# Patient Record
Sex: MALE | Race: WHITE | NOT HISPANIC OR LATINO | URBAN - METROPOLITAN AREA
[De-identification: names, ages, dates, MRNs, and addresses within clinical notes are randomized per-mention and may not be internally consistent; named-entity substitution may affect disease eponyms.]

---

## 2022-12-12 ENCOUNTER — INPATIENT (INPATIENT)
Facility: HOSPITAL | Age: 77
LOS: 2 days | Discharge: HOME | DRG: 682 | End: 2022-12-15
Attending: GENERAL PRACTICE | Admitting: FAMILY MEDICINE
Payer: MEDICARE

## 2022-12-12 VITALS
RESPIRATION RATE: 24 BRPM | TEMPERATURE: 96 F | WEIGHT: 210.1 LBS | OXYGEN SATURATION: 94 % | HEIGHT: 70 IN | HEART RATE: 111 BPM | SYSTOLIC BLOOD PRESSURE: 111 MMHG | DIASTOLIC BLOOD PRESSURE: 77 MMHG

## 2022-12-12 DIAGNOSIS — J18.9 PNEUMONIA, UNSPECIFIED ORGANISM: ICD-10-CM

## 2022-12-12 DIAGNOSIS — N17.9 ACUTE KIDNEY FAILURE, UNSPECIFIED: ICD-10-CM

## 2022-12-12 DIAGNOSIS — Z29.9 ENCOUNTER FOR PROPHYLACTIC MEASURES, UNSPECIFIED: ICD-10-CM

## 2022-12-12 DIAGNOSIS — E78.5 HYPERLIPIDEMIA, UNSPECIFIED: ICD-10-CM

## 2022-12-12 DIAGNOSIS — I10 ESSENTIAL (PRIMARY) HYPERTENSION: ICD-10-CM

## 2022-12-12 DIAGNOSIS — Z87.2 PERSONAL HISTORY OF DISEASES OF THE SKIN AND SUBCUTANEOUS TISSUE: ICD-10-CM

## 2022-12-12 DIAGNOSIS — R74.01 ELEVATION OF LEVELS OF LIVER TRANSAMINASE LEVELS: ICD-10-CM

## 2022-12-12 LAB
ALBUMIN SERPL ELPH-MCNC: 2.7 G/DL — LOW (ref 3.3–5)
ALP SERPL-CCNC: 301 U/L — HIGH (ref 40–120)
ALT FLD-CCNC: 83 U/L — HIGH (ref 12–78)
ANION GAP SERPL CALC-SCNC: 14 MMOL/L — SIGNIFICANT CHANGE UP (ref 5–17)
AST SERPL-CCNC: 74 U/L — HIGH (ref 15–37)
BASOPHILS # BLD AUTO: 0.04 K/UL — SIGNIFICANT CHANGE UP (ref 0–0.2)
BASOPHILS NFR BLD AUTO: 0.2 % — SIGNIFICANT CHANGE UP (ref 0–2)
BILIRUB SERPL-MCNC: 1.1 MG/DL — SIGNIFICANT CHANGE UP (ref 0.2–1.2)
BUN SERPL-MCNC: 37 MG/DL — HIGH (ref 7–23)
CALCIUM SERPL-MCNC: 8.7 MG/DL — SIGNIFICANT CHANGE UP (ref 8.5–10.1)
CHLORIDE SERPL-SCNC: 96 MMOL/L — SIGNIFICANT CHANGE UP (ref 96–108)
CO2 SERPL-SCNC: 25 MMOL/L — SIGNIFICANT CHANGE UP (ref 22–31)
CREAT SERPL-MCNC: 1.8 MG/DL — HIGH (ref 0.5–1.3)
EGFR: 39 ML/MIN/1.73M2 — LOW
EOSINOPHIL # BLD AUTO: 0.05 K/UL — SIGNIFICANT CHANGE UP (ref 0–0.5)
EOSINOPHIL NFR BLD AUTO: 0.2 % — SIGNIFICANT CHANGE UP (ref 0–6)
GLUCOSE SERPL-MCNC: 95 MG/DL — SIGNIFICANT CHANGE UP (ref 70–99)
HCT VFR BLD CALC: 43.5 % — SIGNIFICANT CHANGE UP (ref 39–50)
HGB BLD-MCNC: 14.5 G/DL — SIGNIFICANT CHANGE UP (ref 13–17)
IMM GRANULOCYTES NFR BLD AUTO: 1.2 % — HIGH (ref 0–0.9)
LACTATE SERPL-SCNC: 2.2 MMOL/L — HIGH (ref 0.7–2)
LYMPHOCYTES # BLD AUTO: 0.56 K/UL — LOW (ref 1–3.3)
LYMPHOCYTES # BLD AUTO: 2.7 % — LOW (ref 13–44)
MAGNESIUM SERPL-MCNC: 2.1 MG/DL — SIGNIFICANT CHANGE UP (ref 1.6–2.6)
MCHC RBC-ENTMCNC: 26.5 PG — LOW (ref 27–34)
MCHC RBC-ENTMCNC: 33.3 GM/DL — SIGNIFICANT CHANGE UP (ref 32–36)
MCV RBC AUTO: 79.4 FL — LOW (ref 80–100)
MONOCYTES # BLD AUTO: 0.7 K/UL — SIGNIFICANT CHANGE UP (ref 0–0.9)
MONOCYTES NFR BLD AUTO: 3.3 % — SIGNIFICANT CHANGE UP (ref 2–14)
NEUTROPHILS # BLD AUTO: 19.42 K/UL — HIGH (ref 1.8–7.4)
NEUTROPHILS NFR BLD AUTO: 92.4 % — HIGH (ref 43–77)
NRBC # BLD: 0 /100 WBCS — SIGNIFICANT CHANGE UP (ref 0–0)
NT-PROBNP SERPL-SCNC: 3091 PG/ML — HIGH (ref 0–450)
PLATELET # BLD AUTO: 186 K/UL — SIGNIFICANT CHANGE UP (ref 150–400)
POTASSIUM SERPL-MCNC: 3.5 MMOL/L — SIGNIFICANT CHANGE UP (ref 3.5–5.3)
POTASSIUM SERPL-SCNC: 3.5 MMOL/L — SIGNIFICANT CHANGE UP (ref 3.5–5.3)
PROT SERPL-MCNC: 6.3 G/DL — SIGNIFICANT CHANGE UP (ref 6–8.3)
RAPID RVP RESULT: SIGNIFICANT CHANGE UP
RBC # BLD: 5.48 M/UL — SIGNIFICANT CHANGE UP (ref 4.2–5.8)
RBC # FLD: 15 % — HIGH (ref 10.3–14.5)
SARS-COV-2 RNA SPEC QL NAA+PROBE: SIGNIFICANT CHANGE UP
SODIUM SERPL-SCNC: 135 MMOL/L — SIGNIFICANT CHANGE UP (ref 135–145)
WBC # BLD: 21.02 K/UL — HIGH (ref 3.8–10.5)
WBC # FLD AUTO: 21.02 K/UL — HIGH (ref 3.8–10.5)

## 2022-12-12 PROCEDURE — 99285 EMERGENCY DEPT VISIT HI MDM: CPT | Mod: FS,CS

## 2022-12-12 PROCEDURE — 93010 ELECTROCARDIOGRAM REPORT: CPT

## 2022-12-12 PROCEDURE — 99223 1ST HOSP IP/OBS HIGH 75: CPT | Mod: GC

## 2022-12-12 PROCEDURE — 71046 X-RAY EXAM CHEST 2 VIEWS: CPT | Mod: 26

## 2022-12-12 RX ORDER — BISOPROLOL FUMARATE 10 MG/1
1 TABLET, FILM COATED ORAL
Qty: 0 | Refills: 0 | DISCHARGE

## 2022-12-12 RX ORDER — AMLODIPINE BESYLATE 2.5 MG/1
10 TABLET ORAL DAILY
Refills: 0 | Status: DISCONTINUED | OUTPATIENT
Start: 2022-12-13 | End: 2022-12-15

## 2022-12-12 RX ORDER — SIMVASTATIN 20 MG/1
1 TABLET, FILM COATED ORAL
Qty: 0 | Refills: 0 | DISCHARGE

## 2022-12-12 RX ORDER — CEFTRIAXONE 500 MG/1
1000 INJECTION, POWDER, FOR SOLUTION INTRAMUSCULAR; INTRAVENOUS EVERY 24 HOURS
Refills: 0 | Status: DISCONTINUED | OUTPATIENT
Start: 2022-12-13 | End: 2022-12-15

## 2022-12-12 RX ORDER — CEFTRIAXONE 500 MG/1
1000 INJECTION, POWDER, FOR SOLUTION INTRAMUSCULAR; INTRAVENOUS ONCE
Refills: 0 | Status: COMPLETED | OUTPATIENT
Start: 2022-12-12 | End: 2022-12-12

## 2022-12-12 RX ORDER — METOPROLOL TARTRATE 50 MG
200 TABLET ORAL DAILY
Refills: 0 | Status: DISCONTINUED | OUTPATIENT
Start: 2022-12-13 | End: 2022-12-15

## 2022-12-12 RX ORDER — AZITHROMYCIN 500 MG/1
500 TABLET, FILM COATED ORAL EVERY 24 HOURS
Refills: 0 | Status: DISCONTINUED | OUTPATIENT
Start: 2022-12-13 | End: 2022-12-14

## 2022-12-12 RX ORDER — SODIUM CHLORIDE 9 MG/ML
2000 INJECTION INTRAMUSCULAR; INTRAVENOUS; SUBCUTANEOUS ONCE
Refills: 0 | Status: COMPLETED | OUTPATIENT
Start: 2022-12-12 | End: 2022-12-12

## 2022-12-12 RX ORDER — SIMVASTATIN 20 MG/1
10 TABLET, FILM COATED ORAL AT BEDTIME
Refills: 0 | Status: DISCONTINUED | OUTPATIENT
Start: 2022-12-12 | End: 2022-12-13

## 2022-12-12 RX ORDER — SODIUM CHLORIDE 9 MG/ML
1000 INJECTION INTRAMUSCULAR; INTRAVENOUS; SUBCUTANEOUS
Refills: 0 | Status: DISCONTINUED | OUTPATIENT
Start: 2022-12-12 | End: 2022-12-13

## 2022-12-12 RX ORDER — CHLORTHALIDONE 50 MG
1 TABLET ORAL
Qty: 0 | Refills: 0 | DISCHARGE

## 2022-12-12 RX ORDER — HEPARIN SODIUM 5000 [USP'U]/ML
5000 INJECTION INTRAVENOUS; SUBCUTANEOUS EVERY 8 HOURS
Refills: 0 | Status: DISCONTINUED | OUTPATIENT
Start: 2022-12-12 | End: 2022-12-14

## 2022-12-12 RX ORDER — AZITHROMYCIN 500 MG/1
500 TABLET, FILM COATED ORAL ONCE
Refills: 0 | Status: COMPLETED | OUTPATIENT
Start: 2022-12-12 | End: 2022-12-12

## 2022-12-12 RX ORDER — AMLODIPINE BESYLATE 2.5 MG/1
1 TABLET ORAL
Qty: 0 | Refills: 0 | DISCHARGE

## 2022-12-12 RX ADMIN — HEPARIN SODIUM 5000 UNIT(S): 5000 INJECTION INTRAVENOUS; SUBCUTANEOUS at 22:34

## 2022-12-12 RX ADMIN — SODIUM CHLORIDE 1000 MILLILITER(S): 9 INJECTION INTRAMUSCULAR; INTRAVENOUS; SUBCUTANEOUS at 20:52

## 2022-12-12 RX ADMIN — AZITHROMYCIN 255 MILLIGRAM(S): 500 TABLET, FILM COATED ORAL at 22:21

## 2022-12-12 RX ADMIN — SIMVASTATIN 10 MILLIGRAM(S): 20 TABLET, FILM COATED ORAL at 22:34

## 2022-12-12 RX ADMIN — CEFTRIAXONE 100 MILLIGRAM(S): 500 INJECTION, POWDER, FOR SOLUTION INTRAMUSCULAR; INTRAVENOUS at 20:52

## 2022-12-12 NOTE — H&P ADULT - NSHPSOCIALHISTORY_GEN_ALL_CORE
Tobacco: former smoker, quit 7 years ago  EtOH: denies  Recreational drug use: denies  Lives with: wife  Ambulates: independent  ADLs: indpendent

## 2022-12-12 NOTE — H&P ADULT - PROBLEM SELECTOR PLAN 1
Patient with 1 month of productive cough, malaise, progressive NICHOLSON  -CXR showing patchy infiltrates bilat, f/u official report  -Patient meeting sepsis criteria on admission with leukocytosis, tachycardia, source likely PNA  -Admit to telemetry  -F/u CT chest  -S/p 2L NS bolus in ED, start maintenance IVF  -Lactate elevated on admission, f/u repeat s/p IVF  -S/p Azithro and Rocephin in ED, continue  -F/u blood cultures, legionella, strep pneumo  -Trend temp and leukocytosis curve, patient on prednisone for psoriasis so leukocytosis likely falsely elevated however baseline unknown  -Tylenol PRN   -ID Dr. Bryant consulted Patient with 1 month of productive cough, malaise, progressive NICHOLSON  -CXR showing patchy infiltrates bilat, f/u official report  -Patient meeting sepsis criteria on admission with leukocytosis, tachycardia, source likely PNA  -Admit to telemetry  -BNP elevated however likely in setting of LIVIER, do not suspect pulm congestion however f/u CT chest  -S/p 2L NS bolus in ED, start maintenance IVF  -Lactate elevated on admission, f/u repeat s/p IVF  -S/p Azithro and Rocephin in ED, continue  -F/u blood cultures, legionella, strep pneumo  -Trend temp and leukocytosis curve, patient on prednisone for psoriasis so leukocytosis likely falsely elevated however baseline unknown  -Tylenol PRN   -ID Dr. Bryant consulted Patient with 1 month of productive cough, malaise, progressive NICHOLSON  -CXR showing patchy infiltrates bilat, f/u official report  -Patient meeting sepsis criteria on admission with leukocytosis, tachycardia, source likely PNA  -Admit to telemetry  -Wells Score 1.5, low risk for PE, suspect tachy in setting of sepsis however if remains tachycardic or develops other signs of PE obtain D-dimer  -BNP elevated however likely in setting of LIVIER, do not suspect pulm congestion, however f/u CT chest  -S/p 2L NS bolus in ED, start maintenance IVF  -Lactate elevated on admission, f/u repeat s/p IVF  -S/p Azithro and Rocephin in ED, continue  -F/u blood cultures, legionella, strep pneumo  -Trend temp and leukocytosis curve, patient on prednisone for psoriasis so leukocytosis likely falsely elevated however baseline unknown  -ID Dr. Bryant consulted  -Pulm Dr. Linton consulted

## 2022-12-12 NOTE — H&P ADULT - PROBLEM SELECTOR PLAN 3
Alk phos 301, AST 74, ALT 83 on admission, unknown baseline  -F/u RUQ US  -Trend LFTs Alk phos 301, AST 74, ALT 83 on admission, unknown baseline  -Trend LFTs, if remains elevated consider RUQ US  -Avoid further hepatotoxic meds

## 2022-12-12 NOTE — ED ADULT NURSE NOTE - ED STAT RN HANDOFF DETAILS
Received the patient in the Er at the time of change of shift. Patient is alert and oriented. Monitoring continuing. pending for bed availability.

## 2022-12-12 NOTE — H&P ADULT - PROBLEM SELECTOR PLAN 4
Continue home amlodipine 10mg qd, therapeutic conversion bisoprolol to metoprolol succ 200mg qd  -Hold chlorthalidone in setting of LIVIER, restart when able  -Monitor routine hemodynamics

## 2022-12-12 NOTE — ED PROVIDER NOTE - CROS ED ROS STATEMENT
Sly Dorado the care coordinator with [facility] called to inform Dr Neetu Brar that the pt has admitted for NSTEMI. He is in room 7410.  If any questions give Sly Dorado a call [phone]
all other ROS negative except as per HPI

## 2022-12-12 NOTE — ED PROVIDER NOTE - CARE PLAN
1 Principal Discharge DX:	Bilateral pneumonia  Secondary Diagnosis:	Dehydration  Secondary Diagnosis:	Shortness of breath

## 2022-12-12 NOTE — H&P ADULT - PROBLEM SELECTOR PLAN 2
BUN 37, Cr 1.80, GFR 39 on admission, baseline renal status unknown   -Likely prerenal in setting of acute infection, poor PO intake  -S/p 2L NS bolus in ED, continue maintenance IVF  -Hold home chlorthalidone and further nephrotoxic meds  -If renal indices do not improve, consider renal consult

## 2022-12-12 NOTE — ED PROVIDER NOTE - WR ORDER DATE AND TIME 1
Detail Level: Simple Additional Notes: Patient consent was obtained to proceed with the visit and recommended plan of care after discussion of all risks and benefits, including the risks of COVID-19 exposure. 12-Dec-2022 17:06

## 2022-12-12 NOTE — ED PROVIDER NOTE - CROS ED GI ALL NEG
negative...
No respiratory distress. No stridor, Lungs sounds clear with good aeration bilaterally. No wheezing

## 2022-12-12 NOTE — ED ADULT NURSE NOTE - OBJECTIVE STATEMENT
Pt a/ox4 reports SOB/NICHOLSON, diminished lung sounds, non productive cough, no other signs of acute distress noted.

## 2022-12-12 NOTE — H&P ADULT - NSHPPHYSICALEXAM_GEN_ALL_CORE
T(C): 35.7 (12-12-22 @ 16:06), Max: 35.7 (12-12-22 @ 16:06)  HR: 111 (12-12-22 @ 16:06) (111 - 111)  BP: 111/77 (12-12-22 @ 16:06) (111/77 - 111/77)  RR: 24 (12-12-22 @ 16:06) (24 - 24)  SpO2: 94% (12-12-22 @ 16:06) (94% - 94%)  Wt(kg): --    Physical Exam:   GENERAL: well-groomed, well-developed, NAD  HEENT: head NC/AT; EOM intact, PERRLA, conjunctiva & sclera clear; hearing grossly intact, dry mucous membranes  NECK: supple, no JVD  RESPIRATORY: decreased  breath sounds bilat, no wheezing rhonchi or rales  CARDIOVASCULAR: S1&S2, RRR, no murmurs  ABDOMEN: soft, non-tender, non-distended, + Bowel sounds x4 quadrants, no guarding, rebound or rigidity  MUSCULOSKELETAL:  no clubbing, cyanosis or edema of all 4 extremities  VASCULAR: Dorsalis pedis pulses 2+ bilaterally,   SKIN: warm and dry, color normal  NEUROLOGIC: answering questions appropriately, moving all extremities spontaneously  Psych: Normal mood and affect, normal behavior

## 2022-12-12 NOTE — H&P ADULT - ASSESSMENT
75 yo male PMH HTN, HLD, psoriasis on daily prednisone presents to ED with 4 weeks of productive cough, malaise, poor PO intake, admitted for PNA, LIVIER, and transaminitis.

## 2022-12-12 NOTE — ED PROVIDER NOTE - EMPLOYMENT
pleural effusion. Acute nondisplaced left 6th and 7th rib fractures. Abdomen and pelvis CT: Bilateral perinephric fat stranding, which may be age   related. Nonetheless, correlate with any clinical evidence of urinary tract   infection. Otherwise no acute abnormality detected within the abdomen and   pelvis. 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 No acute intracranial abnormality. No acute intracranial hemorrhage. Assessment/Plan:    Active Hospital Problems    Diagnosis Date Noted    Cardiac arrest [facility]) [I46.9] 06/06/2020       Cardiac arrest:  -Cause uncertain--appears most likely to be infectious. Troponin and procalcitonin on arrival at [facility] ED were within normal limits. WBC was elevated at 14 in potassium elevated at 5.9.  -Blood cultures obtained at outside hospital.  Follow-up results  -COVID rule out test pending  -Hypothermia protocol initiated. Rewarming to start this evening. Given myoclonic jerking patient has poor neurologic status prognosis     Chronic pleural catheter:  -Uncertain the reason it was placed. Pt does appear to have CHF or perhaps some type of malignancy--CT chest shows concern for potential mesothilioma   -prominent lymph node on CT. In conversation of Dr. Rambo Mcdaniels with family, this was never biopsied/worked up  - may be source of infection    Potential Pneumonia -- HAP  -empiric vanc + Merrem    Left pleural effusion  - Suction.    - need monitor for infection of pleur-x      Shock with lactic acidosis:  - s/p Levo and bicard drips     CELESTINE:  - likely due to hypoperfusion  -cont to trend       DVT Prophylaxis: Outpt Eliquis  Diet: Diet NPO Effective Now  Code Status: Limited    PT/OT Eval Status: n/a    Dispo - ICU, poor neurologic prognosis    Brandon Burr MD Retired

## 2022-12-12 NOTE — ED PROVIDER NOTE - OBJECTIVE STATEMENT
77 yo M PMHx HTN presents to ED c/o productive cough, congestion, malaise x about 3 weeks. Pt reports decreased appetite. States he has been feeling short of breath over the last few days, particularly with coughing bouts. Pt sent in by his PCP for evaluation. Pt denies chest pain, abd pain, back pain, fever/chills, leg pain/swelling, known sick contacts, recent travel.

## 2022-12-12 NOTE — H&P ADULT - NSHPREVIEWOFSYSTEMS_GEN_ALL_CORE
CONSTITUTIONAL: +malaise, +chills, no fevers  HEENT:  No headache, no visual changes, no sore throat  RESPIRATORY: +productive cough, +NICHOLSON, denies wheezing  CARDIOVASCULAR: No chest pain or palpitations  GASTROINTESTINAL: No abdominal pain, no nausea, vomiting, or hematemesis; No diarrhea or constipation. No melena or hematochezia.  GENITOURINARY: No dysuria, frequency or hematuria  NEUROLOGICAL: No focal weakness or dizziness   MSK: no myalgias  SKIN: No itching, burning, rashes, or lesions

## 2022-12-12 NOTE — H&P ADULT - ATTENDING COMMENTS
75 yo male PMH HTN, HLD, psoriasis on daily prednisone presents to ED with 4 weeks of productive cough, malaise, poor PO intake, admitted for b/l cap PNA, LIVIER, and transaminitis. Plan: cont iv antibx, apprec pulm recs, monitor clinical course. f/u ct chest, cont prednisone, trend renal and liver functions

## 2022-12-12 NOTE — H&P ADULT - HISTORY OF PRESENT ILLNESS
75 yo male PMH HTN, HLD, psoriasis on daily prednisone presents to ED with 4 weeks of productive cough, malaise, poor PO intake. Patient states after [place]'s day he developed a productive cough with mucous. States this has remained persistent, however past few days he has had worsening NICHOLSON and SOB. Also c/o chills. Denies fevers, sore throat, HA, CP, palpitations. Patient has tried Mucinex with some improvement, however states his symptoms always return. Patient states he has not been eating or drinking at baseline this past week.     In ED:  Vitals: T 96.3, , /77, RR 24, SpO2 94%  Labs significant for: WBC 21.02, neutrophil 19.42, BUN 37, Cr 1.80, GFR 39, alk phos 301, AST 74, ALT 83, lactate 2.2, BNP 3091  CXR: patchy bilat on wet read  EKG: NSR rate 100, possible LAE, nonspecific ST wave abnormality  Given: IV Azithro x1, IV Rocephin x1, 2L NS bolus x1 75 yo male PMH HTN, HLD, psoriasis on daily prednisone presents to ED with 4 weeks of productive cough, malaise, poor PO intake. Patient states after [place]'s day he developed a productive cough with mucous. States this has remained persistent, however past few days he has had worsening NICHOLSON and SOB. Also c/o chills. Denies fevers, sore throat, HA, CP, palpitations. Patient has tried Mucinex with some improvement, however states his symptoms always return. Patient states he has not been eating or drinking at baseline this past week. Patient denies any coughing after eating or hx of dysphagia.     In ED:  Vitals: T 96.3, , /77, RR 24, SpO2 94%  Labs significant for: WBC 21.02, neutrophil 19.42, BUN 37, Cr 1.80, GFR 39, alk phos 301, AST 74, ALT 83, lactate 2.2, BNP 3091  CXR: patchy bilat on wet read  EKG: NSR rate 100, possible LAE, nonspecific ST wave abnormality  Given: IV Azithro x1, IV Rocephin x1, 2L NS bolus x1

## 2022-12-12 NOTE — ED PROVIDER NOTE - CLINICAL SUMMARY MEDICAL DECISION MAKING FREE TEXT BOX
Patient is a 76-year-old male with a history of high blood pressure is been short of breath for 3 days, suffering cold and flu symptoms for the past 3 weeks not eating or drinking well.  His primary care physician is Dr. Prasad Shirley who sent him into the hospital today for evaluation for dehydration and possible pneumonia.  Patient is 77 birthday is in 1 week, and he is eager to go home.    On evaluation is a well-developed well-nourished male no apparent distress.  HEENT reveals no G/F/R.  Mucous membranes are dry.  Lung sounds are coarse bilaterally at the bases, heart exam is normal.  Abdominal exam is soft nontender no guarding no rebound.  No CVA tenderness.  Musculoskeletal exam is normal.  Skin exam is normal.  Neurologic exam is normal patient is awake and alert    Patient with shortness of breath and a cough with dehydration, rule out pneumonia, rule out sepsis chest x-ray reveals bilateral pneumonia will provide antibiotic therapy White count is 21,000 will provide IV fluid resuscitation, but lactate is elevated will after the IV fluids we will repeat the lactate, admit to the hospital for bilateral pneumonia. ro covid ro viral

## 2022-12-12 NOTE — ED PROVIDER NOTE - NS ED ATTENDING STATEMENT MOD
This was a shared visit with the KALYAN. I reviewed and verified the documentation and independently performed the documented:

## 2022-12-13 LAB
ALBUMIN SERPL ELPH-MCNC: 2.2 G/DL — LOW (ref 3.3–5)
ALP SERPL-CCNC: 232 U/L — HIGH (ref 40–120)
ALT FLD-CCNC: 62 U/L — SIGNIFICANT CHANGE UP (ref 12–78)
ANION GAP SERPL CALC-SCNC: 12 MMOL/L — SIGNIFICANT CHANGE UP (ref 5–17)
AST SERPL-CCNC: 64 U/L — HIGH (ref 15–37)
BASOPHILS # BLD AUTO: 0.04 K/UL — SIGNIFICANT CHANGE UP (ref 0–0.2)
BASOPHILS NFR BLD AUTO: 0.2 % — SIGNIFICANT CHANGE UP (ref 0–2)
BILIRUB SERPL-MCNC: 0.7 MG/DL — SIGNIFICANT CHANGE UP (ref 0.2–1.2)
BUN SERPL-MCNC: 36 MG/DL — HIGH (ref 7–23)
CALCIUM SERPL-MCNC: 7.8 MG/DL — LOW (ref 8.5–10.1)
CHLORIDE SERPL-SCNC: 100 MMOL/L — SIGNIFICANT CHANGE UP (ref 96–108)
CO2 SERPL-SCNC: 24 MMOL/L — SIGNIFICANT CHANGE UP (ref 22–31)
CREAT SERPL-MCNC: 1.5 MG/DL — HIGH (ref 0.5–1.3)
EGFR: 48 ML/MIN/1.73M2 — LOW
EOSINOPHIL # BLD AUTO: 0.13 K/UL — SIGNIFICANT CHANGE UP (ref 0–0.5)
EOSINOPHIL NFR BLD AUTO: 0.8 % — SIGNIFICANT CHANGE UP (ref 0–6)
GLUCOSE SERPL-MCNC: 127 MG/DL — HIGH (ref 70–99)
HCT VFR BLD CALC: 39.9 % — SIGNIFICANT CHANGE UP (ref 39–50)
HCV AB S/CO SERPL IA: 0.08 S/CO — SIGNIFICANT CHANGE UP (ref 0–0.99)
HCV AB SERPL-IMP: SIGNIFICANT CHANGE UP
HGB BLD-MCNC: 12.8 G/DL — LOW (ref 13–17)
IMM GRANULOCYTES NFR BLD AUTO: 0.6 % — SIGNIFICANT CHANGE UP (ref 0–0.9)
LEGIONELLA AG UR QL: NEGATIVE — SIGNIFICANT CHANGE UP
LYMPHOCYTES # BLD AUTO: 0.94 K/UL — LOW (ref 1–3.3)
LYMPHOCYTES # BLD AUTO: 5.5 % — LOW (ref 13–44)
MAGNESIUM SERPL-MCNC: 2.1 MG/DL — SIGNIFICANT CHANGE UP (ref 1.6–2.6)
MCHC RBC-ENTMCNC: 26.5 PG — LOW (ref 27–34)
MCHC RBC-ENTMCNC: 32.1 GM/DL — SIGNIFICANT CHANGE UP (ref 32–36)
MCV RBC AUTO: 82.6 FL — SIGNIFICANT CHANGE UP (ref 80–100)
MONOCYTES # BLD AUTO: 0.64 K/UL — SIGNIFICANT CHANGE UP (ref 0–0.9)
MONOCYTES NFR BLD AUTO: 3.8 % — SIGNIFICANT CHANGE UP (ref 2–14)
NEUTROPHILS # BLD AUTO: 15.19 K/UL — HIGH (ref 1.8–7.4)
NEUTROPHILS NFR BLD AUTO: 89.1 % — HIGH (ref 43–77)
NRBC # BLD: 0 /100 WBCS — SIGNIFICANT CHANGE UP (ref 0–0)
PHOSPHATE SERPL-MCNC: 3.3 MG/DL — SIGNIFICANT CHANGE UP (ref 2.5–4.5)
PLATELET # BLD AUTO: 141 K/UL — LOW (ref 150–400)
POTASSIUM SERPL-MCNC: 3.4 MMOL/L — LOW (ref 3.5–5.3)
POTASSIUM SERPL-SCNC: 3.4 MMOL/L — LOW (ref 3.5–5.3)
PROCALCITONIN SERPL-MCNC: 1.02 NG/ML — HIGH
PROT SERPL-MCNC: 5.3 G/DL — LOW (ref 6–8.3)
RBC # BLD: 4.83 M/UL — SIGNIFICANT CHANGE UP (ref 4.2–5.8)
RBC # FLD: 14.9 % — HIGH (ref 10.3–14.5)
SODIUM SERPL-SCNC: 136 MMOL/L — SIGNIFICANT CHANGE UP (ref 135–145)
WBC # BLD: 17.04 K/UL — HIGH (ref 3.8–10.5)
WBC # FLD AUTO: 17.04 K/UL — HIGH (ref 3.8–10.5)

## 2022-12-13 PROCEDURE — 99233 SBSQ HOSP IP/OBS HIGH 50: CPT

## 2022-12-13 PROCEDURE — 99222 1ST HOSP IP/OBS MODERATE 55: CPT

## 2022-12-13 PROCEDURE — 71250 CT THORAX DX C-: CPT | Mod: 26

## 2022-12-13 PROCEDURE — 93306 TTE W/DOPPLER COMPLETE: CPT | Mod: 26

## 2022-12-13 RX ORDER — POTASSIUM CHLORIDE 20 MEQ
40 PACKET (EA) ORAL ONCE
Refills: 0 | Status: COMPLETED | OUTPATIENT
Start: 2022-12-13 | End: 2022-12-13

## 2022-12-13 RX ORDER — ALBUTEROL 90 UG/1
2 AEROSOL, METERED ORAL EVERY 6 HOURS
Refills: 0 | Status: DISCONTINUED | OUTPATIENT
Start: 2022-12-13 | End: 2022-12-15

## 2022-12-13 RX ORDER — FAMOTIDINE 10 MG/ML
20 INJECTION INTRAVENOUS DAILY
Refills: 0 | Status: DISCONTINUED | OUTPATIENT
Start: 2022-12-13 | End: 2022-12-15

## 2022-12-13 RX ORDER — INFLUENZA VIRUS VACCINE 15; 15; 15; 15 UG/.5ML; UG/.5ML; UG/.5ML; UG/.5ML
0.7 SUSPENSION INTRAMUSCULAR ONCE
Refills: 0 | Status: DISCONTINUED | OUTPATIENT
Start: 2022-12-13 | End: 2022-12-15

## 2022-12-13 RX ADMIN — Medication 10 MILLIGRAM(S): at 07:08

## 2022-12-13 RX ADMIN — AZITHROMYCIN 255 MILLIGRAM(S): 500 TABLET, FILM COATED ORAL at 07:08

## 2022-12-13 RX ADMIN — CEFTRIAXONE 100 MILLIGRAM(S): 500 INJECTION, POWDER, FOR SOLUTION INTRAMUSCULAR; INTRAVENOUS at 21:34

## 2022-12-13 RX ADMIN — AMLODIPINE BESYLATE 10 MILLIGRAM(S): 2.5 TABLET ORAL at 07:08

## 2022-12-13 RX ADMIN — Medication 200 MILLIGRAM(S): at 07:08

## 2022-12-13 RX ADMIN — HEPARIN SODIUM 5000 UNIT(S): 5000 INJECTION INTRAVENOUS; SUBCUTANEOUS at 15:12

## 2022-12-13 RX ADMIN — HEPARIN SODIUM 5000 UNIT(S): 5000 INJECTION INTRAVENOUS; SUBCUTANEOUS at 07:08

## 2022-12-13 RX ADMIN — HEPARIN SODIUM 5000 UNIT(S): 5000 INJECTION INTRAVENOUS; SUBCUTANEOUS at 21:34

## 2022-12-13 RX ADMIN — Medication 40 MILLIEQUIVALENT(S): at 10:00

## 2022-12-13 RX ADMIN — SODIUM CHLORIDE 70 MILLILITER(S): 9 INJECTION INTRAMUSCULAR; INTRAVENOUS; SUBCUTANEOUS at 07:39

## 2022-12-13 NOTE — CONSULT NOTE ADULT - TIME BILLING
Thank you for your consult.   Please call us with any concerns or questions.   We will continue to follow.     Arron Yip MD   Division of Infectious Diseases  [facility]   Cell [phone] between 8am and 6pm   After 6pm and weekends please call ID service at [phone].

## 2022-12-13 NOTE — CONSULT NOTE ADULT - ASSESSMENT
77 yo male PMH HTN, HLD, psoriasis on daily prednisone presents to ED with 4 weeks of productive cough, malaise, poor PO intake. Patient states after 's day he developed a productive cough with mucous. 77 yo male PMH HTN, HLD, psoriasis on daily prednisone presents to ED with 4 weeks of productive cough, malaise, poor PO intake. Patient states after 's day he developed a productive cough with mucous.    pt reports congestion and cough  rvp neg  ct chest pending, cxr with poss PNA     eval for HF  eval for PNA    emp ABX in progress  biomarkers  pt is on chr steroids for Psoriasis -     consider Cardio eval - TTE - and eval for HF - proBNP noted - concern for vol overload and HF    cough rx regimen prn  acap prn  monitor VS  WBC elev - may be due to steroids - plus an Infectious component  tolerating room air  cvs rx regimen and BP control - hx of HTN and HLD  pt is an ex smoker - PFT as outpatient, Proventil PRN for sob and or wheeze - empirically

## 2022-12-13 NOTE — PATIENT PROFILE ADULT - FUNCTIONAL ASSESSMENT - BASIC MOBILITY 6.
3-calculated by average/Not able to assess (calculate score using [facility] averaging method)

## 2022-12-13 NOTE — CONSULT NOTE ADULT - SUBJECTIVE AND OBJECTIVE BOX
Date/Time Patient Seen:  		  Referring MD:   Data Reviewed	       Patient is a 76y old  Male who presents with a chief complaint of sepsis 2/2 PNA (12 Dec 2022 21:03)      Subjective/HPI   Reason for Admission: sepsis 2/2 PNA  History of Present Illness:   75 yo male PMH HTN, HLD, psoriasis on daily prednisone presents to ED with 4 weeks of productive cough, malaise, poor PO intake. Patient states after 's day he developed a productive cough with mucous. States this has remained persistent, however past few days he has had worsening NICHOLSON and SOB. Also c/o chills. Denies fevers, sore throat, HA, CP, palpitations. Patient has tried Mucinex with some improvement, however states his symptoms always return. Patient states he has not been eating or drinking at baseline this past week. Patient denies any coughing after eating or hx of dysphagia.   PAST MEDICAL & SURGICAL HISTORY:  Hypertension    Psoriasis    HLD (hyperlipidemia)    No significant past surgical history          Medication list         MEDICATIONS  (STANDING):  amLODIPine   Tablet 10 milliGRAM(s) Oral daily  azithromycin  IVPB 500 milliGRAM(s) IV Intermittent every 24 hours  cefTRIAXone   IVPB 1000 milliGRAM(s) IV Intermittent every 24 hours  heparin   Injectable 5000 Unit(s) SubCutaneous every 8 hours  metoprolol succinate  milliGRAM(s) Oral daily  predniSONE   Tablet 10 milliGRAM(s) Oral daily  simvastatin 10 milliGRAM(s) Oral at bedtime  sodium chloride 0.9%. 1000 milliLiter(s) (70 mL/Hr) IV Continuous <Continuous>    MEDICATIONS  (PRN):         Vitals log        ICU Vital Signs Last 24 Hrs  T(C): 35.7 (12 Dec 2022 16:06), Max: 35.7 (12 Dec 2022 16:06)  T(F): 96.3 (12 Dec 2022 16:06), Max: 96.3 (12 Dec 2022 16:06)  HR: 111 (12 Dec 2022 16:06) (111 - 111)  BP: 111/77 (12 Dec 2022 16:06) (111/77 - 111/77)  BP(mean): --  ABP: --  ABP(mean): --  RR: 24 (12 Dec 2022 16:06) (24 - 24)  SpO2: 94% (12 Dec 2022 16:06) (94% - 94%)    O2 Parameters below as of 12 Dec 2022 16:06  Patient On (Oxygen Delivery Method): room air                 Input and Output:  I&O's Detail      Lab Data                        14.5   21.02 )-----------( 186      ( 12 Dec 2022 18:50 )             43.5     12-12    135  |  96  |  37<H>  ----------------------------<  95  3.5   |  25  |  1.80<H>    Ca    8.7      12 Dec 2022 18:50  Mg     2.1     12-12    TPro  6.3  /  Alb  2.7<L>  /  TBili  1.1  /  DBili  x   /  AST  74<H>  /  ALT  83<H>  /  AlkPhos  301<H>  12-12      PAST SURGICAL HISTORY:  No significant past surgical history.     FAMILY HISTORY:  No pertinent family history in first degree relatives. No pertinent family history of: coronary disease and diabetes.     Social History:  · Substance use	No  · Social History (marital status, living situation, occupation, and sexual history)	Tobacco: former smoker, quit 7 years ago  EtOH: denies  Recreational drug use: denies  Lives with: wife  Ambulates: independent  ADLs: indpendent     Tobacco Screening:  · Core Measure Site	Yes  · Has the patient used tobacco in the past 30 days?	No    Risk Assessment:    Present on Admission:  Deep Venous Thrombosis	no  Pulmonary Embolus	no     HIV Screening:  · In accordance with [place] State law, we offer every patient who comes to our ED an HIV test. Would you like to be tested today?	Opt out        Review of Systems	  cough  sputum production      Objective     Physical Examination        Pertinent Lab findings & Imaging      Pop:  NO   Adequate UO     I&O's Detail           Discussed with:     Cultures:	        Radiology                             Date/Time Patient Seen:  		  Referring MD:   Data Reviewed	       Patient is a 76y old  Male who presents with a chief complaint of sepsis 2/2 PNA (12 Dec 2022 21:03)      Subjective/HPI  in bed  on room air  vs noted  labs reviewed  ex smoker  alert  verbal  oriented    h and p reviewed  er provider note reviewed     Reason for Admission: sepsis 2/2 PNA  History of Present Illness:   75 yo male PMH HTN, HLD, psoriasis on daily prednisone presents to ED with 4 weeks of productive cough, malaise, poor PO intake. Patient states after 's day he developed a productive cough with mucous. States this has remained persistent, however past few days he has had worsening NICHOLSON and SOB. Also c/o chills. Denies fevers, sore throat, HA, CP, palpitations. Patient has tried Mucinex with some improvement, however states his symptoms always return. Patient states he has not been eating or drinking at baseline this past week. Patient denies any coughing after eating or hx of dysphagia.   PAST MEDICAL & SURGICAL HISTORY:  Hypertension    Psoriasis    HLD (hyperlipidemia)    No significant past surgical history          Medication list         MEDICATIONS  (STANDING):  amLODIPine   Tablet 10 milliGRAM(s) Oral daily  azithromycin  IVPB 500 milliGRAM(s) IV Intermittent every 24 hours  cefTRIAXone   IVPB 1000 milliGRAM(s) IV Intermittent every 24 hours  heparin   Injectable 5000 Unit(s) SubCutaneous every 8 hours  metoprolol succinate  milliGRAM(s) Oral daily  predniSONE   Tablet 10 milliGRAM(s) Oral daily  simvastatin 10 milliGRAM(s) Oral at bedtime  sodium chloride 0.9%. 1000 milliLiter(s) (70 mL/Hr) IV Continuous <Continuous>    MEDICATIONS  (PRN):         Vitals log        ICU Vital Signs Last 24 Hrs  T(C): 35.7 (12 Dec 2022 16:06), Max: 35.7 (12 Dec 2022 16:06)  T(F): 96.3 (12 Dec 2022 16:06), Max: 96.3 (12 Dec 2022 16:06)  HR: 111 (12 Dec 2022 16:06) (111 - 111)  BP: 111/77 (12 Dec 2022 16:06) (111/77 - 111/77)  BP(mean): --  ABP: --  ABP(mean): --  RR: 24 (12 Dec 2022 16:06) (24 - 24)  SpO2: 94% (12 Dec 2022 16:06) (94% - 94%)    O2 Parameters below as of 12 Dec 2022 16:06  Patient On (Oxygen Delivery Method): room air                 Input and Output:  I&O's Detail      Lab Data                        14.5   21.02 )-----------( 186      ( 12 Dec 2022 18:50 )             43.5     12-12    135  |  96  |  37<H>  ----------------------------<  95  3.5   |  25  |  1.80<H>    Ca    8.7      12 Dec 2022 18:50  Mg     2.1     12-12    TPro  6.3  /  Alb  2.7<L>  /  TBili  1.1  /  DBili  x   /  AST  74<H>  /  ALT  83<H>  /  AlkPhos  301<H>  12-12      PAST SURGICAL HISTORY:  No significant past surgical history.     FAMILY HISTORY:  No pertinent family history in first degree relatives. No pertinent family history of: coronary disease and diabetes.     Social History:  · Substance use	No  · Social History (marital status, living situation, occupation, and sexual history)	Tobacco: former smoker, quit 7 years ago  EtOH: denies  Recreational drug use: denies  Lives with: wife  Ambulates: independent  ADLs: indpendent     Tobacco Screening:  · Core Measure Site	Yes  · Has the patient used tobacco in the past 30 days?	No    Risk Assessment:    Present on Admission:  Deep Venous Thrombosis	no  Pulmonary Embolus	no     HIV Screening:  · In accordance with [place] State law, we offer every patient who comes to our ED an HIV test. Would you like to be tested today?	Opt out        Review of Systems	  cough  sputum production      Objective     Physical Examination    heart s1s2  lung dc BS  head nc  verbal  cn grossly int  moves all extr      Pertinent Lab findings & Imaging      Pop:  NO   Adequate UO     I&O's Detail           Discussed with:     Cultures:	        Radiology    cxr with pna

## 2022-12-13 NOTE — PROGRESS NOTE ADULT - SUBJECTIVE AND OBJECTIVE BOX
Pneumonia due to infectious organism    HPI:  77 yo male PMH HTN, HLD, psoriasis on daily prednisone presents to ED with 4 weeks of productive cough, malaise, poor PO intake. Patient states after [place]'s day he developed a productive cough with mucous. States this has remained persistent, however past few days he has had worsening NICHOLSON and SOB. Also c/o chills. Denies fevers, sore throat, HA, CP, palpitations. Patient has tried Mucinex with some improvement, however states his symptoms always return. Patient states he has not been eating or drinking at baseline this past week. Patient denies any coughing after eating or hx of dysphagia. (12 Dec 2022 21:03)    Interval History:  Patient was seen and examined at bedside around 12:15 pm.  Complaining of productive cough intermittently.   Denies chest pain, palpitations, shortness of breath at rest, headache, dizziness, visual symptoms, nausea, vomiting or abdominal pain.  Denies lower extremity edema, orthopnea or paroxysmal nocturnal dyspnea.     ROS:  As per interval history otherwise unremarkable.    PHYSICAL EXAM:  Vital Signs   T(C): 36.4 (13 Dec 2022 10:27), Max: 36.4 (13 Dec 2022 06:00)  T(F): 97.5 (13 Dec 2022 10:27), Max: 97.6 (13 Dec 2022 06:00)  HR: 80 (13 Dec 2022 10:27) (74 - 111)  BP: 137/80 (13 Dec 2022 10:27) (110/60 - 137/80)  RR: 18 (13 Dec 2022 10:27) (18 - 24)  SpO2: 96% (13 Dec 2022 10:27) (94% - 96%)  Parameters below as of 13 Dec 2022 10:27  Patient On (Oxygen Delivery Method): room air  General: Elderly male lying in bed comfortably. No acute distress  HEENT: EOMI. Clear conjunctivae. Moist mucus membrane  Neck: Supple.   Chest: Good air entry. Fine scattered rhnochi. No wheezing or rales. No chest wall tenderness.  Heart: Normal S1 & S2. RRR.   Abdomen: Non distended. Soft. Non-tender. + BS  Ext: No pedal edema. No calf tenderness   Neuro: AAO x 3. No focal deficit. No speech disorder  Skin: Warm and Dry  Psychiatry: Normal mood and affect    LABS:                        12.8   17.04 )-----------( 141      ( 13 Dec 2022 07:12 )             39.9     12-13    136  |  100  |  36<H>  ----------------------------<  127<H>  3.4<L>   |  24  |  1.50<H>    Ca    7.8<L>      13 Dec 2022 07:12  Phos  3.3     12-13  Mg     2.1     12-13    TPro  5.3<L>  /  Alb  2.2<L>  /  TBili  0.7  /  DBili  0.3  /  AST  64<H>  /  ALT  62  /  AlkPhos  232<H>  12-13    RADIOLOGY & ADDITIONAL STUDIES:  Reviewed     MEDICATIONS  (STANDING):  amLODIPine   Tablet 10 milliGRAM(s) Oral daily  azithromycin  IVPB 500 milliGRAM(s) IV Intermittent every 24 hours  cefTRIAXone   IVPB 1000 milliGRAM(s) IV Intermittent every 24 hours  heparin   Injectable 5000 Unit(s) SubCutaneous every 8 hours  influenza  Vaccine (HIGH DOSE) 0.7 milliLiter(s) IntraMuscular once  metoprolol succinate  milliGRAM(s) Oral daily  predniSONE   Tablet 10 milliGRAM(s) Oral daily  simvastatin 10 milliGRAM(s) Oral at bedtime  sodium chloride 0.9%. 1000 milliLiter(s) (70 mL/Hr) IV Continuous <Continuous>    MEDICATIONS  (PRN):  albuterol    90 MICROgram(s) HFA Inhaler 2 Puff(s) Inhalation every 6 hours PRN Shortness of Breath and/or Wheezing  guaiFENesin Oral Liquid (Sugar-Free) 200 milliGRAM(s) Oral every 6 hours PRN Cough

## 2022-12-13 NOTE — CONSULT NOTE ADULT - SUBJECTIVE AND OBJECTIVE BOX
[facility]  INFECTIOUS DISEASES   [address] [place] [address]  Tel: [phone]     Fax: [phone]  ======================================================  MD Phi Campbell Kaushal, MD Cho, Michelle, MD   ======================================================    Assessment/Recommendations:       76-year-old  male with past medical history of essential hypertension dyslipidemia psoriasis on steroids for few months presented with nonspecific symptoms off weakness, postnasal drip, cough and malaise with dyspnea on exertion being admitted for     patient seen and examined   blood culture: December 12: 2 sets: Pending  Ordered Streptococcus and Legionella urinary antigen  Ordered procalcitonin   RVP PCR negative on admission  CT chest:Small bilateral pleural effusions. Multifocal and bilateral patchy ground-glass and consolidative opacities involving all lung lobes. The exact etiology is unclear and diagnostic considerations include infectious/inflammatory process. It is difficult to exclude metastasis given the hepatic findings. Esophagus contains fluid and debris throughout its course. Many hepatic lesions; the exact etiology is unclear, but metastatic disease is the leading diagnostic consideration. Upper abdominal lymphadenopathy not well evaluated. Currently patient is afebrile, saturating more than 95% and in no respiratory distress on room air, hemodynamically stable with improving WBC count   Okay to continue with  1 g IV Rocephin and 500 mg IV azithromycin for now but if cultures remain negative with minimally elevated procalcitonin based on clinical presentation and CT chest, low threshold to limit the course of antibiotic to 5 days through enteral route   daily CBC with differential, renal function with electrolytes while inpatient and on antibiotics   Further work-up of hepatic lesions as per primary team    Based on patient's history of being born and brought up in [place] with limited travel history, at low risk for acquiring community-acquired fungal pneumonia      Records, reports from primary team, nursing, consultant reviewed  Plan explained to patient   Case discussed with Primary team   _________________________________________________-  Patient is a 76y old  Male who presents with a chief complaint of sepsis 2/2 PNA (13 Dec 2022 05:32)    HPI:  77 yo male PMH HTN, HLD, psoriasis on daily prednisone presents to ED with 4 weeks of productive cough, malaise, poor PO intake. Patient states after [place]'s day he developed a productive cough with mucous. States this has remained persistent, however past few days he has had worsening NICHOLSON and SOB. Also c/o chills. Denies fevers, sore throat, HA, CP, palpitations. Patient has tried Mucinex with some improvement, however states his symptoms always return. Patient states he has not been eating or drinking at baseline this past week. Patient denies any coughing after eating or hx of dysphagia.     In ED:  Vitals: T 96.3, , /77, RR 24, SpO2 94%  Labs significant for: WBC 21.02, neutrophil 19.42, BUN 37, Cr 1.80, GFR 39, alk phos 301, AST 74, ALT 83, lactate 2.2, BNP 3091  CXR: patchy bilat on wet read  EKG: NSR rate 100, possible LAE, nonspecific ST wave abnormality  Given: IV Azithro x1, IV Rocephin x1, 2L NS bolus x1 (12 Dec 2022 21:03)     infectious disease called for further evaluation of  pneumonia and management of antibiotics.  Patient's history is vague with his timeline and symptoms based on chart review and personal questionnaire.  During my interview he mentioned that he has had generalized weakness over last couple of weeks after having a head cold with postnasal drip with dyspnea on exertion and difficulty breathing.  Denied any fever chills, did mention nausea and episode of vomiting associated with postnasal drip and trying to get phlegm out from the back of the throat.      PAST MEDICAL & SURGICAL HISTORY:  Hypertension  Psoriasis  HLD (hyperlipidemia)  No significant past surgical history    Social History:  Tobacco: former smoker, quit 7 years ago  EtOH: denies  Recreational drug use: denies  Lives with: wife  Ambulates: independent  ADLs: indpendent (12 Dec 2022 21:03)      FAMILY HISTORY:  No pertinent family history in first degree relatives    Recent Ill Contacts:	None  Recent Travel History:	 None  Recent Animal/Insect Exposure/Tick Bites: None      REVIEW OF SYSTEMS  11 systems reviewed, no other symptoms except as above    Allergies  No Known Allergies      Medication   azithromycin  IVPB 500 milliGRAM(s) IV Intermittent every 24 hours  cefTRIAXone   IVPB 1000 milliGRAM(s) IV Intermittent every 24 hours  albuterol    90 MICROgram(s) HFA Inhaler 2 Puff(s) Inhalation every 6 hours PRN  amLODIPine   Tablet 10 milliGRAM(s) Oral daily  guaiFENesin Oral Liquid (Sugar-Free) 200 milliGRAM(s) Oral every 6 hours PRN  heparin   Injectable 5000 Unit(s) SubCutaneous every 8 hours  influenza  Vaccine (HIGH DOSE) 0.7 milliLiter(s) IntraMuscular once  metoprolol succinate  milliGRAM(s) Oral daily  predniSONE   Tablet 10 milliGRAM(s) Oral daily  simvastatin 10 milliGRAM(s) Oral at bedtime  sodium chloride 0.9%. 1000 milliLiter(s) IV Continuous <Continuous>    ____________________________________________  Physical Examination:    Vital Signs Last 24 Hrs  T(C): 36.4 (13 Dec 2022 10:27), Max: 36.4 (13 Dec 2022 06:00)  T(F): 97.5 (13 Dec 2022 10:27), Max: 97.6 (13 Dec 2022 06:00)  HR: 80 (13 Dec 2022 10:27) (74 - 111)  BP: 137/80 (13 Dec 2022 10:27) (110/60 - 137/80)  RR: 18 (13 Dec 2022 10:27) (18 - 24)  SpO2: 96% (13 Dec 2022 10:27) (94% - 96%)  O2 Parameters below as of 13 Dec 2022 10:27  Patient On (Oxygen Delivery Method): room air    General: No acute distress while lying in bed    Neuro: AAO*4, No obvious acute motor deficit  HEENT: Pupils equal, reactive to light, Oral mucosa moist, Posterior pharynx inflamed without any purulence, left anterior nare with minimal inflammation  PULM: Clear to auscultation bilaterally, no significant adventitious breath sounds   CVS: Regular rhythm and controlled rate, 1/6 systolic murmur  ABD: Soft, nondistended, nontender, normoactive bowel sounds, no CVA tenderness  EXT: No b/l LE edema, nontender with pedal pulse palpable   SKIN: Warm and well perfused, no acute rashes   NO obvious palpable lymphadenopathy     _______________________________________________________    Lab Results:                        12.8   17.04 )-----------( 141      ( 13 Dec 2022 07:12 )             39.9     12-13    136  |  100  |  36<H>  ----------------------------<  127<H>  3.4<L>   |  24  |  1.50<H>    Ca    7.8<L>      13 Dec 2022 07:12  Phos  3.3     12-13  Mg     2.1     12-13    TPro  5.3<L>  /  Alb  2.2<L>  /  TBili  0.7  /  DBili  0.3  /  AST  64<H>  /  ALT  62  /  AlkPhos  232<H>  12-13    LIVER FUNCTIONS - ( 13 Dec 2022 07:12 )  Alb: 2.2 g/dL / Pro: 5.3 g/dL / ALK PHOS: 232 U/L / ALT: 62 U/L / AST: 64 U/L / GGT: x                 MICROBIOLOGY/PATHOLOGY/ RADIOLOGY: Reviewed

## 2022-12-13 NOTE — PATIENT PROFILE ADULT - FALL HARM RISK - CONCLUSION
Date of scheduled appointment: 08/23/17


Type: Cancel (Patient called to cancel appointment today due to being called 

into work.)
Fall with Harm Risk

## 2022-12-13 NOTE — PROGRESS NOTE ADULT - ASSESSMENT
77 yo male PMH HTN, HLD, psoriasis on daily prednisone presents to ED with 4 weeks of productive cough, malaise, poor PO intake, admitted for PNA, LIVIER, and transaminitis.     1) Multifocal Pneumonia  Patient with 1 month of productive cough, malaise, progressive NICHOLSON  -Unlikely Sepsis   -BNP elevated however no clinical signs of volume overload   -s/p IVF  -Lactate elevated on admission, likely due to liver disease  -S/p Azithro and Rocephin in ED, continue  -F/u blood cultures, legionella, strep pneumo  -Trend temp and leukocytosis curve, patient on prednisone for psoriasis so leukocytosis likely falsely elevated however baseline unknown  -ID Dr. Heller consulted, recs appreciated   -Pulm Dr. Linton consulted, recs appreciated     2) B/L Pleural Effusions (small)  - No sings of respiratory distress  - ECHO    3) R/O Metastatic Disease   - Will order CT Abdomen / Pelvis with IV Contrast once renal function improves otherwise will order MRI   - Will consider GI / Onc Consults after imaging     4) Transaminitis   - CT vs MRI as above   - Avoid Hepatotoxic medications  - Monitor liver function     5) LIVIER (acute kidney injury) on CKD   BUN 37, Cr 1.80, GFR 39 on admission, baseline renal status unknown but as per patient he was following nephrologist as an outpatient   -Likely prerenal in setting of acute infection, poor PO intake  -s/p IVF  -Hold home chlorthalidone and further nephrotoxic meds  -If renal indices do not improve, consider renal consult.    6) Benign essential HTN  -Continue home amlodipine 10mg qd, therapeutic conversion bisoprolol to metoprolol succ 200mg qd  -Hold chlorthalidone in setting of LIVIER, restart when able  -Monitor routine hemodynamics.    7) HLD (hyperlipidemia).   -Hold Simvastatin, resume once liver function improves     8) History of psoriasis.   Follows with rheum Dr. Bradford  -Continue prednisone 10mg qd.    9) Hypokalemia  -Repleted     DVT Prophylaxis -- Heparin SQ. Check coags.     Dispo: Likely home once stable.    CHEST PAIN

## 2022-12-14 LAB
ALBUMIN SERPL ELPH-MCNC: 2.4 G/DL — LOW (ref 3.3–5)
ALP SERPL-CCNC: 271 U/L — HIGH (ref 40–120)
ALT FLD-CCNC: 67 U/L — SIGNIFICANT CHANGE UP (ref 12–78)
ANION GAP SERPL CALC-SCNC: 7 MMOL/L — SIGNIFICANT CHANGE UP (ref 5–17)
AST SERPL-CCNC: 68 U/L — HIGH (ref 15–37)
BASOPHILS # BLD AUTO: 0.02 K/UL — SIGNIFICANT CHANGE UP (ref 0–0.2)
BASOPHILS NFR BLD AUTO: 0.1 % — SIGNIFICANT CHANGE UP (ref 0–2)
BILIRUB SERPL-MCNC: 0.7 MG/DL — SIGNIFICANT CHANGE UP (ref 0.2–1.2)
BUN SERPL-MCNC: 34 MG/DL — HIGH (ref 7–23)
CALCIUM SERPL-MCNC: 8.7 MG/DL — SIGNIFICANT CHANGE UP (ref 8.5–10.1)
CHLORIDE SERPL-SCNC: 101 MMOL/L — SIGNIFICANT CHANGE UP (ref 96–108)
CO2 SERPL-SCNC: 30 MMOL/L — SIGNIFICANT CHANGE UP (ref 22–31)
CREAT SERPL-MCNC: 1.4 MG/DL — HIGH (ref 0.5–1.3)
EGFR: 52 ML/MIN/1.73M2 — LOW
EOSINOPHIL # BLD AUTO: 0.26 K/UL — SIGNIFICANT CHANGE UP (ref 0–0.5)
EOSINOPHIL NFR BLD AUTO: 1.5 % — SIGNIFICANT CHANGE UP (ref 0–6)
GLUCOSE SERPL-MCNC: 79 MG/DL — SIGNIFICANT CHANGE UP (ref 70–99)
HCT VFR BLD CALC: 39.6 % — SIGNIFICANT CHANGE UP (ref 39–50)
HGB BLD-MCNC: 12.7 G/DL — LOW (ref 13–17)
IMM GRANULOCYTES NFR BLD AUTO: 1.1 % — HIGH (ref 0–0.9)
INR BLD: 1.45 RATIO — HIGH (ref 0.88–1.16)
LACTATE SERPL-SCNC: 1.6 MMOL/L — SIGNIFICANT CHANGE UP (ref 0.7–2)
LYMPHOCYTES # BLD AUTO: 0.75 K/UL — LOW (ref 1–3.3)
LYMPHOCYTES # BLD AUTO: 4.5 % — LOW (ref 13–44)
MAGNESIUM SERPL-MCNC: 2.3 MG/DL — SIGNIFICANT CHANGE UP (ref 1.6–2.6)
MCHC RBC-ENTMCNC: 26.3 PG — LOW (ref 27–34)
MCHC RBC-ENTMCNC: 32.1 GM/DL — SIGNIFICANT CHANGE UP (ref 32–36)
MCV RBC AUTO: 82.2 FL — SIGNIFICANT CHANGE UP (ref 80–100)
MONOCYTES # BLD AUTO: 0.64 K/UL — SIGNIFICANT CHANGE UP (ref 0–0.9)
MONOCYTES NFR BLD AUTO: 3.8 % — SIGNIFICANT CHANGE UP (ref 2–14)
NEUTROPHILS # BLD AUTO: 14.99 K/UL — HIGH (ref 1.8–7.4)
NEUTROPHILS NFR BLD AUTO: 89 % — HIGH (ref 43–77)
NRBC # BLD: 0 /100 WBCS — SIGNIFICANT CHANGE UP (ref 0–0)
PLATELET # BLD AUTO: 168 K/UL — SIGNIFICANT CHANGE UP (ref 150–400)
POTASSIUM SERPL-MCNC: 4.3 MMOL/L — SIGNIFICANT CHANGE UP (ref 3.5–5.3)
POTASSIUM SERPL-SCNC: 4.3 MMOL/L — SIGNIFICANT CHANGE UP (ref 3.5–5.3)
PROT SERPL-MCNC: 5.6 G/DL — LOW (ref 6–8.3)
PROTHROM AB SERPL-ACNC: 17 SEC — HIGH (ref 10.5–13.4)
RBC # BLD: 4.82 M/UL — SIGNIFICANT CHANGE UP (ref 4.2–5.8)
RBC # FLD: 15.2 % — HIGH (ref 10.3–14.5)
S PNEUM AG UR QL: NEGATIVE — SIGNIFICANT CHANGE UP
SODIUM SERPL-SCNC: 138 MMOL/L — SIGNIFICANT CHANGE UP (ref 135–145)
WBC # BLD: 16.85 K/UL — HIGH (ref 3.8–10.5)
WBC # FLD AUTO: 16.85 K/UL — HIGH (ref 3.8–10.5)

## 2022-12-14 PROCEDURE — 99233 SBSQ HOSP IP/OBS HIGH 50: CPT

## 2022-12-14 PROCEDURE — 74183 MRI ABD W/O CNTR FLWD CNTR: CPT | Mod: 26

## 2022-12-14 RX ORDER — ONDANSETRON 8 MG/1
4 TABLET, FILM COATED ORAL EVERY 8 HOURS
Refills: 0 | Status: DISCONTINUED | OUTPATIENT
Start: 2022-12-14 | End: 2022-12-15

## 2022-12-14 RX ADMIN — Medication 10 MILLIGRAM(S): at 05:03

## 2022-12-14 RX ADMIN — HEPARIN SODIUM 5000 UNIT(S): 5000 INJECTION INTRAVENOUS; SUBCUTANEOUS at 05:04

## 2022-12-14 RX ADMIN — FAMOTIDINE 20 MILLIGRAM(S): 10 INJECTION INTRAVENOUS at 13:24

## 2022-12-14 RX ADMIN — AZITHROMYCIN 255 MILLIGRAM(S): 500 TABLET, FILM COATED ORAL at 06:33

## 2022-12-14 RX ADMIN — CEFTRIAXONE 100 MILLIGRAM(S): 500 INJECTION, POWDER, FOR SOLUTION INTRAMUSCULAR; INTRAVENOUS at 21:38

## 2022-12-14 RX ADMIN — HEPARIN SODIUM 5000 UNIT(S): 5000 INJECTION INTRAVENOUS; SUBCUTANEOUS at 13:25

## 2022-12-14 RX ADMIN — AMLODIPINE BESYLATE 10 MILLIGRAM(S): 2.5 TABLET ORAL at 05:03

## 2022-12-14 RX ADMIN — Medication 200 MILLIGRAM(S): at 05:04

## 2022-12-14 NOTE — PROGRESS NOTE ADULT - SUBJECTIVE AND OBJECTIVE BOX
[facility]  INFECTIOUS DISEASES   [address] [place] [address]  Tel: [phone]     Fax: [phone]  ======================================================  MD Phi Campbell Kaushal, MD Cho, Michelle, MD   ======================================================    Assessment/Recommendations:     76-year-old  male with past medical history of essential hypertension dyslipidemia psoriasis on steroids for few months presented with nonspecific symptoms off weakness, postnasal drip, cough and malaise with dyspnea on exertion being admitted for Pneumonia, acute kidney injury, transaminitis with hepatic lesions and fluid and debris's in esophagus with questionable motility problem    Negative to date (follow until completion)  Urinary Legionella antigen: Negative  Urinary Streptococcus antigen: Negative  Procalcitonin:1.02  RVP negative on admission  Afebrile, hemodynamically stable, saturating more than 95% on room air without respiratory distress with improving WBC count  Monitor WBC and temperature curve  Continue with IV Rocephin 1 g daily  Discontinuing IV azithromycin  Daily CBC with differential, renal function with electrolytes while inpatient and on antibiotics  Short course of antibiotic: 5 days in total  Work-up for hepatic lesion, transaminitis as well as esophagus containing fluid and debris's as per primary team  F/u pulmonary lesions as er pulmonary team    Plan explained to patient   D/w Primary team     ________________________________    Last 24 hours:   Patient denied any new complaints  Afebrile  Still has some postnasal drip and subsequent phlegm at the back of the throat that he is having difficulty getting out  Denied any nausea vomiting diarrhea dysuria frequency hematuria new rash    Further ROS:  All systems reviewed. No other complaints besides mentioned above     ______________________________  Allergies  No Known Allergies    Medications:   cefTRIAXone   IVPB 1000 milliGRAM(s) IV Intermittent every 24 hours  influenza  Vaccine (HIGH DOSE) 0.7 milliLiter(s) IntraMuscular once  albuterol    90 MICROgram(s) HFA Inhaler 2 Puff(s) Inhalation every 6 hours PRN  amLODIPine   Tablet 10 milliGRAM(s) Oral daily  famotidine    Tablet 20 milliGRAM(s) Oral daily  guaiFENesin Oral Liquid (Sugar-Free) 200 milliGRAM(s) Oral every 6 hours PRN  heparin   Injectable 5000 Unit(s) SubCutaneous every 8 hours  metoprolol succinate  milliGRAM(s) Oral daily  predniSONE   Tablet 10 milliGRAM(s) Oral daily      _________________    PHYSICAL EXAM:    Objective:  Vital Signs Last 24 Hrs  T(C): 36.4 (14 Dec 2022 05:24), Max: 36.4 (13 Dec 2022 10:27)  T(F): 97.6 (14 Dec 2022 05:24), Max: 97.6 (14 Dec 2022 05:24)  HR: 70 (14 Dec 2022 05:24) (64 - 80)  BP: 117/63 (14 Dec 2022 05:24) (113/64 - 137/80)  RR: 18 (14 Dec 2022 05:24) (18 - 18)  SpO2: 92% (14 Dec 2022 05:24) (91% - 96%)  Parameters below as of 14 Dec 2022 05:24  Patient On (Oxygen Delivery Method): room air    General: No acute distress while lying in bed    Neuro: AAO*4, No obvious acute motor deficit  HEENT: Pupils equal, reactive to light, Oral mucosa moist, Posterior pharynx inflamed without any purulence, left anterior nare with minimal inflammation  PULM: Clear to auscultation bilaterally, no significant adventitious breath sounds   CVS: Regular rhythm and controlled rate, 1/6 systolic murmur  ABD: Soft, nondistended, nontender, normoactive bowel sounds, no CVA tenderness  EXT: No b/l LE edema, nontender with pedal pulse palpable   SKIN: Warm and well perfused, no acute rashes   NO obvious palpable lymphadenopathy   ______________  LABS, MICROBIOLOGY, RADIOLOGY & ADDITIONAL STUDIES: Reviewed

## 2022-12-14 NOTE — CONSULT NOTE ADULT - ASSESSMENT
Abnormal imaging    CT noted, d/w pt  LFTs noted, trend  No known prior hx of liver disease  Follow MR abdomen w/wo IV contrast  D/w pt and family at bedside  Will follow    I reviewed the overnight course of events on the unit, re-confirming the patient history. I discussed the care with the patient and their family  Differential diagnosis and plan of care discussed with patient after the evaluation  35 minutes spent on total encounter of which more than fifty percent of the encounter was spent counseling and/or coordinating care by the attending physician.  Advanced care planning was discussed with patient and family.  Advanced care planning forms were reviewed and discussed.  Risks, benefits and alternatives of gastroenterologic procedures were discussed in detail and all questions were answered.

## 2022-12-14 NOTE — CONSULT NOTE ADULT - SUBJECTIVE AND OBJECTIVE BOX
[facility]  Wesley Varner PA-C  [address] [place] [id number]  [phone]      Chief Complaint:  Patient is a 76y old  Male who presents with a chief complaint of sepsis 2/2 PNA (14 Dec 2022 09:59)      HPI:  77 yo male PMH HTN, HLD, psoriasis on daily prednisone presents to ED with 4 weeks of productive cough, malaise, poor PO intake. Patient states after 's day he developed a productive cough with mucous. States this has remained persistent, however past few days he has had worsening NICHOLSON and SOB. Also c/o chills. Denies fevers, sore throat, HA, CP, palpitations. Patient has tried Mucinex with some improvement, however states his symptoms always return. Patient states he has not been eating or drinking at baseline this past week. Patient denies any coughing after eating or hx of dysphagia.     INTERVAL HPI:  Pt s/e with wife at bedside  Reports history as above  Discussed CT Chest incidental finding of hepatic lesions with pt  Pt denies prior history of liver disease, denies hx of EtOH dependence.   Reports no family history of liver disease or cancer  Otherwise no further GI complaints    Allergies:  No Known Allergies      Medications:  albuterol    90 MICROgram(s) HFA Inhaler 2 Puff(s) Inhalation every 6 hours PRN  amLODIPine   Tablet 10 milliGRAM(s) Oral daily  cefTRIAXone   IVPB 1000 milliGRAM(s) IV Intermittent every 24 hours  famotidine    Tablet 20 milliGRAM(s) Oral daily  guaiFENesin Oral Liquid (Sugar-Free) 200 milliGRAM(s) Oral every 6 hours PRN  heparin   Injectable 5000 Unit(s) SubCutaneous every 8 hours  influenza  Vaccine (HIGH DOSE) 0.7 milliLiter(s) IntraMuscular once  metoprolol succinate  milliGRAM(s) Oral daily  ondansetron Injectable 4 milliGRAM(s) IV Push every 8 hours PRN  predniSONE   Tablet 10 milliGRAM(s) Oral daily      PMHX/PSHX:  Hypertension    Psoriasis    HLD (hyperlipidemia)    No significant past surgical history        Family history:  No pertinent family history in first degree relatives      ROS:   General:  No wt loss, fevers, chills, night sweats, fatigue,   Eyes:  Good vision, no reported pain  ENT:  No sore throat, pain, runny nose, dysphagia  CV:  No pain, palpitations, hypo/hypertension  Resp:  +cough. No dyspnea, tachypnea, wheezing  GI:  No pain, No nausea, No vomiting, No diarrhea, No constipation, No weight loss, No fever, No pruritis, No rectal bleeding, No tarry stools, No dysphagia,  :  No pain, bleeding, incontinence, nocturia  Muscle:  No pain, weakness  Neuro:  No weakness, tingling, memory problems  Psych:  No fatigue, insomnia, mood problems, depression  Endocrine:  No polyuria, polydipsia, cold/heat intolerance  Heme:  No petechiae, ecchymosis, easy bruisability  Skin:  No rash, tattoos, scars, edema      PHYSICAL EXAM:   Vital Signs:  Vital Signs Last 24 Hrs  T(C): 37.2 (14 Dec 2022 12:04), Max: 37.2 (14 Dec 2022 12:04)  T(F): 98.9 (14 Dec 2022 12:04), Max: 98.9 (14 Dec 2022 12:04)  HR: 65 (14 Dec 2022 12:04) (64 - 70)  BP: 107/68 (14 Dec 2022 12:04) (107/68 - 117/63)  BP(mean): --  RR: 18 (14 Dec 2022 12:04) (18 - 18)  SpO2: 91% (14 Dec 2022 12:04) (91% - 92%)    Parameters below as of 14 Dec 2022 12:04  Patient On (Oxygen Delivery Method): room air      Daily     Daily Weight in k.3 (14 Dec 2022 05:24)    GENERAL:  Appears stated age  ABDOMEN:  Soft, non-tender, non-distended  NEURO:  Alert    LABS:                        12.7   16.85 )-----------( 168      ( 14 Dec 2022 08:20 )             39.6     12    138  |  101  |  34<H>  ----------------------------<  79  4.3   |  30  |  1.40<H>    Ca    8.7      14 Dec 2022 08:20  Phos  3.3     12-13  Mg     2.3     12-14    TPro  5.6<L>  /  Alb  2.4<L>  /  TBili  0.7  /  DBili  x   /  AST  68<H>  /  ALT  67  /  AlkPhos  271<H>      LIVER FUNCTIONS - ( 14 Dec 2022 08:20 )  Alb: 2.4 g/dL / Pro: 5.6 g/dL / ALK PHOS: 271 U/L / ALT: 67 U/L / AST: 68 U/L / GGT: x           PT/INR - ( 14 Dec 2022 08:20 )   PT: 17.0 sec;   INR: 1.45 ratio      Imaging:  < from: CT Chest No Cont (22 @ 07:59) >    ACC: [id number] EXAM:  CT CHEST                          PROCEDURE DATE:  2022          INTERPRETATION:  HISTORY: Admitting Dxs: J18.9 PNEUMONIA, UNSPECIFIED   ORGANISM    EXAMINATION: CT CHEST was performed without IV contrast.    COMPARISON: None available.    FINDINGS:    AIRWAYS, LUNGS, PLEURA: Clear central airways. Small bilateral pleural   effusions (left greater than right). Interlobular septal thickening.   Multifocal and bilateral patchy ground-glass and consolidative opacities   involving all lung lobes.    MEDIASTINUM: Heart size normal. No pericardial effusion. Coronary   atherosclerosis. Thoracic aorta normal caliber. Esophagus is distended   with fluid and debris. Multiple subcentimeter mediastinal nodes in short   axis.    IMAGED ABDOMEN: Many lesions throughout the liver; reference right   hepatic lobe 5.2 x 4.9 cm mass (image 115, series 2). Upper abdominal   lymph nodes appear enlarged. Trace perihepatic ascites.    SOFT TISSUES: Unremarkable.    BONES: Unremarkable.      IMPRESSION:.    Small bilateral pleural effusions.    Multifocal and bilateral patchy ground-glass and consolidative opacities   involving all lung lobes.  The exact etiology is unclear and diagnostic   considerations include infectious/inflammatory process. It is difficult   to exclude metastasis given the hepatic findings.    Esophagus contains fluid and debris throughout its course.    Many hepatic lesions; the exact etiology is unclear, but metastatic   disease is the leading diagnostic consideration. Upper abdominal   lymphadenopathy not well evaluated.    Recommend dedicated CT or MR abdomen and pelvis to further assess.    --- End of Report ---     KAYLENE LINN MD; Attending Radiologist  This document has been electronically signed. Dec 13 2022 10:29AM    < end of copied text >

## 2022-12-14 NOTE — PROGRESS NOTE ADULT - SUBJECTIVE AND OBJECTIVE BOX
PULMONARY CONSULT NOTE      ALIA JOVEL  MRN-[id number]    Patient is a 76y old  Male who presents with a chief complaint of sepsis 2/2 PNA (13 Dec 2022 14:24)      HISTORY OF PRESENT ILLNESS:    MEDICATIONS  (STANDING):  amLODIPine   Tablet 10 milliGRAM(s) Oral daily  cefTRIAXone   IVPB 1000 milliGRAM(s) IV Intermittent every 24 hours  famotidine    Tablet 20 milliGRAM(s) Oral daily  heparin   Injectable 5000 Unit(s) SubCutaneous every 8 hours  influenza  Vaccine (HIGH DOSE) 0.7 milliLiter(s) IntraMuscular once  metoprolol succinate  milliGRAM(s) Oral daily  predniSONE   Tablet 10 milliGRAM(s) Oral daily      MEDICATIONS  (PRN):  albuterol    90 MICROgram(s) HFA Inhaler 2 Puff(s) Inhalation every 6 hours PRN Shortness of Breath and/or Wheezing  guaiFENesin Oral Liquid (Sugar-Free) 200 milliGRAM(s) Oral every 6 hours PRN Cough      Allergies    No Known Allergies    Intolerances        PAST MEDICAL & SURGICAL HISTORY:  Hypertension      Psoriasis      HLD (hyperlipidemia)      No significant past surgical history          FAMILY HISTORY:  No pertinent family history in first degree relatives        SOCIAL HISTORY  Smoking History:     REVIEW OF SYSTEMS:    REVIEW OF SYSTEMS      General:	    Skin/Breast:  	  Ophthalmologic:  	  ENMT:	    Respiratory and Thorax:  	  Cardiovascular:	    Gastrointestinal:	    Genitourinary:	    Musculoskeletal:	    Neurological:	    Psychiatric:	    Hematology/Lymphatics:	    Endocrine:	    Allergic/Immunologic:	    Vital Signs Last 24 Hrs  T(C): 36.4 (14 Dec 2022 05:24), Max: 36.4 (13 Dec 2022 10:27)  T(F): 97.6 (14 Dec 2022 05:24), Max: 97.6 (14 Dec 2022 05:24)  HR: 70 (14 Dec 2022 05:24) (64 - 80)  BP: 117/63 (14 Dec 2022 05:24) (113/64 - 137/80)  BP(mean): --  RR: 18 (14 Dec 2022 05:24) (18 - 18)  SpO2: 92% (14 Dec 2022 05:24) (91% - 96%)    Parameters below as of 14 Dec 2022 05:24  Patient On (Oxygen Delivery Method): room air        PHYSICAL EXAMINATION:  PHYSICAL EXAM:      Constitutional:    Eyes:    ENMT:    Neck:    Breasts:    Back:    Respiratory:    Cardiovascular:    Gastrointestinal:    Genitourinary:    Rectal:    Extremities:    Vascular:    Neurological:    Skin:    Lymph Nodes:    Musculoskeletal:    Psychiatric:    heart s1s2  lung dec BS        LABS:                        12.7   16.85 )-----------( 168      ( 14 Dec 2022 08:20 )             39.6     12-13    136  |  100  |  36<H>  ----------------------------<  127<H>  3.4<L>   |  24  |  1.50<H>    Ca    7.8<L>      13 Dec 2022 07:12  Phos  3.3     12-13  Mg     2.1     12-13    TPro  5.3<L>  /  Alb  2.2<L>  /  TBili  0.7  /  DBili  0.3  /  AST  64<H>  /  ALT  62  /  AlkPhos  232<H>  12-13    PT/INR - ( 14 Dec 2022 08:20 )   PT: 17.0 sec;   INR: 1.45 ratio                     Serum Pro-Brain Natriuretic Peptide: 3091 pg/mL (12-12-22 @ 18:50)      Procalcitonin, Serum: 1.02 ng/mL (12-13-22 @ 07:12)      MICROBIOLOGY:    RADIOLOGY & ADDITIONAL STUDIES:

## 2022-12-14 NOTE — PROGRESS NOTE ADULT - SUBJECTIVE AND OBJECTIVE BOX
Patient is a 76y old  Male who presents with a chief complaint of sepsis 2/2 PNA (14 Dec 2022 13:47)    INTERVAL HPI/OVERNIGHT EVENTS: Patient was seen and examined. No acute events occurred overnight. Reports productive cough  on RA    I&O's Summary      LABS:                        12.7   16.85 )-----------( 168      ( 14 Dec 2022 08:20 )             39.6     12-14    138  |  101  |  34<H>  ----------------------------<  79  4.3   |  30  |  1.40<H>    Ca    8.7      14 Dec 2022 08:20  Phos  3.3     12-13  Mg     2.3     12-14    TPro  5.6<L>  /  Alb  2.4<L>  /  TBili  0.7  /  DBili  x   /  AST  68<H>  /  ALT  67  /  AlkPhos  271<H>  12-14    PT/INR - ( 14 Dec 2022 08:20 )   PT: 17.0 sec;   INR: 1.45 ratio             CAPILLARY BLOOD GLUCOSE      MEDICATIONS  (STANDING):  amLODIPine   Tablet 10 milliGRAM(s) Oral daily  cefTRIAXone   IVPB 1000 milliGRAM(s) IV Intermittent every 24 hours  famotidine    Tablet 20 milliGRAM(s) Oral daily  heparin   Injectable 5000 Unit(s) SubCutaneous every 8 hours  influenza  Vaccine (HIGH DOSE) 0.7 milliLiter(s) IntraMuscular once  metoprolol succinate  milliGRAM(s) Oral daily  predniSONE   Tablet 10 milliGRAM(s) Oral daily    MEDICATIONS  (PRN):  albuterol    90 MICROgram(s) HFA Inhaler 2 Puff(s) Inhalation every 6 hours PRN Shortness of Breath and/or Wheezing  guaiFENesin Oral Liquid (Sugar-Free) 200 milliGRAM(s) Oral every 6 hours PRN Cough  ondansetron Injectable 4 milliGRAM(s) IV Push every 8 hours PRN Nausea and/or Vomiting      REVIEW OF SYSTEMS:  CONSTITUTIONAL: No fever or chills  HEENT:  No headache, no sore throat  RESPIRATORY: No cough, wheezing, or shortness of breath  CARDIOVASCULAR: No chest pain, palpitations  GASTROINTESTINAL: No abdominal pain, nausea, vomiting, or diarrhea  GENITOURINARY: No dysuria, frequency, or hematuria  NEUROLOGICAL: no focal weakness or dizziness  MUSCULOSKELETAL: no myalgias  PSYCH: no recent changes in mood    RADIOLOGY & ADDITIONAL TESTS:    Imaging Personally Reviewed:  [x] YES  [ ] NO    Consultant(s) Notes Reviewed:  [x] YES  [ ] NO    PHYSICAL EXAM:  T(C): 37.2 (12-14-22 @ 12:04), Max: 37.2 (12-14-22 @ 12:04)  HR: 65 (12-14-22 @ 12:04) (64 - 70)  BP: 107/68 (12-14-22 @ 12:04) (107/68 - 117/63)  RR: 18 (12-14-22 @ 12:04) (18 - 18)  SpO2: 91% (12-14-22 @ 12:04) (91% - 92%)    GENERAL: NAD, well-developed, well-groomed  HEENT:  anicteric, moist mucous membranes  CHEST/LUNG:  CTA b/l, no rales, wheezes, or rhonchi  HEART:  RRR, S1, S2  ABDOMEN:  BS+, soft, nontender, nondistended  EXTREMITIES: no edema, cyanosis, or calf tenderness  NERVOUS SYSTEM: answers questions and follows commands appropriately  PSYCH: normal affect    Care Discussed with Consultants/Other Providers [x] YES  [ ] NO

## 2022-12-14 NOTE — PROGRESS NOTE ADULT - ASSESSMENT
PNA    - seen by ID. continue rocephin 1 gm daily.     - azithromycin discontinued. urine legionella was negative.     - Procal 1      hepatic lesion    - MRI is concerning for mets. there is a possible mass in the esophagus per discussion with radiology. Spoke with GI. will consider IR liver biopsy if patient is agreeable. Left message with IR to discuss.     - NPO after midnight     - heparin sq discontinued     psoriasis     - cont prednisone 10mg daily     HTN    - cont home meds. bisoprolol changed to toprol. Cont norvasc chlorthalidone held     DVT proph: SCDs

## 2022-12-14 NOTE — PROGRESS NOTE ADULT - TIME BILLING
Please call us with any concerns or questions.   We will continue to follow.     Arron Yip MD   Division of Infectious Diseases  [facility]   Cell [phone] between 8am and 6pm   After 6pm and weekends please call ID service at [phone].

## 2022-12-14 NOTE — PROGRESS NOTE ADULT - ASSESSMENT
75 yo male PMH HTN, HLD, psoriasis on daily prednisone presents to ED with 4 weeks of productive cough, malaise, poor PO intake. Patient states after 's day he developed a productive cough with mucous.    pt reports congestion and cough  rvp neg  ct chest pending, cxr with poss PNA     eval for HF  eval for PNA    emp ABX in progress  biomarkers  pt is on chr steroids for Psoriasis -   ID eval noted  on RA    cardio eval -     cough rx regimen prn  acap prn  monitor VS  WBC elev - may be due to steroids - plus an Infectious component  tolerating room air  cvs rx regimen and BP control - hx of HTN and HLD  pt is an ex smoker - PFT as outpatient, Proventil PRN for sob and or wheeze - empirically

## 2022-12-15 VITALS
RESPIRATION RATE: 18 BRPM | TEMPERATURE: 97 F | OXYGEN SATURATION: 92 % | SYSTOLIC BLOOD PRESSURE: 107 MMHG | DIASTOLIC BLOOD PRESSURE: 67 MMHG | HEART RATE: 71 BPM

## 2022-12-15 DIAGNOSIS — C16.0 MALIGNANT NEOPLASM OF CARDIA: ICD-10-CM

## 2022-12-15 DIAGNOSIS — C78.7 SECONDARY MALIGNANT NEOPLASM OF LIVER AND INTRAHEPATIC BILE DUCT: ICD-10-CM

## 2022-12-15 PROBLEM — E78.5 HYPERLIPIDEMIA, UNSPECIFIED: Chronic | Status: ACTIVE | Noted: 2022-12-12

## 2022-12-15 LAB
ALBUMIN SERPL ELPH-MCNC: 2.5 G/DL — LOW (ref 3.3–5)
ALP SERPL-CCNC: 257 U/L — HIGH (ref 40–120)
ALT FLD-CCNC: 63 U/L — SIGNIFICANT CHANGE UP (ref 12–78)
ANION GAP SERPL CALC-SCNC: 11 MMOL/L — SIGNIFICANT CHANGE UP (ref 5–17)
AST SERPL-CCNC: 59 U/L — HIGH (ref 15–37)
BASOPHILS # BLD AUTO: 0.05 K/UL — SIGNIFICANT CHANGE UP (ref 0–0.2)
BASOPHILS NFR BLD AUTO: 0.2 % — SIGNIFICANT CHANGE UP (ref 0–2)
BILIRUB SERPL-MCNC: 0.7 MG/DL — SIGNIFICANT CHANGE UP (ref 0.2–1.2)
BUN SERPL-MCNC: 42 MG/DL — HIGH (ref 7–23)
CALCIUM SERPL-MCNC: 8.8 MG/DL — SIGNIFICANT CHANGE UP (ref 8.5–10.1)
CHLORIDE SERPL-SCNC: 100 MMOL/L — SIGNIFICANT CHANGE UP (ref 96–108)
CO2 SERPL-SCNC: 29 MMOL/L — SIGNIFICANT CHANGE UP (ref 22–31)
CREAT SERPL-MCNC: 1.4 MG/DL — HIGH (ref 0.5–1.3)
EGFR: 52 ML/MIN/1.73M2 — LOW
EOSINOPHIL # BLD AUTO: 0.26 K/UL — SIGNIFICANT CHANGE UP (ref 0–0.5)
EOSINOPHIL NFR BLD AUTO: 1.2 % — SIGNIFICANT CHANGE UP (ref 0–6)
GLUCOSE SERPL-MCNC: 59 MG/DL — LOW (ref 70–99)
HCT VFR BLD CALC: 42.9 % — SIGNIFICANT CHANGE UP (ref 39–50)
HGB BLD-MCNC: 13.7 G/DL — SIGNIFICANT CHANGE UP (ref 13–17)
IMM GRANULOCYTES NFR BLD AUTO: 1.2 % — HIGH (ref 0–0.9)
INR BLD: 1.35 RATIO — HIGH (ref 0.88–1.16)
LYMPHOCYTES # BLD AUTO: 1.14 K/UL — SIGNIFICANT CHANGE UP (ref 1–3.3)
LYMPHOCYTES # BLD AUTO: 5.4 % — LOW (ref 13–44)
MCHC RBC-ENTMCNC: 26.2 PG — LOW (ref 27–34)
MCHC RBC-ENTMCNC: 31.9 GM/DL — LOW (ref 32–36)
MCV RBC AUTO: 82 FL — SIGNIFICANT CHANGE UP (ref 80–100)
MONOCYTES # BLD AUTO: 0.88 K/UL — SIGNIFICANT CHANGE UP (ref 0–0.9)
MONOCYTES NFR BLD AUTO: 4.2 % — SIGNIFICANT CHANGE UP (ref 2–14)
NEUTROPHILS # BLD AUTO: 18.44 K/UL — HIGH (ref 1.8–7.4)
NEUTROPHILS NFR BLD AUTO: 87.8 % — HIGH (ref 43–77)
NRBC # BLD: 0 /100 WBCS — SIGNIFICANT CHANGE UP (ref 0–0)
PLATELET # BLD AUTO: 201 K/UL — SIGNIFICANT CHANGE UP (ref 150–400)
POTASSIUM SERPL-MCNC: 3.8 MMOL/L — SIGNIFICANT CHANGE UP (ref 3.5–5.3)
POTASSIUM SERPL-SCNC: 3.8 MMOL/L — SIGNIFICANT CHANGE UP (ref 3.5–5.3)
PROT SERPL-MCNC: 5.8 G/DL — LOW (ref 6–8.3)
PROTHROM AB SERPL-ACNC: 15.8 SEC — HIGH (ref 10.5–13.4)
RBC # BLD: 5.23 M/UL — SIGNIFICANT CHANGE UP (ref 4.2–5.8)
RBC # FLD: 15.8 % — HIGH (ref 10.3–14.5)
SODIUM SERPL-SCNC: 140 MMOL/L — SIGNIFICANT CHANGE UP (ref 135–145)
WBC # BLD: 21.03 K/UL — HIGH (ref 3.8–10.5)
WBC # FLD AUTO: 21.03 K/UL — HIGH (ref 3.8–10.5)

## 2022-12-15 PROCEDURE — 71250 CT THORAX DX C-: CPT

## 2022-12-15 PROCEDURE — 87040 BLOOD CULTURE FOR BACTERIA: CPT

## 2022-12-15 PROCEDURE — 83880 ASSAY OF NATRIURETIC PEPTIDE: CPT

## 2022-12-15 PROCEDURE — 74183 MRI ABD W/O CNTR FLWD CNTR: CPT

## 2022-12-15 PROCEDURE — 83036 HEMOGLOBIN GLYCOSYLATED A1C: CPT

## 2022-12-15 PROCEDURE — 82248 BILIRUBIN DIRECT: CPT

## 2022-12-15 PROCEDURE — 99285 EMERGENCY DEPT VISIT HI MDM: CPT

## 2022-12-15 PROCEDURE — 0225U NFCT DS DNA&RNA 21 SARSCOV2: CPT

## 2022-12-15 PROCEDURE — 99233 SBSQ HOSP IP/OBS HIGH 50: CPT

## 2022-12-15 PROCEDURE — 84100 ASSAY OF PHOSPHORUS: CPT

## 2022-12-15 PROCEDURE — 83735 ASSAY OF MAGNESIUM: CPT

## 2022-12-15 PROCEDURE — 87449 NOS EACH ORGANISM AG IA: CPT

## 2022-12-15 PROCEDURE — 36415 COLL VENOUS BLD VENIPUNCTURE: CPT

## 2022-12-15 PROCEDURE — 93307 TTE W/O DOPPLER COMPLETE: CPT

## 2022-12-15 PROCEDURE — 86803 HEPATITIS C AB TEST: CPT

## 2022-12-15 PROCEDURE — 93005 ELECTROCARDIOGRAM TRACING: CPT

## 2022-12-15 PROCEDURE — 85025 COMPLETE CBC W/AUTO DIFF WBC: CPT

## 2022-12-15 PROCEDURE — 80053 COMPREHEN METABOLIC PANEL: CPT

## 2022-12-15 PROCEDURE — A9579: CPT

## 2022-12-15 PROCEDURE — 71046 X-RAY EXAM CHEST 2 VIEWS: CPT

## 2022-12-15 PROCEDURE — 83605 ASSAY OF LACTIC ACID: CPT

## 2022-12-15 PROCEDURE — 85610 PROTHROMBIN TIME: CPT

## 2022-12-15 PROCEDURE — 84145 PROCALCITONIN (PCT): CPT

## 2022-12-15 PROCEDURE — 87899 AGENT NOS ASSAY W/OPTIC: CPT

## 2022-12-15 PROCEDURE — 93306 TTE W/DOPPLER COMPLETE: CPT

## 2022-12-15 RX ORDER — FAMOTIDINE 10 MG/ML
1 INJECTION INTRAVENOUS
Qty: 30 | Refills: 0
Start: 2022-12-15

## 2022-12-15 RX ADMIN — Medication 200 MILLIGRAM(S): at 05:25

## 2022-12-15 RX ADMIN — Medication 10 MILLIGRAM(S): at 05:26

## 2022-12-15 RX ADMIN — FAMOTIDINE 20 MILLIGRAM(S): 10 INJECTION INTRAVENOUS at 12:33

## 2022-12-15 NOTE — PROGRESS NOTE ADULT - TIME BILLING
Please call us with any concerns or questions.   We will continue to follow.     Arron Yip MD   Division of Infectious Diseases  [facility]   Cell [phone] between 8am and 6pm   After 6pm and weekends please call ID service at [phone]. Plan d/w patient and primary team   We will sign off for now.   Please call us with any concerns or questions.     Arron Yip MD   Division of Infectious Diseases  [facility]   Cell [phone] between 8am and 6pm   After 6pm and weekends please call ID service at [phone].

## 2022-12-15 NOTE — DISCHARGE NOTE PROVIDER - NSDCCPCAREPLAN_GEN_ALL_CORE_FT
PRINCIPAL DISCHARGE DIAGNOSIS  Diagnosis: Bilateral pneumonia  Assessment and Plan of Treatment: You were treated with IV antibiotics which was changed to vantin 200mg twice a day for two more days starting 12/16      SECONDARY DISCHARGE DIAGNOSES  Diagnosis: Dehydration  Assessment and Plan of Treatment: You were given IV fluids during admission. Your renal functions improved.    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   You were seen by gastroenterology and hematology. You were recommended a liver biopsy with interventional radiology.   You are scheduled for a biopsy on 12/22 at 9 am. please be at [facility].   You will need an updated covid test on Mon. Please obtain it at any [facility] facility/urgent care.

## 2022-12-15 NOTE — PROGRESS NOTE ADULT - SUBJECTIVE AND OBJECTIVE BOX
[facility]  Wesley Varner PA-C  [address] [place], [place] [id number] [phone]      INTERVAL HPI/OVERNIGHT EVENTS:  Pt s/e  Reports no abdominal pain  States recent poor PO intake  Otherwise no new GI events  MR noted    MEDICATIONS  (STANDING):  amLODIPine   Tablet 10 milliGRAM(s) Oral daily  cefTRIAXone   IVPB 1000 milliGRAM(s) IV Intermittent every 24 hours  famotidine    Tablet 20 milliGRAM(s) Oral daily  influenza  Vaccine (HIGH DOSE) 0.7 milliLiter(s) IntraMuscular once  metoprolol succinate  milliGRAM(s) Oral daily  predniSONE   Tablet 10 milliGRAM(s) Oral daily    MEDICATIONS  (PRN):  albuterol    90 MICROgram(s) HFA Inhaler 2 Puff(s) Inhalation every 6 hours PRN Shortness of Breath and/or Wheezing  guaiFENesin Oral Liquid (Sugar-Free) 200 milliGRAM(s) Oral every 6 hours PRN Cough  ondansetron Injectable 4 milliGRAM(s) IV Push every 8 hours PRN Nausea and/or Vomiting      Allergies    No Known Allergies      PHYSICAL EXAM:   Vital Signs:  Vital Signs Last 24 Hrs  T(C): 36.3 (15 Dec 2022 04:58), Max: 37.2 (14 Dec 2022 12:04)  T(F): 97.3 (15 Dec 2022 04:58), Max: 98.9 (14 Dec 2022 12:04)  HR: 71 (15 Dec 2022 04:58) (65 - 71)  BP: 107/67 (15 Dec 2022 04:58) (106/63 - 130/88)  BP(mean): --  RR: 18 (15 Dec 2022 04:58) (18 - 18)  SpO2: 92% (15 Dec 2022 04:58) (91% - 93%)    Parameters below as of 15 Dec 2022 04:58  Patient On (Oxygen Delivery Method): room air    GENERAL:  Appears stated age  ABDOMEN:  Soft, non-tender, non-distended  NEURO:  Alert      LABS:                        13.7   21.03 )-----------( 201      ( 15 Dec 2022 05:57 )             42.9     12-15    140  |  100  |  42<H>  ----------------------------<  59<L>  3.8   |  29  |  1.40<H>    Ca    8.8      15 Dec 2022 05:57  Mg     2.3     12-14    TPro  5.8<L>  /  Alb  2.5<L>  /  TBili  0.7  /  DBili  x   /  AST  59<H>  /  ALT  63  /  AlkPhos  257<H>  12-15    PT/INR - ( 14 Dec 2022 08:20 )   PT: 17.0 sec;   INR: 1.45 ratio      IMAGING:  < from: MR Abdomen w/wo IV Cont (12.14.22 @ 16:20) >    ACC: [id number] EXAM:  MR ABDOMEN WAW IC                          PROCEDURE DATE:  12/14/2022          INTERPRETATION:  CLINICAL INFORMATION: Hepatic lesions on chest CT    COMPARISON: Chest CT 12/13/2022    CONTRAST/COMPLICATIONS:  IV Contrast: Gadavist  9.5 cc administered   .5 cc discarded  Oral Contrast: NONE  Complications: None reported at time of study completion    PROCEDURE:  MRI of the abdomen was performed.  MRCP was performed.    FINDINGS:    Motion limited study.    LOWER CHEST: Trace to small pleural effusions. Lung parenchyma is   suboptimally characterized on MRI. Abnormal lung parenchymal signal   better characterized on recent chest CT.    LIVER: Bilobar targetoid hepatic lesions/masses concerning for metastatic   disease, for example measuring 3 cm of the left hepatic lobe on image 20   series 4. Many of the lesions contain central necrosis and diffusion   restriction. Superimposed infection cannot be excluded.  BILE DUCTS: No biliary distention  GALLBLADDER: Unremarkable  SPLEEN: Spleen size within normal limits  PANCREAS: Limited evaluation due to motion. No main ductal dilatation.   Parenchymal volume loss at the head neck junction.  ADRENALS: Unremarkable  KIDNEYS/URETERS: No hydronephrosis. Renal cysts, simple and   proteinaceous/hemorrhagic. Additional numerous renal T2 hyperintense and   hypointense lesions too small to characterize.    VISUALIZED PORTIONS:  BOWEL: Distal esophagus is distended with abnormal diffusion restriction   at the GE junction, best seen on images 46-49 series 9. Remainder of the   stomach is nondistended. No small bowel distention.  PERITONEUM: Trace ascites  VESSELS: No abdominal aortic aneurysm. Atheromatous plaque, image 59   series 1301.  RETROPERITONEUM/LYMPH NODES: Enlarged portacaval lymph node measuring 3.1   x 2.1 cm, image 22 series 4. Additional prominent upper abdominal nodes.   Few subcentimeter retroperitoneal nodes.  ABDOMINAL WALL: Unremarkable  BONES: Suboptimally evaluated and abdominal protocol MRI. 1.6cm lower   thoracic vertebral body T1 and T2 hyperintense fat containing lesion,   image 5 series 700, without diffusion restriction or enhancment, favored   to be benign. Suggest bone scan evaluation to exclude osseous metastatic   disease.    IMPRESSION:    Bilobar targetoid hepatic lesions/masses concerning for metastatic   disease. Many of the lesions demonstrate central necrosis. Superimposed   infection cannot be excluded.    Distal esophagus is distended with abnormal diffusion restriction and   enhancement at the GE junction, best seen on images 46-49 series 9.   Gastroesophageal primary neoplasm can be considered. Recommend aspiration   precautions.    Above findings discussed with REHAN Varner from GI service on 12/14/2022 at   4:20PM and Dr. Gracia from medicine on 12/14/2022 at 5PM.    Trace to small pleural effusions. Lung parenchyma is suboptimally   characterized on MRI. Abnormal lung parenchymal signal better   characterized on recent chest CT.    Enlarged portacaval node may be metastatic. Additional small volume upper   abdominal nodes. Attention on follow-up is recommended. Recommend   dedicated CT abdomen pelvis as the pelvis is not fully imaged on this   study.    --- End of Report ---      TIMOTHY SKINNER M.D., ATTENDING RADIOLOGIST  This document has been electronically signed. Dec 14 2022  5:07PM    < end of copied text >    I spent 120 minutes face to face with the patient. Time was spent in discussion with patient. Discussed imaging results, lab results, . Visit start time: 00:00. Visit end time: 00:00.   [facility]  Wesley Varner PA-C  [address] [place], [place] [id number] [phone]      INTERVAL HPI/OVERNIGHT EVENTS:  Pt s/e  Reports no abdominal pain  States recent poor PO intake  Otherwise no new GI events  MR noted    MEDICATIONS  (STANDING):  amLODIPine   Tablet 10 milliGRAM(s) Oral daily  cefTRIAXone   IVPB 1000 milliGRAM(s) IV Intermittent every 24 hours  famotidine    Tablet 20 milliGRAM(s) Oral daily  influenza  Vaccine (HIGH DOSE) 0.7 milliLiter(s) IntraMuscular once  metoprolol succinate  milliGRAM(s) Oral daily  predniSONE   Tablet 10 milliGRAM(s) Oral daily    MEDICATIONS  (PRN):  albuterol    90 MICROgram(s) HFA Inhaler 2 Puff(s) Inhalation every 6 hours PRN Shortness of Breath and/or Wheezing  guaiFENesin Oral Liquid (Sugar-Free) 200 milliGRAM(s) Oral every 6 hours PRN Cough  ondansetron Injectable 4 milliGRAM(s) IV Push every 8 hours PRN Nausea and/or Vomiting      Allergies    No Known Allergies      PHYSICAL EXAM:   Vital Signs:  Vital Signs Last 24 Hrs  T(C): 36.3 (15 Dec 2022 04:58), Max: 37.2 (14 Dec 2022 12:04)  T(F): 97.3 (15 Dec 2022 04:58), Max: 98.9 (14 Dec 2022 12:04)  HR: 71 (15 Dec 2022 04:58) (65 - 71)  BP: 107/67 (15 Dec 2022 04:58) (106/63 - 130/88)  BP(mean): --  RR: 18 (15 Dec 2022 04:58) (18 - 18)  SpO2: 92% (15 Dec 2022 04:58) (91% - 93%)    Parameters below as of 15 Dec 2022 04:58  Patient On (Oxygen Delivery Method): room air    GENERAL:  Appears stated age  ABDOMEN:  Soft, non-tender, non-distended  NEURO:  Alert      LABS:                        13.7   21.03 )-----------( 201      ( 15 Dec 2022 05:57 )             42.9     12-15    140  |  100  |  42<H>  ----------------------------<  59<L>  3.8   |  29  |  1.40<H>    Ca    8.8      15 Dec 2022 05:57  Mg     2.3     12-14    TPro  5.8<L>  /  Alb  2.5<L>  /  TBili  0.7  /  DBili  x   /  AST  59<H>  /  ALT  63  /  AlkPhos  257<H>  12-15    PT/INR - ( 14 Dec 2022 08:20 )   PT: 17.0 sec;   INR: 1.45 ratio      IMAGING:  < from: MR Abdomen w/wo IV Cont (12.14.22 @ 16:20) >    ACC: [id number] EXAM:  MR ABDOMEN WAW IC                          PROCEDURE DATE:  12/14/2022          INTERPRETATION:  CLINICAL INFORMATION: Hepatic lesions on chest CT    COMPARISON: Chest CT 12/13/2022    CONTRAST/COMPLICATIONS:  IV Contrast: Gadavist  9.5 cc administered   .5 cc discarded  Oral Contrast: NONE  Complications: None reported at time of study completion    PROCEDURE:  MRI of the abdomen was performed.  MRCP was performed.    FINDINGS:    Motion limited study.    LOWER CHEST: Trace to small pleural effusions. Lung parenchyma is   suboptimally characterized on MRI. Abnormal lung parenchymal signal   better characterized on recent chest CT.    LIVER: Bilobar targetoid hepatic lesions/masses concerning for metastatic   disease, for example measuring 3 cm of the left hepatic lobe on image 20   series 4. Many of the lesions contain central necrosis and diffusion   restriction. Superimposed infection cannot be excluded.  BILE DUCTS: No biliary distention  GALLBLADDER: Unremarkable  SPLEEN: Spleen size within normal limits  PANCREAS: Limited evaluation due to motion. No main ductal dilatation.   Parenchymal volume loss at the head neck junction.  ADRENALS: Unremarkable  KIDNEYS/URETERS: No hydronephrosis. Renal cysts, simple and   proteinaceous/hemorrhagic. Additional numerous renal T2 hyperintense and   hypointense lesions too small to characterize.    VISUALIZED PORTIONS:  BOWEL: Distal esophagus is distended with abnormal diffusion restriction   at the GE junction, best seen on images 46-49 series 9. Remainder of the   stomach is nondistended. No small bowel distention.  PERITONEUM: Trace ascites  VESSELS: No abdominal aortic aneurysm. Atheromatous plaque, image 59   series 1301.  RETROPERITONEUM/LYMPH NODES: Enlarged portacaval lymph node measuring 3.1   x 2.1 cm, image 22 series 4. Additional prominent upper abdominal nodes.   Few subcentimeter retroperitoneal nodes.  ABDOMINAL WALL: Unremarkable  BONES: Suboptimally evaluated and abdominal protocol MRI. 1.6cm lower   thoracic vertebral body T1 and T2 hyperintense fat containing lesion,   image 5 series 700, without diffusion restriction or enhancment, favored   to be benign. Suggest bone scan evaluation to exclude osseous metastatic   disease.    IMPRESSION:    Bilobar targetoid hepatic lesions/masses concerning for metastatic   disease. Many of the lesions demonstrate central necrosis. Superimposed   infection cannot be excluded.    Distal esophagus is distended with abnormal diffusion restriction and   enhancement at the GE junction, best seen on images 46-49 series 9.   Gastroesophageal primary neoplasm can be considered. Recommend aspiration   precautions.    Above findings discussed with REHAN Varner from GI service on 12/14/2022 at   4:20PM and Dr. Gracia from medicine on 12/14/2022 at 5PM.    Trace to small pleural effusions. Lung parenchyma is suboptimally   characterized on MRI. Abnormal lung parenchymal signal better   characterized on recent chest CT.    Enlarged portacaval node may be metastatic. Additional small volume upper   abdominal nodes. Attention on follow-up is recommended. Recommend   dedicated CT abdomen pelvis as the pelvis is not fully imaged on this   study.    --- End of Report ---      TIMOTHY SKINNER M.D., ATTENDING RADIOLOGIST  This document has been electronically signed. Dec 14 2022  5:07PM    < end of copied text >    I spent 120 minutes face to face with the patient. Time was spent in discussion with patient. Discussed imaging results, lab results, need for liver biopsy, discussed IR scheduling and risks/benefits/alternatives to eventual upper endoscopy. Pt states he prefers not to stay admitted til next week to await IR bx, he wants to follow up outpatient for both bx and egd. Answered all patient questions. Visit start time: 8:00. Visit end time: 10:00.

## 2022-12-15 NOTE — DISCHARGE NOTE PROVIDER - PROVIDER TOKENS
PROVIDER:[TOKEN:[[id number]],FOLLOWUP:[2 weeks]],PROVIDER:[TOKEN:[[id number]],FOLLOWUP:[2 weeks]]

## 2022-12-15 NOTE — CONSULT NOTE ADULT - SUBJECTIVE AND OBJECTIVE BOX
Patient is a 76y old  Male who presents with a chief complaint of sepsis 2/2 PNA (15 Dec 2022 14:17)      HPI:  75 yo male PMH HTN, HLD, psoriasis on daily prednisone presents to ED with 4 weeks of productive cough, malaise, poor PO intake. Patient states after Newcastle's day he developed a productive cough with mucous. States this has remained persistent, however past few days he has had worsening NICHOLSON and SOB. Also c/o chills. Denies fevers, sore throat, HA, CP, palpitations. Patient has tried Mucinex with some improvement, however states his symptoms always return. Patient states he has not been eating or drinking at baseline this past week. Patient denies any coughing after eating or hx of dysphagia.     In ED:  Vitals: T 96.3, , /77, RR 24, SpO2 94%  Labs significant for: WBC 21.02, neutrophil 19.42, BUN 37, Cr 1.80, GFR 39, alk phos 301, AST 74, ALT 83, lactate 2.2, BNP 3091  CXR: patchy bilat on wet read  EKG: NSR rate 100, possible LAE, nonspecific ST wave abnormality  Given: IV Azithro x1, IV Rocephin x1, 2L NS bolus x1 (12 Dec 2022 21:03)       ROS:  Negative except for:    PAST MEDICAL & SURGICAL HISTORY:  Hypertension      Psoriasis      HLD (hyperlipidemia)      No significant past surgical history          SOCIAL HISTORY:    FAMILY HISTORY:  No pertinent family history in first degree relatives        MEDICATIONS  (STANDING):  amLODIPine   Tablet 10 milliGRAM(s) Oral daily  cefTRIAXone   IVPB 1000 milliGRAM(s) IV Intermittent every 24 hours  famotidine    Tablet 20 milliGRAM(s) Oral daily  influenza  Vaccine (HIGH DOSE) 0.7 milliLiter(s) IntraMuscular once  metoprolol succinate  milliGRAM(s) Oral daily  predniSONE   Tablet 10 milliGRAM(s) Oral daily    MEDICATIONS  (PRN):  albuterol    90 MICROgram(s) HFA Inhaler 2 Puff(s) Inhalation every 6 hours PRN Shortness of Breath and/or Wheezing  guaiFENesin Oral Liquid (Sugar-Free) 200 milliGRAM(s) Oral every 6 hours PRN Cough  ondansetron Injectable 4 milliGRAM(s) IV Push every 8 hours PRN Nausea and/or Vomiting      Allergies    No Known Allergies    Intolerances        Vital Signs Last 24 Hrs  T(C): 36.3 (15 Dec 2022 04:58), Max: 36.8 (14 Dec 2022 20:04)  T(F): 97.3 (15 Dec 2022 04:58), Max: 98.2 (14 Dec 2022 20:04)  HR: 71 (15 Dec 2022 04:58) (65 - 71)  BP: 107/67 (15 Dec 2022 04:58) (106/63 - 130/88)  BP(mean): --  RR: 18 (15 Dec 2022 04:58) (18 - 18)  SpO2: 92% (15 Dec 2022 04:58) (91% - 93%)    Parameters below as of 15 Dec 2022 04:58  Patient On (Oxygen Delivery Method): room air        PHYSICAL EXAM  General: adult in NAD  HEENT: clear oropharynx, anicteric sclera, pink conjunctivae  Neck: supple  CV: normal S1S2 with no murmur rubs or gallops  Lungs: clear to auscultation, no wheezes, no rhales  Abdomen: soft non-tender non-distended, no hepato/splenomegaly  Ext: no clubbing cyanosis or edema  Skin: no rashes and no petichiae  Neuro: alert and oriented X3 no focal deficits      LABS:    CBC Full  -  ( 15 Dec 2022 05:57 )  WBC Count : 21.03 K/uL  RBC Count : 5.23 M/uL  Hemoglobin : 13.7 g/dL  Hematocrit : 42.9 %  Platelet Count - Automated : 201 K/uL  Mean Cell Volume : 82.0 fl  Mean Cell Hemoglobin : 26.2 pg  Mean Cell Hemoglobin Concentration : 31.9 gm/dL  Auto Neutrophil # : 18.44 K/uL  Auto Lymphocyte # : 1.14 K/uL  Auto Monocyte # : 0.88 K/uL  Auto Eosinophil # : 0.26 K/uL  Auto Basophil # : 0.05 K/uL  Auto Neutrophil % : 87.8 %  Auto Lymphocyte % : 5.4 %  Auto Monocyte % : 4.2 %  Auto Eosinophil % : 1.2 %  Auto Basophil % : 0.2 %    12-15    140  |  100  |  42<H>  ----------------------------<  59<L>  3.8   |  29  |  1.40<H>    Ca    8.8      15 Dec 2022 05:57  Mg     2.3     12-14    TPro  5.8<L>  /  Alb  2.5<L>  /  TBili  0.7  /  DBili  x   /  AST  59<H>  /  ALT  63  /  AlkPhos  257<H>  12-15    PT/INR - ( 15 Dec 2022 12:50 )   PT: 15.8 sec;   INR: 1.35 ratio                   BLOOD SMEAR INTERPRETATION:    RADIOLOGY & ADDITIONAL STUDIES:    < from: CT Chest No Cont (12.13.22 @ 07:59) >  ACC: [id number] EXAM:  CT CHEST                          PROCEDURE DATE:  12/13/2022          INTERPRETATION:  HISTORY: Admitting Dxs: J18.9 PNEUMONIA, UNSPECIFIED   ORGANISM    EXAMINATION: CT CHEST was performed without IV contrast.    COMPARISON: None available.    FINDINGS:    AIRWAYS, LUNGS, PLEURA: Clear central airways. Small bilateral pleural   effusions (left greater than right). Interlobular septal thickening.   Multifocal and bilateral patchy ground-glass and consolidative opacities   involving all lung lobes.    MEDIASTINUM: Heart size normal. No pericardial effusion. Coronary   atherosclerosis. Thoracic aorta normal caliber. Esophagus is distended   with fluid and debris. Multiple subcentimeter mediastinal nodes in short   axis.    IMAGED ABDOMEN: Many lesions throughout the liver; reference right   hepatic lobe 5.2 x 4.9 cm mass (image 115, series 2). Upper abdominal   lymph nodes appear enlarged. Trace perihepatic ascites.    SOFT TISSUES: Unremarkable.    BONES: Unremarkable.      IMPRESSION:.    Small bilateral pleural effusions.    Multifocal and bilateral patchy ground-glass and consolidative opacities   involving all lung lobes.  The exact etiology is unclear and diagnostic   considerations include infectious/inflammatory process. It is difficult   to exclude metastasis given the hepatic findings.    Esophagus contains fluid and debris throughout its course.    Many hepatic lesions; the exact etiology is unclear, but metastatic   disease is the leading diagnostic consideration. Upper abdominal   lymphadenopathy not well evaluated.    Recommend dedicated CT or MR abdomen and pelvis to further assess.    --- End of Report ---             KAYLENE LINN MD; Attending Radiologist  This document has been electronically signed. Dec 13 2022 10:29AM    < end of copied text >  < from: MR Abdomen w/wo IV Cont (12.14.22 @ 16:20) >  ACC: [id number] EXAM:  MR ABDOMEN WAW IC                          PROCEDURE DATE:  12/14/2022          INTERPRETATION:  CLINICAL INFORMATION: Hepatic lesions on chest CT    COMPARISON: Chest CT 12/13/2022    CONTRAST/COMPLICATIONS:  IV Contrast: Gadavist  9.5 cc administered   .5 cc discarded  Oral Contrast: NONE  Complications: None reported at time of study completion    PROCEDURE:  MRI of the abdomen was performed.  MRCP was performed.    FINDINGS:    Motion limited study.    LOWER CHEST: Trace to small pleural effusions. Lung parenchyma is   suboptimally characterized on MRI. Abnormal lung parenchymal signal   better characterized on recent chest CT.    LIVER: Bilobar targetoid hepatic lesions/masses concerning for metastatic   disease, for example measuring 3 cm of the left hepatic lobe on image 20   series 4. Many of the lesions contain central necrosis and diffusion   restriction. Superimposed infection cannot be excluded.  BILE DUCTS: No biliary distention  GALLBLADDER: Unremarkable  SPLEEN: Spleen size within normal limits  PANCREAS: Limited evaluation due to motion. No main ductal dilatation.   Parenchymal volume loss at the head neck junction.  ADRENALS: Unremarkable  KIDNEYS/URETERS: No hydronephrosis. Renal cysts, simple and   proteinaceous/hemorrhagic. Additional numerous renal T2 hyperintense and   hypointense lesions too small to characterize.    VISUALIZED PORTIONS:  BOWEL: Distal esophagus is distended with abnormal diffusion restriction   at the GE junction, best seen on images 46-49 series 9. Remainder of the   stomach is nondistended. No small bowel distention.  PERITONEUM: Trace ascites  VESSELS: No abdominal aortic aneurysm. Atheromatous plaque, image 59   series 1301.  RETROPERITONEUM/LYMPH NODES: Enlarged portacaval lymph node measuring 3.1   x 2.1 cm, image 22 series 4. Additional prominent upper abdominal nodes.   Few subcentimeter retroperitoneal nodes.  ABDOMINAL WALL: Unremarkable  BONES: Suboptimally evaluated and abdominal protocol MRI. 1.6cm lower   thoracic vertebral body T1 and T2 hyperintense fat containing lesion,   image 5 series 700, without diffusion restriction or enhancment, favored   to be benign. Suggest bone scan evaluation to exclude osseous metastatic   disease.    IMPRESSION:    Bilobar targetoid hepatic lesions/masses concerning for metastatic   disease. Many of the lesions demonstrate central necrosis. Superimposed   infection cannot be excluded.    Distal esophagus is distended with abnormal diffusion restriction and   enhancement at the GE junction, best seen on images 46-49 series 9.   Gastroesophageal primary neoplasm can be considered. Recommend aspiration   precautions.    Above findings discussed with REHAN Varner from GI service on 12/14/2022 at   4:20PM and Dr. Gracia from medicine on 12/14/2022 at 5PM.    Trace to small pleural effusions. Lung parenchyma is suboptimally   characterized on MRI. Abnormal lung parenchymal signal better   characterized on recent chest CT.    Enlarged portacaval node may be metastatic. Additional small volume upper   abdominal nodes. Attention on follow-up is recommended. Recommend   dedicated CT abdomen pelvis as the pelvis is not fully imaged on this   study.    --- End of Report ---            TIMOTHY SKINNER M.D., ATTENDING RADIOLOGIST  This document has been electronically signed. Dec 14 2022  5:07PM    < end of copied text >

## 2022-12-15 NOTE — PROGRESS NOTE ADULT - SUBJECTIVE AND OBJECTIVE BOX
[facility]  INFECTIOUS DISEASES   [address] [place] [address]  Tel: [phone]     Fax: [phone]  ======================================================  MD Phi Campbell Kaushal, MD Cho, Michelle, MD   ======================================================    Assessment/Recommendations:     76-year-old  male with past medical history of essential hypertension dyslipidemia psoriasis on steroids for few months presented with nonspecific symptoms off weakness, postnasal drip, cough and malaise with dyspnea on exertion being admitted for Pneumonia, acute kidney injury, transaminitis with hepatic lesions and fluid and debris's in esophagus with questionable motility problem    Negative to date (follow until completion)  Urinary Legionella antigen: Negative  Urinary Streptococcus antigen: Negative  Procalcitonin:1.02  RVP negative on admission  Afebrile, hemodynamically stable, saturating more than 95% on room air without respiratory distress with improving WBC count  Monitor WBC and temperature curve  Continue with IV Rocephin 1 g daily while inpatient and can finish 5 days Abx course with PO Augmentin 500-125 mg PO TID or Ventin 200 mg Po BID if discharged prior   Discontinuing IV azithromycin  Daily CBC with differential, renal function with electrolytes while inpatient and on antibiotics  Short course of antibiotic: 5 days in total  Work-up for hepatic lesion, transaminitis as well as esophagus containing fluid and debris's as per primary team; MRI noted   F/u pulmonary lesions as er pulmonary team  Leucocytosis reactive, clinically stable from ID perspective; f/u outpatient     Plan explained to patient   D/w Primary team     ________________________________    Last 24 hours:   Patient denied any new complaints  Afebrile  postnasal drip +  Denied any nausea vomiting diarrhea dysuria frequency hematuria new rash    Further ROS:  All systems reviewed. No other complaints besides mentioned above     ______________________________  Allergies  No Known Allergies    MEDICATIONS  (STANDING):  amLODIPine   Tablet 10 milliGRAM(s) Oral daily  cefTRIAXone   IVPB 1000 milliGRAM(s) IV Intermittent every 24 hours  famotidine    Tablet 20 milliGRAM(s) Oral daily  influenza  Vaccine (HIGH DOSE) 0.7 milliLiter(s) IntraMuscular once  metoprolol succinate  milliGRAM(s) Oral daily  predniSONE   Tablet 10 milliGRAM(s) Oral daily    MEDICATIONS  (PRN):  albuterol    90 MICROgram(s) HFA Inhaler 2 Puff(s) Inhalation every 6 hours PRN Shortness of Breath and/or Wheezing  guaiFENesin Oral Liquid (Sugar-Free) 200 milliGRAM(s) Oral every 6 hours PRN Cough  ondansetron Injectable 4 milliGRAM(s) IV Push every 8 hours PRN Nausea and/or Vomiting    _________________    PHYSICAL EXAM:    Vital Signs Last 24 Hrs  T(C): 36.3 (15 Dec 2022 04:58), Max: 37.2 (14 Dec 2022 12:04)  T(F): 97.3 (15 Dec 2022 04:58), Max: 98.9 (14 Dec 2022 12:04)  HR: 71 (15 Dec 2022 04:58) (65 - 71)  BP: 107/67 (15 Dec 2022 04:58) (106/63 - 130/88)  RR: 18 (15 Dec 2022 04:58) (18 - 18)  SpO2: 92% (15 Dec 2022 04:58) (91% - 93%)  O2 Parameters below as of 15 Dec 2022 04:58  Patient On (Oxygen Delivery Method): room air      General: No acute distress while lying in bed    Neuro: AAO*4, No obvious acute motor deficit  HEENT: Pupils equal, reactive to light, Oral mucosa moist, Posterior pharynx inflamed without any purulence, left anterior nare with minimal inflammation  PULM: Clear to auscultation bilaterally, no significant adventitious breath sounds   CVS: Regular rhythm and controlled rate, 1/6 systolic murmur  ABD: Soft, nondistended, nontender, normoactive bowel sounds, no CVA tenderness  EXT: No b/l LE edema, nontender with pedal pulse palpable   SKIN: Warm and well perfused, no acute rashes   NO obvious palpable lymphadenopathy   ______________  LABS, MICROBIOLOGY, RADIOLOGY & ADDITIONAL STUDIES: Reviewed

## 2022-12-15 NOTE — PROGRESS NOTE ADULT - PROVIDER SPECIALTY LIST ADULT
Gastroenterology
Pulmonology
Hospitalist
Infectious Disease
Infectious Disease

## 2022-12-15 NOTE — PROGRESS NOTE ADULT - ASSESSMENT
Abnormal imaging  GE junction lesion  Liver mets    MR noted, d/w pt  D/w IR, liver bx would likely be next week  ?GE junction lesion  Will need eventual egd after liver bx; can be done as outpatient    I reviewed the overnight course of events on the unit, re-confirming the patient history. I discussed the care with the patient and their family  Differential diagnosis and plan of care discussed with patient after the evaluation  35 minutes spent on total encounter of which more than fifty percent of the encounter was spent counseling and/or coordinating care by the attending physician.  Advanced care planning was discussed with patient and family.  Advanced care planning forms were reviewed and discussed.  Risks, benefits and alternatives of gastroenterologic procedures were discussed in detail and all questions were answered.     Abnormal imaging  GE junction lesion  Liver mets    MR noted, d/w pt  D/w IR, as inpatient, liver bx would likely be next week  D/w pt who states he would prefer outpatient bx  ?GE junction lesion  Will need eventual egd after liver bx  D/w pt and wife at bedside  Card given for follow up  D/w IR to arrange for outpatient bx    I reviewed the overnight course of events on the unit, re-confirming the patient history. I discussed the care with the patient and their family  Differential diagnosis and plan of care discussed with patient after the evaluation  35 minutes spent on total encounter of which more than fifty percent of the encounter was spent counseling and/or coordinating care by the attending physician.  Advanced care planning was discussed with patient and family.  Advanced care planning forms were reviewed and discussed.  Risks, benefits and alternatives of gastroenterologic procedures were discussed in detail and all questions were answered.

## 2022-12-15 NOTE — CONSULT NOTE ADULT - ASSESSMENT
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  follow CBC  2.  continue treatment of pneumonia  3.  patient has been seen in GI evaluation and has had scans reviewed by IR.  to have outpatient liver biopsy done as well as EGD  4.  to be further oncologically evaluated as an outpatient as pneumonia resolves and biopsies performed for additional staging with PET/CT and treatment recommendations  discussed with Dr. Gracia, patient and wife

## 2022-12-15 NOTE — DISCHARGE NOTE PROVIDER - NSDCFUADDAPPT_GEN_ALL_CORE_FT
You have a scheduled appointment with interventional radiologist on 12/22 at 9 am  You have a scheduled appointment with interventional radiologist on 12/22 at 9 am   please follow up with a new COVID test on Monday 12/19 at [facility]

## 2022-12-15 NOTE — PROGRESS NOTE ADULT - REASON FOR ADMISSION
sepsis 2/2 PNA

## 2022-12-15 NOTE — DISCHARGE NOTE PROVIDER - NSDCMRMEDTOKEN_GEN_ALL_CORE_FT
amLODIPine 10 mg oral tablet: 1 tab(s) orally once a day  bisoprolol 10 mg oral tablet: 1 tab(s) orally once a day  cefpodoxime 200 mg oral tablet: 1 tab(s) orally 2 times a day   chlorthalidone 25 mg oral tablet: 1 tab(s) orally once a day  famotidine 20 mg oral tablet: 1 tab(s) orally once a day  predniSONE 10 mg oral tablet: 1 tab(s) orally once a day  simvastatin 10 mg oral tablet: 1 tab(s) orally once a day (at bedtime)

## 2022-12-15 NOTE — DISCHARGE NOTE NURSING/CASE MANAGEMENT/SOCIAL WORK - NSDCFUADDAPPT_GEN_ALL_CORE_FT
You have a scheduled appointment with interventional radiologist on 12/22 at 9 am   please follow up with a new COVID test on Monday 12/19 at [facility]

## 2022-12-15 NOTE — DISCHARGE NOTE PROVIDER - HOSPITAL COURSE
. .77 y/o M PMH HTN, HLD, psoriasis on prednisone 10mg daily who was brought in for evaluation of productive cough, poor appetite and worsening dyspnea. Patient was started on IV rocephin/azithromycin for pneumonia. On initial workup, patient was found to have possible liver mets. He was seen by GI and underwent a MR abdomen showing possible esophageal mass with liver mets. He was recommended IR biopsy and will be schedule for a biopsy on 12/22 at 9 am. He was instructed on need for repeat COVID on Monday. Patient was seen at bedside on 12/15. He remains stable on RA. He will be discharged on two more days of vantin 200mg BID. He had elevated wbc which was reactive.    He will need an eventual EGD once his infection clears and after liver biopsy.    Consults:  Pulm; Dr Linton  ID: Dr [email or web address]  Heme: Dr Zarco   GI: Dr Phelps

## 2022-12-15 NOTE — DISCHARGE NOTE NURSING/CASE MANAGEMENT/SOCIAL WORK - PATIENT PORTAL LINK FT
You can access the FollowMyHealth Patient Portal offered by [facility] by registering at the following website: [email or web address]. By joining [facility]’s FollowMyHealth portal, you will also be able to view your health information using other applications (apps) compatible with our system.

## 2022-12-15 NOTE — DISCHARGE NOTE PROVIDER - ATTENDING DISCHARGE PHYSICAL EXAMINATION:
GENERAL: NAD, well-developed, well-groomed  HEENT:  anicteric, moist mucous membranes  CHEST/LUNG:  CTA b/l, no rales, wheezes, or rhonchi  HEART:  RRR, S1, S2  ABDOMEN:  BS+, soft, nontender, nondistended  EXTREMITIES: no edema, cyanosis, or calf tenderness  NERVOUS SYSTEM: answers questions and follows commands appropriately  PSYCH: normal affect

## 2022-12-15 NOTE — PROGRESS NOTE ADULT - SUBJECTIVE AND OBJECTIVE BOX
Patient is a 76y old  Male who presents with a chief complaint of sepsis 2/2 PNA (14 Dec 2022 17:48)    INTERVAL HPI/OVERNIGHT EVENTS: Patient was seen and examined. Denies any abdominal pain. still with some productive cough. afebrile. NPO for possible liver biopsy     I&O's Summary      LABS:                        13.7   21.03 )-----------( 201      ( 15 Dec 2022 05:57 )             42.9     12-15    140  |  100  |  42<H>  ----------------------------<  59<L>  3.8   |  29  |  1.40<H>    Ca    8.8      15 Dec 2022 05:57  Mg     2.3     12-14    TPro  5.8<L>  /  Alb  2.5<L>  /  TBili  0.7  /  DBili  x   /  AST  59<H>  /  ALT  63  /  AlkPhos  257<H>  12-15    PT/INR - ( 14 Dec 2022 08:20 )   PT: 17.0 sec;   INR: 1.45 ratio       CAPILLARY BLOOD GLUCOSE      MEDICATIONS  (STANDING):  amLODIPine   Tablet 10 milliGRAM(s) Oral daily  cefTRIAXone   IVPB 1000 milliGRAM(s) IV Intermittent every 24 hours  famotidine    Tablet 20 milliGRAM(s) Oral daily  influenza  Vaccine (HIGH DOSE) 0.7 milliLiter(s) IntraMuscular once  metoprolol succinate  milliGRAM(s) Oral daily  predniSONE   Tablet 10 milliGRAM(s) Oral daily    MEDICATIONS  (PRN):  albuterol    90 MICROgram(s) HFA Inhaler 2 Puff(s) Inhalation every 6 hours PRN Shortness of Breath and/or Wheezing  guaiFENesin Oral Liquid (Sugar-Free) 200 milliGRAM(s) Oral every 6 hours PRN Cough  ondansetron Injectable 4 milliGRAM(s) IV Push every 8 hours PRN Nausea and/or Vomiting      REVIEW OF SYSTEMS:  CONSTITUTIONAL: No fever or chills  HEENT:  No headache, no sore throat  RESPIRATORY: No cough, wheezing, or shortness of breath  CARDIOVASCULAR: No chest pain, palpitations  GASTROINTESTINAL: No abdominal pain, nausea, vomiting, or diarrhea  GENITOURINARY: No dysuria, frequency, or hematuria  NEUROLOGICAL: no focal weakness or dizziness  MUSCULOSKELETAL: no myalgias  PSYCH: no recent changes in mood    RADIOLOGY & ADDITIONAL TESTS:    Imaging Personally Reviewed:  [x] YES  [ ] NO    Consultant(s) Notes Reviewed:  [x] YES  [ ] NO    PHYSICAL EXAM:  T(C): 36.3 (12-15-22 @ 04:58), Max: 37.2 (12-14-22 @ 12:04)  HR: 71 (12-15-22 @ 04:58) (65 - 71)  BP: 107/67 (12-15-22 @ 04:58) (106/63 - 130/88)  RR: 18 (12-15-22 @ 04:58) (18 - 18)  SpO2: 92% (12-15-22 @ 04:58) (91% - 93%)    GENERAL: NAD, well-developed, well-groomed  HEENT:  anicteric, moist mucous membranes  CHEST/LUNG:  CTA b/l, no rales, wheezes, or rhonchi  HEART:  RRR, S1, S2  ABDOMEN:  BS+, soft, nontender, nondistended  EXTREMITIES: no edema, cyanosis, or calf tenderness  NERVOUS SYSTEM: answers questions and follows commands appropriately  PSYCH: normal affect    Care Discussed with Consultants/Other Providers [x] YES  [ ] NO

## 2022-12-15 NOTE — DISCHARGE NOTE PROVIDER - CARE PROVIDER_API CALL
Jimmy Zarco (MD)  Hematology; Internal Medicine; Medical Oncology  [address] [facility], [address]  [place] [address]  Phone: [phone]  Fax: [phone]  Follow Up Time: 2 weeks    Alvino Phelps (DO)  Gastroenterology; Internal Medicine  [address] [place] [address]  Phone: [phone]  Fax: [phone]  Follow Up Time: 2 weeks

## 2022-12-15 NOTE — PROGRESS NOTE ADULT - ASSESSMENT
PNA    - seen by ID. continue rocephin 1 gm daily.     - azithromycin discontinued. urine legionella was negative.     - Procal 1      hepatic lesion    - MRI is concerning for mets. there is a possible mass in the esophagus per discussion with radiology.     - discussed with patient at bedside. agreeable for liver biopsy    - NPO for now for possible IR today.     - heparin sq discontinued     psoriasis     - cont prednisone 10mg daily     HTN    - cont home meds. bisoprolol changed to toprol. Cont norvasc chlorthalidone held     DVT proph: SCDs  patient likely may need eventual EGD. possible outpatient.    PNA    - seen by ID. continue rocephin 1 gm daily. plan for 5 days.    - azithromycin discontinued. urine legionella was negative.     - Procal 1      hepatic lesion    - MRI is concerning for mets. there is a possible mass in the esophagus per discussion with radiology.     - discussed with patient at bedside. agreeable for liver biopsy    - NPO for now for possible IR today.     - heparin sq discontinued     psoriasis     - cont prednisone 10mg daily     HTN    - cont home meds. bisoprolol changed to toprol. Cont norvasc chlorthalidone held     DVT proph: SCDs  patient likely may need eventual EGD. possible outpatient.    PNA    - seen by ID. continue rocephin 1 gm daily. plan for 5 days.    - azithromycin discontinued. urine legionella was negative.     - Procal 1    - leukocytosis likely reactive       hepatic lesion    - MRI is concerning for mets. there is a possible mass in the esophagus per discussion with radiology.     - discussed with patient at bedside. agreeable for liver biopsy    - NPO for now for possible IR today.     - heparin sq discontinued     psoriasis     - cont prednisone 10mg daily     HTN    - cont home meds. bisoprolol changed to toprol. Cont norvasc chlorthalidone held     DVT proph: SCDs  patient likely may need eventual EGD. possible outpatient.

## 2022-12-16 RX ORDER — CEFPODOXIME PROXETIL 100 MG
1 TABLET ORAL
Qty: 4 | Refills: 0
Start: 2022-12-16

## 2022-12-18 LAB
CULTURE RESULTS: SIGNIFICANT CHANGE UP
CULTURE RESULTS: SIGNIFICANT CHANGE UP
SPECIMEN SOURCE: SIGNIFICANT CHANGE UP
SPECIMEN SOURCE: SIGNIFICANT CHANGE UP

## 2022-12-22 ENCOUNTER — OUTPATIENT (OUTPATIENT)
Dept: OUTPATIENT SERVICES | Facility: HOSPITAL | Age: 77
LOS: 1 days | End: 2022-12-22
Payer: MEDICARE

## 2022-12-22 DIAGNOSIS — J18.9 PNEUMONIA, UNSPECIFIED ORGANISM: ICD-10-CM

## 2022-12-22 PROBLEM — I10 ESSENTIAL (PRIMARY) HYPERTENSION: Chronic | Status: ACTIVE | Noted: 2022-12-12

## 2022-12-22 PROBLEM — L40.9 PSORIASIS, UNSPECIFIED: Chronic | Status: ACTIVE | Noted: 2022-12-12

## 2022-12-22 LAB
HCT VFR BLD CALC: 52.1 % — HIGH (ref 39–50)
HGB BLD-MCNC: 17.1 G/DL — HIGH (ref 13–17)
MCHC RBC-ENTMCNC: 26.8 PG — LOW (ref 27–34)
MCHC RBC-ENTMCNC: 32.8 GM/DL — SIGNIFICANT CHANGE UP (ref 32–36)
MCV RBC AUTO: 81.8 FL — SIGNIFICANT CHANGE UP (ref 80–100)
NRBC # BLD: 0 /100 WBCS — SIGNIFICANT CHANGE UP (ref 0–0)
PLATELET # BLD AUTO: 190 K/UL — SIGNIFICANT CHANGE UP (ref 150–400)
RBC # BLD: 6.37 M/UL — HIGH (ref 4.2–5.8)
RBC # FLD: 18.2 % — HIGH (ref 10.3–14.5)
SARS-COV-2 RNA SPEC QL NAA+PROBE: SIGNIFICANT CHANGE UP
WBC # BLD: 26.27 K/UL — HIGH (ref 3.8–10.5)
WBC # FLD AUTO: 26.27 K/UL — HIGH (ref 3.8–10.5)

## 2022-12-22 PROCEDURE — 88313 SPECIAL STAINS GROUP 2: CPT | Mod: 26

## 2022-12-22 PROCEDURE — 76942 ECHO GUIDE FOR BIOPSY: CPT | Mod: 26

## 2022-12-22 PROCEDURE — 88312 SPECIAL STAINS GROUP 1: CPT | Mod: 26

## 2022-12-22 PROCEDURE — 47000 NEEDLE BIOPSY OF LIVER PERQ: CPT

## 2022-12-22 PROCEDURE — 88333 PATH CONSLTJ SURG CYTO XM 1: CPT | Mod: 26

## 2022-12-22 PROCEDURE — 88307 TISSUE EXAM BY PATHOLOGIST: CPT | Mod: 26

## 2022-12-22 PROCEDURE — 88360 TUMOR IMMUNOHISTOCHEM/MANUAL: CPT | Mod: 26

## 2022-12-28 PROCEDURE — 88313 SPECIAL STAINS GROUP 2: CPT

## 2022-12-28 PROCEDURE — 88312 SPECIAL STAINS GROUP 1: CPT

## 2022-12-28 PROCEDURE — 88307 TISSUE EXAM BY PATHOLOGIST: CPT

## 2022-12-28 PROCEDURE — 88360 TUMOR IMMUNOHISTOCHEM/MANUAL: CPT

## 2022-12-28 PROCEDURE — 87635 SARS-COV-2 COVID-19 AMP PRB: CPT

## 2022-12-28 PROCEDURE — 85027 COMPLETE CBC AUTOMATED: CPT

## 2022-12-28 PROCEDURE — 36415 COLL VENOUS BLD VENIPUNCTURE: CPT

## 2022-12-28 PROCEDURE — 76942 ECHO GUIDE FOR BIOPSY: CPT

## 2022-12-28 PROCEDURE — 88333 PATH CONSLTJ SURG CYTO XM 1: CPT

## 2022-12-28 PROCEDURE — 47000 NEEDLE BIOPSY OF LIVER PERQ: CPT

## 2022-12-31 ENCOUNTER — INPATIENT (INPATIENT)
Facility: HOSPITAL | Age: 77
LOS: 5 days | DRG: 871 | End: 2023-01-06
Attending: EMERGENCY MEDICINE | Admitting: INTERNAL MEDICINE
Payer: MEDICARE

## 2022-12-31 VITALS — HEIGHT: 70 IN | WEIGHT: 220.02 LBS

## 2022-12-31 DIAGNOSIS — R06.02 SHORTNESS OF BREATH: ICD-10-CM

## 2022-12-31 LAB
ALBUMIN SERPL ELPH-MCNC: 2.6 G/DL — LOW (ref 3.3–5)
ALP SERPL-CCNC: 1636 U/L — HIGH (ref 40–120)
ALT FLD-CCNC: 442 U/L — HIGH (ref 10–45)
ANION GAP SERPL CALC-SCNC: 23 MMOL/L — HIGH (ref 5–17)
APPEARANCE UR: CLEAR — SIGNIFICANT CHANGE UP
APTT BLD: 26.9 SEC — LOW (ref 27.5–35.5)
APTT BLD: >200 SEC — CRITICAL HIGH (ref 27.5–35.5)
AST SERPL-CCNC: 685 U/L — HIGH (ref 10–40)
BACTERIA # UR AUTO: NEGATIVE — SIGNIFICANT CHANGE UP
BASE EXCESS BLDV CALC-SCNC: -2.2 MMOL/L — LOW (ref -2–3)
BASE EXCESS BLDV CALC-SCNC: -3.7 MMOL/L — LOW (ref -2–3)
BASE EXCESS BLDV CALC-SCNC: -5.8 MMOL/L — LOW (ref -2–3)
BASOPHILS # BLD AUTO: 0 K/UL — SIGNIFICANT CHANGE UP (ref 0–0.2)
BASOPHILS NFR BLD AUTO: 0 % — SIGNIFICANT CHANGE UP (ref 0–2)
BILIRUB SERPL-MCNC: 3.6 MG/DL — HIGH (ref 0.2–1.2)
BILIRUB UR-MCNC: NEGATIVE — SIGNIFICANT CHANGE UP
BUN SERPL-MCNC: 119 MG/DL — HIGH (ref 7–23)
CA-I SERPL-SCNC: 0.99 MMOL/L — LOW (ref 1.15–1.33)
CA-I SERPL-SCNC: 1.04 MMOL/L — LOW (ref 1.15–1.33)
CALCIUM SERPL-MCNC: 8.4 MG/DL — SIGNIFICANT CHANGE UP (ref 8.4–10.5)
CHLORIDE BLDV-SCNC: 93 MMOL/L — LOW (ref 96–108)
CHLORIDE BLDV-SCNC: 95 MMOL/L — LOW (ref 96–108)
CHLORIDE SERPL-SCNC: 93 MMOL/L — LOW (ref 96–108)
CK MB BLD-MCNC: 8.1 % — HIGH (ref 0–3.5)
CK MB CFR SERPL CALC: 9.6 NG/ML — HIGH (ref 0–6.7)
CK SERPL-CCNC: 118 U/L — SIGNIFICANT CHANGE UP (ref 30–200)
CO2 BLDV-SCNC: 22 MMOL/L — SIGNIFICANT CHANGE UP (ref 22–26)
CO2 BLDV-SCNC: 23 MMOL/L — SIGNIFICANT CHANGE UP (ref 22–26)
CO2 BLDV-SCNC: 24 MMOL/L — SIGNIFICANT CHANGE UP (ref 22–26)
CO2 SERPL-SCNC: 17 MMOL/L — LOW (ref 22–31)
COLOR SPEC: YELLOW — SIGNIFICANT CHANGE UP
CREAT SERPL-MCNC: 3.79 MG/DL — HIGH (ref 0.5–1.3)
DIFF PNL FLD: ABNORMAL
DIFF PNL FLD: NEGATIVE — SIGNIFICANT CHANGE UP
EGFR: 16 ML/MIN/1.73M2 — LOW
EOSINOPHIL # BLD AUTO: 0.67 K/UL — HIGH (ref 0–0.5)
EOSINOPHIL NFR BLD AUTO: 2.6 % — SIGNIFICANT CHANGE UP (ref 0–6)
EPI CELLS # UR: 1 /HPF — SIGNIFICANT CHANGE UP
GAS PNL BLDV: 128 MMOL/L — LOW (ref 136–145)
GAS PNL BLDV: 129 MMOL/L — LOW (ref 136–145)
GAS PNL BLDV: SIGNIFICANT CHANGE UP
GLUCOSE BLDV-MCNC: 104 MG/DL — HIGH (ref 70–99)
GLUCOSE BLDV-MCNC: 112 MG/DL — HIGH (ref 70–99)
GLUCOSE BLDV-MCNC: 125 MG/DL — HIGH (ref 70–99)
GLUCOSE SERPL-MCNC: 111 MG/DL — HIGH (ref 70–99)
GLUCOSE UR QL: NEGATIVE — SIGNIFICANT CHANGE UP
HCO3 BLDV-SCNC: 21 MMOL/L — LOW (ref 22–29)
HCO3 BLDV-SCNC: 22 MMOL/L — SIGNIFICANT CHANGE UP (ref 22–29)
HCO3 BLDV-SCNC: 23 MMOL/L — SIGNIFICANT CHANGE UP (ref 22–29)
HCT VFR BLD CALC: 45.8 % — SIGNIFICANT CHANGE UP (ref 39–50)
HCT VFR BLD CALC: 49.2 % — SIGNIFICANT CHANGE UP (ref 39–50)
HCT VFR BLDA CALC: 39 % — SIGNIFICANT CHANGE UP (ref 39–51)
HCT VFR BLDA CALC: 42 % — SIGNIFICANT CHANGE UP (ref 39–51)
HCT VFR BLDA CALC: 50 % — SIGNIFICANT CHANGE UP (ref 39–51)
HGB BLD CALC-MCNC: 13 G/DL — SIGNIFICANT CHANGE UP (ref 12.6–17.4)
HGB BLD CALC-MCNC: 14 G/DL — SIGNIFICANT CHANGE UP (ref 12.6–17.4)
HGB BLD CALC-MCNC: 16.7 G/DL — SIGNIFICANT CHANGE UP (ref 12.6–17.4)
HGB BLD-MCNC: 14.9 G/DL — SIGNIFICANT CHANGE UP (ref 13–17)
HGB BLD-MCNC: 16 G/DL — SIGNIFICANT CHANGE UP (ref 13–17)
HYALINE CASTS # UR AUTO: 5 /LPF — HIGH (ref 0–2)
HYALINE CASTS # UR AUTO: 6 /LPF — SIGNIFICANT CHANGE UP (ref 0–7)
INR BLD: 1.43 RATIO — HIGH (ref 0.88–1.16)
KETONES UR-MCNC: NEGATIVE — SIGNIFICANT CHANGE UP
LACTATE BLDV-MCNC: 3.8 MMOL/L — HIGH (ref 0.5–2)
LACTATE BLDV-MCNC: 4.7 MMOL/L — CRITICAL HIGH (ref 0.5–2)
LACTATE BLDV-MCNC: 6 MMOL/L — CRITICAL HIGH (ref 0.5–2)
LEGIONELLA AG UR QL: NEGATIVE — SIGNIFICANT CHANGE UP
LEUKOCYTE ESTERASE UR-ACNC: NEGATIVE — SIGNIFICANT CHANGE UP
LYMPHOCYTES # BLD AUTO: 0.44 K/UL — LOW (ref 1–3.3)
LYMPHOCYTES # BLD AUTO: 1.7 % — LOW (ref 13–44)
MAGNESIUM SERPL-MCNC: 2.9 MG/DL — HIGH (ref 1.6–2.6)
MANUAL SMEAR VERIFICATION: SIGNIFICANT CHANGE UP
MCHC RBC-ENTMCNC: 26.8 PG — LOW (ref 27–34)
MCHC RBC-ENTMCNC: 27 PG — SIGNIFICANT CHANGE UP (ref 27–34)
MCHC RBC-ENTMCNC: 32.5 GM/DL — SIGNIFICANT CHANGE UP (ref 32–36)
MCV RBC AUTO: 82.2 FL — SIGNIFICANT CHANGE UP (ref 80–100)
MCV RBC AUTO: 83 FL — SIGNIFICANT CHANGE UP (ref 80–100)
MONOCYTES # BLD AUTO: 0.44 K/UL — SIGNIFICANT CHANGE UP (ref 0–0.9)
MONOCYTES NFR BLD AUTO: 1.7 % — LOW (ref 2–14)
NEUTROPHILS # BLD AUTO: 24.13 K/UL — HIGH (ref 1.8–7.4)
NEUTROPHILS NFR BLD AUTO: 92.3 % — HIGH (ref 43–77)
NEUTS BAND # BLD: 1.7 % — SIGNIFICANT CHANGE UP (ref 0–8)
NITRITE UR-MCNC: NEGATIVE — SIGNIFICANT CHANGE UP
NRBC # BLD: 0 /100 WBCS — SIGNIFICANT CHANGE UP (ref 0–0)
NT-PROBNP SERPL-SCNC: HIGH PG/ML (ref 0–300)
PCO2 BLDV: 41 MMHG — LOW (ref 42–55)
PCO2 BLDV: 42 MMHG — SIGNIFICANT CHANGE UP (ref 42–55)
PCO2 BLDV: 43 MMHG — SIGNIFICANT CHANGE UP (ref 42–55)
PH BLDV: 7.29 — LOW (ref 7.32–7.43)
PH BLDV: 7.33 — SIGNIFICANT CHANGE UP (ref 7.32–7.43)
PH BLDV: 7.36 — SIGNIFICANT CHANGE UP (ref 7.32–7.43)
PH UR: 5.5 — SIGNIFICANT CHANGE UP (ref 5–8)
PLAT MORPH BLD: NORMAL — SIGNIFICANT CHANGE UP
PLATELET # BLD AUTO: 146 K/UL — LOW (ref 150–400)
PLATELET # BLD AUTO: 86 K/UL — LOW (ref 150–400)
PO2 BLDV: 24 MMHG — LOW (ref 25–45)
PO2 BLDV: 29 MMHG — SIGNIFICANT CHANGE UP (ref 25–45)
PO2 BLDV: 31 MMHG — SIGNIFICANT CHANGE UP (ref 25–45)
POTASSIUM BLDV-SCNC: 4.5 MMOL/L — SIGNIFICANT CHANGE UP (ref 3.5–5.1)
POTASSIUM BLDV-SCNC: 4.6 MMOL/L — SIGNIFICANT CHANGE UP (ref 3.5–5.1)
POTASSIUM BLDV-SCNC: 4.7 MMOL/L — SIGNIFICANT CHANGE UP (ref 3.5–5.1)
POTASSIUM SERPL-MCNC: 4.8 MMOL/L — SIGNIFICANT CHANGE UP (ref 3.5–5.3)
POTASSIUM SERPL-SCNC: 4.8 MMOL/L — SIGNIFICANT CHANGE UP (ref 3.5–5.3)
PROT SERPL-MCNC: 5.5 G/DL — LOW (ref 6–8.3)
PROT UR-MCNC: ABNORMAL
PROTHROM AB SERPL-ACNC: 16.7 SEC — HIGH (ref 10.5–13.4)
RAPID RVP RESULT: SIGNIFICANT CHANGE UP
RBC # BLD: 5.57 M/UL — SIGNIFICANT CHANGE UP (ref 4.2–5.8)
RBC # BLD: 5.93 M/UL — HIGH (ref 4.2–5.8)
RBC # FLD: 19.5 % — HIGH (ref 10.3–14.5)
RBC # FLD: 19.7 % — HIGH (ref 10.3–14.5)
RBC BLD AUTO: SIGNIFICANT CHANGE UP
RBC CASTS # UR COMP ASSIST: 4 /HPF — SIGNIFICANT CHANGE UP (ref 0–4)
RBC CASTS # UR COMP ASSIST: 6 /HPF — HIGH (ref 0–4)
SAO2 % BLDV: 32.1 % — LOW (ref 67–88)
SAO2 % BLDV: 33.8 % — LOW (ref 67–88)
SAO2 % BLDV: 35.1 % — LOW (ref 67–88)
SARS-COV-2 RNA SPEC QL NAA+PROBE: SIGNIFICANT CHANGE UP
SODIUM SERPL-SCNC: 133 MMOL/L — LOW (ref 135–145)
SP GR SPEC: 1.02 — SIGNIFICANT CHANGE UP (ref 1.01–1.02)
SP GR SPEC: 1.04 — HIGH (ref 1.01–1.02)
TROPONIN T, HIGH SENSITIVITY RESULT: 139 NG/L — HIGH (ref 0–51)
TROPONIN T, HIGH SENSITIVITY RESULT: 147 NG/L — HIGH (ref 0–51)
TROPONIN T, HIGH SENSITIVITY RESULT: 165 NG/L — HIGH (ref 0–51)
UROBILINOGEN FLD QL: NEGATIVE — SIGNIFICANT CHANGE UP
WBC # BLD: 18.27 K/UL — HIGH (ref 3.8–10.5)
WBC # BLD: 25.67 K/UL — HIGH (ref 3.8–10.5)
WBC # FLD AUTO: 18.27 K/UL — HIGH (ref 3.8–10.5)
WBC # FLD AUTO: 25.67 K/UL — HIGH (ref 3.8–10.5)
WBC UR QL: 2 /HPF — SIGNIFICANT CHANGE UP (ref 0–5)
WBC UR QL: 6 /HPF — HIGH (ref 0–5)

## 2022-12-31 PROCEDURE — 71275 CT ANGIOGRAPHY CHEST: CPT | Mod: 26,MB

## 2022-12-31 PROCEDURE — 99222 1ST HOSP IP/OBS MODERATE 55: CPT | Mod: GC

## 2022-12-31 PROCEDURE — 93010 ELECTROCARDIOGRAM REPORT: CPT

## 2022-12-31 PROCEDURE — 93308 TTE F-UP OR LMTD: CPT | Mod: 26

## 2022-12-31 PROCEDURE — 99223 1ST HOSP IP/OBS HIGH 75: CPT

## 2022-12-31 PROCEDURE — 99291 CRITICAL CARE FIRST HOUR: CPT

## 2022-12-31 PROCEDURE — 71045 X-RAY EXAM CHEST 1 VIEW: CPT | Mod: 26

## 2022-12-31 PROCEDURE — 70450 CT HEAD/BRAIN W/O DYE: CPT | Mod: 26,MA

## 2022-12-31 RX ORDER — FAMOTIDINE 10 MG/ML
20 INJECTION INTRAVENOUS DAILY
Refills: 0 | Status: DISCONTINUED | OUTPATIENT
Start: 2022-12-31 | End: 2022-12-31

## 2022-12-31 RX ORDER — ACETAMINOPHEN 500 MG
650 TABLET ORAL EVERY 6 HOURS
Refills: 0 | Status: DISCONTINUED | OUTPATIENT
Start: 2022-12-31 | End: 2023-01-05

## 2022-12-31 RX ORDER — SODIUM CHLORIDE 9 MG/ML
1000 INJECTION INTRAMUSCULAR; INTRAVENOUS; SUBCUTANEOUS
Refills: 0 | Status: DISCONTINUED | OUTPATIENT
Start: 2022-12-31 | End: 2022-12-31

## 2022-12-31 RX ORDER — INFLUENZA VIRUS VACCINE 15; 15; 15; 15 UG/.5ML; UG/.5ML; UG/.5ML; UG/.5ML
0.7 SUSPENSION INTRAMUSCULAR ONCE
Refills: 0 | Status: DISCONTINUED | OUTPATIENT
Start: 2022-12-31 | End: 2023-01-06

## 2022-12-31 RX ORDER — SIMVASTATIN 20 MG/1
10 TABLET, FILM COATED ORAL AT BEDTIME
Refills: 0 | Status: DISCONTINUED | OUTPATIENT
Start: 2022-12-31 | End: 2023-01-05

## 2022-12-31 RX ORDER — PIPERACILLIN AND TAZOBACTAM 4; .5 G/20ML; G/20ML
3.38 INJECTION, POWDER, LYOPHILIZED, FOR SOLUTION INTRAVENOUS ONCE
Refills: 0 | Status: COMPLETED | OUTPATIENT
Start: 2022-12-31 | End: 2022-12-31

## 2022-12-31 RX ORDER — HYDROCORTISONE 20 MG
100 TABLET ORAL ONCE
Refills: 0 | Status: COMPLETED | OUTPATIENT
Start: 2022-12-31 | End: 2022-12-31

## 2022-12-31 RX ORDER — SODIUM BICARBONATE 1 MEQ/ML
0.04 SYRINGE (ML) INTRAVENOUS
Qty: 75 | Refills: 0 | Status: DISCONTINUED | OUTPATIENT
Start: 2022-12-31 | End: 2023-01-05

## 2022-12-31 RX ORDER — ONDANSETRON 8 MG/1
4 TABLET, FILM COATED ORAL
Refills: 0 | Status: DISCONTINUED | OUTPATIENT
Start: 2022-12-31 | End: 2023-01-05

## 2022-12-31 RX ORDER — HEPARIN SODIUM 5000 [USP'U]/ML
INJECTION INTRAVENOUS; SUBCUTANEOUS
Qty: 25000 | Refills: 0 | Status: DISCONTINUED | OUTPATIENT
Start: 2022-12-31 | End: 2023-01-05

## 2022-12-31 RX ORDER — VANCOMYCIN HCL 1 G
1000 VIAL (EA) INTRAVENOUS ONCE
Refills: 0 | Status: COMPLETED | OUTPATIENT
Start: 2022-12-31 | End: 2022-12-31

## 2022-12-31 RX ORDER — HEPARIN SODIUM 5000 [USP'U]/ML
7000 INJECTION INTRAVENOUS; SUBCUTANEOUS EVERY 6 HOURS
Refills: 0 | Status: DISCONTINUED | OUTPATIENT
Start: 2022-12-31 | End: 2023-01-05

## 2022-12-31 RX ORDER — PIPERACILLIN AND TAZOBACTAM 4; .5 G/20ML; G/20ML
3.38 INJECTION, POWDER, LYOPHILIZED, FOR SOLUTION INTRAVENOUS EVERY 12 HOURS
Refills: 0 | Status: DISCONTINUED | OUTPATIENT
Start: 2023-01-01 | End: 2023-01-06

## 2022-12-31 RX ORDER — SODIUM CHLORIDE 9 MG/ML
500 INJECTION, SOLUTION INTRAVENOUS ONCE
Refills: 0 | Status: COMPLETED | OUTPATIENT
Start: 2022-12-31 | End: 2022-12-31

## 2022-12-31 RX ORDER — HEPARIN SODIUM 5000 [USP'U]/ML
3500 INJECTION INTRAVENOUS; SUBCUTANEOUS EVERY 6 HOURS
Refills: 0 | Status: DISCONTINUED | OUTPATIENT
Start: 2022-12-31 | End: 2023-01-05

## 2022-12-31 RX ORDER — HEPARIN SODIUM 5000 [USP'U]/ML
7000 INJECTION INTRAVENOUS; SUBCUTANEOUS ONCE
Refills: 0 | Status: COMPLETED | OUTPATIENT
Start: 2022-12-31 | End: 2022-12-31

## 2022-12-31 RX ORDER — PIPERACILLIN AND TAZOBACTAM 4; .5 G/20ML; G/20ML
3.38 INJECTION, POWDER, LYOPHILIZED, FOR SOLUTION INTRAVENOUS ONCE
Refills: 0 | Status: COMPLETED | OUTPATIENT
Start: 2023-01-01 | End: 2023-01-01

## 2022-12-31 RX ORDER — SUCRALFATE 1 G
1 TABLET ORAL
Refills: 0 | Status: DISCONTINUED | OUTPATIENT
Start: 2022-12-31 | End: 2023-01-05

## 2022-12-31 RX ORDER — PANTOPRAZOLE SODIUM 20 MG/1
40 TABLET, DELAYED RELEASE ORAL
Refills: 0 | Status: DISCONTINUED | OUTPATIENT
Start: 2022-12-31 | End: 2023-01-03

## 2022-12-31 RX ORDER — AZITHROMYCIN 500 MG/1
500 TABLET, FILM COATED ORAL ONCE
Refills: 0 | Status: COMPLETED | OUTPATIENT
Start: 2022-12-31 | End: 2022-12-31

## 2022-12-31 RX ADMIN — Medication 100 MILLIGRAM(S): at 10:08

## 2022-12-31 RX ADMIN — SODIUM CHLORIDE 500 MILLILITER(S): 9 INJECTION, SOLUTION INTRAVENOUS at 12:19

## 2022-12-31 RX ADMIN — HEPARIN SODIUM 1600 UNIT(S)/HR: 5000 INJECTION INTRAVENOUS; SUBCUTANEOUS at 14:13

## 2022-12-31 RX ADMIN — Medication 250 MILLIGRAM(S): at 11:13

## 2022-12-31 RX ADMIN — HEPARIN SODIUM 1300 UNIT(S)/HR: 5000 INJECTION INTRAVENOUS; SUBCUTANEOUS at 22:55

## 2022-12-31 RX ADMIN — PIPERACILLIN AND TAZOBACTAM 200 GRAM(S): 4; .5 INJECTION, POWDER, LYOPHILIZED, FOR SOLUTION INTRAVENOUS at 10:08

## 2022-12-31 RX ADMIN — AZITHROMYCIN 255 MILLIGRAM(S): 500 TABLET, FILM COATED ORAL at 12:20

## 2022-12-31 RX ADMIN — SODIUM CHLORIDE 500 MILLILITER(S): 9 INJECTION, SOLUTION INTRAVENOUS at 10:08

## 2022-12-31 RX ADMIN — SIMVASTATIN 10 MILLIGRAM(S): 20 TABLET, FILM COATED ORAL at 22:41

## 2022-12-31 RX ADMIN — Medication 50 MEQ/KG/HR: at 19:00

## 2022-12-31 RX ADMIN — HEPARIN SODIUM 1600 UNIT(S)/HR: 5000 INJECTION INTRAVENOUS; SUBCUTANEOUS at 20:00

## 2022-12-31 RX ADMIN — HEPARIN SODIUM 0 UNIT(S)/HR: 5000 INJECTION INTRAVENOUS; SUBCUTANEOUS at 20:55

## 2022-12-31 RX ADMIN — HEPARIN SODIUM 7000 UNIT(S): 5000 INJECTION INTRAVENOUS; SUBCUTANEOUS at 14:12

## 2022-12-31 RX ADMIN — PIPERACILLIN AND TAZOBACTAM 200 GRAM(S): 4; .5 INJECTION, POWDER, LYOPHILIZED, FOR SOLUTION INTRAVENOUS at 20:01

## 2022-12-31 NOTE — ED PROVIDER NOTE - ATTENDING CONTRIBUTION TO CARE
Attending note (Kwasi): 75y/o M with recently diagnosed esophageal/liver masses presenting with SOB since this morning; found to be hypotensive; recent admission for pneumonia; concerning for sepsis, possible PE, possible cardiogenic shock.  Obtaining screening labs: cbc (to evaluate for leukocytosis or anemia), CMP (to evaluate for electrolyte abnormalities or renal/liver dysfunction), troponin/proBNP, send blood cultures, start empiric broad spectrum iv antibiotics, and give iv fluids based on POCUS assessment. Please see above progress notes above for updates to medical decision making and the patient's clinical course. Attending note (Kwasi): 73y/o M with recently diagnosed esophageal/liver masses presenting with SOB since this morning; found to be hypotensive; recent admission for pneumonia; concerning for sepsis, possible PE, possible cardiogenic shock.  Obtaining screening labs: cbc (to evaluate for leukocytosis or anemia), CMP (to evaluate for electrolyte abnormalities or renal/liver dysfunction), troponin/proBNP, send blood cultures, start empiric broad spectrum iv antibiotics, and give iv fluids based on POCUS assessment. Please see above progress notes above for updates to medical decision making and the patient's clinical course.

## 2022-12-31 NOTE — CONSULT NOTE ADULT - ASSESSMENT
77M with PMH HTN, HLD, and psoriasis (on prednisone) with recent admission at OSH for PNA with newly diagnosed liver mets s/p biopsy 12/22 i/s/o esophageal mass presenting with SOB, found to have LIVIER and RLL segmental PE and multiple subsegmental PE's without RHS and b/l consolidations/GGO opacities on CT chest c/f infection. MICU consulted for hypotension    #hypotension  Likely multifactorial 2/2 sepsis vs hypovolemia 2/2 vomiting/diarrhea/decreased PO intake. Initially with soft BP's s/p 1L IVF in ED, now mapping in 70s-80s  - TTE wnl, EF 70%, nl LV systolic function, no e/o RHS   - small compressible IVC on POCUS, can give additional IVF if needed  - c/w treatment for sepsis w/ broad spectrum abx     - Currently maintaining MAPs in 70s-80s, protecting airway and saturating in mid 90s on RA. LIVIER without urgent HD needs at this time. No ICU needs/not a MICU candidate at this time

## 2022-12-31 NOTE — H&P ADULT - HISTORY OF PRESENT ILLNESS
8 y/o M PMH HTN, HLD, psoriasis on prednisone 10mg daily presenting with SOB. Recent admission for pneumonia and dced two weeks ago also found to possible esophageal cancer (mass) with liver mets s/p IR biopsy 12/22. Today patient woke up with shortness of breath at rest, worse with exertion with no associated LOC, chest pain, palpitations, diaphoresis, focal weakness, numbness/tingling.  Reports intermittent nonbloody vomiting and diarrhea with no fever/chills, cough, rashes, dysuria, or hematuria. Reports decreased PO intake     Received 1L IVF, azithro, zosyn, vanc, and solumedrol 100mg IVP x1 in ED.

## 2022-12-31 NOTE — ED ADULT NURSE NOTE - NS ED NURSE LEVEL OF CONSCIOUSNESS AFFECT
History:    Patient age <15 or >35: No  Toxoplasmosis: No  Cytomegalovirus: No  Hepatitis B  Immunization: Yes    History of any Infertility: No  History of an abnormal pap smear: Yes at 20 years old, had a colp for low grade dysplasia, normal since  Any personal History of Post Partum Depression: No  History of previous STD's:  Denies all sexually transmitted disease  History of Blood Transfusion: No  Agree to be tested for AIDS/HIV: Yes  Previous History of Group B Streptococcus: No  Previous History of Group B Strep Infected Infant: No  Problems with Anesthesia: No  Any History of Hemorrhage during a Previous Pregnancy: No  Family or Medical History of Twins or Multiple Birth: No  Desires testing for Cystic Fibrosis: No  Reviewed information about genetic testing options (Rule out Down Syndrome, T18/T13):No  Made aware to discuss testing with Primary physician at first appointment: Yes  Travel outside of [place] within last 4 months: Yes in [place], not a Zika concern  Where:[place]  Dates: July/August  You, your partner, or you and your partner: self    Psychosocial Assessment:    Concerns with Housing, Clothing, Food: No  Concerns with Finances: No  Concerns with Transportation: No  Biological Father Involved: Yes  History of Physical/Sexual/Emotional Abuse: No    Visit Summary:    Pt here today for OB hx, Ob bag given, labs ordered, accepts HIV test, has been offered dating/viability ultrasound at first visit with MD, and accepts . Mauri Mena  is the father and he will be involved. Please see prenatal checklist for areas of education covered today. Questions answered. Pt denies any additional concerns or questions at this time.                  
Calm

## 2022-12-31 NOTE — PATIENT PROFILE ADULT - OVER THE PAST TWO WEEKS HAVE YOU FELT DOWN, DEPRESSED OR HOPELESS?
56 year old female with PMH of stage 3 breast cancer on aromasin, RA, Psoriasis on Otezla, brain aneurysm, s/p clip 1988, right jugular DVT, Catheter related MSSA bacteremia 2015,  multiple admissions for ETOH abuse, COPD on oxygen presents here with dyspnea, ETOH withdrawal.     1.  Chronic respiratory failure with hypoxia.  -secondary to pulm disease, COPD/ emphysema   -cxr w/ clear lungs   -cv stable. no decomp chf on exam; no evidence of acs   -management per med/ pulm  -iv steroids   -recent echo with grossly normal LV function     2. Sinus tachycardia -improved   - secondary to withdrawal, dehydration, anxiety, copd exacerbation  -no evidence of acs , asymptomatic  -recent echo with grossly normal lv function     3. med f/u, CIWA  protocol   pt. continues to drink despite multiple admissions  aware of risks of continued drinking     dvt ppx no

## 2022-12-31 NOTE — H&P ADULT - ASSESSMENT
77 m with    Pulmonary embolus  - AC with Heparin  - Pulmonary evaluation  - Vascular cardiology evaluation    Congestive Heart Failure  - telemetry  - cardiac enzymes  - echo  - cardiology evaluation     HTN  - hold BP meds  - hold diuretic    Psoriasis  - stress dose steroid    Liver lesions  - s/p Bx at [facility]  - will try to obtain result      LIVIER  - follow lytes, Cr  - gentle IVF  - Nephrology evaluation Dr. Khan     Esophageal mass  - Gastroenterology evaluation Dr. Sterling    Sepsis/ Pneumonia  - antibiotic  - ID evaluation Dr Luo    DVT prophylaxis  - AC    MICU evaluation noted     Further action as per clinical course     Moshe Tamayo MD phone [phone]  77 m with    Pulmonary embolus  - AC with Heparin  - Pulmonary evaluation  - Vascular cardiology evaluation    Congestive Heart Failure  - telemetry  - cardiac enzymes  - echo  - cardiology evaluation     HTN  - hold BP meds  - hold diuretic    Psoriasis  - stress dose steroid    Liver lesions  - s/p Bx at [facility]  - will try to obtain result      LIVIER  - follow lytes, Cr  - gentle IVF  - Nephrology evaluation Dr. Khan     Esophageal mass  - Gastroenterology evaluation Dr. Sterling    Sepsis/ Pneumonia  - antibiotic  - ID evaluation Dr Luo    DVT prophylaxis  - AC    MICU evaluation noted     Further action as per clinical course     Moshe Tamayo MD phone [phone]  77 m with    Pulmonary embolus  - AC with Heparin  - Pulmonary evaluation  - Vascular cardiology evaluation    Congestive Heart Failure  - telemetry  - cardiac enzymes  - echo  - cardiology evaluation     HTN  - hold BP meds  - hold diuretic    Psoriasis  - stress dose steroid    Liver lesions  - s/p Bx at [facility]  - will try to obtain result      LIVIER  - follow lytes, Cr  - gentle IVF  - Nephrology evaluation Dr. Khan     Esophageal mass  - Gastroenterology evaluation Dr. Sterling    Sepsis/ Pneumonia  - antibiotic  - ID evaluation Dr Luo    DVT prophylaxis  - AC    MICU evaluation noted     Further action as per clinical course     Moshe Tamayo MD phone [phone]

## 2022-12-31 NOTE — ED ADULT TRIAGE NOTE - CHIEF COMPLAINT QUOTE
decreased appetite CA esophagus mets LIVER NO tx at this time SOB on exertion  Lethargic Aroused Unable to obtain vitals

## 2022-12-31 NOTE — ED CLERICAL - BED REQUESTED
Patient states that she \"thought\" she put the printed script in a safe/obvious place.  But now as she needs it to refill, she can not find the script.  She reports that she has looked everywhere in her house and her purse/car but can not find the script.  Asking to have script escribed to [facility] in [place] on her profile.  She will continue to look for the script and if she finds it, she will bring it to her appointment on 11/12.   31-Dec-2022 15:05

## 2022-12-31 NOTE — ED PROVIDER NOTE - OBJECTIVE STATEMENT
78 y/o M PMH HTN, HLD, psoriasis on prednisone 10mg daily presenting with SOB. Recent admission for pneumonia and dced two weeks ago also found to possible esophageal cancer (mass) with liver mets s/p IR biopsy 12/22. Today patient woke up with shortness of breath at rest, worse with exertion with no associated LOC, chest pain, palpitations, diaphoresis, focal weakness, numbness/tingling.  Reports intermittent nonbloody vomiting and diarrhea with no fever/chills, cough, rashes, dysuria, or hematuria.  Does report decreased appetite as well as decreased fluid intake. 76 y/o M PMH HTN, HLD, psoriasis on prednisone 10mg daily presenting with SOB. Recent admission for pneumonia and dced two weeks ago also found to possible esophageal cancer (mass) with liver mets s/p IR biopsy 12/22. Today patient woke up with shortness of breath at rest, worse with exertion with no associated LOC, chest pain, palpitations, diaphoresis, focal weakness, numbness/tingling.  Reports intermittent nonbloody vomiting and diarrhea with no fever/chills, cough, rashes, dysuria, or hematuria.  Does report decreased appetite as well as decreased fluid intake.

## 2022-12-31 NOTE — ED ADULT NURSE NOTE - OBJECTIVE STATEMENT
78 y/o M not ambulatory AAO4 presents to the ED c/o SOB x2 days. Pt was recently inpatient at [facility] from 12-12-22--12-15-22 being treated for pneumonia. Pt has become increasingly weak and decrease PO intake since discharge. Pt has fallen multiple times at home because of weak knee joints. denies hitting his head ever, or LOC. Pt has PMH of HTN and psoriatic arthritis. Pt denies blood thinners. Pt is also newly diagnosed with metastatic liver CA but has not had any treatment thus far. Pt denies CP, HA, vision changes, fevers chills, abdominal pain. Upon assessment, abdomen soft and nontender, +strong peripheral pulses, moving all extremities without difficulty. Pt on room air sat 97% O2. On CM NSR. EKG done. All safety measures are implied side rails up and stretcher in lowest position. Straight catheterization with two RNs present, 300 cc of dark yellow colored urin. Pt bottom was red but not pressure injury's noted. 78 y/o M not ambulatory AAO4 presents to the ED c/o SOB x2 days. Pt was recently inpatient at [facility] from 12-12-22--12-15-22 being treated for pneumonia. Pt has become increasingly weak and decrease PO intake since discharge. Pt has fallen multiple times at home because of weak knee joints. denies hitting his head ever, or LOC. Pt has PMH of HTN and psoriatic arthritis. Pt denies blood thinners. Pt is also newly diagnosed with metastatic liver CA but has not had any treatment thus far. Pt denies CP, HA, vision changes, fevers chills, abdominal pain. Upon assessment, abdomen soft and nontender, +strong peripheral pulses, moving all extremities without difficulty. Pt on room air sat 97% O2. On CM NSR. EKG done. All safety measures are implied side rails up and stretcher in lowest position. Straight catheterization with two RNs present, 300 cc of dark yellow colored urin. Pt bottom was red but not pressure injury's noted. 78 y/o M not ambulatory AAO4 presents to the ED c/o SOB x2 days. Pt was recently inpatient at [facility] from 12-12-22--12-15-22 being treated for pneumonia. Pt has become increasingly weak and decrease PO intake since discharge. Pt has fallen multiple times at home because of weak knee joints. denies hitting his head ever, or LOC. Pt has PMH of HTN and psoriatic arthritis. Pt denies blood thinners. Pt is also newly diagnosed with metastatic liver CA but has not had any treatment thus far. Pt denies CP, HA, vision changes, fevers chills, abdominal pain. Upon assessment, abdomen soft and nontender, +strong peripheral pulses, moving all extremities without difficulty. Pt on room air sat 97% O2. On CM NSR. EKG done. All safety measures are implied side rails up and stretcher in lowest position. Straight catheterization with two RNs present, 300 cc of dark yellow colored urin. Pt bottom was red but not pressure injury's noted.

## 2022-12-31 NOTE — ED PROVIDER NOTE - CLINICAL SUMMARY MEDICAL DECISION MAKING FREE TEXT BOX
76 y/o M PMH HTN, HLD, psoriasis on prednisone 10mg daily presenting with SOB x 1 day; recent admission for pneumonia and dced two weeks ago also found to possible esophageal cancer (mass) with liver mets s/p IR biopsy 12/22, mild increased WOB with RR in 20s minimal accessory muscle use with no hypoxia, afebrile, hypotensive,  POCUS showing B lines throughout b/l fields and enlarged RV with areas of decreased contractility. Exam/hx c/f but not limited to multifocal pneumonia vs. CHF vs. ACS (nonischemic EKG) vs. PE; will treat empirically with broad spectrum abx, 500 cc fluid bolus with stress dose steroids given on chronic steroids, Trop/pro-BNP, basic labs, and will continue to reassess BP/fluid status

## 2022-12-31 NOTE — CONSULT NOTE ADULT - ASSESSMENT
a/p esophageal cancer with mets liver  n/v/d    plan  in an dout  ppi once a day  zofran and reglan  check stool studies   daily cbc   transfuse prn   ivf  may need  upper gastrointestinal endoscopy to be done  check iron studies   ppi once a day  check stool occult blood  further recommendations pending above    Advanced care planning was discussed with patient and family.  Advanced care planning forms were reviewed and discussed.  Risks, benefits and alternatives of gastroenterologic procedures were discussed in detail and all questions were answered.    30 minutes spent.

## 2022-12-31 NOTE — H&P ADULT - NSHPPHYSICALEXAM_GEN_ALL_CORE
PHYSICAL EXAMINATION:  Vital Signs Last 24 Hrs  T(C): 36.2 (31 Dec 2022 15:51), Max: 36.2 (31 Dec 2022 15:51)  T(F): 97.2 (31 Dec 2022 15:51), Max: 97.2 (31 Dec 2022 15:51)  HR: 64 (31 Dec 2022 15:37) (64 - 87)  BP: 116/63 (31 Dec 2022 15:37) (82/62 - 116/63)  BP(mean): 79 (31 Dec 2022 15:37) (70 - 79)  RR: 23 (31 Dec 2022 15:37) (17 - 23)  SpO2: 97% (31 Dec 2022 15:37) (96% - 100%)    Parameters below as of 31 Dec 2022 15:37  Patient On (Oxygen Delivery Method): room air      CAPILLARY BLOOD GLUCOSE          GENERAL: sick  HEAD:  atraumatic, normocephalic  EYES: sclera anicteric  ENMT: mucous membranes moist  NECK: supple, No JVD  CHEST/LUNG: clear to auscultation bilaterally; no rales, rhonchi, or wheezing b/l  HEART: normal S1, S2  ABDOMEN: BS+, soft, ND, NT   EXTREMITIES: 1-2+ edema b/l LEs  NEURO: awake, alert, interactive; moves all extremities  SKIN: no rashes or lesions

## 2022-12-31 NOTE — H&P ADULT - NSHPLABSRESULTS_GEN_ALL_CORE
16.0   25.67 )-----------( 146      ( 31 Dec 2022 10:00 )             49.2           133<L>  |  93<L>  |  119<H>  ----------------------------<  111<H>  4.8   |  17<L>  |  3.79<H>    Ca    8.4      31 Dec 2022 10:00  Mg     2.9         TPro  5.5<L>  /  Alb  2.6<L>  /  TBili  3.6<H>  /  DBili  x   /  AST  685<H>  /  ALT  442<H>  /  AlkPhos  1636<H>                Urinalysis Basic - ( 31 Dec 2022 09:59 )    Color: Yellow / Appearance: Clear / S.022 / pH: x  Gluc: x / Ketone: Negative  / Bili: Negative / Urobili: Negative   Blood: x / Protein: Trace / Nitrite: Negative   Leuk Esterase: Negative / RBC: 4 /hpf / WBC 2 /HPF   Sq Epi: x / Non Sq Epi: 1 /hpf / Bacteria: Negative        PT/INR - ( 31 Dec 2022 10:00 )   PT: 16.7 sec;   INR: 1.43 ratio         PTT - ( 31 Dec 2022 10:00 )  PTT:26.9 sec    < from: CT Head No Cont (22 @ 12:27) >    IMPRESSION:    No CT evidence of acute intracranial hemorrhage or mass.    If clinical suspicion is high for intracranial metastasis, an MRI of the   brain with and without IV contrast is recommended for further evaluation.    < end of copied text >    < from: CT Angio Chest PE Protocol w/ IV Cont (22 @ 10:33) >    IMPRESSION:.    Pulmonary embolism within the right interlobar pulmonary artery and   within multiple proximal segmental branches of the right lower lobe.    Small bilateral pleural effusions and interlobular septalthickening may   be on the basis of pulmonary edema.    Ill-defined consolidative and groundglass opacities involving all lung   lobes may be secondary to infection.    Few additional discrete subcentimeter nodular opacities; indeterminate   and metastatic disease cannot be excluded. Recommend CT chest follow-up   in 3 months to assess stability.    AP window and left hilar lymphadenopathy; indeterminate and metastatic   disease is a diagnostic consideration.    Hepatic metastatic disease, periportal lymphadenopathy, and ascites.    EKG SR

## 2022-12-31 NOTE — ED PROVIDER NOTE - PROGRESS NOTE DETAILS
Rogelio, PGY-3  Radiology called. CTA shows right sided PE (interlobar and subsegmental), trop 165 and pro-BNP 23,000 c/f right heart strain. Pending weight and CT head (r/o mets) to start heparin bolus/gtt. CCU/PERT team called. Patient currently hemodynamically stable Attending note (Kwasi): CT with PE; POCUS with dilated RV ?RV strain (limited apical views); agree with above plan napoleon given concern for RV strain but after 500cc iv fluid bolus and stress steroids (given as patient on prednisone chronically for psoariasis) now maintaining BP with MAP 70s; CCU/cardiology consulted; continue to monitor while awaiting consultant recommendations. Labs reviewed, concerning for liver failure, acute renal failure, and leukocytosis; possible severe sepsis vs congestion due to RV strain vs malignancy related; will continue very careful hydration (high suspicion for dehydration given poor po intake 2/2 esophageal mass and distributive shock from infection) while close monitoring and awaiting CCU/PERT input. Rogelio, PGY-3  MICU report not an ICU candidate. CCU reports not a thrombectomy or CCU candidate. Patient hemodynamically stable with MAPs in high 70s. Admitted to Dr. Tamayo

## 2022-12-31 NOTE — CONSULT NOTE ADULT - SUBJECTIVE AND OBJECTIVE BOX
CHIEF COMPLAINT:    HPI: 76 y/o M PMH HTN, HLD, psoriasis on prednisone 10mg daily presenting with SOB. Recent admission for pneumonia and dced two weeks ago also found to possible esophageal cancer (mass) with liver mets s/p IR biopsy . Today patient woke up with shortness of breath at rest, worse with exertion with no associated LOC, chest pain, palpitations, diaphoresis, focal weakness, numbness/tingling.  Reports intermittent nonbloody vomiting and diarrhea with no fever/chills, cough, rashes, dysuria, or hematuria. Reports decreased PO intake     Received 1L IVF, azithro, zosyn, vanc, and solumedrol 100mg IVP x1 in ED.   MICU consulted for hypotension/soft BP's.       PAST MEDICAL & SURGICAL HISTORY:  HTN  HLD  psoriasis  Metastatic liver lesions s/p IR biopsy   Esophageal mass     FAMILY HISTORY: non-contributory     SOCIAL HISTORY: 40 year smoker, occasionally beers    Allergies: No Known Allergies/intolerances      HOME MEDICATIONS: Reviewed     REVIEW OF SYSTEMS:  CONSTITUTIONAL: No fevers or chills  EYES/ENT: No visual changes;  No vertigo or throat pain   NECK: No pain or stiffness  RESPIRATORY: No cough, wheezing, hemoptysis; No shortness of breath  CARDIOVASCULAR: No chest pain or palpitations  GASTROINTESTINAL: No abdominal or epigastric pain. No hematemesis; No melena or hematochezia.  GENITOURINARY: No dysuria, frequency or hematuria  NEUROLOGICAL: No syncope or dizziness  SKIN: No itching, rashes      OBJECTIVE:  ICU Vital Signs Last 24 Hrs  T(C): 36.1 (31 Dec 2022 09:50), Max: 36.1 (31 Dec 2022 09:50)  T(F): 97 (31 Dec 2022 09:50), Max: 97 (31 Dec 2022 09:50)  HR: 70 (31 Dec 2022 12:53) (70 - 87)  BP: 96/68 (31 Dec 2022 12:53) (82/62 - 102/61)  BP(mean): 79 (31 Dec 2022 12:53) (70 - 79)  ABP: --  ABP(mean): --  RR: 19 (31 Dec 2022 12:53) (17 - 20)  SpO2: 96% (31 Dec 2022 12:53) (96% - 100%)    O2 Parameters below as of 31 Dec 2022 12:53  Patient On (Oxygen Delivery Method): room air      CAPILLARY BLOOD GLUCOSE      PHYSICAL EXAM:  General: NAD, non-toxic appearing   HEENT: EOMi, no scleral icterus  CV: RRR, normal S1 and S2  Lungs: normal respiratory effort, scattered crackles b/l   Abd: soft, nontender, nondistended  Ext: no edema, warm, well perfused   Pysch: AAOx3, appropriate affect   Neuro: grossly non-focal, moving all extremities spontaneously   Skin: no rashes or lesions       HOSPITAL MEDICATIONS:  MEDICATIONS  (STANDING):  heparin   Injectable 7000 Unit(s) IV Push once  heparin  Infusion.  Unit(s)/Hr (16 mL/Hr) IV Continuous <Continuous>    MEDICATIONS  (PRN):  heparin   Injectable 7000 Unit(s) IV Push every 6 hours PRN For aPTT less than 40  heparin   Injectable 3500 Unit(s) IV Push every 6 hours PRN For aPTT between 40 - 57      LABS:                        16.0   25.67 )-----------( 146      ( 31 Dec 2022 10:00 )             49.2         133<L>  |  93<L>  |  119<H>  ----------------------------<  111<H>  4.8   |  17<L>  |  3.79<H>    Ca    8.4      31 Dec 2022 10:00  Mg     2.9         TPro  5.5<L>  /  Alb  2.6<L>  /  TBili  3.6<H>  /  DBili  x   /  AST  685<H>  /  ALT  442<H>  /  AlkPhos  1636<H>      PT/INR - ( 31 Dec 2022 10:00 )   PT: 16.7 sec;   INR: 1.43 ratio         PTT - ( 31 Dec 2022 10:00 )  PTT:26.9 sec  Urinalysis Basic - ( 31 Dec 2022 09:59 )    Color: Yellow / Appearance: Clear / S.022 / pH: x  Gluc: x / Ketone: Negative  / Bili: Negative / Urobili: Negative   Blood: x / Protein: Trace / Nitrite: Negative   Leuk Esterase: Negative / RBC: 4 /hpf / WBC 2 /HPF   Sq Epi: x / Non Sq Epi: 1 /hpf / Bacteria: Negative        Venous Blood Gas:   @ 10:42  7.33/42/29/22/35.1  VBG Lactate: 4.7  Venous Blood Gas:   @ 09:25  7.29/43/31/21/33.8  VBG Lactate: 6.0      MICROBIOLOGY:     RADIOLOGY: Reviewed  CHIEF COMPLAINT:    HPI: 78 y/o M PMH HTN, HLD, psoriasis on prednisone 10mg daily presenting with SOB. Recent admission for pneumonia and dced two weeks ago also found to possible esophageal cancer (mass) with liver mets s/p IR biopsy . Today patient woke up with shortness of breath at rest, worse with exertion with no associated LOC, chest pain, palpitations, diaphoresis, focal weakness, numbness/tingling.  Reports intermittent nonbloody vomiting and diarrhea with no fever/chills, cough, rashes, dysuria, or hematuria. Reports decreased PO intake     Received 1L IVF, azithro, zosyn, vanc, and solumedrol 100mg IVP x1 in ED.   MICU consulted for hypotension/soft BP's.       PAST MEDICAL & SURGICAL HISTORY:  HTN  HLD  psoriasis  Metastatic liver lesions s/p IR biopsy   Esophageal mass     FAMILY HISTORY: non-contributory     SOCIAL HISTORY: 40 year smoker, occasionally beers    Allergies: No Known Allergies/intolerances      HOME MEDICATIONS: Reviewed     REVIEW OF SYSTEMS:  CONSTITUTIONAL: No fevers or chills  EYES/ENT: No visual changes;  No vertigo or throat pain   NECK: No pain or stiffness  RESPIRATORY: No cough, wheezing, hemoptysis; No shortness of breath  CARDIOVASCULAR: No chest pain or palpitations  GASTROINTESTINAL: No abdominal or epigastric pain. No hematemesis; No melena or hematochezia.  GENITOURINARY: No dysuria, frequency or hematuria  NEUROLOGICAL: No syncope or dizziness  SKIN: No itching, rashes      OBJECTIVE:  ICU Vital Signs Last 24 Hrs  T(C): 36.1 (31 Dec 2022 09:50), Max: 36.1 (31 Dec 2022 09:50)  T(F): 97 (31 Dec 2022 09:50), Max: 97 (31 Dec 2022 09:50)  HR: 70 (31 Dec 2022 12:53) (70 - 87)  BP: 96/68 (31 Dec 2022 12:53) (82/62 - 102/61)  BP(mean): 79 (31 Dec 2022 12:53) (70 - 79)  ABP: --  ABP(mean): --  RR: 19 (31 Dec 2022 12:53) (17 - 20)  SpO2: 96% (31 Dec 2022 12:53) (96% - 100%)    O2 Parameters below as of 31 Dec 2022 12:53  Patient On (Oxygen Delivery Method): room air      CAPILLARY BLOOD GLUCOSE      PHYSICAL EXAM:  General: NAD, non-toxic appearing   HEENT: EOMi, no scleral icterus  CV: RRR, normal S1 and S2  Lungs: normal respiratory effort, scattered crackles b/l   Abd: soft, nontender, nondistended  Ext: no edema, warm, well perfused   Pysch: AAOx3, appropriate affect   Neuro: grossly non-focal, moving all extremities spontaneously   Skin: no rashes or lesions       HOSPITAL MEDICATIONS:  MEDICATIONS  (STANDING):  heparin   Injectable 7000 Unit(s) IV Push once  heparin  Infusion.  Unit(s)/Hr (16 mL/Hr) IV Continuous <Continuous>    MEDICATIONS  (PRN):  heparin   Injectable 7000 Unit(s) IV Push every 6 hours PRN For aPTT less than 40  heparin   Injectable 3500 Unit(s) IV Push every 6 hours PRN For aPTT between 40 - 57      LABS:                        16.0   25.67 )-----------( 146      ( 31 Dec 2022 10:00 )             49.2         133<L>  |  93<L>  |  119<H>  ----------------------------<  111<H>  4.8   |  17<L>  |  3.79<H>    Ca    8.4      31 Dec 2022 10:00  Mg     2.9         TPro  5.5<L>  /  Alb  2.6<L>  /  TBili  3.6<H>  /  DBili  x   /  AST  685<H>  /  ALT  442<H>  /  AlkPhos  1636<H>      PT/INR - ( 31 Dec 2022 10:00 )   PT: 16.7 sec;   INR: 1.43 ratio         PTT - ( 31 Dec 2022 10:00 )  PTT:26.9 sec  Urinalysis Basic - ( 31 Dec 2022 09:59 )    Color: Yellow / Appearance: Clear / S.022 / pH: x  Gluc: x / Ketone: Negative  / Bili: Negative / Urobili: Negative   Blood: x / Protein: Trace / Nitrite: Negative   Leuk Esterase: Negative / RBC: 4 /hpf / WBC 2 /HPF   Sq Epi: x / Non Sq Epi: 1 /hpf / Bacteria: Negative        Venous Blood Gas:   @ 10:42  7.33/42/29/22/35.1  VBG Lactate: 4.7  Venous Blood Gas:   @ 09:25  7.29/43/31/21/33.8  VBG Lactate: 6.0      MICROBIOLOGY:     RADIOLOGY: Reviewed

## 2022-12-31 NOTE — CONSULT NOTE ADULT - SUBJECTIVE AND OBJECTIVE BOX
Chief Complaint:  Patient is a 77y old  Male who presents with a chief complaint of n/v/d  8 y/o M PMH HTN, HLD, psoriasis on prednisone 10mg daily presenting with SOB. Recent admission for pneumonia and dced two weeks ago also found to possible esophageal cancer (mass) with liver mets s/p IR biopsy . Today patient woke up with shortness of breath at rest, worse with exertion with no associated LOC, chest pain, palpitations, diaphoresis, focal weakness, numbness/tingling.  Reports intermittent nonbloody vomiting and diarrhea with no fever/chills, cough, rashes, dysuria, or hematuria. Reports decreased PO intake     Received 1L IVF, azithro, zosyn, vanc, and solumedrol 100mg IVP x1 in ED.        Review of Systems:  Review of Systems: REVIEW OF SYSTEMS:    CONSTITUTIONAL:  +weakness,no fevers + chills  EYES/ENT: No visual changes;  No vertigo or throat pain   NECK: No pain or stiffness  RESPIRATORY: No cough, wheezing, hemoptysis; No shortness of breath  CARDIOVASCULAR: No chest pain or palpitations  GASTROINTESTINAL: No abdominal or epigastric pain. No nausea, vomiting, or hematemesis; No diarrhea or constipation. No melena or hematochezia.  GENITOURINARY: No dysuria, frequency or hematuria  NEUROLOGICAL: No numbness or weakness  SKIN: No itching, burning, rashes, or lesions   All other review of systems is negative unless indicated above.      Allergies:  No Known Allergies      Medications:  acetaminophen     Tablet .. 650 milliGRAM(s) Oral every 6 hours PRN  heparin   Injectable 7000 Unit(s) IV Push every 6 hours PRN  heparin   Injectable 3500 Unit(s) IV Push every 6 hours PRN  heparin  Infusion.  Unit(s)/Hr IV Continuous <Continuous>  influenza  Vaccine (HIGH DOSE) 0.7 milliLiter(s) IntraMuscular once  pantoprazole    Tablet 40 milliGRAM(s) Oral before breakfast  predniSONE   Tablet 10 milliGRAM(s) Oral daily  simvastatin 10 milliGRAM(s) Oral at bedtime  sodium bicarbonate  Infusion 0.043 mEq/kG/Hr IV Continuous <Continuous>      PMHX/PSHX:  Psoriasis    Esophageal mass    Benign essential HTN        Family history:    no uc no cd    Social History:   no etoh no cigs lives at home    ROS:     General:  No wt loss, fevers, chills, night sweats, fatigue,   Eyes:  Good vision, no reported pain  ENT:  No sore throat, pain, runny nose, dysphagia  CV:  No pain, palpitations, hypo/hypertension  Resp:  No dyspnea, cough, tachypnea, wheezing  GI:  No pain, No nausea, No vomiting, No diarrhea, No constipation, No weight loss, No fever, No pruritis, No rectal bleeding, No tarry stools, No dysphagia,  :  No pain, bleeding, incontinence, nocturia  Muscle:  No pain, weakness  Neuro:  No weakness, tingling, memory problems  Psych:  No fatigue, insomnia, mood problems, depression  Endocrine:  No polyuria, polydipsia, cold/heat intolerance  Heme:  No petechiae, ecchymosis, easy bruisability  Skin:  No rash, tattoos, scars, edema      PHYSICAL EXAM:   Vital Signs:  Vital Signs Last 24 Hrs  T(C): 35.6 (31 Dec 2022 17:47), Max: 36.2 (31 Dec 2022 15:51)  T(F): 96 (31 Dec 2022 17:47), Max: 97.2 (31 Dec 2022 15:51)  HR: 72 (31 Dec 2022 17:47) (64 - 87)  BP: 101/69 (31 Dec 2022 17:47) (82/62 - 116/63)  BP(mean): 80 (31 Dec 2022 17:47) (70 - 80)  RR: 22 (31 Dec 2022 17:47) (17 - 23)  SpO2: 94% (31 Dec 2022 17:47) (94% - 100%)    Parameters below as of 31 Dec 2022 17:47  Patient On (Oxygen Delivery Method): room air      Daily Height in cm: 177.8 (31 Dec 2022 09:14)    Daily     GENERAL:  Appears stated age, well-groomed, well-nourished, no distress  HEENT:  NC/AT,  conjunctivae clear and pink, no thyromegaly, nodules, adenopathy, no JVD, sclera -anicteric  CHEST:  Full & symmetric excursion, no increased effort, breath sounds clear  HEART:  Regular rhythm, S1, S2, no murmur/rub/S3/S4, no abdominal bruit, no edema  ABDOMEN:  Soft, non-tender, non-distended, normoactive bowel sounds,  no masses ,no hepato-splenomegaly, no signs of chronic liver disease  EXTEREMITIES:  no cyanosis,clubbing or edema  SKIN:  No rash/erythema/ecchymoses/petechiae/wounds/abscess/warm/dry  NEURO:  Alert, oriented, no asterixis, no tremor, no encephalopathy    LABS:                        16.0   25.67 )-----------( 146      ( 31 Dec 2022 10:00 )             49.2         133<L>  |  93<L>  |  119<H>  ----------------------------<  111<H>  4.8   |  17<L>  |  3.79<H>    Ca    8.4      31 Dec 2022 10:00  Mg     2.9         TPro  5.5<L>  /  Alb  2.6<L>  /  TBili  3.6<H>  /  DBili  x   /  AST  685<H>  /  ALT  442<H>  /  AlkPhos  1636<H>      LIVER FUNCTIONS - ( 31 Dec 2022 10:00 )  Alb: 2.6 g/dL / Pro: 5.5 g/dL / ALK PHOS: 1636 U/L / ALT: 442 U/L / AST: 685 U/L / GGT: x           PT/INR - ( 31 Dec 2022 10:00 )   PT: 16.7 sec;   INR: 1.43 ratio         PTT - ( 31 Dec 2022 10:00 )  PTT:26.9 sec  Urinalysis Basic - ( 31 Dec 2022 09:59 )    Color: Yellow / Appearance: Clear / S.022 / pH: x  Gluc: x / Ketone: Negative  / Bili: Negative / Urobili: Negative   Blood: x / Protein: Trace / Nitrite: Negative   Leuk Esterase: Negative / RBC: 4 /hpf / WBC 2 /HPF   Sq Epi: x / Non Sq Epi: 1 /hpf / Bacteria: Negative          Imaging:             Chief Complaint:  Patient is a 77y old  Male who presents with a chief complaint of n/v/d  6 y/o M PMH HTN, HLD, psoriasis on prednisone 10mg daily presenting with SOB. Recent admission for pneumonia and dced two weeks ago also found to possible esophageal cancer (mass) with liver mets s/p IR biopsy . Today patient woke up with shortness of breath at rest, worse with exertion with no associated LOC, chest pain, palpitations, diaphoresis, focal weakness, numbness/tingling.  Reports intermittent nonbloody vomiting and diarrhea with no fever/chills, cough, rashes, dysuria, or hematuria. Reports decreased PO intake     Received 1L IVF, azithro, zosyn, vanc, and solumedrol 100mg IVP x1 in ED.        Review of Systems:  Review of Systems: REVIEW OF SYSTEMS:    CONSTITUTIONAL:  +weakness,no fevers + chills  EYES/ENT: No visual changes;  No vertigo or throat pain   NECK: No pain or stiffness  RESPIRATORY: No cough, wheezing, hemoptysis; No shortness of breath  CARDIOVASCULAR: No chest pain or palpitations  GASTROINTESTINAL: No abdominal or epigastric pain. No nausea, vomiting, or hematemesis; No diarrhea or constipation. No melena or hematochezia.  GENITOURINARY: No dysuria, frequency or hematuria  NEUROLOGICAL: No numbness or weakness  SKIN: No itching, burning, rashes, or lesions   All other review of systems is negative unless indicated above.      Allergies:  No Known Allergies      Medications:  acetaminophen     Tablet .. 650 milliGRAM(s) Oral every 6 hours PRN  heparin   Injectable 7000 Unit(s) IV Push every 6 hours PRN  heparin   Injectable 3500 Unit(s) IV Push every 6 hours PRN  heparin  Infusion.  Unit(s)/Hr IV Continuous <Continuous>  influenza  Vaccine (HIGH DOSE) 0.7 milliLiter(s) IntraMuscular once  pantoprazole    Tablet 40 milliGRAM(s) Oral before breakfast  predniSONE   Tablet 10 milliGRAM(s) Oral daily  simvastatin 10 milliGRAM(s) Oral at bedtime  sodium bicarbonate  Infusion 0.043 mEq/kG/Hr IV Continuous <Continuous>      PMHX/PSHX:  Psoriasis    Esophageal mass    Benign essential HTN        Family history:    no uc no cd    Social History:   no etoh no cigs lives at home    ROS:     General:  No wt loss, fevers, chills, night sweats, fatigue,   Eyes:  Good vision, no reported pain  ENT:  No sore throat, pain, runny nose, dysphagia  CV:  No pain, palpitations, hypo/hypertension  Resp:  No dyspnea, cough, tachypnea, wheezing  GI:  No pain, No nausea, No vomiting, No diarrhea, No constipation, No weight loss, No fever, No pruritis, No rectal bleeding, No tarry stools, No dysphagia,  :  No pain, bleeding, incontinence, nocturia  Muscle:  No pain, weakness  Neuro:  No weakness, tingling, memory problems  Psych:  No fatigue, insomnia, mood problems, depression  Endocrine:  No polyuria, polydipsia, cold/heat intolerance  Heme:  No petechiae, ecchymosis, easy bruisability  Skin:  No rash, tattoos, scars, edema      PHYSICAL EXAM:   Vital Signs:  Vital Signs Last 24 Hrs  T(C): 35.6 (31 Dec 2022 17:47), Max: 36.2 (31 Dec 2022 15:51)  T(F): 96 (31 Dec 2022 17:47), Max: 97.2 (31 Dec 2022 15:51)  HR: 72 (31 Dec 2022 17:47) (64 - 87)  BP: 101/69 (31 Dec 2022 17:47) (82/62 - 116/63)  BP(mean): 80 (31 Dec 2022 17:47) (70 - 80)  RR: 22 (31 Dec 2022 17:47) (17 - 23)  SpO2: 94% (31 Dec 2022 17:47) (94% - 100%)    Parameters below as of 31 Dec 2022 17:47  Patient On (Oxygen Delivery Method): room air      Daily Height in cm: 177.8 (31 Dec 2022 09:14)    Daily     GENERAL:  Appears stated age, well-groomed, well-nourished, no distress  HEENT:  NC/AT,  conjunctivae clear and pink, no thyromegaly, nodules, adenopathy, no JVD, sclera -anicteric  CHEST:  Full & symmetric excursion, no increased effort, breath sounds clear  HEART:  Regular rhythm, S1, S2, no murmur/rub/S3/S4, no abdominal bruit, no edema  ABDOMEN:  Soft, non-tender, non-distended, normoactive bowel sounds,  no masses ,no hepato-splenomegaly, no signs of chronic liver disease  EXTEREMITIES:  no cyanosis,clubbing or edema  SKIN:  No rash/erythema/ecchymoses/petechiae/wounds/abscess/warm/dry  NEURO:  Alert, oriented, no asterixis, no tremor, no encephalopathy    LABS:                        16.0   25.67 )-----------( 146      ( 31 Dec 2022 10:00 )             49.2         133<L>  |  93<L>  |  119<H>  ----------------------------<  111<H>  4.8   |  17<L>  |  3.79<H>    Ca    8.4      31 Dec 2022 10:00  Mg     2.9         TPro  5.5<L>  /  Alb  2.6<L>  /  TBili  3.6<H>  /  DBili  x   /  AST  685<H>  /  ALT  442<H>  /  AlkPhos  1636<H>      LIVER FUNCTIONS - ( 31 Dec 2022 10:00 )  Alb: 2.6 g/dL / Pro: 5.5 g/dL / ALK PHOS: 1636 U/L / ALT: 442 U/L / AST: 685 U/L / GGT: x           PT/INR - ( 31 Dec 2022 10:00 )   PT: 16.7 sec;   INR: 1.43 ratio         PTT - ( 31 Dec 2022 10:00 )  PTT:26.9 sec  Urinalysis Basic - ( 31 Dec 2022 09:59 )    Color: Yellow / Appearance: Clear / S.022 / pH: x  Gluc: x / Ketone: Negative  / Bili: Negative / Urobili: Negative   Blood: x / Protein: Trace / Nitrite: Negative   Leuk Esterase: Negative / RBC: 4 /hpf / WBC 2 /HPF   Sq Epi: x / Non Sq Epi: 1 /hpf / Bacteria: Negative          Imaging:

## 2022-12-31 NOTE — ED PROVIDER NOTE - CARE PLAN
Principal Discharge DX:	SOB (shortness of breath)   1 Principal Discharge DX:	SOB (shortness of breath)  Secondary Diagnosis:	Pulmonary embolism  Secondary Diagnosis:	Pneumonia

## 2022-12-31 NOTE — PATIENT PROFILE ADULT - FALL HARM RISK - HARM RISK INTERVENTIONS
Assistance with ambulation/Assistance OOB with selected safe patient handling equipment/Communicate Risk of Fall with Harm to all staff/Reinforce activity limits and safety measures with patient and family/Tailored Fall Risk Interventions/Visual Cue: Yellow wristband and red socks/Bed in lowest position, wheels locked, appropriate side rails in place/Call bell, personal items and telephone in reach/Instruct patient to call for assistance before getting out of bed or chair/Non-slip footwear when patient is out of bed/[place] to call system/Physically safe environment - no spills, clutter or unnecessary equipment/Purposeful Proactive Rounding/Room/bathroom lighting operational, light cord in reach Assistance with ambulation/Assistance OOB with selected safe patient handling equipment/Communicate Risk of Fall with Harm to all staff/Reinforce activity limits and safety measures with patient and family/Tailored Fall Risk Interventions/Visual Cue: Yellow wristband and red socks/Bed in lowest position, wheels locked, appropriate side rails in place/Call bell, personal items and telephone in reach/Instruct patient to call for assistance before getting out of bed or chair/Non-slip footwear when patient is out of bed/[place] to call system/Physically safe environment - no spills, clutter or unnecessary equipment/Purposeful Proactive Rounding/Room/bathroom lighting operational, light cord in reach Assistance with ambulation/Assistance OOB with selected safe patient handling equipment/Communicate Risk of Fall with Harm to all staff/Reinforce activity limits and safety measures with patient and family/Tailored Fall Risk Interventions/Visual Cue: Yellow wristband and red socks/Bed in lowest position, wheels locked, appropriate side rails in place/Call bell, personal items and telephone in reach/Instruct patient to call for assistance before getting out of bed or chair/Non-slip footwear when patient is out of bed/[place] to call system/Physically safe environment - no spills, clutter or unnecessary equipment/Purposeful Proactive Rounding/Room/bathroom lighting operational, light cord in reach

## 2022-12-31 NOTE — CONSULT NOTE ADULT - ATTENDING COMMENTS
78 y/o M w/psoriasis (on prednisone) and esophageal mass w/likely liver mets presenting with dyspnea found to have RLL segmental PE and multiple subsegmental PE's without heart strain, LIVIER likely ATN, and b/l consolidations/GGO opacities on CT chest c/f infection. Patient initially hypotensive upon arrival, however now normotensive without evidence of distress on exam.    - Therapeutic AC  - Broad spectrum abx  - Trend Cr, avoid nephrotoxins  - GOC discussions  - No current ICU need, please recall if condition worsens 76 y/o M w/psoriasis (on prednisone) and esophageal mass w/likely liver mets presenting with dyspnea found to have RLL segmental PE and multiple subsegmental PE's without heart strain, LIVIER likely ATN, and b/l consolidations/GGO opacities on CT chest c/f infection. Patient initially hypotensive upon arrival, however now normotensive without evidence of distress on exam.    - Therapeutic AC  - Broad spectrum abx  - Trend Cr, avoid nephrotoxins  - GOC discussions  - No current ICU need, please recall if condition worsens

## 2022-12-31 NOTE — ED PROVIDER NOTE - CHILD ABUSE FACILITY
[facility], formerly [facility] [place]

## 2022-12-31 NOTE — CONSULT NOTE ADULT - SUBJECTIVE AND OBJECTIVE BOX
Patient seen and evaluated at bedside    Chief Complaint: SOB    HPI:  78 yo M with hx of HTN, HLD, psoriasis on prednisone presented with SOB and found to have RLL segmental PEs.  Patient was recently admitted 2 weeks ago for PNA and found to have esophageal mass with liver masses s/p IR biopsy with concern for hepatic malignancy.  Today, he woke up with SOB at rest and worse with exertion, denies LOC, CP, palpitations or weakness.     PMHx:       PSHx:       Allergies:  No Known Allergies      Home Meds:    Current Medications:       FAMILY HISTORY:      Social History:  Smoking History:  Alcohol Use:  Drug Use:    REVIEW OF SYSTEMS:  Constitutional:     [x ] negative [ ] fevers [ ] chills [ ] weight loss [ ] weight gain  HEENT:                  [x ] negative [ ] dry eyes [ ] eye irritation [ ] postnasal drip [ ] nasal congestion  CV:                         [ x] negative  [ ] chest pain [ ] orthopnea [ ] palpitations [ ] murmur  Resp:                     [x ] negative [ ] cough [ ] shortness of breath [ ] dyspnea [ ] wheezing [ ] sputum [ ]hemoptysis  GI:                          [ x] negative [ ] nausea [ ] vomiting [ ] diarrhea [ ] constipation [ ] abd pain [ ] dysphagia   :                        [ x] negative [ ] dysuria [ ] nocturia [ ] hematuria [ ] increased urinary frequency  Musculoskeletal: [x ] negative [ ] back pain [ ] myalgias [ ] arthralgias [ ] fracture  Skin:                       [ x] negative [ ] rash [ ] itch  Neurological:        [ x] negative [ ] headache [ ] dizziness [ ] syncope [ ] weakness [ ] numbness  Psychiatric:           [ x] negative [ ] anxiety [ ] depression  Endocrine:            [ x] negative [ ] diabetes [ ] thyroid problem  Heme/Lymph:      [ x] negative [ ] anemia [ ] bleeding problem  Allergic/Immune: [ x] negative [ ] itchy eyes [ ] nasal discharge [ ] hives [ ] angioedema    [ x] All other systems negative  [ ] Unable to assess ROS due to      Physical Exam:  T(F): 97 (12-31), Max: 97 (12-31)  HR: 87 (12-31) (86 - 87)  BP: 102/61 (12-31) (82/62 - 102/61)  RR: 17 (12-31)  SpO2: 97% (12-31)  GENERAL: No acute distress, well-developed  HEAD:  Atraumatic, Normocephalic  ENT: EOMI, PERRLA, conjunctiva and sclera clear, Neck supple, No JVD, moist mucosa  CHEST/LUNG: Clear to auscultation bilaterally; No wheeze, equal breath sounds bilaterally   BACK: No spinal tenderness  HEART: Regular rate and rhythm; No murmurs, rubs, or gallops  ABDOMEN: Soft, Nontender, Nondistended; Bowel sounds present  EXTREMITIES:  No clubbing, cyanosis, 1-2+ BLE edema  PSYCH: Nl behavior, nl affect  NEUROLOGY: AAOx3, non-focal, cranial nerves intact  SKIN: Normal color, No rashes or lesions  LINES:    Cardiovascular Diagnostic Testing:    ECG: Personally reviewed:      Imaging:    CXR: Personally reviewed    Labs: Personally reviewed                        16.0   25.67 )-----------( 146      ( 31 Dec 2022 10:00 )             49.2     12-31    133<L>  |  93<L>  |  119<H>  ----------------------------<  111<H>  4.8   |  17<L>  |  3.79<H>    Ca    8.4      31 Dec 2022 10:00  Mg     2.9     12-31    TPro  5.5<L>  /  Alb  2.6<L>  /  TBili  3.6<H>  /  DBili  x   /  AST  685<H>  /  ALT  442<H>  /  AlkPhos  1636<H>  12-31    PT/INR - ( 31 Dec 2022 10:00 )   PT: 16.7 sec;   INR: 1.43 ratio         PTT - ( 31 Dec 2022 10:00 )  PTT:26.9 sec  Serum Pro-Brain Natriuretic Peptide: [id number] pg/mL (12-31 @ 10:00)         Patient seen and evaluated at bedside    Chief Complaint: SOB    HPI:  76 yo M with hx of HTN, HLD, psoriasis on prednisone presented with SOB and found to have RLL segmental PEs.  Patient was recently admitted 2 weeks ago for PNA and found to have esophageal mass with liver masses s/p IR biopsy with concern for hepatic malignancy.  Today, he woke up with SOB at rest and worse with exertion, denies LOC, CP, palpitations or weakness.     PMHx:       PSHx:       Allergies:  No Known Allergies      Home Meds:    Current Medications:       FAMILY HISTORY:      Social History:  Smoking History:  Alcohol Use:  Drug Use:    REVIEW OF SYSTEMS:  Constitutional:     [x ] negative [ ] fevers [ ] chills [ ] weight loss [ ] weight gain  HEENT:                  [x ] negative [ ] dry eyes [ ] eye irritation [ ] postnasal drip [ ] nasal congestion  CV:                         [ x] negative  [ ] chest pain [ ] orthopnea [ ] palpitations [ ] murmur  Resp:                     [x ] negative [ ] cough [ ] shortness of breath [ ] dyspnea [ ] wheezing [ ] sputum [ ]hemoptysis  GI:                          [ x] negative [ ] nausea [ ] vomiting [ ] diarrhea [ ] constipation [ ] abd pain [ ] dysphagia   :                        [ x] negative [ ] dysuria [ ] nocturia [ ] hematuria [ ] increased urinary frequency  Musculoskeletal: [x ] negative [ ] back pain [ ] myalgias [ ] arthralgias [ ] fracture  Skin:                       [ x] negative [ ] rash [ ] itch  Neurological:        [ x] negative [ ] headache [ ] dizziness [ ] syncope [ ] weakness [ ] numbness  Psychiatric:           [ x] negative [ ] anxiety [ ] depression  Endocrine:            [ x] negative [ ] diabetes [ ] thyroid problem  Heme/Lymph:      [ x] negative [ ] anemia [ ] bleeding problem  Allergic/Immune: [ x] negative [ ] itchy eyes [ ] nasal discharge [ ] hives [ ] angioedema    [ x] All other systems negative  [ ] Unable to assess ROS due to      Physical Exam:  T(F): 97 (12-31), Max: 97 (12-31)  HR: 87 (12-31) (86 - 87)  BP: 102/61 (12-31) (82/62 - 102/61)  RR: 17 (12-31)  SpO2: 97% (12-31)  GENERAL: No acute distress, well-developed  HEAD:  Atraumatic, Normocephalic  ENT: EOMI, PERRLA, conjunctiva and sclera clear, Neck supple, No JVD, moist mucosa  CHEST/LUNG: Clear to auscultation bilaterally; No wheeze, equal breath sounds bilaterally   BACK: No spinal tenderness  HEART: Regular rate and rhythm; No murmurs, rubs, or gallops  ABDOMEN: Soft, Nontender, Nondistended; Bowel sounds present  EXTREMITIES:  No clubbing, cyanosis, 1-2+ BLE edema  PSYCH: Nl behavior, nl affect  NEUROLOGY: AAOx3, non-focal, cranial nerves intact  SKIN: Normal color, No rashes or lesions  LINES:    Cardiovascular Diagnostic Testing:    ECG: Personally reviewed:      Imaging:    CXR: Personally reviewed    Labs: Personally reviewed                        16.0   25.67 )-----------( 146      ( 31 Dec 2022 10:00 )             49.2     12-31    133<L>  |  93<L>  |  119<H>  ----------------------------<  111<H>  4.8   |  17<L>  |  3.79<H>    Ca    8.4      31 Dec 2022 10:00  Mg     2.9     12-31    TPro  5.5<L>  /  Alb  2.6<L>  /  TBili  3.6<H>  /  DBili  x   /  AST  685<H>  /  ALT  442<H>  /  AlkPhos  1636<H>  12-31    PT/INR - ( 31 Dec 2022 10:00 )   PT: 16.7 sec;   INR: 1.43 ratio         PTT - ( 31 Dec 2022 10:00 )  PTT:26.9 sec  Serum Pro-Brain Natriuretic Peptide: [id number] pg/mL (12-31 @ 10:00)         Patient seen and evaluated at bedside    Chief Complaint: SOB    HPI:  76 yo M with hx of HTN, HLD, psoriasis on prednisone presented with SOB and found to have RLL segmental PEs.  Patient was recently admitted 2 weeks ago for PNA and found to have esophageal mass with liver masses s/p IR biopsy with concern for hepatic malignancy.  Today, he woke up with SOB at rest and worse with exertion, denies LOC, CP, palpitations or weakness.     PMHx:       PSHx:       Allergies:  No Known Allergies      Home Meds:    Current Medications:       FAMILY HISTORY:      Social History:  Smoking History:  Alcohol Use:  Drug Use:    REVIEW OF SYSTEMS:  Constitutional:     [x ] negative [ ] fevers [ ] chills [ ] weight loss [ ] weight gain  HEENT:                  [x ] negative [ ] dry eyes [ ] eye irritation [ ] postnasal drip [ ] nasal congestion  CV:                         [ x] negative  [ ] chest pain [ ] orthopnea [ ] palpitations [ ] murmur  Resp:                     [x ] negative [ ] cough [ ] shortness of breath [ ] dyspnea [ ] wheezing [ ] sputum [ ]hemoptysis  GI:                          [ x] negative [ ] nausea [ ] vomiting [ ] diarrhea [ ] constipation [ ] abd pain [ ] dysphagia   :                        [ x] negative [ ] dysuria [ ] nocturia [ ] hematuria [ ] increased urinary frequency  Musculoskeletal: [x ] negative [ ] back pain [ ] myalgias [ ] arthralgias [ ] fracture  Skin:                       [ x] negative [ ] rash [ ] itch  Neurological:        [ x] negative [ ] headache [ ] dizziness [ ] syncope [ ] weakness [ ] numbness  Psychiatric:           [ x] negative [ ] anxiety [ ] depression  Endocrine:            [ x] negative [ ] diabetes [ ] thyroid problem  Heme/Lymph:      [ x] negative [ ] anemia [ ] bleeding problem  Allergic/Immune: [ x] negative [ ] itchy eyes [ ] nasal discharge [ ] hives [ ] angioedema    [ x] All other systems negative  [ ] Unable to assess ROS due to      Physical Exam:  T(F): 97 (12-31), Max: 97 (12-31)  HR: 87 (12-31) (86 - 87)  BP: 102/61 (12-31) (82/62 - 102/61)  RR: 17 (12-31)  SpO2: 97% (12-31)  GENERAL: No acute distress, well-developed  HEAD:  Atraumatic, Normocephalic  ENT: EOMI, PERRLA, conjunctiva and sclera clear, Neck supple, No JVD, moist mucosa  CHEST/LUNG: Clear to auscultation bilaterally; No wheeze, equal breath sounds bilaterally   BACK: No spinal tenderness  HEART: Regular rate and rhythm; No murmurs, rubs, or gallops  ABDOMEN: Soft, Nontender, Nondistended; Bowel sounds present  EXTREMITIES:  No clubbing, cyanosis, 1-2+ BLE edema  PSYCH: Nl behavior, nl affect  NEUROLOGY: AAOx3, non-focal, cranial nerves intact  SKIN: Normal color, No rashes or lesions  LINES:    Cardiovascular Diagnostic Testing:    ECG: Personally reviewed:      Imaging:    CXR: Personally reviewed    Labs: Personally reviewed                        16.0   25.67 )-----------( 146      ( 31 Dec 2022 10:00 )             49.2     12-31    133<L>  |  93<L>  |  119<H>  ----------------------------<  111<H>  4.8   |  17<L>  |  3.79<H>    Ca    8.4      31 Dec 2022 10:00  Mg     2.9     12-31    TPro  5.5<L>  /  Alb  2.6<L>  /  TBili  3.6<H>  /  DBili  x   /  AST  685<H>  /  ALT  442<H>  /  AlkPhos  1636<H>  12-31    PT/INR - ( 31 Dec 2022 10:00 )   PT: 16.7 sec;   INR: 1.43 ratio         PTT - ( 31 Dec 2022 10:00 )  PTT:26.9 sec  Serum Pro-Brain Natriuretic Peptide: [id number] pg/mL (12-31 @ 10:00)

## 2022-12-31 NOTE — H&P ADULT - NSHPREVIEWOFSYSTEMS_GEN_ALL_CORE
REVIEW OF SYSTEMS:    CONSTITUTIONAL:  +weakness,no fevers + chills  EYES/ENT: No visual changes;  No vertigo or throat pain   NECK: No pain or stiffness  RESPIRATORY: No cough, wheezing, hemoptysis; No shortness of breath  CARDIOVASCULAR: No chest pain or palpitations  GASTROINTESTINAL: No abdominal or epigastric pain. No nausea, vomiting, or hematemesis; No diarrhea or constipation. No melena or hematochezia.  GENITOURINARY: No dysuria, frequency or hematuria  NEUROLOGICAL: No numbness or weakness  SKIN: No itching, burning, rashes, or lesions   All other review of systems is negative unless indicated above.

## 2022-12-31 NOTE — H&P ADULT - NSHPSOCIALHISTORY_GEN_ALL_CORE
Social History:    Marital Status:  (  x )    (   ) Single    (   )    (  )   Occupation:   Lives with: (  ) alone  (  ) children   (x  ) spouse   (  ) parents  (  ) other    Substance Use (street drugs): ( x ) never used  (  ) other:  Tobacco Usage:  (   ) never smoked   (x  ) former smoker   (   ) current smoker  (     ) pack years  (        ) last cigarette date  Alcohol Usage: denies    (     ) Advanced Directives: (     ) None    (      ) DNR    (     ) DNI    (     ) Health Care Proxy:

## 2022-12-31 NOTE — CONSULT NOTE ADULT - SUBJECTIVE AND OBJECTIVE BOX
Patient is a 77y old  Male who presents with a chief complaint of     HPI:  8 y/o M PMH HTN, HLD, psoriasis on prednisone 10mg daily presenting with SOB. Recent admission for pneumonia and dced two weeks ago also found to possible esophageal cancer (mass) with liver mets s/p IR biopsy . Today patient woke up with shortness of breath at rest. Reports intermittent nonbloody vomiting and diarrhea with no fever/chills, cough, rashes, dysuria, or hematuria. CT chest notable for pulmonary emboli.  Nephrology consulted for elevated creatinine. Patient reports no known renal issues in the past.  No h/o kidney stones. States he has not been able to urinate since this am. NO pedal edema    Received 1L IVF, azithro, zosyn, vanc, and solumedrol 100mg IVP x1 in ED.    (31 Dec 2022 16:20)      PAST MEDICAL & SURGICAL HISTORY:  Psoriasis      Esophageal mass      Benign essential HTN          MEDICATIONS  (STANDING):  heparin  Infusion.  Unit(s)/Hr (16 mL/Hr) IV Continuous <Continuous>  pantoprazole    Tablet 40 milliGRAM(s) Oral before breakfast  piperacillin/tazobactam IVPB. 3.375 Gram(s) IV Intermittent once  predniSONE   Tablet 10 milliGRAM(s) Oral daily  simvastatin 10 milliGRAM(s) Oral at bedtime      Allergies    No Known Allergies    Intolerances        SOCIAL HISTORY:  Denies ETOh,Smoking,     FAMILY HISTORY:      REVIEW OF SYSTEMS:  CONSTITUTIONAL: No weakness, fevers or chills  EYES/ENT: No visual changes;  No vertigo or throat pain   NECK: No pain or stiffness  RESPIRATORY: No cough, wheezing, hemoptysis; No shortness of breath  CARDIOVASCULAR: No chest pain or palpitations  GASTROINTESTINAL: No abdominal or epigastric pain. No nausea, vomiting, or hematemesis; No diarrhea or constipation. No melena or hematochezia.  GENITOURINARY: No dysuria, frequency or hematuria  NEUROLOGICAL: No numbness or weakness  SKIN: No itching, burning, rashes, or lesions   All other review of systems is negative unless indicated above.    VITAL:  T(C): , Max: 36.2 (22 @ 15:51)  T(F): , Max: 97.2 (22 @ 15:51)  HR: 72 (22 @ 17:47)  BP: 101/69 (22 @ 17:47)  BP(mean): 80 (22 @ 17:47)  RR: 22 (22 @ 17:47)  SpO2: 94% (22 @ 17:47)  Wt(kg): --    PHYSICAL EXAM:  Constitutional: NAD, Alert  HEENT: NCAT, MMM  Neck: Supple, No JVD  Respiratory: CTA-b/l  Cardiovascular: RRR s1s2, no m/r/g  Gastrointestinal: BS+, soft, NT/ND  Extremities: No peripheral edema b/l  Neurological: no focal deficits; strength grossly intact  Back: no CVAT b/l  Skin: No rashes, no nevi    LABS:                        16.0   25.67 )-----------( 146      ( 31 Dec 2022 10:00 )             49.2     Na(133)/K(4.8)/Cl(93)/HCO3(17)/BUN(119)/Cr(3.79)Glu(111)/Ca(8.4)/Mg(2.9)/PO4(--)     @ 10:00    Urinalysis Basic - ( 31 Dec 2022 09:59 )    Color: Yellow / Appearance: Clear / S.022 / pH: x  Gluc: x / Ketone: Negative  / Bili: Negative / Urobili: Negative   Blood: x / Protein: Trace / Nitrite: Negative   Leuk Esterase: Negative / RBC: 4 /hpf / WBC 2 /HPF   Sq Epi: x / Non Sq Epi: 1 /hpf / Bacteria: Negative            IMAGING:    ASSESSMENT:  8 y/o M PMH HTN, HLD, psoriasis on prednisone, . Recent admission for pneumonia and dced two weeks ago also found to possible esophageal cancer (mass) with liver mets s/p IR biopsy  presenting with SOB and now  with LIVIER and pulmonary emboli     Oliguric LIVIER likely pre renal vs obstructive uropathy   Gap acidosis due to LIVIER   Volume status- not overtly volume up     RECOMMEND  Switch fluids to .45 NS with 75 meq of sodium biacrbonate at 50 cc/hour   Urine lytes, urine protein:  Bladder scan  Dose meds for  Cr CL 15-20 ml/min     D/w Dr. Tamayo     Thank you for involving [facility] in this patient's care.    With warm regards,    Marlen Santoyo MD   [facility]  Office: [phone]           Patient is a 77y old  Male who presents with a chief complaint of     HPI:  8 y/o M PMH HTN, HLD, psoriasis on prednisone 10mg daily presenting with SOB. Recent admission for pneumonia and dced two weeks ago also found to possible esophageal cancer (mass) with liver mets s/p IR biopsy . Today patient woke up with shortness of breath at rest. Reports intermittent nonbloody vomiting and diarrhea with no fever/chills, cough, rashes, dysuria, or hematuria. CT chest notable for pulmonary emboli.  Nephrology consulted for elevated creatinine. Patient reports no known renal issues in the past.  No h/o kidney stones. States he has not been able to urinate since this am. NO pedal edema    Received 1L IVF, azithro, zosyn, vanc, and solumedrol 100mg IVP x1 in ED.    (31 Dec 2022 16:20)      PAST MEDICAL & SURGICAL HISTORY:  Psoriasis      Esophageal mass      Benign essential HTN          MEDICATIONS  (STANDING):  heparin  Infusion.  Unit(s)/Hr (16 mL/Hr) IV Continuous <Continuous>  pantoprazole    Tablet 40 milliGRAM(s) Oral before breakfast  piperacillin/tazobactam IVPB. 3.375 Gram(s) IV Intermittent once  predniSONE   Tablet 10 milliGRAM(s) Oral daily  simvastatin 10 milliGRAM(s) Oral at bedtime      Allergies    No Known Allergies    Intolerances        SOCIAL HISTORY:  Denies ETOh,Smoking,     FAMILY HISTORY:      REVIEW OF SYSTEMS:  CONSTITUTIONAL: No weakness, fevers or chills  EYES/ENT: No visual changes;  No vertigo or throat pain   NECK: No pain or stiffness  RESPIRATORY: No cough, wheezing, hemoptysis; No shortness of breath  CARDIOVASCULAR: No chest pain or palpitations  GASTROINTESTINAL: No abdominal or epigastric pain. No nausea, vomiting, or hematemesis; No diarrhea or constipation. No melena or hematochezia.  GENITOURINARY: No dysuria, frequency or hematuria  NEUROLOGICAL: No numbness or weakness  SKIN: No itching, burning, rashes, or lesions   All other review of systems is negative unless indicated above.    VITAL:  T(C): , Max: 36.2 (22 @ 15:51)  T(F): , Max: 97.2 (22 @ 15:51)  HR: 72 (22 @ 17:47)  BP: 101/69 (22 @ 17:47)  BP(mean): 80 (22 @ 17:47)  RR: 22 (22 @ 17:47)  SpO2: 94% (22 @ 17:47)  Wt(kg): --    PHYSICAL EXAM:  Constitutional: NAD, Alert  HEENT: NCAT, MMM  Neck: Supple, No JVD  Respiratory: CTA-b/l  Cardiovascular: RRR s1s2, no m/r/g  Gastrointestinal: BS+, soft, NT/ND  Extremities: No peripheral edema b/l  Neurological: no focal deficits; strength grossly intact  Back: no CVAT b/l  Skin: No rashes, no nevi    LABS:                        16.0   25.67 )-----------( 146      ( 31 Dec 2022 10:00 )             49.2     Na(133)/K(4.8)/Cl(93)/HCO3(17)/BUN(119)/Cr(3.79)Glu(111)/Ca(8.4)/Mg(2.9)/PO4(--)     @ 10:00    Urinalysis Basic - ( 31 Dec 2022 09:59 )    Color: Yellow / Appearance: Clear / S.022 / pH: x  Gluc: x / Ketone: Negative  / Bili: Negative / Urobili: Negative   Blood: x / Protein: Trace / Nitrite: Negative   Leuk Esterase: Negative / RBC: 4 /hpf / WBC 2 /HPF   Sq Epi: x / Non Sq Epi: 1 /hpf / Bacteria: Negative            IMAGING:    ASSESSMENT:  8 y/o M PMH HTN, HLD, psoriasis on prednisone, . Recent admission for pneumonia and dced two weeks ago also found to possible esophageal cancer (mass) with liver mets s/p IR biopsy  presenting with SOB and now  with LIVIER and pulmonary emboli     Oliguric LIVIER likely pre renal vs obstructive uropathy   Gap acidosis due to LIVIER   Volume status- not overtly volume up     RECOMMEND  Switch fluids to .45 NS with 75 meq of sodium biacrbonate at 50 cc/hour   Urine lytes, urine protein:  Bladder scan  Dose meds for  Cr CL 15-20 ml/min     D/w Dr. Tamayo     Thank you for involving [facility] in this patient's care.    With warm regards,    Marlen Santoyo MD   [facility]  Office: [phone]           Patient is a 77y old  Male who presents with a chief complaint of     HPI:  8 y/o M PMH HTN, HLD, psoriasis on prednisone 10mg daily presenting with SOB. Recent admission for pneumonia and dced two weeks ago also found to possible esophageal cancer (mass) with liver mets s/p IR biopsy . Today patient woke up with shortness of breath at rest. Reports intermittent nonbloody vomiting and diarrhea with no fever/chills, cough, rashes, dysuria, or hematuria. CT chest notable for pulmonary emboli.  Nephrology consulted for elevated creatinine. Patient reports no known renal issues in the past.  No h/o kidney stones. States he has not been able to urinate since this am. NO pedal edema    Received 1L IVF, azithro, zosyn, vanc, and solumedrol 100mg IVP x1 in ED.    (31 Dec 2022 16:20)      PAST MEDICAL & SURGICAL HISTORY:  Psoriasis      Esophageal mass      Benign essential HTN          MEDICATIONS  (STANDING):  heparin  Infusion.  Unit(s)/Hr (16 mL/Hr) IV Continuous <Continuous>  pantoprazole    Tablet 40 milliGRAM(s) Oral before breakfast  piperacillin/tazobactam IVPB. 3.375 Gram(s) IV Intermittent once  predniSONE   Tablet 10 milliGRAM(s) Oral daily  simvastatin 10 milliGRAM(s) Oral at bedtime      Allergies    No Known Allergies    Intolerances        SOCIAL HISTORY:  Denies ETOh,Smoking,     FAMILY HISTORY:      REVIEW OF SYSTEMS:  CONSTITUTIONAL: No weakness, fevers or chills  EYES/ENT: No visual changes;  No vertigo or throat pain   NECK: No pain or stiffness  RESPIRATORY: No cough, wheezing, hemoptysis; No shortness of breath  CARDIOVASCULAR: No chest pain or palpitations  GASTROINTESTINAL: No abdominal or epigastric pain. No nausea, vomiting, or hematemesis; No diarrhea or constipation. No melena or hematochezia.  GENITOURINARY: No dysuria, frequency or hematuria  NEUROLOGICAL: No numbness or weakness  SKIN: No itching, burning, rashes, or lesions   All other review of systems is negative unless indicated above.    VITAL:  T(C): , Max: 36.2 (22 @ 15:51)  T(F): , Max: 97.2 (22 @ 15:51)  HR: 72 (22 @ 17:47)  BP: 101/69 (22 @ 17:47)  BP(mean): 80 (22 @ 17:47)  RR: 22 (22 @ 17:47)  SpO2: 94% (22 @ 17:47)  Wt(kg): --    PHYSICAL EXAM:  Constitutional: NAD, Alert  HEENT: NCAT, MMM  Neck: Supple, No JVD  Respiratory: CTA-b/l  Cardiovascular: RRR s1s2, no m/r/g  Gastrointestinal: BS+, soft, NT/ND  Extremities: No peripheral edema b/l  Neurological: no focal deficits; strength grossly intact  Back: no CVAT b/l  Skin: No rashes, no nevi    LABS:                        16.0   25.67 )-----------( 146      ( 31 Dec 2022 10:00 )             49.2     Na(133)/K(4.8)/Cl(93)/HCO3(17)/BUN(119)/Cr(3.79)Glu(111)/Ca(8.4)/Mg(2.9)/PO4(--)     @ 10:00    Urinalysis Basic - ( 31 Dec 2022 09:59 )    Color: Yellow / Appearance: Clear / S.022 / pH: x  Gluc: x / Ketone: Negative  / Bili: Negative / Urobili: Negative   Blood: x / Protein: Trace / Nitrite: Negative   Leuk Esterase: Negative / RBC: 4 /hpf / WBC 2 /HPF   Sq Epi: x / Non Sq Epi: 1 /hpf / Bacteria: Negative            IMAGING:    ASSESSMENT:  8 y/o M PMH HTN, HLD, psoriasis on prednisone, . Recent admission for pneumonia and dced two weeks ago also found to possible esophageal cancer (mass) with liver mets s/p IR biopsy  presenting with SOB and now  with LIVIER and pulmonary emboli     Oliguric LIVIER likely pre renal vs obstructive uropathy   Gap acidosis due to LIVIER   Volume status- not overtly volume up     RECOMMEND  Switch fluids to .45 NS with 75 meq of sodium biacrbonate at 50 cc/hour   Urine lytes, urine protein:  Bladder scan  Dose meds for  Cr CL 15-20 ml/min     D/w Dr. Tamayo     Thank you for involving [facility] in this patient's care.    With warm regards,    Marlen Santoyo MD   [facility]  Office: [phone]           Patient is a 77y old  Male who presents with a chief complaint of     HPI:  8 y/o M PMH HTN, HLD, psoriasis on prednisone 10mg daily presenting with SOB. Recent admission for pneumonia and dced two weeks ago also found to possible esophageal cancer (mass) with liver mets s/p IR biopsy . Today patient woke up with shortness of breath at rest. Reports intermittent nonbloody vomiting and diarrhea with no fever/chills, cough, rashes, dysuria, or hematuria. CT chest notable for pulmonary emboli.  Nephrology consulted for elevated creatinine. Patient reports no known renal issues in the past.  No h/o kidney stones. States he has not been able to urinate since this am. NO pedal edema    Received 1L IVF, azithro, zosyn, vanc, and solumedrol 100mg IVP x1 in ED.    (31 Dec 2022 16:20)      PAST MEDICAL & SURGICAL HISTORY:  Psoriasis      Esophageal mass      Benign essential HTN          MEDICATIONS  (STANDING):  heparin  Infusion.  Unit(s)/Hr (16 mL/Hr) IV Continuous <Continuous>  pantoprazole    Tablet 40 milliGRAM(s) Oral before breakfast  piperacillin/tazobactam IVPB. 3.375 Gram(s) IV Intermittent once  predniSONE   Tablet 10 milliGRAM(s) Oral daily  simvastatin 10 milliGRAM(s) Oral at bedtime      Allergies    No Known Allergies    Intolerances        SOCIAL HISTORY:  Denies ETOh,Smoking,     FAMILY HISTORY:      REVIEW OF SYSTEMS:  CONSTITUTIONAL: No weakness, fevers or chills  EYES/ENT: No visual changes;  No vertigo or throat pain   NECK: No pain or stiffness  RESPIRATORY: No cough, wheezing, hemoptysis; No shortness of breath  CARDIOVASCULAR: No chest pain or palpitations  GASTROINTESTINAL: No abdominal or epigastric pain. No nausea, vomiting, or hematemesis; No diarrhea or constipation. No melena or hematochezia.  GENITOURINARY: No dysuria, frequency or hematuria  NEUROLOGICAL: No numbness or weakness  SKIN: No itching, burning, rashes, or lesions   All other review of systems is negative unless indicated above.    VITAL:  T(C): , Max: 36.2 (22 @ 15:51)  T(F): , Max: 97.2 (22 @ 15:51)  HR: 72 (22 @ 17:47)  BP: 101/69 (22 @ 17:47)  BP(mean): 80 (22 @ 17:47)  RR: 22 (22 @ 17:47)  SpO2: 94% (22 @ 17:47)  Wt(kg): --    PHYSICAL EXAM:  Constitutional: NAD, Alert  HEENT: NCAT, MMM  Neck: Supple, No JVD  Respiratory: CTA-b/l  Cardiovascular: RRR s1s2, no m/r/g  Gastrointestinal: BS+, soft, NT/ND  Extremities: No peripheral edema b/l  Neurological: no focal deficits; strength grossly intact  Back: no CVAT b/l  Skin: No rashes, no nevi    LABS:                        16.0   25.67 )-----------( 146      ( 31 Dec 2022 10:00 )             49.2     Na(133)/K(4.8)/Cl(93)/HCO3(17)/BUN(119)/Cr(3.79)Glu(111)/Ca(8.4)/Mg(2.9)/PO4(--)     @ 10:00    Urinalysis Basic - ( 31 Dec 2022 09:59 )    Color: Yellow / Appearance: Clear / S.022 / pH: x  Gluc: x / Ketone: Negative  / Bili: Negative / Urobili: Negative   Blood: x / Protein: Trace / Nitrite: Negative   Leuk Esterase: Negative / RBC: 4 /hpf / WBC 2 /HPF   Sq Epi: x / Non Sq Epi: 1 /hpf / Bacteria: Negative            IMAGING:    ASSESSMENT:  8 y/o M PMH HTN, HLD, psoriasis on prednisone, . Recent admission for pneumonia and dced two weeks ago also found to possible esophageal cancer (mass) with liver mets s/p IR biopsy  presenting with SOB and now  with LIVIER and pulmonary emboli     Oliguric LIVIER likely pre renal vs obstructive uropathy   Gap acidosis due to LIVIER   Volume status- not overtly volume up    esophageal cancer (mass) with liver mets s/p IR biopsy       RECOMMEND  Switch fluids to .45 NS with 75 meq of sodium biacrbonate at 50 cc/hour   Urine lytes, urine protein:  Phos with am labs   Bladder scan  Dose meds for  Cr CL 15-20 ml/min     D/w Dr. Tamayo     Thank you for involving [facility] in this patient's care.    With warm regards,    Marlen Santoyo MD   [facility]  Office: [phone]           Patient is a 77y old  Male who presents with a chief complaint of     HPI:  8 y/o M PMH HTN, HLD, psoriasis on prednisone 10mg daily presenting with SOB. Recent admission for pneumonia and dced two weeks ago also found to possible esophageal cancer (mass) with liver mets s/p IR biopsy . Today patient woke up with shortness of breath at rest. Reports intermittent nonbloody vomiting and diarrhea with no fever/chills, cough, rashes, dysuria, or hematuria. CT chest notable for pulmonary emboli.  Nephrology consulted for elevated creatinine. Patient reports no known renal issues in the past.  No h/o kidney stones. States he has not been able to urinate since this am. NO pedal edema    Received 1L IVF, azithro, zosyn, vanc, and solumedrol 100mg IVP x1 in ED.    (31 Dec 2022 16:20)      PAST MEDICAL & SURGICAL HISTORY:  Psoriasis      Esophageal mass      Benign essential HTN          MEDICATIONS  (STANDING):  heparin  Infusion.  Unit(s)/Hr (16 mL/Hr) IV Continuous <Continuous>  pantoprazole    Tablet 40 milliGRAM(s) Oral before breakfast  piperacillin/tazobactam IVPB. 3.375 Gram(s) IV Intermittent once  predniSONE   Tablet 10 milliGRAM(s) Oral daily  simvastatin 10 milliGRAM(s) Oral at bedtime      Allergies    No Known Allergies    Intolerances        SOCIAL HISTORY:  Denies ETOh,Smoking,     FAMILY HISTORY:      REVIEW OF SYSTEMS:  CONSTITUTIONAL: No weakness, fevers or chills  EYES/ENT: No visual changes;  No vertigo or throat pain   NECK: No pain or stiffness  RESPIRATORY: No cough, wheezing, hemoptysis; No shortness of breath  CARDIOVASCULAR: No chest pain or palpitations  GASTROINTESTINAL: No abdominal or epigastric pain. No nausea, vomiting, or hematemesis; No diarrhea or constipation. No melena or hematochezia.  GENITOURINARY: No dysuria, frequency or hematuria  NEUROLOGICAL: No numbness or weakness  SKIN: No itching, burning, rashes, or lesions   All other review of systems is negative unless indicated above.    VITAL:  T(C): , Max: 36.2 (22 @ 15:51)  T(F): , Max: 97.2 (22 @ 15:51)  HR: 72 (22 @ 17:47)  BP: 101/69 (22 @ 17:47)  BP(mean): 80 (22 @ 17:47)  RR: 22 (22 @ 17:47)  SpO2: 94% (22 @ 17:47)  Wt(kg): --    PHYSICAL EXAM:  Constitutional: NAD, Alert  HEENT: NCAT, MMM  Neck: Supple, No JVD  Respiratory: CTA-b/l  Cardiovascular: RRR s1s2, no m/r/g  Gastrointestinal: BS+, soft, NT/ND  Extremities: No peripheral edema b/l  Neurological: no focal deficits; strength grossly intact  Back: no CVAT b/l  Skin: No rashes, no nevi    LABS:                        16.0   25.67 )-----------( 146      ( 31 Dec 2022 10:00 )             49.2     Na(133)/K(4.8)/Cl(93)/HCO3(17)/BUN(119)/Cr(3.79)Glu(111)/Ca(8.4)/Mg(2.9)/PO4(--)     @ 10:00    Urinalysis Basic - ( 31 Dec 2022 09:59 )    Color: Yellow / Appearance: Clear / S.022 / pH: x  Gluc: x / Ketone: Negative  / Bili: Negative / Urobili: Negative   Blood: x / Protein: Trace / Nitrite: Negative   Leuk Esterase: Negative / RBC: 4 /hpf / WBC 2 /HPF   Sq Epi: x / Non Sq Epi: 1 /hpf / Bacteria: Negative            IMAGING:    ASSESSMENT:  8 y/o M PMH HTN, HLD, psoriasis on prednisone, . Recent admission for pneumonia and dced two weeks ago also found to possible esophageal cancer (mass) with liver mets s/p IR biopsy  presenting with SOB and now  with LIVIER and pulmonary emboli     Oliguric LIVIER likely pre renal vs obstructive uropathy   Gap acidosis due to LIVIER   Volume status- not overtly volume up    esophageal cancer (mass) with liver mets s/p IR biopsy       RECOMMEND  Switch fluids to .45 NS with 75 meq of sodium biacrbonate at 50 cc/hour   Urine lytes, urine protein:  Phos with am labs   Bladder scan  Dose meds for  Cr CL 15-20 ml/min     D/w Dr. Tamayo     Thank you for involving [facility] in this patient's care.    With warm regards,    Marlen Santoyo MD   [facility]  Office: [phone]           Patient is a 77y old  Male who presents with a chief complaint of     HPI:  8 y/o M PMH HTN, HLD, psoriasis on prednisone 10mg daily presenting with SOB. Recent admission for pneumonia and dced two weeks ago also found to possible esophageal cancer (mass) with liver mets s/p IR biopsy . Today patient woke up with shortness of breath at rest. Reports intermittent nonbloody vomiting and diarrhea with no fever/chills, cough, rashes, dysuria, or hematuria. CT chest notable for pulmonary emboli.  Nephrology consulted for elevated creatinine. Patient reports no known renal issues in the past.  No h/o kidney stones. States he has not been able to urinate since this am. NO pedal edema    Received 1L IVF, azithro, zosyn, vanc, and solumedrol 100mg IVP x1 in ED.    (31 Dec 2022 16:20)      PAST MEDICAL & SURGICAL HISTORY:  Psoriasis      Esophageal mass      Benign essential HTN          MEDICATIONS  (STANDING):  heparin  Infusion.  Unit(s)/Hr (16 mL/Hr) IV Continuous <Continuous>  pantoprazole    Tablet 40 milliGRAM(s) Oral before breakfast  piperacillin/tazobactam IVPB. 3.375 Gram(s) IV Intermittent once  predniSONE   Tablet 10 milliGRAM(s) Oral daily  simvastatin 10 milliGRAM(s) Oral at bedtime      Allergies    No Known Allergies    Intolerances        SOCIAL HISTORY:  Denies ETOh,Smoking,     FAMILY HISTORY:      REVIEW OF SYSTEMS:  CONSTITUTIONAL: No weakness, fevers or chills  EYES/ENT: No visual changes;  No vertigo or throat pain   NECK: No pain or stiffness  RESPIRATORY: No cough, wheezing, hemoptysis; No shortness of breath  CARDIOVASCULAR: No chest pain or palpitations  GASTROINTESTINAL: No abdominal or epigastric pain. No nausea, vomiting, or hematemesis; No diarrhea or constipation. No melena or hematochezia.  GENITOURINARY: No dysuria, frequency or hematuria  NEUROLOGICAL: No numbness or weakness  SKIN: No itching, burning, rashes, or lesions   All other review of systems is negative unless indicated above.    VITAL:  T(C): , Max: 36.2 (22 @ 15:51)  T(F): , Max: 97.2 (22 @ 15:51)  HR: 72 (22 @ 17:47)  BP: 101/69 (22 @ 17:47)  BP(mean): 80 (22 @ 17:47)  RR: 22 (22 @ 17:47)  SpO2: 94% (22 @ 17:47)  Wt(kg): --    PHYSICAL EXAM:  Constitutional: NAD, Alert  HEENT: NCAT, MMM  Neck: Supple, No JVD  Respiratory: CTA-b/l  Cardiovascular: RRR s1s2, no m/r/g  Gastrointestinal: BS+, soft, NT/ND  Extremities: No peripheral edema b/l  Neurological: no focal deficits; strength grossly intact  Back: no CVAT b/l  Skin: No rashes, no nevi    LABS:                        16.0   25.67 )-----------( 146      ( 31 Dec 2022 10:00 )             49.2     Na(133)/K(4.8)/Cl(93)/HCO3(17)/BUN(119)/Cr(3.79)Glu(111)/Ca(8.4)/Mg(2.9)/PO4(--)     @ 10:00    Urinalysis Basic - ( 31 Dec 2022 09:59 )    Color: Yellow / Appearance: Clear / S.022 / pH: x  Gluc: x / Ketone: Negative  / Bili: Negative / Urobili: Negative   Blood: x / Protein: Trace / Nitrite: Negative   Leuk Esterase: Negative / RBC: 4 /hpf / WBC 2 /HPF   Sq Epi: x / Non Sq Epi: 1 /hpf / Bacteria: Negative            IMAGING:    ASSESSMENT:  8 y/o M PMH HTN, HLD, psoriasis on prednisone, . Recent admission for pneumonia and dced two weeks ago also found to possible esophageal cancer (mass) with liver mets s/p IR biopsy  presenting with SOB and now  with LIVIER and pulmonary emboli     Oliguric LIVIER likely pre renal vs obstructive uropathy   Gap acidosis due to LIVIER   Volume status- not overtly volume up    esophageal cancer (mass) with liver mets s/p IR biopsy       RECOMMEND  Switch fluids to .45 NS with 75 meq of sodium biacrbonate at 50 cc/hour   Urine lytes, urine protein:  Phos with am labs   Bladder scan  Dose meds for  Cr CL 15-20 ml/min     D/w Dr. Tamayo     Thank you for involving [facility] in this patient's care.    With warm regards,    Marlen Santoyo MD   [facility]  Office: [phone]

## 2022-12-31 NOTE — ED PROVIDER NOTE - PHYSICAL EXAMINATION
Gen: WDWN, mild increased WOB with RR in 20s with minimal accessory muscle use, BP MAPs low 70s/high 60s, afebrile   HEENT: No nasal discharge, mucous membranes very dry, no oropharyngeal edema/erythema/exudates   CV: RRR, +S1/S2, no M/R/G, equal b/l radial pulses 2+, POCUS showing B lines throughout b/l fields and enlarged RV with areas of decreased contractility   Resp: Decreased BS though b/l lung fields with no localization, no W/R/R, SPO2 >95% on RA, mild increased WOB with RR in 20s minimal accessory muscle use  GI: Abdomen soft non-distended, NTTP, no masses/organomegaly   MSK/Skin: B/l LE symmetric pitting edema with no calf tenderness, no CVA tenderness, no open wounds, no bruising  Neuro: A&Ox4, moving all 4 extremities spontaneously, gross sensation intact in UE and LE BL  Psych: appropriate mood

## 2022-12-31 NOTE — ED CLERICAL - NS ED CLERK NOTE PRE-ARRIVAL INFORMATION; ADDITIONAL PRE-ARRIVAL INFORMATION
This patient is enrolled in the COPD or PNA STARS readmission program and has active care navigation.  Please call the contact number [phone] to speak with the Care navigation team to obtain additional clinical information regarding this patient and/or to arrange close clinical follow up within 24 hours. Between the hours of 8A and 5P, Monday through Friday, a Hospital Medicine attending will meet with the ED care team to provide relevant clinical information, and expedite appropriate follow-up if there is an opportunity for discharge from the ED. Off hours, you may contact the Hospitalist-In-Charge at pager [phone] for assistance. For expedited pulmonary follow-up appointments, you may email [email or web address]. Please include in this email: patient name, date of birth, MRN and contact information. This patient is enrolled in the COPD or PNA STARS readmission program and has active care navigation.  Please call the contact number [phone] to speak with the Care navigation team to obtain additional clinical information regarding this patient and/or to arrange close clinical follow up within 24 hours. Between the hours of 8A and 5P, Monday through Friday, a Hospital Medicine attending will meet with the ED care team to provide relevant clinical information, and expedite appropriate follow-up if there is an opportunity for discharge from the ED. Off hours, you may contact the Hospitalist-In-Charge at pager [phone] for assistance. For expedited pulmonary follow-up appointments, you may email [email or web address]. Please include in this email: patient name, date of birth, MRN and contact information. This patient is enrolled in the COPD or PNA STARS readmission program and has active care navigation.  Please call the contact number [phone] to speak with the Care navigation team to obtain additional clinical information regarding this patient and/or to arrange close clinical follow up within 24 hours. Between the hours of 8A and 5P, Monday through Friday, a Hospital Medicine attending will meet with the ED care team to provide relevant clinical information, and expedite appropriate follow-up if there is an opportunity for discharge from the ED. Off hours, you may contact the Hospitalist-In-Charge at pager [phone] for assistance. For expedited pulmonary follow-up appointments, you may email [email or web address]. Please include in this email: patient name, date of birth, MRN and contact information.

## 2022-12-31 NOTE — CONSULT NOTE ADULT - ASSESSMENT
78 yo M with hx of HTN, HLD, psoriasis on prednisone, and recent possible esophageal/hepatic malignancy/mass presented with acute onset SOB and found to have RLL segmental PE. Trop at 165 and proBNP at 8[address], TTE showed no sign of RV strain, no clot in transit, and LVEF of 70%. Patient initially had SBP in the 80s without LOC or dizziness, however, may have other possible etiologies for hypotension including sepsis and poor PO intake rather than 2/2 PE. He is currently HDS after 500cc IVF in the ED and on RA.    - would initiate heparin gtt  - TTE result as above  - get BLE dopplers  - c/w telemetry  - no plan for procedural interventions 78 yo M with hx of HTN, HLD, psoriasis on prednisone, and recent possible esophageal/hepatic malignancy/mass presented with acute onset SOB and found to have RLL segmental PE. Trop at 165 and [place]BNP at [address], TTE showed no sign of RV strain, no clot in transit, and LVEF of 70%. Patient initially had SBP in the 80s without LOC or dizziness, however, may have other possible etiologies for hypotension including sepsis and poor PO intake rather than 2/2 PE. He is currently HDS after 500cc IVF in the ED and on RA.    - would initiate heparin gtt  - TTE result as above  - get BLE dopplers  - c/w telemetry  - no plan for procedural interventions 76 yo M with hx of HTN, HLD, psoriasis on prednisone, and recent possible esophageal/hepatic malignancy/mass presented with acute onset SOB and found to have RLL segmental PE. Trop at 165 and proBNP at 90[address], TTE showed no sign of RV strain, no clot in transit, and LVEF of 70%. Patient initially had SBP in the 80s without LOC or dizziness, however, may have other possible etiologies for hypotension including sepsis and poor PO intake rather than 2/2 PE. He is currently HDS after 500cc IVF in the ED and on RA.    - would initiate heparin gtt  - TTE result as above  - get BLE dopplers  - c/w telemetry  - no plan for procedural interventions

## 2022-12-31 NOTE — ED ADULT NURSE REASSESSMENT NOTE - NS ED NURSE REASSESS COMMENT FT1
MD Mercado with Pt and family members at bedside explaining plan of care for pt. Pt remains at bedside CM, pending CT of the head.

## 2022-12-31 NOTE — CONSULT NOTE ADULT - ATTENDING COMMENTS
Agree with plan as outlined above.  No hemodynamic effect from PE   Continue hep gtt                                                                                            Eighty minutes spent in direct patient care and communication

## 2023-01-01 LAB
AMMONIA BLD-MCNC: 31 UMOL/L — SIGNIFICANT CHANGE UP (ref 11–55)
ANION GAP SERPL CALC-SCNC: 22 MMOL/L — HIGH (ref 5–17)
APTT BLD: 60.8 SEC — HIGH (ref 27.5–35.5)
APTT BLD: >200 SEC — CRITICAL HIGH (ref 27.5–35.5)
BUN SERPL-MCNC: 124 MG/DL — HIGH (ref 7–23)
CALCIUM SERPL-MCNC: 7.5 MG/DL — LOW (ref 8.4–10.5)
CHLORIDE SERPL-SCNC: 91 MMOL/L — LOW (ref 96–108)
CO2 SERPL-SCNC: 20 MMOL/L — LOW (ref 22–31)
CREAT ?TM UR-MCNC: 113 MG/DL — SIGNIFICANT CHANGE UP
CREAT SERPL-MCNC: 4.17 MG/DL — HIGH (ref 0.5–1.3)
CULTURE RESULTS: NO GROWTH — SIGNIFICANT CHANGE UP
EGFR: 14 ML/MIN/1.73M2 — LOW
GLUCOSE SERPL-MCNC: 108 MG/DL — HIGH (ref 70–99)
HCT VFR BLD CALC: 48.9 % — SIGNIFICANT CHANGE UP (ref 39–50)
HCV AB S/CO SERPL IA: 0.1 S/CO — SIGNIFICANT CHANGE UP (ref 0–0.99)
HCV AB SERPL-IMP: SIGNIFICANT CHANGE UP
HEPARIN-PF4 AB RESULT: 0.9 U/ML — SIGNIFICANT CHANGE UP (ref 0–0.9)
HGB BLD-MCNC: 15.8 G/DL — SIGNIFICANT CHANGE UP (ref 13–17)
MCHC RBC-ENTMCNC: 26.9 PG — LOW (ref 27–34)
MCHC RBC-ENTMCNC: 32.3 GM/DL — SIGNIFICANT CHANGE UP (ref 32–36)
MCV RBC AUTO: 83.2 FL — SIGNIFICANT CHANGE UP (ref 80–100)
MRSA PCR RESULT.: SIGNIFICANT CHANGE UP
NRBC # BLD: 0 /100 WBCS — SIGNIFICANT CHANGE UP (ref 0–0)
PF4 HEPARIN CMPLX AB SER-ACNC: NEGATIVE — SIGNIFICANT CHANGE UP
PLATELET # BLD AUTO: 60 K/UL — LOW (ref 150–400)
POTASSIUM SERPL-MCNC: 5 MMOL/L — SIGNIFICANT CHANGE UP (ref 3.5–5.3)
POTASSIUM SERPL-SCNC: 5 MMOL/L — SIGNIFICANT CHANGE UP (ref 3.5–5.3)
PROT ?TM UR-MCNC: 22 MG/DL — HIGH (ref 0–12)
PROT/CREAT UR-RTO: 0.2 RATIO — SIGNIFICANT CHANGE UP (ref 0–0.2)
RBC # BLD: 5.88 M/UL — HIGH (ref 4.2–5.8)
RBC # FLD: 20.2 % — HIGH (ref 10.3–14.5)
S AUREUS DNA NOSE QL NAA+PROBE: SIGNIFICANT CHANGE UP
SODIUM SERPL-SCNC: 133 MMOL/L — LOW (ref 135–145)
SPECIMEN SOURCE: SIGNIFICANT CHANGE UP
TROPONIN T, HIGH SENSITIVITY RESULT: 144 NG/L — HIGH (ref 0–51)
WBC # BLD: 15.76 K/UL — HIGH (ref 3.8–10.5)
WBC # FLD AUTO: 15.76 K/UL — HIGH (ref 3.8–10.5)

## 2023-01-01 PROCEDURE — 99223 1ST HOSP IP/OBS HIGH 75: CPT

## 2023-01-01 PROCEDURE — 93970 EXTREMITY STUDY: CPT | Mod: 26

## 2023-01-01 PROCEDURE — 99233 SBSQ HOSP IP/OBS HIGH 50: CPT

## 2023-01-01 RX ORDER — SODIUM BICARBONATE 1 MEQ/ML
0.04 SYRINGE (ML) INTRAVENOUS
Qty: 75 | Refills: 0 | Status: DISCONTINUED | OUTPATIENT
Start: 2023-01-01 | End: 2023-01-03

## 2023-01-01 RX ORDER — CHLORHEXIDINE GLUCONATE 213 G/1000ML
1 SOLUTION TOPICAL DAILY
Refills: 0 | Status: DISCONTINUED | OUTPATIENT
Start: 2023-01-01 | End: 2023-01-06

## 2023-01-01 RX ADMIN — HEPARIN SODIUM 1300 UNIT(S)/HR: 5000 INJECTION INTRAVENOUS; SUBCUTANEOUS at 08:52

## 2023-01-01 RX ADMIN — PIPERACILLIN AND TAZOBACTAM 25 GRAM(S): 4; .5 INJECTION, POWDER, LYOPHILIZED, FOR SOLUTION INTRAVENOUS at 00:58

## 2023-01-01 RX ADMIN — HEPARIN SODIUM 0 UNIT(S)/HR: 5000 INJECTION INTRAVENOUS; SUBCUTANEOUS at 17:59

## 2023-01-01 RX ADMIN — Medication 1 GRAM(S): at 06:57

## 2023-01-01 RX ADMIN — Medication 10 MILLIGRAM(S): at 06:56

## 2023-01-01 RX ADMIN — PIPERACILLIN AND TAZOBACTAM 25 GRAM(S): 4; .5 INJECTION, POWDER, LYOPHILIZED, FOR SOLUTION INTRAVENOUS at 17:58

## 2023-01-01 RX ADMIN — Medication 1 GRAM(S): at 17:59

## 2023-01-01 RX ADMIN — HEPARIN SODIUM 1000 UNIT(S)/HR: 5000 INJECTION INTRAVENOUS; SUBCUTANEOUS at 19:00

## 2023-01-01 RX ADMIN — CHLORHEXIDINE GLUCONATE 1 APPLICATION(S): 213 SOLUTION TOPICAL at 18:06

## 2023-01-01 RX ADMIN — PANTOPRAZOLE SODIUM 40 MILLIGRAM(S): 20 TABLET, DELAYED RELEASE ORAL at 07:00

## 2023-01-01 NOTE — CHART NOTE - NSCHARTNOTEFT_GEN_A_CORE
Pt has prior admission under [facility]    CT chest has similar patchy opacities 3 weeks ago that make it unlikely to be a new infectious process.   Consider pulm edema or early metastasis.   Would have rads compare

## 2023-01-01 NOTE — CONSULT NOTE ADULT - SUBJECTIVE AND OBJECTIVE BOX
Oncology Consult Note    HPI as per admitting team:   6 y/o M PMH HTN, HLD, psoriasis on prednisone 10mg daily presenting with SOB. Recent admission for pneumonia and dced two weeks ago also found to possible esophageal cancer (mass) with liver mets s/p IR biopsy 12/22. Today patient woke up with shortness of breath at rest, worse with exertion with no associated LOC, chest pain, palpitations, diaphoresis, focal weakness, numbness/tingling.  Reports intermittent nonbloody vomiting and diarrhea with no fever/chills, cough, rashes, dysuria, or hematuria. Reports decreased PO intake     Received 1L IVF, azithro, zosyn, vanc, and solumedrol 100mg IVP x1 in ED.    (31 Dec 2022 16:20)        REVIEW OF SYSTEMS:    CONSTITUTIONAL: No weakness, fevers or chills  EYES/ENT: No visual changes;  No vertigo or throat pain   NECK: No pain or stiffness  RESPIRATORY: No cough, wheezing, hemoptysis; No shortness of breath  CARDIOVASCULAR: No chest pain or palpitations  GASTROINTESTINAL: No abdominal or epigastric pain. No nausea, vomiting, or hematemesis; No diarrhea or constipation. No melena or hematochezia.  GENITOURINARY: No dysuria, frequency or hematuria  NEUROLOGICAL: No numbness or weakness  SKIN: No itching, burning, rashes, or lesions   All other review of systems is negative unless indicated above.    PAST MEDICAL & SURGICAL HISTORY:  Psoriasis      Esophageal mass      Benign essential HTN          FAMILY HISTORY:      SOCIAL HISTORY:     Allergies    No Known Allergies    Intolerances        MEDICATIONS  (STANDING):  chlorhexidine 2% Cloths 1 Application(s) Topical daily  heparin  Infusion.  Unit(s)/Hr (16 mL/Hr) IV Continuous <Continuous>  influenza  Vaccine (HIGH DOSE) 0.7 milliLiter(s) IntraMuscular once  ondansetron Injectable 4 milliGRAM(s) IV Push four times a day  pantoprazole    Tablet 40 milliGRAM(s) Oral before breakfast  piperacillin/tazobactam IVPB.. 3.375 Gram(s) IV Intermittent every 12 hours  predniSONE   Tablet 10 milliGRAM(s) Oral daily  simvastatin 10 milliGRAM(s) Oral at bedtime  sodium bicarbonate  Infusion 0.043 mEq/kG/Hr (50 mL/Hr) IV Continuous <Continuous>  sucralfate suspension 1 Gram(s) Oral two times a day    MEDICATIONS  (PRN):  acetaminophen     Tablet .. 650 milliGRAM(s) Oral every 6 hours PRN Temp greater or equal to 38.5C (101.3F), Mild Pain (1 - 3)  heparin   Injectable 7000 Unit(s) IV Push every 6 hours PRN For aPTT less than 40  heparin   Injectable 3500 Unit(s) IV Push every 6 hours PRN For aPTT between 40 - 57      OBJECTIVE       T(F): 97.9 (01-01-23 @ 11:04), Max: 98.4 (01-01-23 @ 05:20)  HR: 87 (01-01-23 @ 11:04)  BP: 104/72 (01-01-23 @ 11:04)  RR: 19 (01-01-23 @ 11:04)  SpO2: 96% (01-01-23 @ 11:04)  Wt(kg): --    PHYSICAL EXAM   GENERAL: NAD, well-developed  HEAD:  Atraumatic, Normocephalic  EYES: EOMI, PERRLA, conjunctiva and sclera clear  NECK: Supple, No JVD  CHEST/LUNG: Clear to auscultation bilaterally; No wheeze  HEART: Regular rate and rhythm; No murmurs, rubs, or gallops  ABDOMEN: Soft, Nontender, Nondistended; Bowel sounds present  EXTREMITIES:  2+ Peripheral Pulses, No clubbing, cyanosis, or edema  NEUROLOGY: non-focal  SKIN: No rashes or lesions                          15.8   15.76 )-----------( 60       ( 01 Jan 2023 06:16 )             48.9       12-31    133<L>  |  93<L>  |  119<H>  ----------------------------<  111<H>  4.8   |  17<L>  |  3.79<H>    Ca    8.4      31 Dec 2022 10:00  Mg     2.9     12-31    TPro  5.5<L>  /  Alb  2.6<L>  /  TBili  3.6<H>  /  DBili  x   /  AST  685<H>  /  ALT  442<H>  /  AlkPhos  1636<H>  12-31           Oncology Consult Note    HPI as per admitting team:   8 y/o M PMH HTN, HLD, psoriasis on prednisone 10mg daily presenting with SOB. Recent admission for pneumonia and dced two weeks ago also found to possible esophageal cancer (mass) with liver mets s/p IR biopsy 12/22. Today patient woke up with shortness of breath at rest, worse with exertion with no associated LOC, chest pain, palpitations, diaphoresis, focal weakness, numbness/tingling.  Reports intermittent nonbloody vomiting and diarrhea with no fever/chills, cough, rashes, dysuria, or hematuria. Reports decreased PO intake     Received 1L IVF, azithro, zosyn, vanc, and solumedrol 100mg IVP x1 in ED.    (31 Dec 2022 16:20)        REVIEW OF SYSTEMS:    CONSTITUTIONAL: No weakness, fevers or chills  EYES/ENT: No visual changes;  No vertigo or throat pain   NECK: No pain or stiffness  RESPIRATORY: No cough, wheezing, hemoptysis; No shortness of breath  CARDIOVASCULAR: No chest pain or palpitations  GASTROINTESTINAL: No abdominal or epigastric pain. No nausea, vomiting, or hematemesis; No diarrhea or constipation. No melena or hematochezia.  GENITOURINARY: No dysuria, frequency or hematuria  NEUROLOGICAL: No numbness or weakness  SKIN: No itching, burning, rashes, or lesions   All other review of systems is negative unless indicated above.    PAST MEDICAL & SURGICAL HISTORY:  Psoriasis      Esophageal mass      Benign essential HTN          FAMILY HISTORY:      SOCIAL HISTORY:     Allergies    No Known Allergies    Intolerances        MEDICATIONS  (STANDING):  chlorhexidine 2% Cloths 1 Application(s) Topical daily  heparin  Infusion.  Unit(s)/Hr (16 mL/Hr) IV Continuous <Continuous>  influenza  Vaccine (HIGH DOSE) 0.7 milliLiter(s) IntraMuscular once  ondansetron Injectable 4 milliGRAM(s) IV Push four times a day  pantoprazole    Tablet 40 milliGRAM(s) Oral before breakfast  piperacillin/tazobactam IVPB.. 3.375 Gram(s) IV Intermittent every 12 hours  predniSONE   Tablet 10 milliGRAM(s) Oral daily  simvastatin 10 milliGRAM(s) Oral at bedtime  sodium bicarbonate  Infusion 0.043 mEq/kG/Hr (50 mL/Hr) IV Continuous <Continuous>  sucralfate suspension 1 Gram(s) Oral two times a day    MEDICATIONS  (PRN):  acetaminophen     Tablet .. 650 milliGRAM(s) Oral every 6 hours PRN Temp greater or equal to 38.5C (101.3F), Mild Pain (1 - 3)  heparin   Injectable 7000 Unit(s) IV Push every 6 hours PRN For aPTT less than 40  heparin   Injectable 3500 Unit(s) IV Push every 6 hours PRN For aPTT between 40 - 57      OBJECTIVE       T(F): 97.9 (01-01-23 @ 11:04), Max: 98.4 (01-01-23 @ 05:20)  HR: 87 (01-01-23 @ 11:04)  BP: 104/72 (01-01-23 @ 11:04)  RR: 19 (01-01-23 @ 11:04)  SpO2: 96% (01-01-23 @ 11:04)  Wt(kg): --    PHYSICAL EXAM   GENERAL: NAD, well-developed  HEAD:  Atraumatic, Normocephalic  EYES: EOMI, PERRLA, conjunctiva and sclera clear  NECK: Supple, No JVD  CHEST/LUNG: Clear to auscultation bilaterally; No wheeze  HEART: Regular rate and rhythm; No murmurs, rubs, or gallops  ABDOMEN: Soft, Nontender, Nondistended; Bowel sounds present  EXTREMITIES:  2+ Peripheral Pulses, No clubbing, cyanosis, or edema  NEUROLOGY: non-focal  SKIN: No rashes or lesions                          15.8   15.76 )-----------( 60       ( 01 Jan 2023 06:16 )             48.9       12-31    133<L>  |  93<L>  |  119<H>  ----------------------------<  111<H>  4.8   |  17<L>  |  3.79<H>    Ca    8.4      31 Dec 2022 10:00  Mg     2.9     12-31    TPro  5.5<L>  /  Alb  2.6<L>  /  TBili  3.6<H>  /  DBili  x   /  AST  685<H>  /  ALT  442<H>  /  AlkPhos  1636<H>  12-31           Oncology Consult Note    HPI as per admitting team:   76 y/o M PMH HTN, HLD, psoriasis on prednisone 10mg daily presenting with SOB. Recent admission for pneumonia and dced two weeks ago also found to possible esophageal cancer (mass) with liver mets s/p IR biopsy 12/22. Today patient woke up with shortness of breath at rest, worse with exertion with no associated LOC, chest pain, palpitations, diaphoresis, focal weakness, numbness/tingling.  Reports intermittent nonbloody vomiting and diarrhea with no fever/chills, cough, rashes, dysuria, or hematuria. Reports decreased PO intake     Received 1L IVF, azithro, zosyn, vanc, and solumedrol 100mg IVP x1 in ED.    (31 Dec 2022 16:20)        REVIEW OF SYSTEMS:  CONSTITUTIONAL: No weakness, fevers or chills  EYES/ENT: No visual changes;  No vertigo or throat pain   NECK: No pain or stiffness  RESPIRATORY: No cough, wheezing, hemoptysis; No shortness of breath  CARDIOVASCULAR: No chest pain or palpitations  GASTROINTESTINAL: No abdominal or epigastric pain. No nausea, vomiting, or hematemesis; No diarrhea or constipation. No melena or hematochezia.  GENITOURINARY: No dysuria, frequency or hematuria  NEUROLOGICAL: No numbness or weakness  SKIN: No itching, burning, rashes, or lesions   All other review of systems is negative unless indicated above.    PAST MEDICAL & SURGICAL HISTORY:  Psoriasis      Esophageal mass      Benign essential HTN          FAMILY HISTORY:      SOCIAL HISTORY:     Allergies    No Known Allergies    Intolerances        MEDICATIONS  (STANDING):  chlorhexidine 2% Cloths 1 Application(s) Topical daily  heparin  Infusion.  Unit(s)/Hr (16 mL/Hr) IV Continuous <Continuous>  influenza  Vaccine (HIGH DOSE) 0.7 milliLiter(s) IntraMuscular once  ondansetron Injectable 4 milliGRAM(s) IV Push four times a day  pantoprazole    Tablet 40 milliGRAM(s) Oral before breakfast  piperacillin/tazobactam IVPB.. 3.375 Gram(s) IV Intermittent every 12 hours  predniSONE   Tablet 10 milliGRAM(s) Oral daily  simvastatin 10 milliGRAM(s) Oral at bedtime  sodium bicarbonate  Infusion 0.043 mEq/kG/Hr (50 mL/Hr) IV Continuous <Continuous>  sucralfate suspension 1 Gram(s) Oral two times a day    MEDICATIONS  (PRN):  acetaminophen     Tablet .. 650 milliGRAM(s) Oral every 6 hours PRN Temp greater or equal to 38.5C (101.3F), Mild Pain (1 - 3)  heparin   Injectable 7000 Unit(s) IV Push every 6 hours PRN For aPTT less than 40  heparin   Injectable 3500 Unit(s) IV Push every 6 hours PRN For aPTT between 40 - 57      OBJECTIVE       T(F): 97.9 (01-01-23 @ 11:04), Max: 98.4 (01-01-23 @ 05:20)  HR: 87 (01-01-23 @ 11:04)  BP: 104/72 (01-01-23 @ 11:04)  RR: 19 (01-01-23 @ 11:04)  SpO2: 96% (01-01-23 @ 11:04)  Wt(kg): --    PHYSICAL EXAM   GENERAL: NAD, well-developed  HEAD:  Atraumatic, Normocephalic  EYES: EOMI, PERRLA, conjunctiva and sclera clear  NECK: Supple, No JVD  CHEST/LUNG: Clear to auscultation bilaterally; No wheeze  HEART: Regular rate and rhythm; No murmurs, rubs, or gallops  ABDOMEN: Soft, Nontender, Nondistended; Bowel sounds present  EXTREMITIES:  2+ Peripheral Pulses, No clubbing, cyanosis, or edema  NEUROLOGY: non-focal  SKIN: No rashes or lesions                          15.8   15.76 )-----------( 60       ( 01 Jan 2023 06:16 )             48.9       12-31    133<L>  |  93<L>  |  119<H>  ----------------------------<  111<H>  4.8   |  17<L>  |  3.79<H>    Ca    8.4      31 Dec 2022 10:00  Mg     2.9     12-31    TPro  5.5<L>  /  Alb  2.6<L>  /  TBili  3.6<H>  /  DBili  x   /  AST  685<H>  /  ALT  442<H>  /  AlkPhos  1636<H>  12-31             Oncology Consult Note    HPI as per admitting team:   78 y/o M PMH HTN, HLD, psoriasis on prednisone 10mg daily presenting with SOB. Recent admission for pneumonia and dced two weeks ago also found to possible esophageal cancer (mass) with liver mets s/p IR biopsy 12/22. Today patient woke up with shortness of breath at rest, worse with exertion with no associated LOC, chest pain, palpitations, diaphoresis, focal weakness, numbness/tingling.  Reports intermittent nonbloody vomiting and diarrhea with no fever/chills, cough, rashes, dysuria, or hematuria. Reports decreased PO intake     Received 1L IVF, azithro, zosyn, vanc, and solumedrol 100mg IVP x1 in ED.    (31 Dec 2022 16:20)        REVIEW OF SYSTEMS:  CONSTITUTIONAL: No weakness, fevers or chills  EYES/ENT: No visual changes;  No vertigo or throat pain   NECK: No pain or stiffness  RESPIRATORY: No cough, wheezing, hemoptysis; No shortness of breath  CARDIOVASCULAR: No chest pain or palpitations  GASTROINTESTINAL: No abdominal or epigastric pain. No nausea, vomiting, or hematemesis; No diarrhea or constipation. No melena or hematochezia.  GENITOURINARY: No dysuria, frequency or hematuria  NEUROLOGICAL: No numbness or weakness  SKIN: No itching, burning, rashes, or lesions   All other review of systems is negative unless indicated above.    PAST MEDICAL & SURGICAL HISTORY:  Psoriasis      Esophageal mass      Benign essential HTN          FAMILY HISTORY:      SOCIAL HISTORY:     Allergies    No Known Allergies    Intolerances        MEDICATIONS  (STANDING):  chlorhexidine 2% Cloths 1 Application(s) Topical daily  heparin  Infusion.  Unit(s)/Hr (16 mL/Hr) IV Continuous <Continuous>  influenza  Vaccine (HIGH DOSE) 0.7 milliLiter(s) IntraMuscular once  ondansetron Injectable 4 milliGRAM(s) IV Push four times a day  pantoprazole    Tablet 40 milliGRAM(s) Oral before breakfast  piperacillin/tazobactam IVPB.. 3.375 Gram(s) IV Intermittent every 12 hours  predniSONE   Tablet 10 milliGRAM(s) Oral daily  simvastatin 10 milliGRAM(s) Oral at bedtime  sodium bicarbonate  Infusion 0.043 mEq/kG/Hr (50 mL/Hr) IV Continuous <Continuous>  sucralfate suspension 1 Gram(s) Oral two times a day    MEDICATIONS  (PRN):  acetaminophen     Tablet .. 650 milliGRAM(s) Oral every 6 hours PRN Temp greater or equal to 38.5C (101.3F), Mild Pain (1 - 3)  heparin   Injectable 7000 Unit(s) IV Push every 6 hours PRN For aPTT less than 40  heparin   Injectable 3500 Unit(s) IV Push every 6 hours PRN For aPTT between 40 - 57      OBJECTIVE       T(F): 97.9 (01-01-23 @ 11:04), Max: 98.4 (01-01-23 @ 05:20)  HR: 87 (01-01-23 @ 11:04)  BP: 104/72 (01-01-23 @ 11:04)  RR: 19 (01-01-23 @ 11:04)  SpO2: 96% (01-01-23 @ 11:04)  Wt(kg): --    PHYSICAL EXAM   GENERAL: NAD, well-developed  HEAD:  Atraumatic, Normocephalic  EYES: EOMI, PERRLA, conjunctiva and sclera clear  NECK: Supple, No JVD  CHEST/LUNG: Clear to auscultation bilaterally; No wheeze  HEART: Regular rate and rhythm; No murmurs, rubs, or gallops  ABDOMEN: Soft, Nontender, Nondistended; Bowel sounds present  EXTREMITIES:  2+ Peripheral Pulses, No clubbing, cyanosis, or edema  NEUROLOGY: non-focal  SKIN: No rashes or lesions                          15.8   15.76 )-----------( 60       ( 01 Jan 2023 06:16 )             48.9       12-31    133<L>  |  93<L>  |  119<H>  ----------------------------<  111<H>  4.8   |  17<L>  |  3.79<H>    Ca    8.4      31 Dec 2022 10:00  Mg     2.9     12-31    TPro  5.5<L>  /  Alb  2.6<L>  /  TBili  3.6<H>  /  DBili  x   /  AST  685<H>  /  ALT  442<H>  /  AlkPhos  1636<H>  12-31             Oncology Consult Note    HPI as per admitting team:   78 y/o M PMH HTN, HLD, psoriasis on prednisone 10mg daily presenting with SOB. Recent admission for pneumonia and dced two weeks ago also found to possible esophageal cancer (mass) with liver mets s/p IR biopsy 12/22. Today patient woke up with shortness of breath at rest, worse with exertion with no associated LOC, chest pain, palpitations, diaphoresis, focal weakness, numbness/tingling.  Reports intermittent nonbloody vomiting and diarrhea with no fever/chills, cough, rashes, dysuria, or hematuria. Reports decreased PO intake     Received 1L IVF, azithro, zosyn, vanc, and solumedrol 100mg IVP x1 in ED.    (31 Dec 2022 16:20)    Onc Hx:  Patient initially presented to [facility] for pneumonia two weeks ago. He was found to have an esophageal mass with liver lesions suspected to be metastases. Liver lesion core biopsy 12/22/22 shows poorly differentiated carcinoma, likely upper GI or pancreaticobiliary origin. Patient has not been able to eat well for the past few weeks and has become significantly more debilitated as a result. His wife at bedside reports that she also had esophageal cancer and oral SCC treated with surgical intervention, so she wanted to know if surgery was an option for him.    REVIEW OF SYSTEMS:  CONSTITUTIONAL: +Weakness, decreased PO intake  EYES/ENT: No visual changes;  No vertigo or throat pain   NECK: No pain or stiffness  RESPIRATORY: No cough, wheezing, hemoptysis; No shortness of breath  CARDIOVASCULAR: No chest pain or palpitations  GASTROINTESTINAL: No abdominal or epigastric pain. No nausea, vomiting, or hematemesis; No diarrhea or constipation. No melena or hematochezia.  GENITOURINARY: No dysuria, frequency or hematuria  NEUROLOGICAL: No numbness or weakness  SKIN: No itching, burning, rashes, or lesions   All other review of systems is negative unless indicated above.    PAST MEDICAL & SURGICAL HISTORY:  Psoriasis  Esophageal mass  Benign essential HTN    FAMILY HISTORY:    SOCIAL HISTORY:     Allergies  No Known Allergies      MEDICATIONS  (STANDING):  chlorhexidine 2% Cloths 1 Application(s) Topical daily  heparin  Infusion.  Unit(s)/Hr (16 mL/Hr) IV Continuous <Continuous>  influenza  Vaccine (HIGH DOSE) 0.7 milliLiter(s) IntraMuscular once  ondansetron Injectable 4 milliGRAM(s) IV Push four times a day  pantoprazole    Tablet 40 milliGRAM(s) Oral before breakfast  piperacillin/tazobactam IVPB.. 3.375 Gram(s) IV Intermittent every 12 hours  predniSONE   Tablet 10 milliGRAM(s) Oral daily  simvastatin 10 milliGRAM(s) Oral at bedtime  sodium bicarbonate  Infusion 0.043 mEq/kG/Hr (50 mL/Hr) IV Continuous <Continuous>  sucralfate suspension 1 Gram(s) Oral two times a day    MEDICATIONS  (PRN):  acetaminophen     Tablet .. 650 milliGRAM(s) Oral every 6 hours PRN Temp greater or equal to 38.5C (101.3F), Mild Pain (1 - 3)  heparin   Injectable 7000 Unit(s) IV Push every 6 hours PRN For aPTT less than 40  heparin   Injectable 3500 Unit(s) IV Push every 6 hours PRN For aPTT between 40 - 57      OBJECTIVE       T(F): 97.9 (01-01-23 @ 11:04), Max: 98.4 (01-01-23 @ 05:20)  HR: 87 (01-01-23 @ 11:04)  BP: 104/72 (01-01-23 @ 11:04)  RR: 19 (01-01-23 @ 11:04)  SpO2: 96% (01-01-23 @ 11:04)  Wt(kg): --    PHYSICAL EXAM   GENERAL: NAD, well-developed  HEAD:  Atraumatic, Normocephalic  EYES: EOMI, PERRLA, conjunctiva and sclera clear  NECK: Supple, No JVD  CHEST/LUNG: Clear to auscultation bilaterally; No wheeze  HEART: Regular rate and rhythm; No murmurs, rubs, or gallops  ABDOMEN: Soft, Nontender, Nondistended; Bowel sounds present  EXTREMITIES:  2+ Peripheral Pulses, No clubbing, cyanosis, or edema  NEUROLOGY: non-focal  SKIN: No rashes or lesions                          15.8   15.76 )-----------( 60       ( 01 Jan 2023 06:16 )             48.9       12-31    133<L>  |  93<L>  |  119<H>  ----------------------------<  111<H>  4.8   |  17<L>  |  3.79<H>    Ca    8.4      31 Dec 2022 10:00  Mg     2.9     12-31    TPro  5.5<L>  /  Alb  2.6<L>  /  TBili  3.6<H>  /  DBili  x   /  AST  685<H>  /  ALT  442<H>  /  AlkPhos  1636<H>  12-31      Pathology Report from [facility]   1. Liver, ultrasound guided biopsy:  - Poorly-differentiated adenocarcinoma, see note    Note:  The biopsy shows two cores of tissue with infiltrating neoplastic  cells with marked atypia and nuclear pleomorphism. Rare focal mucin is    present as supported by mucicarmine stain. Scattered mitoses are seen. A  focus of lymphovascular invasion is seen. The neoplasm is immunoreactive  to CK7 (strong, diffuse), CK20 (strong, diffuse); negative for TTF1  and P40. There is focal weak nonspecific staining with arginase. The  overall morphologic and immunophenotypic findings favor upper GI/  pancreaticobiliary origin. [id number] is negative (1+). Foundation testing is  in progress and will be reported as an addendum.       Oncology Consult Note    HPI as per admitting team:   78 y/o M PMH HTN, HLD, psoriasis on prednisone 10mg daily presenting with SOB. Recent admission for pneumonia and dced two weeks ago also found to possible esophageal cancer (mass) with liver mets s/p IR biopsy 12/22. Today patient woke up with shortness of breath at rest, worse with exertion with no associated LOC, chest pain, palpitations, diaphoresis, focal weakness, numbness/tingling.  Reports intermittent nonbloody vomiting and diarrhea with no fever/chills, cough, rashes, dysuria, or hematuria. Reports decreased PO intake     Received 1L IVF, azithro, zosyn, vanc, and solumedrol 100mg IVP x1 in ED.    (31 Dec 2022 16:20)    Onc Hx:  Patient initially presented to [facility] for pneumonia two weeks ago. He was found to have an esophageal mass with liver lesions suspected to be metastases. Liver lesion core biopsy 12/22/22 shows poorly differentiated carcinoma, likely upper GI or pancreaticobiliary origin. Patient has not been able to eat well for the past few weeks and has become significantly more debilitated as a result. His wife at bedside reports that she also had esophageal cancer and oral SCC treated with surgical intervention, so she wanted to know if surgery was an option for him.    REVIEW OF SYSTEMS:  CONSTITUTIONAL: +Weakness, decreased PO intake  EYES/ENT: No visual changes;  No vertigo or throat pain   NECK: No pain or stiffness  RESPIRATORY: No cough, wheezing, hemoptysis; No shortness of breath  CARDIOVASCULAR: No chest pain or palpitations  GASTROINTESTINAL: No abdominal or epigastric pain. No nausea, vomiting, or hematemesis; No diarrhea or constipation. No melena or hematochezia.  GENITOURINARY: No dysuria, frequency or hematuria  NEUROLOGICAL: No numbness or weakness  SKIN: No itching, burning, rashes, or lesions   All other review of systems is negative unless indicated above.    PAST MEDICAL & SURGICAL HISTORY:  Psoriasis  Esophageal mass  Benign essential HTN    FAMILY HISTORY:    SOCIAL HISTORY:     Allergies  No Known Allergies      MEDICATIONS  (STANDING):  chlorhexidine 2% Cloths 1 Application(s) Topical daily  heparin  Infusion.  Unit(s)/Hr (16 mL/Hr) IV Continuous <Continuous>  influenza  Vaccine (HIGH DOSE) 0.7 milliLiter(s) IntraMuscular once  ondansetron Injectable 4 milliGRAM(s) IV Push four times a day  pantoprazole    Tablet 40 milliGRAM(s) Oral before breakfast  piperacillin/tazobactam IVPB.. 3.375 Gram(s) IV Intermittent every 12 hours  predniSONE   Tablet 10 milliGRAM(s) Oral daily  simvastatin 10 milliGRAM(s) Oral at bedtime  sodium bicarbonate  Infusion 0.043 mEq/kG/Hr (50 mL/Hr) IV Continuous <Continuous>  sucralfate suspension 1 Gram(s) Oral two times a day    MEDICATIONS  (PRN):  acetaminophen     Tablet .. 650 milliGRAM(s) Oral every 6 hours PRN Temp greater or equal to 38.5C (101.3F), Mild Pain (1 - 3)  heparin   Injectable 7000 Unit(s) IV Push every 6 hours PRN For aPTT less than 40  heparin   Injectable 3500 Unit(s) IV Push every 6 hours PRN For aPTT between 40 - 57      OBJECTIVE       T(F): 97.9 (01-01-23 @ 11:04), Max: 98.4 (01-01-23 @ 05:20)  HR: 87 (01-01-23 @ 11:04)  BP: 104/72 (01-01-23 @ 11:04)  RR: 19 (01-01-23 @ 11:04)  SpO2: 96% (01-01-23 @ 11:04)  Wt(kg): --    PHYSICAL EXAM   GENERAL: NAD, well-developed  HEAD:  Atraumatic, Normocephalic  EYES: EOMI, PERRLA, conjunctiva and sclera clear  NECK: Supple, No JVD  CHEST/LUNG: Clear to auscultation bilaterally; No wheeze  HEART: Regular rate and rhythm; No murmurs, rubs, or gallops  ABDOMEN: Soft, Nontender, Nondistended; Bowel sounds present  EXTREMITIES:  2+ Peripheral Pulses, No clubbing, cyanosis, or edema  NEUROLOGY: non-focal  SKIN: No rashes or lesions                          15.8   15.76 )-----------( 60       ( 01 Jan 2023 06:16 )             48.9       12-31    133<L>  |  93<L>  |  119<H>  ----------------------------<  111<H>  4.8   |  17<L>  |  3.79<H>    Ca    8.4      31 Dec 2022 10:00  Mg     2.9     12-31    TPro  5.5<L>  /  Alb  2.6<L>  /  TBili  3.6<H>  /  DBili  x   /  AST  685<H>  /  ALT  442<H>  /  AlkPhos  1636<H>  12-31      Pathology Report from [facility]   1. Liver, ultrasound guided biopsy:  - Poorly-differentiated adenocarcinoma, see note    Note:  The biopsy shows two cores of tissue with infiltrating neoplastic  cells with marked atypia and nuclear pleomorphism. Rare focal mucin is    present as supported by mucicarmine stain. Scattered mitoses are seen. A  focus of lymphovascular invasion is seen. The neoplasm is immunoreactive  to CK7 (strong, diffuse), CK20 (strong, diffuse); negative for TTF1  and P40. There is focal weak nonspecific staining with arginase. The  overall morphologic and immunophenotypic findings favor upper GI/  pancreaticobiliary origin. [id number] is negative (1+). Foundation testing is  in progress and will be reported as an addendum.       Oncology Consult Note    HPI as per admitting team:   76 y/o M PMH HTN, HLD, psoriasis on prednisone 10mg daily presenting with SOB. Recent admission for pneumonia and dced two weeks ago also found to possible esophageal cancer (mass) with liver mets s/p IR biopsy 12/22. Today patient woke up with shortness of breath at rest, worse with exertion with no associated LOC, chest pain, palpitations, diaphoresis, focal weakness, numbness/tingling.  Reports intermittent nonbloody vomiting and diarrhea with no fever/chills, cough, rashes, dysuria, or hematuria. Reports decreased PO intake     Received 1L IVF, azithro, zosyn, vanc, and solumedrol 100mg IVP x1 in ED.    (31 Dec 2022 16:20)    Onc Hx:  Patient initially presented to [facility] for pneumonia two weeks ago. He was found to have an esophageal mass with liver lesions suspected to be metastases. Liver lesion core biopsy 12/22/22 shows poorly differentiated carcinoma, likely upper GI or pancreaticobiliary origin. Patient has not been able to eat well for the past few weeks and has become significantly more debilitated as a result. His wife at bedside reports that she also had esophageal cancer and oral SCC treated with surgical intervention, so she wanted to know if surgery was an option for him.    REVIEW OF SYSTEMS:  CONSTITUTIONAL: +Weakness, decreased PO intake  EYES/ENT: No visual changes;  No vertigo or throat pain   NECK: No pain or stiffness  RESPIRATORY: No cough, wheezing, hemoptysis; No shortness of breath  CARDIOVASCULAR: No chest pain or palpitations  GASTROINTESTINAL: No abdominal or epigastric pain. No nausea, vomiting, or hematemesis; No diarrhea or constipation. No melena or hematochezia.  GENITOURINARY: No dysuria, frequency or hematuria  NEUROLOGICAL: No numbness or weakness  SKIN: No itching, burning, rashes, or lesions   All other review of systems is negative unless indicated above.    PAST MEDICAL & SURGICAL HISTORY:  Psoriasis  Esophageal mass  Benign essential HTN    FAMILY HISTORY:    SOCIAL HISTORY:     Allergies  No Known Allergies      MEDICATIONS  (STANDING):  chlorhexidine 2% Cloths 1 Application(s) Topical daily  heparin  Infusion.  Unit(s)/Hr (16 mL/Hr) IV Continuous <Continuous>  influenza  Vaccine (HIGH DOSE) 0.7 milliLiter(s) IntraMuscular once  ondansetron Injectable 4 milliGRAM(s) IV Push four times a day  pantoprazole    Tablet 40 milliGRAM(s) Oral before breakfast  piperacillin/tazobactam IVPB.. 3.375 Gram(s) IV Intermittent every 12 hours  predniSONE   Tablet 10 milliGRAM(s) Oral daily  simvastatin 10 milliGRAM(s) Oral at bedtime  sodium bicarbonate  Infusion 0.043 mEq/kG/Hr (50 mL/Hr) IV Continuous <Continuous>  sucralfate suspension 1 Gram(s) Oral two times a day    MEDICATIONS  (PRN):  acetaminophen     Tablet .. 650 milliGRAM(s) Oral every 6 hours PRN Temp greater or equal to 38.5C (101.3F), Mild Pain (1 - 3)  heparin   Injectable 7000 Unit(s) IV Push every 6 hours PRN For aPTT less than 40  heparin   Injectable 3500 Unit(s) IV Push every 6 hours PRN For aPTT between 40 - 57      OBJECTIVE       T(F): 97.9 (01-01-23 @ 11:04), Max: 98.4 (01-01-23 @ 05:20)  HR: 87 (01-01-23 @ 11:04)  BP: 104/72 (01-01-23 @ 11:04)  RR: 19 (01-01-23 @ 11:04)  SpO2: 96% (01-01-23 @ 11:04)  Wt(kg): --    PHYSICAL EXAM   GENERAL: NAD, well-developed  HEAD:  Atraumatic, Normocephalic  EYES: EOMI, PERRLA, conjunctiva and sclera clear  NECK: Supple, No JVD  CHEST/LUNG: Clear to auscultation bilaterally; No wheeze  HEART: Regular rate and rhythm; No murmurs, rubs, or gallops  ABDOMEN: Soft, Nontender, Nondistended; Bowel sounds present  EXTREMITIES:  2+ Peripheral Pulses, No clubbing, cyanosis, or edema  NEUROLOGY: non-focal  SKIN: No rashes or lesions                          15.8   15.76 )-----------( 60       ( 01 Jan 2023 06:16 )             48.9       12-31    133<L>  |  93<L>  |  119<H>  ----------------------------<  111<H>  4.8   |  17<L>  |  3.79<H>    Ca    8.4      31 Dec 2022 10:00  Mg     2.9     12-31    TPro  5.5<L>  /  Alb  2.6<L>  /  TBili  3.6<H>  /  DBili  x   /  AST  685<H>  /  ALT  442<H>  /  AlkPhos  1636<H>  12-31      Pathology Report from [facility]   1. Liver, ultrasound guided biopsy:  - Poorly-differentiated adenocarcinoma, see note    Note:  The biopsy shows two cores of tissue with infiltrating neoplastic  cells with marked atypia and nuclear pleomorphism. Rare focal mucin is    present as supported by mucicarmine stain. Scattered mitoses are seen. A  focus of lymphovascular invasion is seen. The neoplasm is immunoreactive  to CK7 (strong, diffuse), CK20 (strong, diffuse); negative for TTF1  and P40. There is focal weak nonspecific staining with arginase. The  overall morphologic and immunophenotypic findings favor upper GI/  pancreaticobiliary origin. Ytr2Sfo is negative (1+). Foundation testing is  in progress and will be reported as an addendum.

## 2023-01-01 NOTE — PHYSICAL THERAPY INITIAL EVALUATION ADULT - PERTINENT HX OF CURRENT PROBLEM, REHAB EVAL
78 y/o M admitted to [facility] on 12/31/22  PMH HTN, HLD, psoriasis on prednisone 10mg daily presenting with SOB. Recent admission for pneumonia and dced two weeks ago also found to possible esophageal cancer (mass) with liver mets s/p IR biopsy 12/22. Today patient woke up with shortness of breath at rest, worse with exertion. Reports decreased PO intake. CT head (-),  CT angio, + PE on AC heparin,  TTE (-) pericardial effusion Trop serial 147, 139, 144,  awaiting LE duplex B/L 76 y/o M admitted to [facility] on 12/31/22  PMH HTN, HLD, psoriasis on prednisone 10mg daily presenting with SOB. Recent admission for pneumonia and dced two weeks ago also found to possible esophageal cancer (mass) with liver mets s/p IR biopsy 12/22. Today patient woke up with shortness of breath at rest, worse with exertion. Reports decreased PO intake. CT head (-),  CT angio, + PE on AC heparin,  TTE (-) pericardial effusion Trop serial 147, 139, 144,  awaiting LE duplex B/L 76 y/o M admitted to [facility] on 12/31/22  PMH HTN, HLD, psoriasis on prednisone 10mg daily presenting with SOB. Recent admission for pneumonia and dced two weeks ago also found to possible esophageal cancer (mass) with liver mets s/p IR biopsy 12/22. Today patient woke up with shortness of breath at rest, worse with exertion. Reports decreased PO intake. CT head (-),  CT angio, + PE on AC heparin,  TTE (-) pericardial effusion Trop serial 147, 139, 144,  awaiting LE duplex B/L 78 y/o M admitted to [facility] on 12/31/22  PMH HTN, HLD, psoriasis on prednisone 10mg daily presenting with SOB. Recent admission for pneumonia and dced two weeks ago also found to possible esophageal cancer (mass) with liver mets s/p IR biopsy 12/22. Today patient woke up with shortness of breath at rest, worse with exertion. Reports decreased PO intake. CT head (-),  CT angio, + PE on AC heparin,  TTE (-) pericardial effusion Trop serial 147, 139, 144,  awaiting LE duplex B/L but on AC 24 hours since 2:15 12/31 76 y/o M admitted to [facility] on 12/31/22  PMH HTN, HLD, psoriasis on prednisone 10mg daily presenting with SOB. Recent admission for pneumonia and dced two weeks ago also found to possible esophageal cancer (mass) with liver mets s/p IR biopsy 12/22. Today patient woke up with shortness of breath at rest, worse with exertion. Reports decreased PO intake. CT head (-),  CT angio, + PE on AC heparin,  TTE (-) pericardial effusion Trop serial 147, 139, 144,  awaiting LE duplex B/L but on AC 24 hours since 2:15 12/31 78 y/o M admitted to [facility] on 12/31/22  PMH HTN, HLD, psoriasis on prednisone 10mg daily presenting with SOB. Recent admission for pneumonia and dced two weeks ago also found to possible esophageal cancer (mass) with liver mets s/p IR biopsy 12/22. Today patient woke up with shortness of breath at rest, worse with exertion. Reports decreased PO intake. CT head (-),  CT angio, + PE on AC heparin,  TTE (-) pericardial effusion Trop serial 147, 139, 144,  awaiting LE duplex B/L but on AC 24 hours since 2:15 12/31

## 2023-01-01 NOTE — PHYSICAL THERAPY INITIAL EVALUATION ADULT - NSPTDISCHREC_GEN_A_CORE
if pt goes home home PT for bed mobs, transfers, amb training, balance training and ther exercises commode and grab bars/Sub-acute Rehab

## 2023-01-01 NOTE — CHART NOTE - NSCHARTNOTEFT_GEN_A_CORE
78 y/o M PMH HTN, HLD, psoriasis on prednisone 10mg daily presenting with SOB. Recent admission for pneumonia found to possible esophageal cancer (mass) with liver mets s/p IR biopsy 12/22. Nurse states she found pt to be confused. Baseline is unknown. On arrival pt is A0x2 although he believes he is 39 and has two children 18 and 20. He is aware that he is in the hospital and aware of the date although does not know the year. Denies chest pain, no dizziness, no N/V, no vision defects, no headache, no numbness or tingling. He denies chills or any other symptoms.     GENERAL: NAD, although states he needs to go home to take care of his wife and kids   HEAD:  Atraumatic, Normocephalic  EYES: EOMI, PERRLA, conjunctiva and sclera clear  ENMT:  Moist mucous membranes   NECK: Supple, No JVD   NERVOUS SYSTEM:  Alert & Oriented X 2,moves all ext. Strength +4 bilaterally  CHEST/LUNG: CTA bilaterally; No rales, rhonchi, wheezing   HEART: RRR  ABDOMEN: Soft, Nontender, Nondistended; Bowel sounds present  EXTREMITIES:  2+ Peripheral Pulses, No clubbing, cyanosis, or edema  SKIN: No rashes or lesions     A/P: Confusion  PT had head CT  Will follow to see improvement in the day 76 y/o M PMH HTN, HLD, psoriasis on prednisone 10mg daily presenting with SOB. Recent admission for pneumonia found to possible esophageal cancer (mass) with liver mets s/p IR biopsy 12/22. Nurse states she found pt to be confused. Baseline is unknown. On arrival pt is A0x2 although he believes he is 39 and has two children 18 and 20. He is aware that he is in the hospital and aware of the date although does not know the year. Denies chest pain, no dizziness, no N/V, no vision defects, no headache, no numbness or tingling. He denies chills or any other symptoms.     GENERAL: NAD, although states he needs to go home to take care of his wife and kids   HEAD:  Atraumatic, Normocephalic  EYES: EOMI, PERRLA, conjunctiva and sclera clear  ENMT:  Moist mucous membranes   NECK: Supple, No JVD   NERVOUS SYSTEM:  Alert & Oriented X 2,moves all ext. Strength +4 bilaterally  CHEST/LUNG: CTA bilaterally; No rales, rhonchi, wheezing   HEART: RRR  ABDOMEN: Soft, Nontender, Nondistended; Bowel sounds present  EXTREMITIES:  2+ Peripheral Pulses, No clubbing, cyanosis, or edema  SKIN: No rashes or lesions     A/P: Confusion  PT had head CT  Will follow to see improvement in the day 78 y/o M PMH HTN, HLD, psoriasis on prednisone 10mg daily presenting with SOB. Recent admission for pneumonia found to possible esophageal cancer (mass) with liver mets s/p IR biopsy 12/22. Nurse states she found pt to be confused. Baseline is unknown. On arrival pt is A0x2 although he believes he is 39 and has two children 18 and 20. He is aware that he is in the hospital and aware of the date although does not know the year. Denies chest pain, no dizziness, no N/V, no vision defects, no headache, no numbness or tingling. He denies chills or any other symptoms.     Vitals: 110/69, 88R, 16, Temp 98.4 oxygen sat 90%    GENERAL: NAD, although states he needs to go home to take care of his wife and kids   HEAD:  Atraumatic, Normocephalic  EYES: EOMI, PERRLA, conjunctiva and sclera clear  ENMT:  Moist mucous membranes   NECK: Supple, No JVD   NERVOUS SYSTEM:  Alert & Oriented X 2,moves all ext. Strength +4 bilaterally  CHEST/LUNG: CTA bilaterally; No rales, rhonchi, expiratory wheeze bilaterally; some use of accessory muscles    HEART: RRR  ABDOMEN: Soft, Nontender, Nondistended; Bowel sounds present  EXTREMITIES:  2+ Peripheral Pulses, No clubbing, cyanosis, or edema  SKIN: No rashes or lesions     A/P: Confusion  PT had head CT  Will follow to see improvement in the day  Oxygen NC 2 l/min as pt oxygen sat is 90-92 76 y/o M PMH HTN, HLD, psoriasis on prednisone 10mg daily presenting with SOB. Recent admission for pneumonia found to possible esophageal cancer (mass) with liver mets s/p IR biopsy 12/22. Nurse states she found pt to be confused. Baseline is unknown. On arrival pt is A0x2 although he believes he is 39 and has two children 18 and 20. He is aware that he is in the hospital and aware of the date although does not know the year. Denies chest pain, no dizziness, no N/V, no vision defects, no headache, no numbness or tingling. He denies chills or any other symptoms.     Vitals: 110/69, 88R, 16, Temp 98.4 oxygen sat 90%    GENERAL: NAD, although states he needs to go home to take care of his wife and kids   HEAD:  Atraumatic, Normocephalic  EYES: EOMI, PERRLA, conjunctiva and sclera clear  ENMT:  Moist mucous membranes   NECK: Supple, No JVD   NERVOUS SYSTEM:  Alert & Oriented X 2,moves all ext. Strength +4 bilaterally  CHEST/LUNG: CTA bilaterally; No rales, rhonchi, expiratory wheeze bilaterally; some use of accessory muscles    HEART: RRR  ABDOMEN: Soft, Nontender, Nondistended; Bowel sounds present  EXTREMITIES:  2+ Peripheral Pulses, No clubbing, cyanosis, or edema  SKIN: No rashes or lesions     A/P: Confusion  PT had head CT  Will follow to see improvement in the day  Oxygen NC 2 l/min as pt oxygen sat is 90-92 78 y/o M PMH HTN, HLD, psoriasis on prednisone 10mg daily presenting with SOB. Recent admission for pneumonia found to possible esophageal cancer (mass) with liver mets s/p IR biopsy 12/22. Nurse states she found pt to be confused. Baseline is unknown. On arrival pt is A0x2 although he believes he is 39 and has two children 18 and 20. He is aware that he is in the hospital and aware of the date although does not know the year. Denies chest pain, no dizziness, no N/V, no vision defects, no headache, no numbness or tingling. He denies chills or any other symptoms.     Vitals: 110/69, 88R, 16, Temp 98.4 oxygen sat 90%    GENERAL: NAD, although states he needs to go home to take care of his wife and kids   HEAD:  Atraumatic, Normocephalic  EYES: EOMI, PERRLA, conjunctiva and sclera clear  ENMT:  Moist mucous membranes   NECK: Supple, No JVD   NERVOUS SYSTEM:  Alert & Oriented X 2,moves all ext. Strength +4 bilaterally  CHEST/LUNG: CTA bilaterally; No rales, rhonchi, expiratory wheeze bilaterally; some use of accessory muscles    HEART: RRR  ABDOMEN: Soft, Nontender, Nondistended; Bowel sounds present  EXTREMITIES:  2+ Peripheral Pulses, No clubbing, cyanosis, or edema  SKIN: No rashes or lesions     A/P: Confusion  PT had head CT  Will follow to see improvement in the day  On IV abx; ID c/s to be called in the am   Oxygen NC 2 l/min as pt oxygen sat is 90-92 76 y/o M PMH HTN, HLD, psoriasis on prednisone 10mg daily presenting with SOB. Recent admission for pneumonia found to possible esophageal cancer (mass) with liver mets s/p IR biopsy 12/22. Nurse states she found pt to be confused. Baseline is unknown. On arrival pt is A0x2 although he believes he is 39 and has two children 18 and 20. He is aware that he is in the hospital and aware of the date although does not know the year. Denies chest pain, no dizziness, no N/V, no vision defects, no headache, no numbness or tingling. He denies chills or any other symptoms.     Vitals: 110/69, 88R, 16, Temp 98.4 oxygen sat 90%    GENERAL: NAD, although states he needs to go home to take care of his wife and kids   HEAD:  Atraumatic, Normocephalic  EYES: EOMI, PERRLA, conjunctiva and sclera clear  ENMT:  Moist mucous membranes   NECK: Supple, No JVD   NERVOUS SYSTEM:  Alert & Oriented X 2,moves all ext. Strength +4 bilaterally  CHEST/LUNG: CTA bilaterally; No rales, rhonchi, expiratory wheeze bilaterally; some use of accessory muscles    HEART: RRR  ABDOMEN: Soft, Nontender, Nondistended; Bowel sounds present  EXTREMITIES:  2+ Peripheral Pulses, No clubbing, cyanosis, or edema  SKIN: No rashes or lesions     A/P: Confusion  PT had head CT  Will follow to see improvement in the day  On IV abx; ID c/s to be called in the am   Oxygen NC 2 l/min as pt oxygen sat is 90-92

## 2023-01-01 NOTE — CONSULT NOTE ADULT - SUBJECTIVE AND OBJECTIVE BOX
HPI:   Patient is a 77y male with a history of  HTN,HLD,psoriasis managed with 10 mg daily prednisone, recently treated for pneumonia and found to have esophageal lesion with liver mets, who developed shortness of breath and weakness yesterday and was brought to ER. The details of recent treatment for pneumonia as well as malignancy w/u not known to me. In the ER he had a CTA with documented PE as well as multifocal densities throughout both lungs.  He was hypotensive in ER with BP in 80's, given antibiotics.He is confused this AM, he does not know the details of past few days.He appears comfortable on nasal oxygen.He has received zosyn, one dose of vancomycoin,  and azithromycin.He also was given a dose of solumedrol.    REVIEW OF SYSTEMS:  All other review of systems negative (Comprehensive ROS): short of breath, poor po intake, denies any GI or  complaints(limited by confusion)    PAST MEDICAL & SURGICAL HISTORY:  Psoriasis      Esophageal mass      Benign essential HTN          Allergies    No Known Allergies    Intolerances        Antimicrobials Day #  :day 2  piperacillin/tazobactam IVPB.. 3.375 Gram(s) IV Intermittent every 12 hours    Other Medications:  acetaminophen     Tablet .. 650 milliGRAM(s) Oral every 6 hours PRN  heparin   Injectable 7000 Unit(s) IV Push every 6 hours PRN  heparin   Injectable 3500 Unit(s) IV Push every 6 hours PRN  heparin  Infusion.  Unit(s)/Hr IV Continuous <Continuous>  influenza  Vaccine (HIGH DOSE) 0.7 milliLiter(s) IntraMuscular once  ondansetron Injectable 4 milliGRAM(s) IV Push four times a day  pantoprazole    Tablet 40 milliGRAM(s) Oral before breakfast  predniSONE   Tablet 10 milliGRAM(s) Oral daily  simvastatin 10 milliGRAM(s) Oral at bedtime  sodium bicarbonate  Infusion 0.043 mEq/kG/Hr IV Continuous <Continuous>  sucralfate suspension 1 Gram(s) Oral two times a day      FAMILY HISTORY: N/A      SOCIAL HISTORY:  Smoking: ?    ETOH: ?    Drug Use: ?    S    T(F): 98.4 (23 @ 05:20), Max: 98.4 (23 @ 05:20)  HR: 88 (23 @ 05:20)  BP: 110/69 (23 @ 05:20)  RR: 20 (23 @ 05:20)  SpO2: 92% (23 @ 05:20)  Wt(kg): --    PHYSICAL EXAM:  General: alert, mild respiratory distress, on nasal oxygen  Eyes: icteric, no conjunctival injection, no discharge  Oropharynx: no lesions or injection 	  Neck: supple, without adenopathy  Lungs: distant BS  Heart: regular rate and rhythm; no murmur,   Abdomen: soft, nondistended, nontender, without mass or organomegaly  Skin: mild juandice  Extremities: no clubbing, cyanosis, or edema  Neurologic: alert,confused, moves all extremities    LAB RESULTS:                        15.8   15.76 )-----------( 60       ( 2023 06:16 )             48.9         133<L>  |  93<L>  |  119<H>  ----------------------------<  111<H>  4.8   |  17<L>  |  3.79<H>    Ca    8.4      31 Dec 2022 10:00  Mg     2.9         TPro  5.5<L>  /  Alb  2.6<L>  /  TBili  3.6<H>  /  DBili  x   /  AST  685<H>  /  ALT  442<H>  /  AlkPhos  1636<H>      LIVER FUNCTIONS - ( 31 Dec 2022 10:00 )  Alb: 2.6 g/dL / Pro: 5.5 g/dL / ALK PHOS: 1636 U/L / ALT: 442 U/L / AST: 685 U/L / GGT: x           Urinalysis Basic - ( 31 Dec 2022 23:22 )    Color: Yellow / Appearance: Clear / S.038 / pH: x  Gluc: x / Ketone: Negative  / Bili: Negative / Urobili: Negative   Blood: x / Protein: Trace / Nitrite: Negative   Leuk Esterase: Negative / RBC: 6 /hpf / WBC 6 /HPF   Sq Epi: x / Non Sq Epi: 1 /hpf / Bacteria: Negative        MICROBIOLOGY:  RECENT CULTURES:        RADIOLOGY REVIEWED:  < from: CT Head No Cont (22 @ 12:27) >  IMPRESSION:    No CT evidence of acute intracranial hemorrhage or mass.    If clinical suspicion is high for intracranial metastasis, an MRI of the   brain with and without IV contrast is recommended for further evaluation.    --- End of Report -    < end of copied text >  < from: POCUS ED TTE 2D F/U, Limited w/o Cont. (22 @ 11:28) >  IMPRESSION:  No Pericardial Effusion.  Dilated right ventricle but IVC not plethoric.  Limited assessment of   right heart due to poor cardiac windows.  Diffuse b lines and bilateral small pleural effusions suggestive of   pulmonary edema.    --- End of Report ---    < end of copied text >  < from: CT Angio Chest PE Protocol w/ IV Cont (22 @ 10:33) >  IMPRESSION:.    Pulmonary embolism within the right interlobar pulmonary artery and   within multiple proximal segmental branches of the right lower lobe.    Small bilateral pleural effusions and interlobular septalthickening may   be on the basis of pulmonary edema.    Ill-defined consolidative and groundglass opacities involving all lung   lobes may be secondary to infection.    Few additional discrete subcentimeter nodular opacities; indeterminate   and metastatic disease cannot be excluded. Recommend CT chest follow-up   in 3 months to assess stability.    AP window and left hilar lymphadenopathy; indeterminate and metastatic   disease is a diagnostic consideration.    Hepatic metastatic disease, periportal lymphadenopathy, and ascites.    Findings discussed with Dr. Mercado on 2022 at 10:49 AM.    --- End of Report ---    < end of copied text >

## 2023-01-01 NOTE — PROGRESS NOTE ADULT - SUBJECTIVE AND OBJECTIVE BOX
No acute complaints   Renal function higher       VITAL:  T(C): , Max: 36.9 (23 @ 05:20)  T(F): , Max: 98.4 (23 @ 05:20)  HR: 79 (23 @ 14:12)  BP: 117/77 (23 @ 14:12)  BP(mean): 90 (23 @ 14:12)  RR: 20 (23 @ 14:12)  SpO2: 97% (23 @ 14:12)  Wt(kg): --      PHYSICAL EXAM:  General: Alerted, oriented  HEENT: MMM  Lungs:CTA-b/l  Heart: RRR S1S2  Abdomen: Soft, NTND  Ext: no pedal edema b/l  : no serrato      LABS:                        15.8   15.76 )-----------( 60       ( 2023 06:16 )             48.9     Na(133)/K(5.0)/Cl(91)/HCO3(20)/BUN(124)/Cr(4.17)Glu(108)/Ca(7.5)/Mg(--)/PO4(--)     @ 15:37  Na(133)/K(4.8)/Cl(93)/HCO3(17)/BUN(119)/Cr(3.79)Glu(111)/Ca(8.4)/Mg(2.9)/PO4(--)     @ 10:00    Urinalysis Basic - ( 31 Dec 2022 23:22 )    Color: Yellow / Appearance: Clear / S.038 / pH: x  Gluc: x / Ketone: Negative  / Bili: Negative / Urobili: Negative   Blood: x / Protein: Trace / Nitrite: Negative   Leuk Esterase: Negative / RBC: 6 /hpf / WBC 6 /HPF   Sq Epi: x / Non Sq Epi: 1 /hpf / Bacteria: Negative      Creatinine, Random Urine: 113 mg/dL ( @ 23:22)  Protein/Creatinine Ratio Calculation: 0.2 Ratio ( @ 23:22)      8 y/o M PMH HTN, HLD, psoriasis on prednisone, . Recent admission for pneumonia and dced two weeks ago also found to possible esophageal cancer (mass) with liver mets s/p IR biopsy  presenting with SOB and now  with LIVIER and pulmonary emboli     Oliguric LIVIER likely pre renal vs obstructive uropathy, UPC minimal   Gap acidosis due to LIVIER   Volume status- not overtly volume up    esophageal cancer (mass) with liver mets s/p IR biopsy       RECOMMEND  Additional IF fluids - .45 NS with 75 meq of sodium bicarbonate at 50 cc/hour x 20 hours   Bladder scan  Dose meds for  Cr CL 15-20 ml/min         Marlen Santoyo MD  [facility]  Office: [phone]         No acute complaints   Renal function higher       VITAL:  T(C): , Max: 36.9 (23 @ 05:20)  T(F): , Max: 98.4 (23 @ 05:20)  HR: 79 (23 @ 14:12)  BP: 117/77 (23 @ 14:12)  BP(mean): 90 (23 @ 14:12)  RR: 20 (23 @ 14:12)  SpO2: 97% (23 @ 14:12)  Wt(kg): --      PHYSICAL EXAM:  General: Alerted, oriented  HEENT: MMM  Lungs:CTA-b/l  Heart: RRR S1S2  Abdomen: Soft, NTND  Ext: no pedal edema b/l  : no serrato      LABS:                        15.8   15.76 )-----------( 60       ( 2023 06:16 )             48.9     Na(133)/K(5.0)/Cl(91)/HCO3(20)/BUN(124)/Cr(4.17)Glu(108)/Ca(7.5)/Mg(--)/PO4(--)     @ 15:37  Na(133)/K(4.8)/Cl(93)/HCO3(17)/BUN(119)/Cr(3.79)Glu(111)/Ca(8.4)/Mg(2.9)/PO4(--)     @ 10:00    Urinalysis Basic - ( 31 Dec 2022 23:22 )    Color: Yellow / Appearance: Clear / S.038 / pH: x  Gluc: x / Ketone: Negative  / Bili: Negative / Urobili: Negative   Blood: x / Protein: Trace / Nitrite: Negative   Leuk Esterase: Negative / RBC: 6 /hpf / WBC 6 /HPF   Sq Epi: x / Non Sq Epi: 1 /hpf / Bacteria: Negative      Creatinine, Random Urine: 113 mg/dL ( @ 23:22)  Protein/Creatinine Ratio Calculation: 0.2 Ratio ( @ 23:22)      8 y/o M PMH HTN, HLD, psoriasis on prednisone, . Recent admission for pneumonia and dced two weeks ago also found to possible esophageal cancer (mass) with liver mets s/p IR biopsy  presenting with SOB and now  with LIVIER and pulmonary emboli     Oliguric LIVIER likely pre renal vs obstructive uropathy, UPC minimal   Gap acidosis due to LIVIER   Volume status- not overtly volume up    esophageal cancer (mass) with liver mets s/p IR biopsy       RECOMMEND  Additional IF fluids - .45 NS with 75 meq of sodium bicarbonate at 50 cc/hour x 20 hours   Bladder scan  Dose meds for  Cr CL 15-20 ml/min         Marlen Santoyo MD  [facility]  Office: [phone]         No acute complaints   Renal function higher       VITAL:  T(C): , Max: 36.9 (23 @ 05:20)  T(F): , Max: 98.4 (23 @ 05:20)  HR: 79 (23 @ 14:12)  BP: 117/77 (23 @ 14:12)  BP(mean): 90 (23 @ 14:12)  RR: 20 (23 @ 14:12)  SpO2: 97% (23 @ 14:12)  Wt(kg): --      PHYSICAL EXAM:  General: Alerted, oriented  HEENT: MMM  Lungs:CTA-b/l  Heart: RRR S1S2  Abdomen: Soft, NTND  Ext: no pedal edema b/l  : no serrato      LABS:                        15.8   15.76 )-----------( 60       ( 2023 06:16 )             48.9     Na(133)/K(5.0)/Cl(91)/HCO3(20)/BUN(124)/Cr(4.17)Glu(108)/Ca(7.5)/Mg(--)/PO4(--)     @ 15:37  Na(133)/K(4.8)/Cl(93)/HCO3(17)/BUN(119)/Cr(3.79)Glu(111)/Ca(8.4)/Mg(2.9)/PO4(--)     @ 10:00    Urinalysis Basic - ( 31 Dec 2022 23:22 )    Color: Yellow / Appearance: Clear / S.038 / pH: x  Gluc: x / Ketone: Negative  / Bili: Negative / Urobili: Negative   Blood: x / Protein: Trace / Nitrite: Negative   Leuk Esterase: Negative / RBC: 6 /hpf / WBC 6 /HPF   Sq Epi: x / Non Sq Epi: 1 /hpf / Bacteria: Negative      Creatinine, Random Urine: 113 mg/dL ( @ 23:22)  Protein/Creatinine Ratio Calculation: 0.2 Ratio ( @ 23:22)      6 y/o M PMH HTN, HLD, psoriasis on prednisone, . Recent admission for pneumonia and dced two weeks ago also found to possible esophageal cancer (mass) with liver mets s/p IR biopsy  presenting with SOB and now  with LIVIER and pulmonary emboli     Oliguric LIVIER likely pre renal vs obstructive uropathy, UPC minimal   Gap acidosis due to LIVIER   Volume status- not overtly volume up    esophageal cancer (mass) with liver mets s/p IR biopsy       RECOMMEND  Additional IF fluids - .45 NS with 75 meq of sodium bicarbonate at 50 cc/hour x 20 hours   Bladder scan  Dose meds for  Cr CL 15-20 ml/min         Marlen Santoyo MD  [facility]  Office: [phone]

## 2023-01-01 NOTE — PROGRESS NOTE ADULT - ASSESSMENT
n/v/d    plan    zofran and ppi once a day  follow up stool studies  bacid one tab tid  low residue lactose free diet  if stool negative consider imodium  replete electrolytes as needed   will follow    Advanced care planning was discussed with patient and family.  Advanced care planning forms were reviewed and discussed.  Risks, benefits and alternatives of gastroenterologic procedures were discussed in detail and all questions were answered.    30 minutes spent.

## 2023-01-01 NOTE — PROGRESS NOTE ADULT - ASSESSMENT
77 m with    Pulmonary embolus  - AC with Heparin  - Pulmonary evaluation noted  - Vascular cardiology evaluation   - LED    Thrombocytopenia  - follow Platelets  - HIT  - if worsening thrombocytopenia may need IVC filter    Congestive Heart Failure  - telemetry  - cardiac enzymes  - echo with no strain   - cardiology evaluation     HTN  - hold BP meds  - hold diuretic    Psoriasis  - stress dose steroid    Liver lesions  - s/p Bx at [facility] c/w adenocarcinoma  - Oncology evaluation     LIVIER  - follow lytes, Cr  - gentle IVF  - Nephrology evaluation Dr. Khan     Esophageal mass  - Gastroenterology follow  - may need PEG    Sepsis/ Pneumonia  - Zosyn  - ID follow    DVT prophylaxis  - AC    Message left for wife     Moshe Tamayo MD phone [phone]  77 m with    Pulmonary embolus  - AC with Heparin  - Pulmonary evaluation noted  - Vascular cardiology evaluation   - LED    Thrombocytopenia  - follow Platelets  - HIT  - if worsening thrombocytopenia may need IVC filter    Congestive Heart Failure  - telemetry  - cardiac enzymes  - echo with no strain   - cardiology evaluation     HTN  - hold BP meds  - hold diuretic    Psoriasis  - stress dose steroid    Liver lesions  - s/p Bx at [facility] c/w adenocarcinoma  - Oncology evaluation     LIVIER  - follow lytes, Cr  - gentle IVF  - Nephrology evaluation Dr. Khan     Esophageal mass  - Gastroenterology follow  - may need PEG    Sepsis/ Pneumonia  - Zosyn  - ID follow    DVT prophylaxis  - AC    Message left for wife     Moshe Tamayo MD phone [phone]  77 m with    Pulmonary embolus  - AC with Heparin  - Pulmonary evaluation noted  - Vascular cardiology evaluation   - LED    Thrombocytopenia  - follow Platelets  - HIT  - if worsening thrombocytopenia may need IVC filter    Congestive Heart Failure  - telemetry  - cardiac enzymes  - echo with no strain   - cardiology evaluation     HTN  - hold BP meds  - hold diuretic    Psoriasis  - stress dose steroid    Liver lesions  - s/p Bx at [facility] c/w adenocarcinoma  - Oncology evaluation     LIVIER  - follow lytes, Cr  - gentle IVF  - Nephrology evaluation Dr. Khan     Esophageal mass  - Gastroenterology follow  - may need PEG    Sepsis/ Pneumonia  - Zosyn  - ID follow    DVT prophylaxis  - AC    Message left for wife     Moshe Tamayo MD phone [phone]

## 2023-01-01 NOTE — CONSULT NOTE ADULT - ATTENDING COMMENTS
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 Patient has lost 50 lbs. Patient will need nutritional support before planning on any treatment. Consider GI evaluation for possible PEG/J-tube placement. Cancer directed treatment will be discussed by the solid tumor team. Supportive.

## 2023-01-01 NOTE — PROGRESS NOTE ADULT - SUBJECTIVE AND OBJECTIVE BOX
INTERVAL HPI/OVERNIGHT EVENTS:  No new overnight event.  No N/V/D.  Tolerating diet.  now post nasal drip better one loose bm was today    Allergies    No Known Allergies    Intolerances    General:  No wt loss, fevers, chills, night sweats, fatigue,   Eyes:  Good vision, no reported pain  ENT:  No sore throat, pain, runny nose, dysphagia  CV:  No pain, palpitations, hypo/hypertension  Resp:  No dyspnea, cough, tachypnea, wheezing  GI:  No pain, No nausea, No vomiting, No diarrhea, No constipation, No weight loss, No fever, No pruritis, No rectal bleeding, No tarry stools, No dysphagia,  :  No pain, bleeding, incontinence, nocturia  Muscle:  No pain, weakness  Neuro:  No weakness, tingling, memory problems  Psych:  No fatigue, insomnia, mood problems, depression  Endocrine:  No polyuria, polydipsia, cold/heat intolerance  Heme:  No petechiae, ecchymosis, easy bruisability  Skin:  No rash, tattoos, scars, edema      PHYSICAL EXAM:   Vital Signs:  Vital Signs Last 24 Hrs  T(C): 36.6 (2023 11:04), Max: 36.9 (2023 05:20)  T(F): 97.9 (2023 11:04), Max: 98.4 (2023 05:20)  HR: 87 (2023 11:04) (64 - 88)  BP: 104/72 (2023 11:04) (96/68 - 116/63)  BP(mean): 80 (31 Dec 2022 17:47) (79 - 80)  RR: 19 (2023 11:04) (19 - 23)  SpO2: 96% (2023 11:04) (92% - 97%)    Parameters below as of 2023 11:04  Patient On (Oxygen Delivery Method): nasal cannula  O2 Flow (L/min): 2    Daily     Daily I&O's Summary      GENERAL:  Appears stated age, well-groomed, well-nourished, no distress  HEENT:  NC/AT,  conjunctivae clear and pink, no thyromegaly, nodules, adenopathy, no JVD, sclera -anicteric  CHEST:  Full & symmetric excursion, no increased effort, breath sounds clear  HEART:  Regular rhythm, S1, S2, no murmur/rub/S3/S4, no abdominal bruit, no edema  ABDOMEN:  Soft, non-tender, non-distended, normoactive bowel sounds,  no masses ,no hepato-splenomegaly, no signs of chronic liver disease  EXTEREMITIES:  no cyanosis,clubbing or edema  SKIN:  No rash/erythema/ecchymoses/petechiae/wounds/abscess/warm/dry  NEURO:  Alert, oriented, no asterixis, no tremor, no encephalopathy      LABS:                        15.8   15.76 )-----------( 60       ( 2023 06:16 )             48.9         133<L>  |  93<L>  |  119<H>  ----------------------------<  111<H>  4.8   |  17<L>  |  3.79<H>    Ca    8.4      31 Dec 2022 10:00  Mg     2.9         TPro  5.5<L>  /  Alb  2.6<L>  /  TBili  3.6<H>  /  DBili  x   /  AST  685<H>  /  ALT  442<H>  /  AlkPhos  1636<H>      PT/INR - ( 31 Dec 2022 10:00 )   PT: 16.7 sec;   INR: 1.43 ratio         PTT - ( 2023 06:17 )  PTT:60.8 sec  Urinalysis Basic - ( 31 Dec 2022 23:22 )    Color: Yellow / Appearance: Clear / S.038 / pH: x  Gluc: x / Ketone: Negative  / Bili: Negative / Urobili: Negative   Blood: x / Protein: Trace / Nitrite: Negative   Leuk Esterase: Negative / RBC: 6 /hpf / WBC 6 /HPF   Sq Epi: x / Non Sq Epi: 1 /hpf / Bacteria: Negative      amylase   lipase  RADIOLOGY & ADDITIONAL TESTS:

## 2023-01-01 NOTE — PROGRESS NOTE ADULT - SUBJECTIVE AND OBJECTIVE BOX
HPI:  76 yo M with hx of HTN, HLD, psoriasis on prednisone presented with SOB and found to have RLL segmental PEs.  Patient was recently admitted 2 weeks ago for PNA and found to have esophageal mass with liver masses s/p IR biopsy with concern for hepatic malignancy.  Today, he woke up with SOB at rest and worse with exertion, denies LOC, CP, palpitations or weakness.       Events  - No sig events     PMHx:       PSHx:       Allergies:  No Known Allergies      Home Meds:    Current Medications:       FAMILY HISTORY:      Social History:  Smoking History:  Alcohol Use:  Drug Use:    REVIEW OF SYSTEMS:  Constitutional:     [x ] negative [ ] fevers [ ] chills [ ] weight loss [ ] weight gain  HEENT:                  [x ] negative [ ] dry eyes [ ] eye irritation [ ] postnasal drip [ ] nasal congestion  CV:                         [ x] negative  [ ] chest pain [ ] orthopnea [ ] palpitations [ ] murmur  Resp:                     [x ] negative [ ] cough [ ] shortness of breath [ ] dyspnea [ ] wheezing [ ] sputum [ ]hemoptysis  GI:                          [ x] negative [ ] nausea [ ] vomiting [ ] diarrhea [ ] constipation [ ] abd pain [ ] dysphagia   :                        [ x] negative [ ] dysuria [ ] nocturia [ ] hematuria [ ] increased urinary frequency  Musculoskeletal: [x ] negative [ ] back pain [ ] myalgias [ ] arthralgias [ ] fracture  Skin:                       [ x] negative [ ] rash [ ] itch  Neurological:        [ x] negative [ ] headache [ ] dizziness [ ] syncope [ ] weakness [ ] numbness  Psychiatric:           [ x] negative [ ] anxiety [ ] depression  Endocrine:            [ x] negative [ ] diabetes [ ] thyroid problem  Heme/Lymph:      [ x] negative [ ] anemia [ ] bleeding problem  Allergic/Immune: [ x] negative [ ] itchy eyes [ ] nasal discharge [ ] hives [ ] angioedema    [ x] All other systems negative  [ ] Unable to assess ROS due to      Physical Exam:  T(F): 97 (12-31), Max: 97 (12-31)  HR: 87 (12-31) (86 - 87)  BP: 102/61 (12-31) (82/62 - 102/61)  RR: 17 (12-31)  SpO2: 97% (12-31)  GENERAL: No acute distress, well-developed  HEAD:  Atraumatic, Normocephalic  ENT: EOMI, PERRLA, conjunctiva and sclera clear, Neck supple, No JVD, moist mucosa  CHEST/LUNG: Clear to auscultation bilaterally; No wheeze, equal breath sounds bilaterally   BACK: No spinal tenderness  HEART: Regular rate and rhythm; No murmurs, rubs, or gallops  ABDOMEN: Soft, Nontender, Nondistended; Bowel sounds present  EXTREMITIES:  No clubbing, cyanosis, 1-2+ BLE edema  PSYCH: Nl behavior, nl affect  NEUROLOGY: AAOx3, non-focal, cranial nerves intact  SKIN: Normal color, No rashes or lesions  LINES:    Cardiovascular Diagnostic Testing:    ECG: Personally reviewed:      Imaging:    CXR: Personally reviewed    Labs: Personally reviewed                        16.0   25.67 )-----------( 146      ( 31 Dec 2022 10:00 )             49.2     12-31    133<L>  |  93<L>  |  119<H>  ----------------------------<  111<H>  4.8   |  17<L>  |  3.79<H>    Ca    8.4      31 Dec 2022 10:00  Mg     2.9     12-31    TPro  5.5<L>  /  Alb  2.6<L>  /  TBili  3.6<H>  /  DBili  x   /  AST  685<H>  /  ALT  442<H>  /  AlkPhos  1636<H>  12-31    PT/INR - ( 31 Dec 2022 10:00 )   PT: 16.7 sec;   INR: 1.43 ratio         PTT - ( 31 Dec 2022 10:00 )  PTT:26.9 sec  Serum Pro-Brain Natriuretic Peptide: 17517 pg/mL (12-31 @ 10:00)            Assessment and Recommendation:   · Assessment	  76 yo M with hx of HTN, HLD, psoriasis on prednisone, and recent possible esophageal/hepatic malignancy/mass presented with acute onset SOB and found to have RLL segmental PE. Trop at 165 and proBNP at 9[address], TTE showed no sign of RV strain, no clot in transit, and LVEF of 70%. Patient initially had SBP in the 80s without LOC or dizziness, however, may have other possible etiologies for hypotension including sepsis and poor PO intake rather than 2/2 PE. He is currently HDS after 500cc IVF in the ED and on RA.    - continue AC  - TTE result as above  - get BLE dopplers  - c/w telemetry  - no plan for procedural interventions  - Vascular to follow Melissa Robison                                                                        Forty-1 Minutes Spent in Patient Management     HPI:  76 yo M with hx of HTN, HLD, psoriasis on prednisone presented with SOB and found to have RLL segmental PEs.  Patient was recently admitted 2 weeks ago for PNA and found to have esophageal mass with liver masses s/p IR biopsy with concern for hepatic malignancy.  Today, he woke up with SOB at rest and worse with exertion, denies LOC, CP, palpitations or weakness.       Events  - No sig events     PMHx:       PSHx:       Allergies:  No Known Allergies      Home Meds:    Current Medications:       FAMILY HISTORY:      Social History:  Smoking History:  Alcohol Use:  Drug Use:    REVIEW OF SYSTEMS:  Constitutional:     [x ] negative [ ] fevers [ ] chills [ ] weight loss [ ] weight gain  HEENT:                  [x ] negative [ ] dry eyes [ ] eye irritation [ ] postnasal drip [ ] nasal congestion  CV:                         [ x] negative  [ ] chest pain [ ] orthopnea [ ] palpitations [ ] murmur  Resp:                     [x ] negative [ ] cough [ ] shortness of breath [ ] dyspnea [ ] wheezing [ ] sputum [ ]hemoptysis  GI:                          [ x] negative [ ] nausea [ ] vomiting [ ] diarrhea [ ] constipation [ ] abd pain [ ] dysphagia   :                        [ x] negative [ ] dysuria [ ] nocturia [ ] hematuria [ ] increased urinary frequency  Musculoskeletal: [x ] negative [ ] back pain [ ] myalgias [ ] arthralgias [ ] fracture  Skin:                       [ x] negative [ ] rash [ ] itch  Neurological:        [ x] negative [ ] headache [ ] dizziness [ ] syncope [ ] weakness [ ] numbness  Psychiatric:           [ x] negative [ ] anxiety [ ] depression  Endocrine:            [ x] negative [ ] diabetes [ ] thyroid problem  Heme/Lymph:      [ x] negative [ ] anemia [ ] bleeding problem  Allergic/Immune: [ x] negative [ ] itchy eyes [ ] nasal discharge [ ] hives [ ] angioedema    [ x] All other systems negative  [ ] Unable to assess ROS due to      Physical Exam:  T(F): 97 (12-31), Max: 97 (12-31)  HR: 87 (12-31) (86 - 87)  BP: 102/61 (12-31) (82/62 - 102/61)  RR: 17 (12-31)  SpO2: 97% (12-31)  GENERAL: No acute distress, well-developed  HEAD:  Atraumatic, Normocephalic  ENT: EOMI, PERRLA, conjunctiva and sclera clear, Neck supple, No JVD, moist mucosa  CHEST/LUNG: Clear to auscultation bilaterally; No wheeze, equal breath sounds bilaterally   BACK: No spinal tenderness  HEART: Regular rate and rhythm; No murmurs, rubs, or gallops  ABDOMEN: Soft, Nontender, Nondistended; Bowel sounds present  EXTREMITIES:  No clubbing, cyanosis, 1-2+ BLE edema  PSYCH: Nl behavior, nl affect  NEUROLOGY: AAOx3, non-focal, cranial nerves intact  SKIN: Normal color, No rashes or lesions  LINES:    Cardiovascular Diagnostic Testing:    ECG: Personally reviewed:      Imaging:    CXR: Personally reviewed    Labs: Personally reviewed                        16.0   25.67 )-----------( 146      ( 31 Dec 2022 10:00 )             49.2     12-31    133<L>  |  93<L>  |  119<H>  ----------------------------<  111<H>  4.8   |  17<L>  |  3.79<H>    Ca    8.4      31 Dec 2022 10:00  Mg     2.9     12-31    TPro  5.5<L>  /  Alb  2.6<L>  /  TBili  3.6<H>  /  DBili  x   /  AST  685<H>  /  ALT  442<H>  /  AlkPhos  1636<H>  12-31    PT/INR - ( 31 Dec 2022 10:00 )   PT: 16.7 sec;   INR: 1.43 ratio         PTT - ( 31 Dec 2022 10:00 )  PTT:26.9 sec  Serum Pro-Brain Natriuretic Peptide: 34001 pg/mL (12-31 @ 10:00)            Assessment and Recommendation:   · Assessment	  76 yo M with hx of HTN, HLD, psoriasis on prednisone, and recent possible esophageal/hepatic malignancy/mass presented with acute onset SOB and found to have RLL segmental PE. Trop at 165 and proBNP at 81091, TTE showed no sign of RV strain, no clot in transit, and LVEF of 70%. Patient initially had SBP in the 80s without LOC or dizziness, however, may have other possible etiologies for hypotension including sepsis and poor PO intake rather than 2/2 PE. He is currently HDS after 500cc IVF in the ED and on RA.    - continue AC  - TTE result as above  - get BLE dopplers  - c/w telemetry  - no plan for procedural interventions  - Vascular to follow Melissa Robison                                                                        Forty-1 Minutes Spent in Patient Management     HPI:  76 yo M with hx of HTN, HLD, psoriasis on prednisone presented with SOB and found to have RLL segmental PEs.  Patient was recently admitted 2 weeks ago for PNA and found to have esophageal mass with liver masses s/p IR biopsy with concern for hepatic malignancy.  Today, he woke up with SOB at rest and worse with exertion, denies LOC, CP, palpitations or weakness.       Events  - No sig events     PMHx:       PSHx:       Allergies:  No Known Allergies      Home Meds:    Current Medications:       FAMILY HISTORY:      Social History:  Smoking History:  Alcohol Use:  Drug Use:    REVIEW OF SYSTEMS:  Constitutional:     [x ] negative [ ] fevers [ ] chills [ ] weight loss [ ] weight gain  HEENT:                  [x ] negative [ ] dry eyes [ ] eye irritation [ ] postnasal drip [ ] nasal congestion  CV:                         [ x] negative  [ ] chest pain [ ] orthopnea [ ] palpitations [ ] murmur  Resp:                     [x ] negative [ ] cough [ ] shortness of breath [ ] dyspnea [ ] wheezing [ ] sputum [ ]hemoptysis  GI:                          [ x] negative [ ] nausea [ ] vomiting [ ] diarrhea [ ] constipation [ ] abd pain [ ] dysphagia   :                        [ x] negative [ ] dysuria [ ] nocturia [ ] hematuria [ ] increased urinary frequency  Musculoskeletal: [x ] negative [ ] back pain [ ] myalgias [ ] arthralgias [ ] fracture  Skin:                       [ x] negative [ ] rash [ ] itch  Neurological:        [ x] negative [ ] headache [ ] dizziness [ ] syncope [ ] weakness [ ] numbness  Psychiatric:           [ x] negative [ ] anxiety [ ] depression  Endocrine:            [ x] negative [ ] diabetes [ ] thyroid problem  Heme/Lymph:      [ x] negative [ ] anemia [ ] bleeding problem  Allergic/Immune: [ x] negative [ ] itchy eyes [ ] nasal discharge [ ] hives [ ] angioedema    [ x] All other systems negative  [ ] Unable to assess ROS due to      Physical Exam:  T(F): 97 (12-31), Max: 97 (12-31)  HR: 87 (12-31) (86 - 87)  BP: 102/61 (12-31) (82/62 - 102/61)  RR: 17 (12-31)  SpO2: 97% (12-31)  GENERAL: No acute distress, well-developed  HEAD:  Atraumatic, Normocephalic  ENT: EOMI, PERRLA, conjunctiva and sclera clear, Neck supple, No JVD, moist mucosa  CHEST/LUNG: Clear to auscultation bilaterally; No wheeze, equal breath sounds bilaterally   BACK: No spinal tenderness  HEART: Regular rate and rhythm; No murmurs, rubs, or gallops  ABDOMEN: Soft, Nontender, Nondistended; Bowel sounds present  EXTREMITIES:  No clubbing, cyanosis, 1-2+ BLE edema  PSYCH: Nl behavior, nl affect  NEUROLOGY: AAOx3, non-focal, cranial nerves intact  SKIN: Normal color, No rashes or lesions  LINES:    Cardiovascular Diagnostic Testing:    ECG: Personally reviewed:      Imaging:    CXR: Personally reviewed    Labs: Personally reviewed                        16.0   25.67 )-----------( 146      ( 31 Dec 2022 10:00 )             49.2     12-31    133<L>  |  93<L>  |  119<H>  ----------------------------<  111<H>  4.8   |  17<L>  |  3.79<H>    Ca    8.4      31 Dec 2022 10:00  Mg     2.9     12-31    TPro  5.5<L>  /  Alb  2.6<L>  /  TBili  3.6<H>  /  DBili  x   /  AST  685<H>  /  ALT  442<H>  /  AlkPhos  1636<H>  12-31    PT/INR - ( 31 Dec 2022 10:00 )   PT: 16.7 sec;   INR: 1.43 ratio         PTT - ( 31 Dec 2022 10:00 )  PTT:26.9 sec  Serum Pro-Brain Natriuretic Peptide: 12862 pg/mL (12-31 @ 10:00)            Assessment and Recommendation:   · Assessment	  76 yo M with hx of HTN, HLD, psoriasis on prednisone, and recent possible esophageal/hepatic malignancy/mass presented with acute onset SOB and found to have RLL segmental PE. Trop at 165 [place]BNP [place] 10639, TTE showed no sign of RV strain, no clot in transit, and LVEF of 70%. Patient initially had SBP in the 80s without LOC or dizziness, however, may have other possible etiologies for hypotension including sepsis and poor PO intake rather than 2/2 PE. He is currently HDS after 500cc IVF in the ED and on RA.    - continue AC  - TTE result as above  - get BLE dopplers  - c/w telemetry  - no plan for procedural interventions  - Vascular to follow Melissa Robison                                                                        Forty-1 Minutes Spent in Patient Management

## 2023-01-01 NOTE — CONSULT NOTE ADULT - ASSESSMENT
77 with esophageal mass, b/l pleural effusions, pulmonary embolism, hepatic lesions, acute diastolic failure, chronic renal failure    - cont empiric abx for now  - cont heparin   77 with esophageal mass, b/l pleural effusions, pulmonary embolism, hepatic lesions, acute diastolic failure, chronic renal failure    - cont empiric abx for now, will get more history regarding "prior PNA"  - opacities can be PNA vs pulm edema vs early metastasis  - cont heparin  - check PCT  - Will have to discuss with cards/renal regarding diuresis if we cannot get him off o2, Cr elevated

## 2023-01-01 NOTE — PHYSICAL THERAPY INITIAL EVALUATION ADULT - ADDITIONAL COMMENTS
lives w/ lives w/wife in private home, 4 steps to enter w/ handrails  and 13 steps to bedroom, + reading glasses, hearing decreased but no hearing aids/ pt is R handed/ pt has rolling walker cane

## 2023-01-01 NOTE — PHYSICAL THERAPY INITIAL EVALUATION ADULT - LEVEL OF INDEPENDENCE: STAIR NEGOTIATION, REHAB EVAL
Telephone Encounter by Yesica Luna at 3/3/2020  4:06 PM     Author: Yesica Luna Service: -- Author Type: --    Filed: 3/3/2020  4:06 PM Encounter Date: 2/27/2020 Status: Signed    : Yesica Luna       PRIOR AUTHORIZATION DENIED    Denial Rational: needs to try/fail at least two preferred drugs listed below        Appeal Information: This medication was denied. If physician would like to appeal because patient has contraindication or allergy to covered medication please write letter of medical necessity and route back to PA team to initiate.  If no further action is needed please close encounter thank you.             unable to perform

## 2023-01-01 NOTE — PHYSICAL THERAPY INITIAL EVALUATION ADULT - WEIGHT-BEARING RESTRICTIONS: CHAIR/BED,  REHAB EVAL
Continuity of Care Form    Patient Name: Aubrie Franco   :  1938  MRN:  [id number]    [facility] date:  2020  Discharge date:  2020    Code Status Order: Full Code   Advance Directives:   [facility] Documentation     Date/Time Healthcare Directive Type of Healthcare Directive Copy in [address] Agent's Name Healthcare Agent's Phone Number    20  Yes, patient has an advance directive for healthcare treatment  --  --  --  --  --          Admitting Physician:  Pam Hogan DO  PCP: Gay Sue MD    Discharging Nurse: [facility] Unit/Room#:   Discharging Unit Phone Number: [phone]    Emergency Contact:   Extended Emergency Contact Information  Primary Emergency Contact: [facility]  Address: [address] [place] [address] [place] of [address] [place] [address] Phone: [phone]  Mobile Phone: [phone]  Relation: Child  Secondary Emergency Contact: Jazz Flores Phone: [phone]  Mobile Phone: [phone]  Relation: Brother/Sister    Past Surgical History:  Past Surgical History:   Procedure Laterality Date    ARTHROPLASTY Left 2017    LEFT FOOT ARTHROPLASTY  4TH DIGIT performed by Luis [facility]ieves DPM at [facility] 3 Left 2017    L 4th digit arthroplasty     HYSTERECTOMY      Partial    LUMBAR SPINE SURGERY  x 2    SPINAL FUSION  2012    UPPER GASTROINTESTINAL ENDOSCOPY      VARICOSE VEIN SURGERY         Immunization History:   Immunization History   Administered Date(s) Administered    Influenza, High Dose (Fluzone 65 yrs and older) 10/13/2016, 2017, 10/12/2018    Influenza, MDCK, Preservative free 2012, 2013, 10/17/2014, 2015, 10/13/2016    Influenza, Quadv, IM, PF (6 mo and older Fluzone, Flulaval, Fluarix, and 3 yrs and older Afluria) 10/12/2019    Pneumococcal Conjugate 13-valent (Bobie Parish) 2017  Pneumococcal Polysaccharide ([id number]) 11/15/2010, 09/21/2015       Active Problems:  Patient Active Problem List   Diagnosis Code    Hypoxia R09.02    COPD (chronic obstructive pulmonary disease) ([facility]) J44.9    Hypokalemia E87.6    Chronic bilateral low back pain with bilateral sciatica M54.42, M54.41, G89.29    Altered mental status, unspecified R41.82    Pneumonia J18.9    Abnormal gait R26.9    Adiposity E66.9    Aortic valve insufficiency I35.1    Allergic rhinitis J30.9    Essential hypertension I10    Centriacinar emphysema ([facility]) 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    Constipation K59.00    Displacement of intervertebral disc without myelopathy [id number]    Dry eye syndrome H04.129    Eczema L30.9    Insomnia G47.00    DDD (degenerative disc disease), lumbar M51.36    Neuropathic pain M79.2    Osteoporosis M81.0    Nerve disease, peripheral G62.9    Posterior vitreous detachment H43.819    Failed back syndrome of lumbar spine M96.1    Pseudophakia Z96.1    Temporary cerebral vascular dysfunction G93.9    Bursitis, trochanteric M70.60    Vitamin D deficiency E55.9    Lumbosacral radiculopathy M54.17    Generalized muscle weakness M62.81    Neurogenic bladder N31.9    Syncope and collapse R55    Acute bronchitis due to other specified organisms J20.8    Bilateral carpal tunnel syndrome G56.03    Epistaxis R04.0    Supplemental oxygen dependent Z99.81    Excessive cerumen in ear canal, right H61.21    Secondary pulmonary arterial hypertension ([facility]) I27.21    LVH (left ventricular hypertrophy) I51.7    Valvular heart disease I38    Pulmonary HTN ([facility]) I27.20    COPD exacerbation ([facility]) J44.1    Dysuria R30.0    Near syncope R55    Chest pain at rest R07.9    Chronic respiratory failure with hypoxia ([facility]) J96.11    Falls W19. Virginia Shipley Age-related physical debility R54    Weakness of both lower extremities R29.898       Isolation/Infection:   Isolation          No Isolation        Patient Infection Status     None to display          Nurse Assessment:  Last Vital Signs: BP (!) 106/57   Pulse 73   Temp 97.7 °F (36.5 °C) (Oral)   Resp 20   Ht 5' 7\" (1.702 m)   Wt 145 lb 12.8 oz (66.1 kg)   SpO2 92%   BMI 22.84 kg/m²     Last documented pain score (0-10 scale): Pain Level: 8  Last Weight:   Wt Readings from Last 1 Encounters:   02/12/20 145 lb 12.8 oz (66.1 kg)     Mental Status:  oriented and alert    IV Access:  - None    Nursing Mobility/ADLs:  Walking   Assisted  Transfer  Assisted  Bathing  Assisted  Dressing  Assisted  Toileting  Assisted  Feeding  Independent  Med Admin  Independent  Med Delivery   whole    Wound Care Documentation and Therapy:        Elimination:  Continence:   · Bowel: Yes  · Bladder: Yes  Urinary Catheter: None   Colostomy/Ileostomy/Ileal Conduit: No       Date of Last BM: 02/19/2020    Intake/Output Summary (Last 24 hours) at 2/12/2020 0844  Last data filed at 2/12/2020 0439  Gross per 24 hour   Intake --   Output 1000 ml   Net -1000 ml     I/O last 3 completed shifts:  In: -   Out: 1000 [Urine:1000]    Safety Concerns: At Risk for Falls    Impairments/Disabilities:      None    Nutrition Therapy:  Current Nutrition Therapy:   - Oral Diet:  General    Routes of Feeding: Oral  Liquids: No Restrictions  Daily Fluid Restriction: no  Last Modified Barium Swallow with Video (Video Swallowing Test): not done    Treatments at the Time of Hospital Discharge:   Respiratory Treatments: yes  Oxygen Therapy:  is on oxygen at 2 L/min per nasal cannula.   Ventilator:    - No ventilator support    Rehab Therapies: Physical Therapy and Occupational Therapy  Weight Bearing Status/Restrictions: No weight bearing restirctions  Other Medical Equipment (for information only, NOT a DME order):  walker and bedside commode 02 and wheelchair  Other Treatments: Skilled nursing assessment, med teaching and compliance, follow up appt in 1 week with her PCP Marcio Morrell   Patient's personal PM2  Electronically signed by Kevin Cavazos MD on 2/19/2020 at 10:20 AM pt lift device

## 2023-01-01 NOTE — CONSULT NOTE ADULT - SUBJECTIVE AND OBJECTIVE BOX
PULMONARY CONSULT    Initial HPI on admission:  HPI:  6 y/o M PMH HTN, HLD, psoriasis on prednisone 10mg daily presenting with SOB. Recent admission for pneumonia and dced two weeks ago also found to possible esophageal cancer (mass) with liver mets s/p IR biopsy . Today patient woke up with shortness of breath at rest, worse with exertion with no associated LOC, chest pain, palpitations, diaphoresis, focal weakness, numbness/tingling.  Reports intermittent nonbloody vomiting and diarrhea with no fever/chills, cough, rashes, dysuria, or hematuria. Reports decreased PO intake     Received 1L IVF, azithro, zosyn, vanc, and solumedrol 100mg IVP x1 in ED.    (31 Dec 2022 16:20)      BRIEF HOSPITAL COURSE: Found to have multiple PE, b/l pleural effusion    PAST MEDICAL & SURGICAL HISTORY:  Psoriasis      Esophageal mass      Benign essential HTN        Allergies    No Known Allergies    Intolerances      FAMILY HISTORY: no known lung cancer    Social history: unable to assess smoking history    Review of Systems:  CONSTITUTIONAL: No fever, chills, or fatigue  EYES: No eye pain, visual disturbances, or discharge  ENMT:  No difficulty hearing, tinnitus, vertigo; No sinus or throat pain  NECK: No pain or stiffness  RESPIRATORY: Per above  CARDIOVASCULAR: No chest pain, palpitations, dizziness, or leg swelling  GASTROINTESTINAL: No abdominal or epigastric pain. No nausea, vomiting, or hematemesis; No diarrhea or constipation. No melena or hematochezia.  GENITOURINARY: No dysuria, frequency, hematuria, or incontinence  NEUROLOGICAL: No headaches, memory loss, loss of strength, numbness, or tremors  SKIN: No itching, burning, rashes, or lesions   MUSCULOSKELETAL: No joint pain or swelling; No muscle, back, or extremity pain  PSYCHIATRIC: No depression, anxiety, mood swings, or difficulty sleeping      Medications:  MEDICATIONS  (STANDING):  chlorhexidine 2% Cloths 1 Application(s) Topical daily  heparin  Infusion.  Unit(s)/Hr (16 mL/Hr) IV Continuous <Continuous>  influenza  Vaccine (HIGH DOSE) 0.7 milliLiter(s) IntraMuscular once  ondansetron Injectable 4 milliGRAM(s) IV Push four times a day  pantoprazole    Tablet 40 milliGRAM(s) Oral before breakfast  piperacillin/tazobactam IVPB.. 3.375 Gram(s) IV Intermittent every 12 hours  predniSONE   Tablet 10 milliGRAM(s) Oral daily  simvastatin 10 milliGRAM(s) Oral at bedtime  sodium bicarbonate  Infusion 0.043 mEq/kG/Hr (50 mL/Hr) IV Continuous <Continuous>  sucralfate suspension 1 Gram(s) Oral two times a day    MEDICATIONS  (PRN):  acetaminophen     Tablet .. 650 milliGRAM(s) Oral every 6 hours PRN Temp greater or equal to 38.5C (101.3F), Mild Pain (1 - 3)  heparin   Injectable 7000 Unit(s) IV Push every 6 hours PRN For aPTT less than 40  heparin   Injectable 3500 Unit(s) IV Push every 6 hours PRN For aPTT between 40 - 57    Vital Signs Last 24 Hrs  T(C): 36.6 (2023 11:04), Max: 36.9 (2023 05:20)  T(F): 97.9 (2023 11:04), Max: 98.4 (2023 05:20)  HR: 79 (2023 14:12) (64 - 88)  BP: 117/77 (2023 14:12) (101/69 - 117/77)  BP(mean): 90 (2023 14:12) (79 - 90)  RR: 20 (2023 14:12) (19 - 23)  SpO2: 97% (2023 14:12) (92% - 97%)    Parameters below as of 2023 14:12  Patient On (Oxygen Delivery Method): nasal cannula  O2 Flow (L/min): 2      VBG pH 7.36  @ 14:15  VBG pCO2 41  @ 14:15  VBG O2 sat 32.1  @ 14:15  VBG lactate 3.8  @ 14:15  VBG pH 7.33  @ 10:42  VBG pCO2 42  @ 10:42  VBG O2 sat 35.1  @ 10:42  VBG lactate 4.7  @ 10:42  VBG pH 7.29  @ 09:25  VBG pCO2 43  @ 09:25  VBG O2 sat 33.8  @ 09:25  VBG lactate 6.0  @ 09:25      LABS:                        15.8   15.76 )-----------( 60       ( 2023 06:16 )             48.9         133<L>  |  93<L>  |  119<H>  ----------------------------<  111<H>  4.8   |  17<L>  |  3.79<H>    Ca    8.4      31 Dec 2022 10:00  Mg     2.9         TPro  5.5<L>  /  Alb  2.6<L>  /  TBili  3.6<H>  /  DBili  x   /  AST  685<H>  /  ALT  442<H>  /  AlkPhos  1636<H>        CARDIAC MARKERS ( 31 Dec 2022 19:02 )  x     / x     / 118 U/L / x     / 9.6 ng/mL      CAPILLARY BLOOD GLUCOSE        PT/INR - ( 31 Dec 2022 10:00 )   PT: 16.7 sec;   INR: 1.43 ratio         PTT - ( 2023 06:17 )  PTT:60.8 sec  Urinalysis Basic - ( 31 Dec 2022 23:22 )    Color: Yellow / Appearance: Clear / S.038 / pH: x  Gluc: x / Ketone: Negative  / Bili: Negative / Urobili: Negative   Blood: x / Protein: Trace / Nitrite: Negative   Leuk Esterase: Negative / RBC: 6 /hpf / WBC 6 /HPF   Sq Epi: x / Non Sq Epi: 1 /hpf / Bacteria: Negative        Serum Pro-Brain Natriuretic Peptide: 40131 pg/mL (22 @ 10:00)    CULTURES: (if applicable)     (collected 22 @ 09:30)  Source: .Blood Blood-Peripheral  Preliminary Report (23 @ 14:01):    No growth to date.     (collected 22 @ 09:25)  Source: .Blood Blood-Peripheral  Preliminary Report (23 @ 14:01):    No growth to date.        Culture - Urine (collected 22 @ 09:59)  Source: Clean Catch Clean Catch (Midstream)  Final Report (23 @ 09:37):    No growth    Physical Examination:    General: No acute distress.      HEENT: Pupils equal, reactive to light.  Symmetric.    PULM: Decreased BS at bases    CVS: S1, S2    ABD: Soft, nondistended, nontender, normoactive bowel sounds, no masses    EXT: +1-2 b/l edema    SKIN: Warm and well perfused, no rashes noted.    NEURO: Alert, oriented, interactive, nonfocal    RADIOLOGY REVIEWED  CXR:    CT chest: scattered tiny opacities, multiple PE, b/l pleural effusions    TTE:   PULMONARY CONSULT    Initial HPI on admission:  HPI:  8 y/o M PMH HTN, HLD, psoriasis on prednisone 10mg daily presenting with SOB. Recent admission for pneumonia and dced two weeks ago also found to possible esophageal cancer (mass) with liver mets s/p IR biopsy . Today patient woke up with shortness of breath at rest, worse with exertion with no associated LOC, chest pain, palpitations, diaphoresis, focal weakness, numbness/tingling.  Reports intermittent nonbloody vomiting and diarrhea with no fever/chills, cough, rashes, dysuria, or hematuria. Reports decreased PO intake     Received 1L IVF, azithro, zosyn, vanc, and solumedrol 100mg IVP x1 in ED.    (31 Dec 2022 16:20)      BRIEF HOSPITAL COURSE: Found to have multiple PE, b/l pleural effusion    PAST MEDICAL & SURGICAL HISTORY:  Psoriasis      Esophageal mass      Benign essential HTN        Allergies    No Known Allergies    Intolerances      FAMILY HISTORY: no known lung cancer    Social history: unable to assess smoking history    Review of Systems:  CONSTITUTIONAL: No fever, chills, or fatigue  EYES: No eye pain, visual disturbances, or discharge  ENMT:  No difficulty hearing, tinnitus, vertigo; No sinus or throat pain  NECK: No pain or stiffness  RESPIRATORY: Per above  CARDIOVASCULAR: No chest pain, palpitations, dizziness, or leg swelling  GASTROINTESTINAL: No abdominal or epigastric pain. No nausea, vomiting, or hematemesis; No diarrhea or constipation. No melena or hematochezia.  GENITOURINARY: No dysuria, frequency, hematuria, or incontinence  NEUROLOGICAL: No headaches, memory loss, loss of strength, numbness, or tremors  SKIN: No itching, burning, rashes, or lesions   MUSCULOSKELETAL: No joint pain or swelling; No muscle, back, or extremity pain  PSYCHIATRIC: No depression, anxiety, mood swings, or difficulty sleeping      Medications:  MEDICATIONS  (STANDING):  chlorhexidine 2% Cloths 1 Application(s) Topical daily  heparin  Infusion.  Unit(s)/Hr (16 mL/Hr) IV Continuous <Continuous>  influenza  Vaccine (HIGH DOSE) 0.7 milliLiter(s) IntraMuscular once  ondansetron Injectable 4 milliGRAM(s) IV Push four times a day  pantoprazole    Tablet 40 milliGRAM(s) Oral before breakfast  piperacillin/tazobactam IVPB.. 3.375 Gram(s) IV Intermittent every 12 hours  predniSONE   Tablet 10 milliGRAM(s) Oral daily  simvastatin 10 milliGRAM(s) Oral at bedtime  sodium bicarbonate  Infusion 0.043 mEq/kG/Hr (50 mL/Hr) IV Continuous <Continuous>  sucralfate suspension 1 Gram(s) Oral two times a day    MEDICATIONS  (PRN):  acetaminophen     Tablet .. 650 milliGRAM(s) Oral every 6 hours PRN Temp greater or equal to 38.5C (101.3F), Mild Pain (1 - 3)  heparin   Injectable 7000 Unit(s) IV Push every 6 hours PRN For aPTT less than 40  heparin   Injectable 3500 Unit(s) IV Push every 6 hours PRN For aPTT between 40 - 57    Vital Signs Last 24 Hrs  T(C): 36.6 (2023 11:04), Max: 36.9 (2023 05:20)  T(F): 97.9 (2023 11:04), Max: 98.4 (2023 05:20)  HR: 79 (2023 14:12) (64 - 88)  BP: 117/77 (2023 14:12) (101/69 - 117/77)  BP(mean): 90 (2023 14:12) (79 - 90)  RR: 20 (2023 14:12) (19 - 23)  SpO2: 97% (2023 14:12) (92% - 97%)    Parameters below as of 2023 14:12  Patient On (Oxygen Delivery Method): nasal cannula  O2 Flow (L/min): 2      VBG pH 7.36  @ 14:15  VBG pCO2 41  @ 14:15  VBG O2 sat 32.1  @ 14:15  VBG lactate 3.8  @ 14:15  VBG pH 7.33  @ 10:42  VBG pCO2 42  @ 10:42  VBG O2 sat 35.1  @ 10:42  VBG lactate 4.7  @ 10:42  VBG pH 7.29  @ 09:25  VBG pCO2 43  @ 09:25  VBG O2 sat 33.8  @ 09:25  VBG lactate 6.0  @ 09:25      LABS:                        15.8   15.76 )-----------( 60       ( 2023 06:16 )             48.9         133<L>  |  93<L>  |  119<H>  ----------------------------<  111<H>  4.8   |  17<L>  |  3.79<H>    Ca    8.4      31 Dec 2022 10:00  Mg     2.9         TPro  5.5<L>  /  Alb  2.6<L>  /  TBili  3.6<H>  /  DBili  x   /  AST  685<H>  /  ALT  442<H>  /  AlkPhos  1636<H>        CARDIAC MARKERS ( 31 Dec 2022 19:02 )  x     / x     / 118 U/L / x     / 9.6 ng/mL      CAPILLARY BLOOD GLUCOSE        PT/INR - ( 31 Dec 2022 10:00 )   PT: 16.7 sec;   INR: 1.43 ratio         PTT - ( 2023 06:17 )  PTT:60.8 sec  Urinalysis Basic - ( 31 Dec 2022 23:22 )    Color: Yellow / Appearance: Clear / S.038 / pH: x  Gluc: x / Ketone: Negative  / Bili: Negative / Urobili: Negative   Blood: x / Protein: Trace / Nitrite: Negative   Leuk Esterase: Negative / RBC: 6 /hpf / WBC 6 /HPF   Sq Epi: x / Non Sq Epi: 1 /hpf / Bacteria: Negative        Serum Pro-Brain Natriuretic Peptide: 57920 pg/mL (22 @ 10:00)    CULTURES: (if applicable)     (collected 22 @ 09:30)  Source: .Blood Blood-Peripheral  Preliminary Report (23 @ 14:01):    No growth to date.     (collected 22 @ 09:25)  Source: .Blood Blood-Peripheral  Preliminary Report (23 @ 14:01):    No growth to date.        Culture - Urine (collected 22 @ 09:59)  Source: Clean Catch Clean Catch (Midstream)  Final Report (23 @ 09:37):    No growth    Physical Examination:    General: No acute distress.      HEENT: Pupils equal, reactive to light.  Symmetric.    PULM: Decreased BS at bases    CVS: S1, S2    ABD: Soft, nondistended, nontender, normoactive bowel sounds, no masses    EXT: +1-2 b/l edema    SKIN: Warm and well perfused, no rashes noted.    NEURO: Alert, oriented, interactive, nonfocal    RADIOLOGY REVIEWED  CXR:    CT chest: scattered tiny opacities, multiple PE, b/l pleural effusions    TTE:   PULMONARY CONSULT    Initial HPI on admission:  HPI:  6 y/o M PMH HTN, HLD, psoriasis on prednisone 10mg daily presenting with SOB. Recent admission for pneumonia and dced two weeks ago also found to possible esophageal cancer (mass) with liver mets s/p IR biopsy . Today patient woke up with shortness of breath at rest, worse with exertion with no associated LOC, chest pain, palpitations, diaphoresis, focal weakness, numbness/tingling.  Reports intermittent nonbloody vomiting and diarrhea with no fever/chills, cough, rashes, dysuria, or hematuria. Reports decreased PO intake     Received 1L IVF, azithro, zosyn, vanc, and solumedrol 100mg IVP x1 in ED.    (31 Dec 2022 16:20)      BRIEF HOSPITAL COURSE: Found to have multiple PE, b/l pleural effusion    PAST MEDICAL & SURGICAL HISTORY:  Psoriasis      Esophageal mass      Benign essential HTN        Allergies    No Known Allergies    Intolerances      FAMILY HISTORY: no known lung cancer    Social history: unable to assess smoking history    Review of Systems:  CONSTITUTIONAL: No fever, chills, or fatigue  EYES: No eye pain, visual disturbances, or discharge  ENMT:  No difficulty hearing, tinnitus, vertigo; No sinus or throat pain  NECK: No pain or stiffness  RESPIRATORY: Per above  CARDIOVASCULAR: No chest pain, palpitations, dizziness, or leg swelling  GASTROINTESTINAL: No abdominal or epigastric pain. No nausea, vomiting, or hematemesis; No diarrhea or constipation. No melena or hematochezia.  GENITOURINARY: No dysuria, frequency, hematuria, or incontinence  NEUROLOGICAL: No headaches, memory loss, loss of strength, numbness, or tremors  SKIN: No itching, burning, rashes, or lesions   MUSCULOSKELETAL: No joint pain or swelling; No muscle, back, or extremity pain  PSYCHIATRIC: No depression, anxiety, mood swings, or difficulty sleeping      Medications:  MEDICATIONS  (STANDING):  chlorhexidine 2% Cloths 1 Application(s) Topical daily  heparin  Infusion.  Unit(s)/Hr (16 mL/Hr) IV Continuous <Continuous>  influenza  Vaccine (HIGH DOSE) 0.7 milliLiter(s) IntraMuscular once  ondansetron Injectable 4 milliGRAM(s) IV Push four times a day  pantoprazole    Tablet 40 milliGRAM(s) Oral before breakfast  piperacillin/tazobactam IVPB.. 3.375 Gram(s) IV Intermittent every 12 hours  predniSONE   Tablet 10 milliGRAM(s) Oral daily  simvastatin 10 milliGRAM(s) Oral at bedtime  sodium bicarbonate  Infusion 0.043 mEq/kG/Hr (50 mL/Hr) IV Continuous <Continuous>  sucralfate suspension 1 Gram(s) Oral two times a day    MEDICATIONS  (PRN):  acetaminophen     Tablet .. 650 milliGRAM(s) Oral every 6 hours PRN Temp greater or equal to 38.5C (101.3F), Mild Pain (1 - 3)  heparin   Injectable 7000 Unit(s) IV Push every 6 hours PRN For aPTT less than 40  heparin   Injectable 3500 Unit(s) IV Push every 6 hours PRN For aPTT between 40 - 57    Vital Signs Last 24 Hrs  T(C): 36.6 (2023 11:04), Max: 36.9 (2023 05:20)  T(F): 97.9 (2023 11:04), Max: 98.4 (2023 05:20)  HR: 79 (2023 14:12) (64 - 88)  BP: 117/77 (2023 14:12) (101/69 - 117/77)  BP(mean): 90 (2023 14:12) (79 - 90)  RR: 20 (2023 14:12) (19 - 23)  SpO2: 97% (2023 14:12) (92% - 97%)    Parameters below as of 2023 14:12  Patient On (Oxygen Delivery Method): nasal cannula  O2 Flow (L/min): 2      VBG pH 7.36  @ 14:15  VBG pCO2 41  @ 14:15  VBG O2 sat 32.1  @ 14:15  VBG lactate 3.8  @ 14:15  VBG pH 7.33  @ 10:42  VBG pCO2 42  @ 10:42  VBG O2 sat 35.1  @ 10:42  VBG lactate 4.7  @ 10:42  VBG pH 7.29  @ 09:25  VBG pCO2 43  @ 09:25  VBG O2 sat 33.8  @ 09:25  VBG lactate 6.0  @ 09:25      LABS:                        15.8   15.76 )-----------( 60       ( 2023 06:16 )             48.9         133<L>  |  93<L>  |  119<H>  ----------------------------<  111<H>  4.8   |  17<L>  |  3.79<H>    Ca    8.4      31 Dec 2022 10:00  Mg     2.9         TPro  5.5<L>  /  Alb  2.6<L>  /  TBili  3.6<H>  /  DBili  x   /  AST  685<H>  /  ALT  442<H>  /  AlkPhos  1636<H>        CARDIAC MARKERS ( 31 Dec 2022 19:02 )  x     / x     / 118 U/L / x     / 9.6 ng/mL      CAPILLARY BLOOD GLUCOSE        PT/INR - ( 31 Dec 2022 10:00 )   PT: 16.7 sec;   INR: 1.43 ratio         PTT - ( 2023 06:17 )  PTT:60.8 sec  Urinalysis Basic - ( 31 Dec 2022 23:22 )    Color: Yellow / Appearance: Clear / S.038 / pH: x  Gluc: x / Ketone: Negative  / Bili: Negative / Urobili: Negative   Blood: x / Protein: Trace / Nitrite: Negative   Leuk Esterase: Negative / RBC: 6 /hpf / WBC 6 /HPF   Sq Epi: x / Non Sq Epi: 1 /hpf / Bacteria: Negative        Serum Pro-Brain Natriuretic Peptide: 09578 pg/mL (22 @ 10:00)    CULTURES: (if applicable)     (collected 22 @ 09:30)  Source: .Blood Blood-Peripheral  Preliminary Report (23 @ 14:01):    No growth to date.     (collected 22 @ 09:25)  Source: .Blood Blood-Peripheral  Preliminary Report (23 @ 14:01):    No growth to date.        Culture - Urine (collected 22 @ 09:59)  Source: Clean Catch Clean Catch (Midstream)  Final Report (23 @ 09:37):    No growth    Physical Examination:    General: No acute distress.      HEENT: Pupils equal, reactive to light.  Symmetric.    PULM: Decreased BS at bases    CVS: S1, S2    ABD: Soft, nondistended, nontender, normoactive bowel sounds, no masses    EXT: +1-2 b/l edema    SKIN: Warm and well perfused, no rashes noted.    NEURO: Alert, oriented, interactive, nonfocal    RADIOLOGY REVIEWED  CXR:    CT chest: scattered tiny opacities, multiple PE, b/l pleural effusions    TTE:

## 2023-01-01 NOTE — CONSULT NOTE ADULT - ASSESSMENT
Patient is a 77y male with a history of  HTN,HLD,psoriasis managed with 10 mg daily prednisone, recently treated for pneumonia and found to have esophageal lesion with liver mets, who developed shortness of breath and weakness yesterday and was brought to ER. The details of recent treatment for pneumonia as well as malignancy w/u not known to me. In the ER he had a CTA with documented PE as well as multifocal densities throughout both lungs.  He was hypotensive in ER with BP in 80's, given antibiotics.He is confused this AM, he does not know the details of past few days.He appears comfortable on nasal oxygen.He has received zosyn, one dose of vancomycoin,  and azithromycin.He also was given a dose of solumedrol.  He appears quite ill with advanced malignancy(? type) , CKD vs LIVIER, thrombocytopenia.PE and possible pneumonia.  Old films would help.  Suggest:  1.Await blood cultures- r/o bacteremia  2.Zosyn for possible multifocal pneumonia  3.MRSA nasal screen, legionella urine antigen  4. Favor Oncology input  5.Anticoagulation per med/cardiology  6.[place] guarded Patient is a 77y male with a history of  HTN,HLD,psoriasis managed with 10 mg daily prednisone, recently treated for pneumonia and found to have esophageal lesion with liver mets, who developed shortness of breath and weakness yesterday and was brought to ER. The details of recent treatment for pneumonia as well as malignancy w/u not known to me. In the ER he had a CTA with documented PE as well as multifocal densities throughout both lungs.  He was hypotensive in ER with BP in 80's, given antibiotics.He is confused this AM, he does not know the details of past few days.He appears comfortable on nasal oxygen.He has received zosyn, one dose of vancomycoin,  and azithromycin.He also was given a dose of solumedrol.  He appears quite ill with advanced malignancy(? type) , CKD vs LIVIER, thrombocytopenia.PE and possible pneumonia.  Old films would help.  Suggest:  1.Await blood cultures- r/o bacteremia  2.Zosyn for possible multifocal pneumonia  3.MRSA nasal screen, legionella urine antigen  4. Favor Oncology input  5.Anticoagulation per med/cardiology  6.[place] guarded Patient is a 77y male with a history of  HTN,HLD,psoriasis managed with 10 mg daily prednisone, recently treated for pneumonia and found to have esophageal lesion with liver mets, who developed shortness of breath and weakness yesterday and was brought to ER. The details of recent treatment for pneumonia as well as malignancy w/u not known to me. In the ER he had a CTA with documented PE as well as multifocal densities throughout both lungs.  He was hypotensive in ER with BP in 80's, given antibiotics.He is confused this AM, he does not know the details of past few days.He appears comfortable on nasal oxygen.He has received zosyn, one dose of vancomycoin,  and azithromycin.He also was given a dose of solumedrol.  He appears quite ill with advanced malignancy(? type) , CKD vs LIVIER, thrombocytopenia.PE and possible pneumonia.  Old films would help.  Suggest:  1.Await blood cultures- r/o bacteremia  2.Zosyn for possible multifocal pneumonia  3.MRSA nasal screen, legionella urine antigen  4. Favor Oncology input  5.Anticoagulation per med/cardiology  6.[place] guarded

## 2023-01-01 NOTE — PROGRESS NOTE ADULT - SUBJECTIVE AND OBJECTIVE BOX
Patient is a 77y old  Male who presents with a chief complaint of Malignancy Workup (2023 11:21)      SUBJECTIVE / OVERNIGHT EVENTS: feels better.   Review of Systems  chest pain no  palpitations no  sob improving   nausea no  headache no    MEDICATIONS  (STANDING):  chlorhexidine 2% Cloths 1 Application(s) Topical daily  heparin  Infusion.  Unit(s)/Hr (16 mL/Hr) IV Continuous <Continuous>  influenza  Vaccine (HIGH DOSE) 0.7 milliLiter(s) IntraMuscular once  ondansetron Injectable 4 milliGRAM(s) IV Push four times a day  pantoprazole    Tablet 40 milliGRAM(s) Oral before breakfast  piperacillin/tazobactam IVPB.. 3.375 Gram(s) IV Intermittent every 12 hours  predniSONE   Tablet 10 milliGRAM(s) Oral daily  simvastatin 10 milliGRAM(s) Oral at bedtime  sodium bicarbonate  Infusion 0.043 mEq/kG/Hr (50 mL/Hr) IV Continuous <Continuous>  sucralfate suspension 1 Gram(s) Oral two times a day    MEDICATIONS  (PRN):  acetaminophen     Tablet .. 650 milliGRAM(s) Oral every 6 hours PRN Temp greater or equal to 38.5C (101.3F), Mild Pain (1 - 3)  heparin   Injectable 7000 Unit(s) IV Push every 6 hours PRN For aPTT less than 40  heparin   Injectable 3500 Unit(s) IV Push every 6 hours PRN For aPTT between 40 - 57      Vital Signs Last 24 Hrs  T(C): 36.6 (2023 11:04), Max: 36.9 (2023 05:20)  T(F): 97.9 (2023 11:04), Max: 98.4 (2023 05:20)  HR: 79 (2023 14:12) (64 - 88)  BP: 117/77 (2023 14:12) (101/69 - 117/77)  BP(mean): 90 (2023 14:12) (79 - 90)  RR: 20 (2023 14:12) (19 - 23)  SpO2: 97% (2023 14:12) (92% - 97%)    Parameters below as of 2023 14:12  Patient On (Oxygen Delivery Method): nasal cannula  O2 Flow (L/min): 2      PHYSICAL EXAM:  GENERAL: NAD, well-developed  HEAD:  Atraumatic, Normocephalic  EYES: EOMI, PERRLA, conjunctiva and sclera clear  NECK: Supple, No JVD  CHEST/LUNG: Clear to auscultation bilaterally; No wheeze  HEART: Regular rate and rhythm; No murmurs, rubs, or gallops  ABDOMEN: Soft, Nontender, Nondistended; Bowel sounds present  EXTREMITIES:  2+ Peripheral Pulses, No clubbing, cyanosis, or edema  PSYCH: AAOx1   NEUROLOGY: non-focal  SKIN: No rashes or lesions    LABS:                        15.8   15.76 )-----------( 60       ( 2023 06:16 )             48.9         133<L>  |  93<L>  |  119<H>  ----------------------------<  111<H>  4.8   |  17<L>  |  3.79<H>    Ca    8.4      31 Dec 2022 10:00  Mg     2.9         TPro  5.5<L>  /  Alb  2.6<L>  /  TBili  3.6<H>  /  DBili  x   /  AST  685<H>  /  ALT  442<H>  /  AlkPhos  1636<H>      PT/INR - ( 31 Dec 2022 10:00 )   PT: 16.7 sec;   INR: 1.43 ratio         PTT - ( 2023 06:17 )  PTT:60.8 sec  CARDIAC MARKERS ( 31 Dec 2022 19:02 )  x     / x     / 118 U/L / x     / 9.6 ng/mL      Urinalysis Basic - ( 31 Dec 2022 23:22 )    Color: Yellow / Appearance: Clear / S.038 / pH: x  Gluc: x / Ketone: Negative  / Bili: Negative / Urobili: Negative   Blood: x / Protein: Trace / Nitrite: Negative   Leuk Esterase: Negative / RBC: 6 /hpf / WBC 6 /HPF   Sq Epi: x / Non Sq Epi: 1 /hpf / Bacteria: Negative        Culture - Urine (collected 31 Dec 2022 09:59)  Source: Clean Catch Clean Catch (Midstream)  Final Report (2023 09:37):    No growth    Culture - Blood (collected 31 Dec 2022 09:30)  Source: .Blood Blood-Peripheral  Preliminary Report (2023 14:01):    No growth to date.    Culture - Blood (collected 31 Dec 2022 09:25)  Source: .Blood Blood-Peripheral  Preliminary Report (2023 14:01):    No growth to date.        RADIOLOGY & ADDITIONAL TESTS:    Imaging Personally Reviewed:    Consultant(s) Notes Reviewed:      Care Discussed with Consultants/Other Providers:

## 2023-01-02 LAB
ANION GAP SERPL CALC-SCNC: 22 MMOL/L — HIGH (ref 5–17)
APTT BLD: 100.4 SEC — HIGH (ref 27.5–35.5)
APTT BLD: 49.2 SEC — HIGH (ref 27.5–35.5)
APTT BLD: 91.5 SEC — HIGH (ref 27.5–35.5)
BUN SERPL-MCNC: 129 MG/DL — HIGH (ref 7–23)
CALCIUM SERPL-MCNC: 7.3 MG/DL — LOW (ref 8.4–10.5)
CHLORIDE SERPL-SCNC: 93 MMOL/L — LOW (ref 96–108)
CO2 SERPL-SCNC: 19 MMOL/L — LOW (ref 22–31)
CREAT SERPL-MCNC: 5.27 MG/DL — HIGH (ref 0.5–1.3)
EGFR: 11 ML/MIN/1.73M2 — LOW
GLUCOSE SERPL-MCNC: 86 MG/DL — SIGNIFICANT CHANGE UP (ref 70–99)
HCT VFR BLD CALC: 40.8 % — SIGNIFICANT CHANGE UP (ref 39–50)
HGB BLD-MCNC: 13.3 G/DL — SIGNIFICANT CHANGE UP (ref 13–17)
MCHC RBC-ENTMCNC: 27 PG — SIGNIFICANT CHANGE UP (ref 27–34)
MCHC RBC-ENTMCNC: 32.6 GM/DL — SIGNIFICANT CHANGE UP (ref 32–36)
MCV RBC AUTO: 82.8 FL — SIGNIFICANT CHANGE UP (ref 80–100)
NRBC # BLD: 0 /100 WBCS — SIGNIFICANT CHANGE UP (ref 0–0)
PLATELET # BLD AUTO: 90 K/UL — LOW (ref 150–400)
POTASSIUM SERPL-MCNC: 4.8 MMOL/L — SIGNIFICANT CHANGE UP (ref 3.5–5.3)
POTASSIUM SERPL-SCNC: 4.8 MMOL/L — SIGNIFICANT CHANGE UP (ref 3.5–5.3)
PROCALCITONIN SERPL-MCNC: 6.18 NG/ML — HIGH (ref 0.02–0.1)
RBC # BLD: 4.93 M/UL — SIGNIFICANT CHANGE UP (ref 4.2–5.8)
RBC # FLD: 20.1 % — HIGH (ref 10.3–14.5)
SODIUM SERPL-SCNC: 134 MMOL/L — LOW (ref 135–145)
VANCOMYCIN FLD-MCNC: 8 UG/ML — SIGNIFICANT CHANGE UP
WBC # BLD: 16.85 K/UL — HIGH (ref 3.8–10.5)
WBC # FLD AUTO: 16.85 K/UL — HIGH (ref 3.8–10.5)

## 2023-01-02 PROCEDURE — 99233 SBSQ HOSP IP/OBS HIGH 50: CPT

## 2023-01-02 RX ORDER — FUROSEMIDE 40 MG
80 TABLET ORAL
Refills: 0 | Status: DISCONTINUED | OUTPATIENT
Start: 2023-01-02 | End: 2023-01-03

## 2023-01-02 RX ORDER — MIDODRINE HYDROCHLORIDE 2.5 MG/1
10 TABLET ORAL THREE TIMES A DAY
Refills: 0 | Status: DISCONTINUED | OUTPATIENT
Start: 2023-01-02 | End: 2023-01-05

## 2023-01-02 RX ADMIN — PANTOPRAZOLE SODIUM 40 MILLIGRAM(S): 20 TABLET, DELAYED RELEASE ORAL at 07:57

## 2023-01-02 RX ADMIN — Medication 1 GRAM(S): at 05:41

## 2023-01-02 RX ADMIN — HEPARIN SODIUM 800 UNIT(S)/HR: 5000 INJECTION INTRAVENOUS; SUBCUTANEOUS at 02:46

## 2023-01-02 RX ADMIN — Medication 1 GRAM(S): at 17:27

## 2023-01-02 RX ADMIN — HEPARIN SODIUM 1000 UNIT(S)/HR: 5000 INJECTION INTRAVENOUS; SUBCUTANEOUS at 20:53

## 2023-01-02 RX ADMIN — HEPARIN SODIUM 800 UNIT(S)/HR: 5000 INJECTION INTRAVENOUS; SUBCUTANEOUS at 02:44

## 2023-01-02 RX ADMIN — MIDODRINE HYDROCHLORIDE 10 MILLIGRAM(S): 2.5 TABLET ORAL at 22:03

## 2023-01-02 RX ADMIN — PIPERACILLIN AND TAZOBACTAM 25 GRAM(S): 4; .5 INJECTION, POWDER, LYOPHILIZED, FOR SOLUTION INTRAVENOUS at 11:58

## 2023-01-02 RX ADMIN — HEPARIN SODIUM 800 UNIT(S)/HR: 5000 INJECTION INTRAVENOUS; SUBCUTANEOUS at 12:32

## 2023-01-02 RX ADMIN — PIPERACILLIN AND TAZOBACTAM 25 GRAM(S): 4; .5 INJECTION, POWDER, LYOPHILIZED, FOR SOLUTION INTRAVENOUS at 23:59

## 2023-01-02 RX ADMIN — HEPARIN SODIUM 3500 UNIT(S): 5000 INJECTION INTRAVENOUS; SUBCUTANEOUS at 19:11

## 2023-01-02 RX ADMIN — MIDODRINE HYDROCHLORIDE 10 MILLIGRAM(S): 2.5 TABLET ORAL at 15:41

## 2023-01-02 RX ADMIN — Medication 10 MILLIGRAM(S): at 05:41

## 2023-01-02 RX ADMIN — CHLORHEXIDINE GLUCONATE 1 APPLICATION(S): 213 SOLUTION TOPICAL at 11:38

## 2023-01-02 RX ADMIN — SIMVASTATIN 10 MILLIGRAM(S): 20 TABLET, FILM COATED ORAL at 22:03

## 2023-01-02 RX ADMIN — PIPERACILLIN AND TAZOBACTAM 25 GRAM(S): 4; .5 INJECTION, POWDER, LYOPHILIZED, FOR SOLUTION INTRAVENOUS at 02:55

## 2023-01-02 RX ADMIN — HEPARIN SODIUM 1000 UNIT(S)/HR: 5000 INJECTION INTRAVENOUS; SUBCUTANEOUS at 18:58

## 2023-01-02 NOTE — PROGRESS NOTE ADULT - SUBJECTIVE AND OBJECTIVE BOX
INTERVAL HPI/OVERNIGHT EVENTS:  No new overnight event.  No N/V/D.  Tolerating diet. no loose bm,      Allergies    No Known Allergies    Intolerances          General:  No wt loss, fevers, chills, night sweats, fatigue,   Eyes:  Good vision, no reported pain  ENT:  No sore throat, pain, runny nose, dysphagia  CV:  No pain, palpitations, hypo/hypertension  Resp:  No dyspnea, cough, tachypnea, wheezing  GI:  No pain, No nausea, No vomiting, No diarrhea, No constipation, No weight loss, No fever, No pruritis, No rectal bleeding, No tarry stools, No dysphagia,  :  No pain, bleeding, incontinence, nocturia  Muscle:  No pain, weakness  Neuro:  No weakness, tingling, memory problems  Psych:  No fatigue, insomnia, mood problems, depression  Endocrine:  No polyuria, polydipsia, cold/heat intolerance  Heme:  No petechiae, ecchymosis, easy bruisability  Skin:  No rash, tattoos, scars, edema      PHYSICAL EXAM:   Vital Signs:  Vital Signs Last 24 Hrs  T(C): 36.4 (2023 11:05), Max: 36.4 (2023 11:05)  T(F): 97.5 (2023 11:05), Max: 97.5 (2023 11:05)  HR: 76 (2023 15:30) (76 - 86)  BP: 90/68 (2023 15:30) (90/68 - 98/62)  BP(mean): --  RR: 18 (2023 11:05) (18 - 19)  SpO2: 96% (2023 11:05) (96% - 96%)    Parameters below as of 2023 11:05  Patient On (Oxygen Delivery Method): nasal cannula  O2 Flow (L/min): 2    Daily     Daily I&O's Summary      GENERAL:  Appears stated age, well-groomed, well-nourished, no distress  HEENT:  NC/AT,  conjunctivae clear and pink, no thyromegaly, nodules, adenopathy, no JVD, sclera -anicteric  CHEST:  Full & symmetric excursion, no increased effort, breath sounds clear  HEART:  Regular rhythm, S1, S2, no murmur/rub/S3/S4, no abdominal bruit, no edema  ABDOMEN:  Soft, non-tender, non-distended, normoactive bowel sounds,  no masses ,no hepato-splenomegaly, no signs of chronic liver disease  EXTEREMITIES:  no cyanosis,clubbing or edema  SKIN:  No rash/erythema/ecchymoses/petechiae/wounds/abscess/warm/dry  NEURO:  Alert, oriented, no asterixis, no tremor, no encephalopathy      LABS:                        13.3   16.85 )-----------( 90       ( 2023 11:50 )             40.8     01-    134<L>  |  93<L>  |  129<H>  ----------------------------<  86  4.8   |  19<L>  |  5.27<H>    Ca    7.3<L>      2023 11:50      PTT - ( 2023 11:50 )  PTT:91.5 sec  Urinalysis Basic - ( 31 Dec 2022 23:22 )    Color: Yellow / Appearance: Clear / S.038 / pH: x  Gluc: x / Ketone: Negative  / Bili: Negative / Urobili: Negative   Blood: x / Protein: Trace / Nitrite: Negative   Leuk Esterase: Negative / RBC: 6 /hpf / WBC 6 /HPF   Sq Epi: x / Non Sq Epi: 1 /hpf / Bacteria: Negative      amylase   lipase  RADIOLOGY & ADDITIONAL TESTS:

## 2023-01-02 NOTE — PROGRESS NOTE ADULT - SUBJECTIVE AND OBJECTIVE BOX
HPI:  78 yo M with hx of HTN, HLD, psoriasis on prednisone presented with SOB and found to have RLL segmental PEs.  Patient was recently admitted 2 weeks ago for PNA and found to have esophageal mass with liver masses s/p IR biopsy with concern for hepatic malignancy.  Today, he woke up with SOB at rest and worse with exertion, denies LOC, CP, palpitations or weakness.     ===========================  Interval Events  - DVTs found on LE Dopplers   - No reported worsening CP/Palps/SOB\  ===========================        Events  - No sig events     PMHx:       PSHx:       Allergies:  No Known Allergies      Home Meds:    Current Medications:       FAMILY HISTORY:      Social History:  Smoking History:  Alcohol Use:  Drug Use:    REVIEW OF SYSTEMS:  Constitutional:     [x ] negative [ ] fevers [ ] chills [ ] weight loss [ ] weight gain  HEENT:                  [x ] negative [ ] dry eyes [ ] eye irritation [ ] postnasal drip [ ] nasal congestion  CV:                         [ x] negative  [ ] chest pain [ ] orthopnea [ ] palpitations [ ] murmur  Resp:                     [x ] negative [ ] cough [ ] shortness of breath [ ] dyspnea [ ] wheezing [ ] sputum [ ]hemoptysis  GI:                          [ x] negative [ ] nausea [ ] vomiting [ ] diarrhea [ ] constipation [ ] abd pain [ ] dysphagia   :                        [ x] negative [ ] dysuria [ ] nocturia [ ] hematuria [ ] increased urinary frequency  Musculoskeletal: [x ] negative [ ] back pain [ ] myalgias [ ] arthralgias [ ] fracture  Skin:                       [ x] negative [ ] rash [ ] itch  Neurological:        [ x] negative [ ] headache [ ] dizziness [ ] syncope [ ] weakness [ ] numbness  Psychiatric:           [ x] negative [ ] anxiety [ ] depression  Endocrine:            [ x] negative [ ] diabetes [ ] thyroid problem  Heme/Lymph:      [ x] negative [ ] anemia [ ] bleeding problem  Allergic/Immune: [ x] negative [ ] itchy eyes [ ] nasal discharge [ ] hives [ ] angioedema    [ x] All other systems negative  [ ] Unable to assess ROS due to      Physical Exam:  T(F): 97 (12-31), Max: 97 (12-31)  HR: 87 (12-31) (86 - 87)  BP: 102/61 (12-31) (82/62 - 102/61)  RR: 17 (12-31)  SpO2: 97% (12-31)  GENERAL: No acute distress, well-developed  HEAD:  Atraumatic, Normocephalic  ENT: EOMI, PERRLA, conjunctiva and sclera clear, Neck supple, No JVD, moist mucosa  CHEST/LUNG: Clear to auscultation bilaterally; No wheeze, equal breath sounds bilaterally   BACK: No spinal tenderness  HEART: Regular rate and rhythm; No murmurs, rubs, or gallops  ABDOMEN: Soft, Nontender, Nondistended; Bowel sounds present  EXTREMITIES:  No clubbing, cyanosis, 1-2+ BLE edema  PSYCH: Nl behavior, nl affect  NEUROLOGY: AAOx3, non-focal, cranial nerves intact  SKIN: Normal color, No rashes or lesions  LINES:    Cardiovascular Diagnostic Testing:    ECG: Personally reviewed:      Imaging:    CXR: Personally reviewed    Labs: Personally reviewed                        16.0   25.67 )-----------( 146      ( 31 Dec 2022 10:00 )             49.2     12-31    133<L>  |  93<L>  |  119<H>  ----------------------------<  111<H>  4.8   |  17<L>  |  3.79<H>    Ca    8.4      31 Dec 2022 10:00  Mg     2.9     12-31    TPro  5.5<L>  /  Alb  2.6<L>  /  TBili  3.6<H>  /  DBili  x   /  AST  685<H>  /  ALT  442<H>  /  AlkPhos  1636<H>  12-31    PT/INR - ( 31 Dec 2022 10:00 )   PT: 16.7 sec;   INR: 1.43 ratio         PTT - ( 31 Dec 2022 10:00 )  PTT:26.9 sec  Serum Pro-Brain Natriuretic Peptide: 90605 pg/mL (12-31 @ 10:00)            Assessment and Recommendation:   · Assessment	  78 yo M with hx of HTN, HLD, psoriasis on prednisone, and recent possible esophageal/hepatic malignancy/mass presented with acute onset SOB and found to have RLL segmental PE. Trop at 165 and proBNP at [address], TTE showed no sign of RV strain, no clot in transit, and LVEF of 70%. Patient initially had SBP in the 80s without LOC or dizziness, however, may have other possible etiologies for hypotension including sepsis and poor PO intake rather than 2/2 PE. He is currently HDS after 500cc IVF in the ED and on RA.    - continue AC  - TTE result as above  - d/w vascular plans given LE clots  - Monitor for fluid overload - if BP drops further, consider CRRT   - Soft BPs  - c/w telemetry  - no plan for procedural interventions  - Vascular to follow Melissa Robison                                                                        Forty-1 Minutes Spent in Patient Management     HPI:  78 yo M with hx of HTN, HLD, psoriasis on prednisone presented with SOB and found to have RLL segmental PEs.  Patient was recently admitted 2 weeks ago for PNA and found to have esophageal mass with liver masses s/p IR biopsy with concern for hepatic malignancy.  Today, he woke up with SOB at rest and worse with exertion, denies LOC, CP, palpitations or weakness.     ===========================  Interval Events  - DVTs found on LE Dopplers   - No reported worsening CP/Palps/SOB\  ===========================        Events  - No sig events     PMHx:       PSHx:       Allergies:  No Known Allergies      Home Meds:    Current Medications:       FAMILY HISTORY:      Social History:  Smoking History:  Alcohol Use:  Drug Use:    REVIEW OF SYSTEMS:  Constitutional:     [x ] negative [ ] fevers [ ] chills [ ] weight loss [ ] weight gain  HEENT:                  [x ] negative [ ] dry eyes [ ] eye irritation [ ] postnasal drip [ ] nasal congestion  CV:                         [ x] negative  [ ] chest pain [ ] orthopnea [ ] palpitations [ ] murmur  Resp:                     [x ] negative [ ] cough [ ] shortness of breath [ ] dyspnea [ ] wheezing [ ] sputum [ ]hemoptysis  GI:                          [ x] negative [ ] nausea [ ] vomiting [ ] diarrhea [ ] constipation [ ] abd pain [ ] dysphagia   :                        [ x] negative [ ] dysuria [ ] nocturia [ ] hematuria [ ] increased urinary frequency  Musculoskeletal: [x ] negative [ ] back pain [ ] myalgias [ ] arthralgias [ ] fracture  Skin:                       [ x] negative [ ] rash [ ] itch  Neurological:        [ x] negative [ ] headache [ ] dizziness [ ] syncope [ ] weakness [ ] numbness  Psychiatric:           [ x] negative [ ] anxiety [ ] depression  Endocrine:            [ x] negative [ ] diabetes [ ] thyroid problem  Heme/Lymph:      [ x] negative [ ] anemia [ ] bleeding problem  Allergic/Immune: [ x] negative [ ] itchy eyes [ ] nasal discharge [ ] hives [ ] angioedema    [ x] All other systems negative  [ ] Unable to assess ROS due to      Physical Exam:  T(F): 97 (12-31), Max: 97 (12-31)  HR: 87 (12-31) (86 - 87)  BP: 102/61 (12-31) (82/62 - 102/61)  RR: 17 (12-31)  SpO2: 97% (12-31)  GENERAL: No acute distress, well-developed  HEAD:  Atraumatic, Normocephalic  ENT: EOMI, PERRLA, conjunctiva and sclera clear, Neck supple, No JVD, moist mucosa  CHEST/LUNG: Clear to auscultation bilaterally; No wheeze, equal breath sounds bilaterally   BACK: No spinal tenderness  HEART: Regular rate and rhythm; No murmurs, rubs, or gallops  ABDOMEN: Soft, Nontender, Nondistended; Bowel sounds present  EXTREMITIES:  No clubbing, cyanosis, 1-2+ BLE edema  PSYCH: Nl behavior, nl affect  NEUROLOGY: AAOx3, non-focal, cranial nerves intact  SKIN: Normal color, No rashes or lesions  LINES:    Cardiovascular Diagnostic Testing:    ECG: Personally reviewed:      Imaging:    CXR: Personally reviewed    Labs: Personally reviewed                        16.0   25.67 )-----------( 146      ( 31 Dec 2022 10:00 )             49.2     12-31    133<L>  |  93<L>  |  119<H>  ----------------------------<  111<H>  4.8   |  17<L>  |  3.79<H>    Ca    8.4      31 Dec 2022 10:00  Mg     2.9     12-31    TPro  5.5<L>  /  Alb  2.6<L>  /  TBili  3.6<H>  /  DBili  x   /  AST  685<H>  /  ALT  442<H>  /  AlkPhos  1636<H>  12-31    PT/INR - ( 31 Dec 2022 10:00 )   PT: 16.7 sec;   INR: 1.43 ratio         PTT - ( 31 Dec 2022 10:00 )  PTT:26.9 sec  Serum Pro-Brain Natriuretic Peptide: 52732 pg/mL (12-31 @ 10:00)            Assessment and Recommendation:   · Assessment	  78 yo M with hx of HTN, HLD, psoriasis on prednisone, and recent possible esophageal/hepatic malignancy/mass presented with acute onset SOB and found to have RLL segmental PE. Trop at 165 and proBNP at 67344, TTE showed no sign of RV strain, no clot in transit, and LVEF of 70%. Patient initially had SBP in the 80s without LOC or dizziness, however, may have other possible etiologies for hypotension including sepsis and poor PO intake rather than 2/2 PE. He is currently HDS after 500cc IVF in the ED and on RA.    - continue AC  - TTE result as above  - d/w vascular plans given LE clots  - Monitor for fluid overload - if BP drops further, consider CRRT   - Soft BPs  - c/w telemetry  - no plan for procedural interventions  - Vascular to follow Melissa Robison                                                                        Forty-1 Minutes Spent in Patient Management     HPI:  78 yo M with hx of HTN, HLD, psoriasis on prednisone presented with SOB and found to have RLL segmental PEs.  Patient was recently admitted 2 weeks ago for PNA and found to have esophageal mass with liver masses s/p IR biopsy with concern for hepatic malignancy.  Today, he woke up with SOB at rest and worse with exertion, denies LOC, CP, palpitations or weakness.     ===========================  Interval Events  - DVTs found on LE Dopplers   - No reported worsening CP/Palps/SOB\  ===========================        Events  - No sig events     PMHx:       PSHx:       Allergies:  No Known Allergies      Home Meds:    Current Medications:       FAMILY HISTORY:      Social History:  Smoking History:  Alcohol Use:  Drug Use:    REVIEW OF SYSTEMS:  Constitutional:     [x ] negative [ ] fevers [ ] chills [ ] weight loss [ ] weight gain  HEENT:                  [x ] negative [ ] dry eyes [ ] eye irritation [ ] postnasal drip [ ] nasal congestion  CV:                         [ x] negative  [ ] chest pain [ ] orthopnea [ ] palpitations [ ] murmur  Resp:                     [x ] negative [ ] cough [ ] shortness of breath [ ] dyspnea [ ] wheezing [ ] sputum [ ]hemoptysis  GI:                          [ x] negative [ ] nausea [ ] vomiting [ ] diarrhea [ ] constipation [ ] abd pain [ ] dysphagia   :                        [ x] negative [ ] dysuria [ ] nocturia [ ] hematuria [ ] increased urinary frequency  Musculoskeletal: [x ] negative [ ] back pain [ ] myalgias [ ] arthralgias [ ] fracture  Skin:                       [ x] negative [ ] rash [ ] itch  Neurological:        [ x] negative [ ] headache [ ] dizziness [ ] syncope [ ] weakness [ ] numbness  Psychiatric:           [ x] negative [ ] anxiety [ ] depression  Endocrine:            [ x] negative [ ] diabetes [ ] thyroid problem  Heme/Lymph:      [ x] negative [ ] anemia [ ] bleeding problem  Allergic/Immune: [ x] negative [ ] itchy eyes [ ] nasal discharge [ ] hives [ ] angioedema    [ x] All other systems negative  [ ] Unable to assess ROS due to      Physical Exam:  T(F): 97 (12-31), Max: 97 (12-31)  HR: 87 (12-31) (86 - 87)  BP: 102/61 (12-31) (82/62 - 102/61)  RR: 17 (12-31)  SpO2: 97% (12-31)  GENERAL: No acute distress, well-developed  HEAD:  Atraumatic, Normocephalic  ENT: EOMI, PERRLA, conjunctiva and sclera clear, Neck supple, No JVD, moist mucosa  CHEST/LUNG: Clear to auscultation bilaterally; No wheeze, equal breath sounds bilaterally   BACK: No spinal tenderness  HEART: Regular rate and rhythm; No murmurs, rubs, or gallops  ABDOMEN: Soft, Nontender, Nondistended; Bowel sounds present  EXTREMITIES:  No clubbing, cyanosis, 1-2+ BLE edema  PSYCH: Nl behavior, nl affect  NEUROLOGY: AAOx3, non-focal, cranial nerves intact  SKIN: Normal color, No rashes or lesions  LINES:    Cardiovascular Diagnostic Testing:    ECG: Personally reviewed:      Imaging:    CXR: Personally reviewed    Labs: Personally reviewed                        16.0   25.67 )-----------( 146      ( 31 Dec 2022 10:00 )             49.2     12-31    133<L>  |  93<L>  |  119<H>  ----------------------------<  111<H>  4.8   |  17<L>  |  3.79<H>    Ca    8.4      31 Dec 2022 10:00  Mg     2.9     12-31    TPro  5.5<L>  /  Alb  2.6<L>  /  TBili  3.6<H>  /  DBili  x   /  AST  685<H>  /  ALT  442<H>  /  AlkPhos  1636<H>  12-31    PT/INR - ( 31 Dec 2022 10:00 )   PT: 16.7 sec;   INR: 1.43 ratio         PTT - ( 31 Dec 2022 10:00 )  PTT:26.9 sec  Serum Pro-Brain Natriuretic Peptide: 89712 pg/mL (12-31 @ 10:00)            Assessment and Recommendation:   · Assessment	  78 yo M with hx of HTN, HLD, psoriasis on prednisone, and recent possible esophageal/hepatic malignancy/mass presented with acute onset SOB and found to have RLL segmental PE. Trop at 165 and [place]BNP at [address], TTE showed no sign of RV strain, no clot in transit, and LVEF of 70%. Patient initially had SBP in the 80s without LOC or dizziness, however, may have other possible etiologies for hypotension including sepsis and poor PO intake rather than 2/2 PE. He is currently HDS after 500cc IVF in the ED and on RA.    - continue AC  - TTE result as above  - d/w vascular plans given LE clots  - Monitor for fluid overload - if BP drops further, consider CRRT   - Soft BPs  - c/w telemetry  - no plan for procedural interventions  - Vascular to follow Melissa Robison                                                                        Forty-1 Minutes Spent in Patient Management

## 2023-01-02 NOTE — PROGRESS NOTE ADULT - SUBJECTIVE AND OBJECTIVE BOX
Date of Service: 23 @ 12:38    Patient is a 77y old  Male who presents with a chief complaint of Malignancy Workup (2023 11:21)      Any change in ROS: He is alert and awake:  seems slightly confused to me?  on 2 L of oxygen      MEDICATIONS  (STANDING):  chlorhexidine 2% Cloths 1 Application(s) Topical daily  heparin  Infusion.  Unit(s)/Hr (16 mL/Hr) IV Continuous <Continuous>  influenza  Vaccine (HIGH DOSE) 0.7 milliLiter(s) IntraMuscular once  ondansetron Injectable 4 milliGRAM(s) IV Push four times a day  pantoprazole    Tablet 40 milliGRAM(s) Oral before breakfast  piperacillin/tazobactam IVPB.. 3.375 Gram(s) IV Intermittent every 12 hours  predniSONE   Tablet 10 milliGRAM(s) Oral daily  simvastatin 10 milliGRAM(s) Oral at bedtime  sodium bicarbonate  Infusion 0.043 mEq/kG/Hr (50 mL/Hr) IV Continuous <Continuous>  sodium bicarbonate  Infusion 0.043 mEq/kG/Hr (50 mL/Hr) IV Continuous <Continuous>  sucralfate suspension 1 Gram(s) Oral two times a day    MEDICATIONS  (PRN):  acetaminophen     Tablet .. 650 milliGRAM(s) Oral every 6 hours PRN Temp greater or equal to 38.5C (101.3F), Mild Pain (1 - 3)  heparin   Injectable 7000 Unit(s) IV Push every 6 hours PRN For aPTT less than 40  heparin   Injectable 3500 Unit(s) IV Push every 6 hours PRN For aPTT between 40 - 57    Vital Signs Last 24 Hrs  T(C): 36.4 (2023 11:05), Max: 36.4 (2023 11:05)  T(F): 97.5 (2023 11:05), Max: 97.5 (2023 11:05)  HR: 86 (2023 11:05) (77 - 86)  BP: 90/76 (2023 11:05) (90/76 - 117/77)  BP(mean): 90 (2023 14:12) (90 - 90)  RR: 18 (2023 11:05) (18 - 20)  SpO2: 96% (2023 11:05) (96% - 97%)    Parameters below as of 2023 11:05  Patient On (Oxygen Delivery Method): nasal cannula  O2 Flow (L/min): 2      I&O's Summary        Physical Exam:   GENERAL: NAD, well-groomed, well-developed  HEENT: YOANA/   Atraumatic, Normocephalic  ENMT: No tonsillar erythema, exudates, or enlargement; Moist mucous membranes, Good dentition, No lesions  NECK: Supple, No JVD, Normal thyroid  CHEST/LUNG: decreased air entry bilaterally:  no wheezing  CVS: Regular rate and rhythm; No murmurs, rubs, or gallops  GI: : Soft, Nontender, Nondistended; Bowel sounds present  NERVOUS SYSTEM:  Alert & awake and responsive  EXTREMITIES: +edema  LYMPH: No lymphadenopathy noted  SKIN: No rashes or lesions  ENDOCRINOLOGY: No Thyromegaly  PSYCH: Appropriate    Labs:  23, 22, 21  CARDIAC MARKERS ( 31 Dec 2022 19:02 )  x     / x     / 118 U/L / x     / 9.6 ng/mL                            13.3   16.85 )-----------( 90       ( 2023 11:50 )             40.8                         15.8   15.76 )-----------( 60       ( 2023 06:16 )             48.9                         14.9   18.27 )-----------( 86       ( 31 Dec 2022 19:50 )             45.8                         16.0   25.67 )-----------( 146      ( 31 Dec 2022 10:00 )             49.2         133<L>  |  91<L>  |  124<H>  ----------------------------<  108<H>  5.0   |  20<L>  |  4.17<H>      133<L>  |  93<L>  |  119<H>  ----------------------------<  111<H>  4.8   |  17<L>  |  3.79<H>    Ca    7.5<L>      2023 15:37    TPro  5.5<L>  /  Alb  2.6<L>  /  TBili  3.6<H>  /  DBili  x   /  AST  685<H>  /  ALT  442<H>  /  AlkPhos  1636<H>      CAPILLARY BLOOD GLUCOSE            PTT - ( 2023 11:50 )  PTT:91.5 sec  Urinalysis Basic - ( 31 Dec 2022 23:22 )    Color: Yellow / Appearance: Clear / S.038 / pH: x  Gluc: x / Ketone: Negative  / Bili: Negative / Urobili: Negative   Blood: x / Protein: Trace / Nitrite: Negative   Leuk Esterase: Negative / RBC: 6 /hpf / WBC 6 /HPF   Sq Epi: x / Non Sq Epi: 1 /hpf / Bacteria: Negative      Serum Pro-Brain Natriuretic Peptide: 70589 pg/mL ( @ 10:00)        RECENT CULTURES:   @ 09:59 Clean Catch Clean Catch (Midstream)                No growth     @ 09:30 .Blood Blood-Peripheral       rad< from: [facility] Duplex Lower Ext Vein Scan, Bilat (23 @ 18:57) >  The visualized external iliac vein, common femoral vein, popliteal,   gastrocnemius veins are incompressible and demonstrate intraluminal   echogenic material and lack of color Doppler flow. The left femoral vein   is patent.  Peroneal vein thrombosis. Posterior tibial vein is patent.    IMPRESSION:  Acute above and below the knee left lower extremity deep venous   thrombosis with extension into left external iliac vein.  No evidence of acute deep venous thrombosis in the right lower extremity.    Findings were discussed by ultrasound technologist to JAN Paulino on   2023 at 6:50 PM.    --- End of Report ---           ERICA JERNIGAN MD; Resident Radiologist  This document has been electronically signed.  JUANJO FLORES MD; Attending Radiologist  This document has been electronically signed. 2023  7:55PM    < end of copied text >  < from: CT Head No Cont (22 @ 12:27) >    There is moderate cerebral volume loss and patchy white matter   hypoattenuation which is nonspecific in etiology but likely related to   chronic microvascular-type changes.    Partially opacified intracranial vasculature, likely secondary to recent   contrast-enhanced CT scan of the chest.    The visualized paranasal sinuses are clear. The mastoid air cells and   middle ear cavities are clear.    The soft tissues of the scalp are unremarkable. The calvarium is intact.    IMPRESSION:    No CT evidence of acute intracranial hemorrhage or mass.    If clinical suspicion is high for intracranial metastasis, an MRI of the   brain with and without IV contrast is recommended for further evaluation.    --- End of Report ---           MALAIKA PLATT DO; Resident Radiologist  This document has been electronically signed.  ERYN JOVEL MD; Attending Radiologist  This document has been electronically signed.Dec 31 2022 12:50PM    < end of copied text >  < from: CT Angio Chest PE Protocol w/ IV Cont (22 @ 10:33) >  Oral Contrast: NONE  Complications: None reported at time of study completion    COMPARISON: None available.    FINDINGS:    PULMONARY ARTERIES: Pulmonary embolism within the right interlobar   pulmonary artery and within multiple proximal segmental branches of the   right lower lobe.    MEDIASTINUM: The cardiac chambers are qualitatively normal in size.   Coronary atherosclerosis. No pericardial effusion. Normal caliber   thoracic aorta.    Enlarged AP window and left hilar lymphadenopathy; reference 2.3 x 1.3 cm   AP window node (image 41, series 2) and left hilar 1.5 x 1 cm node (image   54, series 2).    AIRWAYS, LUNGS, PLEURA: Clear central airways. Small bilateral pleural   effusions. Intralobular septal thickening.    Multifocal ill-defined consolidative and groundglass opacities involving   all lung lobes. Few nodular opacities also identified; reference right   lower lobe 0.9 cm discrete nodule (image 81, series 2).    IMAGED ABDOMEN: Innumerable hepatic lesions. Ascites. Periportal   lymphadenopathy.    SOFT TISSUES: Unremarkable.    BONES: Unremarkable.      IMPRESSION:.    Pulmonary embolism within the right interlobar pulmonary artery and   within multiple proximal segmental branches of the right lower lobe.    Small bilateral pleural effusions and interlobular septalthickening may   be on the basis of pulmonary edema.    Ill-defined consolidative and groundglass opacities involving all lung   lobes may be secondary to infection.    Few additional discrete subcentimeter nodular opacities; indeterminate   and metastatic disease cannot be excluded. Recommend CT chest follow-up   in 3 months to assess stability.    AP window and left hilar lymphadenopathy; indeterminate and metastatic   disease is a diagnostic consideration.    Hepatic metastatic disease, periportal lymphadenopathy, and ascites.    Findings discussed with Dr. Mercado on 2022 at 10:49 AM.    --- End of Report ---    < end of copied text >           No growth to date.     @ 09:25 .Blood Blood-Peripheral                No growth to date.          RESPIRATORY CULTURES:          Studies  Chest X-RAY  CT SCAN Chest   Venous Dopplers: LE:   CT Abdomen  Others               Date of Service: 23 @ 12:38    Patient is a 77y old  Male who presents with a chief complaint of Malignancy Workup (2023 11:21)      Any change in ROS: He is alert and awake:  seems slightly confused to me?  on 2 L of oxygen      MEDICATIONS  (STANDING):  chlorhexidine 2% Cloths 1 Application(s) Topical daily  heparin  Infusion.  Unit(s)/Hr (16 mL/Hr) IV Continuous <Continuous>  influenza  Vaccine (HIGH DOSE) 0.7 milliLiter(s) IntraMuscular once  ondansetron Injectable 4 milliGRAM(s) IV Push four times a day  pantoprazole    Tablet 40 milliGRAM(s) Oral before breakfast  piperacillin/tazobactam IVPB.. 3.375 Gram(s) IV Intermittent every 12 hours  predniSONE   Tablet 10 milliGRAM(s) Oral daily  simvastatin 10 milliGRAM(s) Oral at bedtime  sodium bicarbonate  Infusion 0.043 mEq/kG/Hr (50 mL/Hr) IV Continuous <Continuous>  sodium bicarbonate  Infusion 0.043 mEq/kG/Hr (50 mL/Hr) IV Continuous <Continuous>  sucralfate suspension 1 Gram(s) Oral two times a day    MEDICATIONS  (PRN):  acetaminophen     Tablet .. 650 milliGRAM(s) Oral every 6 hours PRN Temp greater or equal to 38.5C (101.3F), Mild Pain (1 - 3)  heparin   Injectable 7000 Unit(s) IV Push every 6 hours PRN For aPTT less than 40  heparin   Injectable 3500 Unit(s) IV Push every 6 hours PRN For aPTT between 40 - 57    Vital Signs Last 24 Hrs  T(C): 36.4 (2023 11:05), Max: 36.4 (2023 11:05)  T(F): 97.5 (2023 11:05), Max: 97.5 (2023 11:05)  HR: 86 (2023 11:05) (77 - 86)  BP: 90/76 (2023 11:05) (90/76 - 117/77)  BP(mean): 90 (2023 14:12) (90 - 90)  RR: 18 (2023 11:05) (18 - 20)  SpO2: 96% (2023 11:05) (96% - 97%)    Parameters below as of 2023 11:05  Patient On (Oxygen Delivery Method): nasal cannula  O2 Flow (L/min): 2      I&O's Summary        Physical Exam:   GENERAL: NAD, well-groomed, well-developed  HEENT: YOANA/   Atraumatic, Normocephalic  ENMT: No tonsillar erythema, exudates, or enlargement; Moist mucous membranes, Good dentition, No lesions  NECK: Supple, No JVD, Normal thyroid  CHEST/LUNG: decreased air entry bilaterally:  no wheezing  CVS: Regular rate and rhythm; No murmurs, rubs, or gallops  GI: : Soft, Nontender, Nondistended; Bowel sounds present  NERVOUS SYSTEM:  Alert & awake and responsive  EXTREMITIES: +edema  LYMPH: No lymphadenopathy noted  SKIN: No rashes or lesions  ENDOCRINOLOGY: No Thyromegaly  PSYCH: Appropriate    Labs:  23, 22, 21  CARDIAC MARKERS ( 31 Dec 2022 19:02 )  x     / x     / 118 U/L / x     / 9.6 ng/mL                            13.3   16.85 )-----------( 90       ( 2023 11:50 )             40.8                         15.8   15.76 )-----------( 60       ( 2023 06:16 )             48.9                         14.9   18.27 )-----------( 86       ( 31 Dec 2022 19:50 )             45.8                         16.0   25.67 )-----------( 146      ( 31 Dec 2022 10:00 )             49.2         133<L>  |  91<L>  |  124<H>  ----------------------------<  108<H>  5.0   |  20<L>  |  4.17<H>      133<L>  |  93<L>  |  119<H>  ----------------------------<  111<H>  4.8   |  17<L>  |  3.79<H>    Ca    7.5<L>      2023 15:37    TPro  5.5<L>  /  Alb  2.6<L>  /  TBili  3.6<H>  /  DBili  x   /  AST  685<H>  /  ALT  442<H>  /  AlkPhos  1636<H>      CAPILLARY BLOOD GLUCOSE            PTT - ( 2023 11:50 )  PTT:91.5 sec  Urinalysis Basic - ( 31 Dec 2022 23:22 )    Color: Yellow / Appearance: Clear / S.038 / pH: x  Gluc: x / Ketone: Negative  / Bili: Negative / Urobili: Negative   Blood: x / Protein: Trace / Nitrite: Negative   Leuk Esterase: Negative / RBC: 6 /hpf / WBC 6 /HPF   Sq Epi: x / Non Sq Epi: 1 /hpf / Bacteria: Negative      Serum Pro-Brain Natriuretic Peptide: 75894 pg/mL ( @ 10:00)        RECENT CULTURES:   @ 09:59 Clean Catch Clean Catch (Midstream)                No growth     @ 09:30 .Blood Blood-Peripheral       rad< from: [facility] Duplex Lower Ext Vein Scan, Bilat (23 @ 18:57) >  The visualized external iliac vein, common femoral vein, popliteal,   gastrocnemius veins are incompressible and demonstrate intraluminal   echogenic material and lack of color Doppler flow. The left femoral vein   is patent.  Peroneal vein thrombosis. Posterior tibial vein is patent.    IMPRESSION:  Acute above and below the knee left lower extremity deep venous   thrombosis with extension into left external iliac vein.  No evidence of acute deep venous thrombosis in the right lower extremity.    Findings were discussed by ultrasound technologist to JAN Paulino on   2023 at 6:50 PM.    --- End of Report ---           ERICA JERNIGAN MD; Resident Radiologist  This document has been electronically signed.  JUANJO FLORES MD; Attending Radiologist  This document has been electronically signed. 2023  7:55PM    < end of copied text >  < from: CT Head No Cont (22 @ 12:27) >    There is moderate cerebral volume loss and patchy white matter   hypoattenuation which is nonspecific in etiology but likely related to   chronic microvascular-type changes.    Partially opacified intracranial vasculature, likely secondary to recent   contrast-enhanced CT scan of the chest.    The visualized paranasal sinuses are clear. The mastoid air cells and   middle ear cavities are clear.    The soft tissues of the scalp are unremarkable. The calvarium is intact.    IMPRESSION:    No CT evidence of acute intracranial hemorrhage or mass.    If clinical suspicion is high for intracranial metastasis, an MRI of the   brain with and without IV contrast is recommended for further evaluation.    --- End of Report ---           MALAIKA PLATT DO; Resident Radiologist  This document has been electronically signed.  ERYN JOVEL MD; Attending Radiologist  This document has been electronically signed.Dec 31 2022 12:50PM    < end of copied text >  < from: CT Angio Chest PE Protocol w/ IV Cont (22 @ 10:33) >  Oral Contrast: NONE  Complications: None reported at time of study completion    COMPARISON: None available.    FINDINGS:    PULMONARY ARTERIES: Pulmonary embolism within the right interlobar   pulmonary artery and within multiple proximal segmental branches of the   right lower lobe.    MEDIASTINUM: The cardiac chambers are qualitatively normal in size.   Coronary atherosclerosis. No pericardial effusion. Normal caliber   thoracic aorta.    Enlarged AP window and left hilar lymphadenopathy; reference 2.3 x 1.3 cm   AP window node (image 41, series 2) and left hilar 1.5 x 1 cm node (image   54, series 2).    AIRWAYS, LUNGS, PLEURA: Clear central airways. Small bilateral pleural   effusions. Intralobular septal thickening.    Multifocal ill-defined consolidative and groundglass opacities involving   all lung lobes. Few nodular opacities also identified; reference right   lower lobe 0.9 cm discrete nodule (image 81, series 2).    IMAGED ABDOMEN: Innumerable hepatic lesions. Ascites. Periportal   lymphadenopathy.    SOFT TISSUES: Unremarkable.    BONES: Unremarkable.      IMPRESSION:.    Pulmonary embolism within the right interlobar pulmonary artery and   within multiple proximal segmental branches of the right lower lobe.    Small bilateral pleural effusions and interlobular septalthickening may   be on the basis of pulmonary edema.    Ill-defined consolidative and groundglass opacities involving all lung   lobes may be secondary to infection.    Few additional discrete subcentimeter nodular opacities; indeterminate   and metastatic disease cannot be excluded. Recommend CT chest follow-up   in 3 months to assess stability.    AP window and left hilar lymphadenopathy; indeterminate and metastatic   disease is a diagnostic consideration.    Hepatic metastatic disease, periportal lymphadenopathy, and ascites.    Findings discussed with Dr. Mercado on 2022 at 10:49 AM.    --- End of Report ---    < end of copied text >           No growth to date.     @ 09:25 .Blood Blood-Peripheral                No growth to date.          RESPIRATORY CULTURES:          Studies  Chest X-RAY  CT SCAN Chest   Venous Dopplers: LE:   CT Abdomen  Others               Date of Service: 23 @ 12:38    Patient is a 77y old  Male who presents with a chief complaint of Malignancy Workup (2023 11:21)      Any change in ROS: He is alert and awake:  seems slightly confused to me?  on 2 L of oxygen      MEDICATIONS  (STANDING):  chlorhexidine 2% Cloths 1 Application(s) Topical daily  heparin  Infusion.  Unit(s)/Hr (16 mL/Hr) IV Continuous <Continuous>  influenza  Vaccine (HIGH DOSE) 0.7 milliLiter(s) IntraMuscular once  ondansetron Injectable 4 milliGRAM(s) IV Push four times a day  pantoprazole    Tablet 40 milliGRAM(s) Oral before breakfast  piperacillin/tazobactam IVPB.. 3.375 Gram(s) IV Intermittent every 12 hours  predniSONE   Tablet 10 milliGRAM(s) Oral daily  simvastatin 10 milliGRAM(s) Oral at bedtime  sodium bicarbonate  Infusion 0.043 mEq/kG/Hr (50 mL/Hr) IV Continuous <Continuous>  sodium bicarbonate  Infusion 0.043 mEq/kG/Hr (50 mL/Hr) IV Continuous <Continuous>  sucralfate suspension 1 Gram(s) Oral two times a day    MEDICATIONS  (PRN):  acetaminophen     Tablet .. 650 milliGRAM(s) Oral every 6 hours PRN Temp greater or equal to 38.5C (101.3F), Mild Pain (1 - 3)  heparin   Injectable 7000 Unit(s) IV Push every 6 hours PRN For aPTT less than 40  heparin   Injectable 3500 Unit(s) IV Push every 6 hours PRN For aPTT between 40 - 57    Vital Signs Last 24 Hrs  T(C): 36.4 (2023 11:05), Max: 36.4 (2023 11:05)  T(F): 97.5 (2023 11:05), Max: 97.5 (2023 11:05)  HR: 86 (2023 11:05) (77 - 86)  BP: 90/76 (2023 11:05) (90/76 - 117/77)  BP(mean): 90 (2023 14:12) (90 - 90)  RR: 18 (2023 11:05) (18 - 20)  SpO2: 96% (2023 11:05) (96% - 97%)    Parameters below as of 2023 11:05  Patient On (Oxygen Delivery Method): nasal cannula  O2 Flow (L/min): 2      I&O's Summary        Physical Exam:   GENERAL: NAD, well-groomed, well-developed  HEENT: YOANA/   Atraumatic, Normocephalic  ENMT: No tonsillar erythema, exudates, or enlargement; Moist mucous membranes, Good dentition, No lesions  NECK: Supple, No JVD, Normal thyroid  CHEST/LUNG: decreased air entry bilaterally:  no wheezing  CVS: Regular rate and rhythm; No murmurs, rubs, or gallops  GI: : Soft, Nontender, Nondistended; Bowel sounds present  NERVOUS SYSTEM:  Alert & awake and responsive  EXTREMITIES: +edema  LYMPH: No lymphadenopathy noted  SKIN: No rashes or lesions  ENDOCRINOLOGY: No Thyromegaly  PSYCH: Appropriate    Labs:  23, 22, 21  CARDIAC MARKERS ( 31 Dec 2022 19:02 )  x     / x     / 118 U/L / x     / 9.6 ng/mL                            13.3   16.85 )-----------( 90       ( 2023 11:50 )             40.8                         15.8   15.76 )-----------( 60       ( 2023 06:16 )             48.9                         14.9   18.27 )-----------( 86       ( 31 Dec 2022 19:50 )             45.8                         16.0   25.67 )-----------( 146      ( 31 Dec 2022 10:00 )             49.2         133<L>  |  91<L>  |  124<H>  ----------------------------<  108<H>  5.0   |  20<L>  |  4.17<H>      133<L>  |  93<L>  |  119<H>  ----------------------------<  111<H>  4.8   |  17<L>  |  3.79<H>    Ca    7.5<L>      2023 15:37    TPro  5.5<L>  /  Alb  2.6<L>  /  TBili  3.6<H>  /  DBili  x   /  AST  685<H>  /  ALT  442<H>  /  AlkPhos  1636<H>      CAPILLARY BLOOD GLUCOSE            PTT - ( 2023 11:50 )  PTT:91.5 sec  Urinalysis Basic - ( 31 Dec 2022 23:22 )    Color: Yellow / Appearance: Clear / S.038 / pH: x  Gluc: x / Ketone: Negative  / Bili: Negative / Urobili: Negative   Blood: x / Protein: Trace / Nitrite: Negative   Leuk Esterase: Negative / RBC: 6 /hpf / WBC 6 /HPF   Sq Epi: x / Non Sq Epi: 1 /hpf / Bacteria: Negative      Serum Pro-Brain Natriuretic Peptide: 29785 pg/mL ( @ 10:00)        RECENT CULTURES:   @ 09:59 Clean Catch Clean Catch (Midstream)                No growth     @ 09:30 .Blood Blood-Peripheral       rad< from: [facility] Duplex Lower Ext Vein Scan, Bilat (23 @ 18:57) >  The visualized external iliac vein, common femoral vein, popliteal,   gastrocnemius veins are incompressible and demonstrate intraluminal   echogenic material and lack of color Doppler flow. The left femoral vein   is patent.  Peroneal vein thrombosis. Posterior tibial vein is patent.    IMPRESSION:  Acute above and below the knee left lower extremity deep venous   thrombosis with extension into left external iliac vein.  No evidence of acute deep venous thrombosis in the right lower extremity.    Findings were discussed by ultrasound technologist to JAN Paulino on   2023 at 6:50 PM.    --- End of Report ---           ERICA JERNIGAN MD; Resident Radiologist  This document has been electronically signed.  JUANJO FLORES MD; Attending Radiologist  This document has been electronically signed. 2023  7:55PM    < end of copied text >  < from: CT Head No Cont (22 @ 12:27) >    There is moderate cerebral volume loss and patchy white matter   hypoattenuation which is nonspecific in etiology but likely related to   chronic microvascular-type changes.    Partially opacified intracranial vasculature, likely secondary to recent   contrast-enhanced CT scan of the chest.    The visualized paranasal sinuses are clear. The mastoid air cells and   middle ear cavities are clear.    The soft tissues of the scalp are unremarkable. The calvarium is intact.    IMPRESSION:    No CT evidence of acute intracranial hemorrhage or mass.    If clinical suspicion is high for intracranial metastasis, an MRI of the   brain with and without IV contrast is recommended for further evaluation.    --- End of Report ---           MALAIKA PLATT DO; Resident Radiologist  This document has been electronically signed.  ERYN JOVEL MD; Attending Radiologist  This document has been electronically signed.Dec 31 2022 12:50PM    < end of copied text >  < from: CT Angio Chest PE Protocol w/ IV Cont (22 @ 10:33) >  Oral Contrast: NONE  Complications: None reported at time of study completion    COMPARISON: None available.    FINDINGS:    PULMONARY ARTERIES: Pulmonary embolism within the right interlobar   pulmonary artery and within multiple proximal segmental branches of the   right lower lobe.    MEDIASTINUM: The cardiac chambers are qualitatively normal in size.   Coronary atherosclerosis. No pericardial effusion. Normal caliber   thoracic aorta.    Enlarged AP window and left hilar lymphadenopathy; reference 2.3 x 1.3 cm   AP window node (image 41, series 2) and left hilar 1.5 x 1 cm node (image   54, series 2).    AIRWAYS, LUNGS, PLEURA: Clear central airways. Small bilateral pleural   effusions. Intralobular septal thickening.    Multifocal ill-defined consolidative and groundglass opacities involving   all lung lobes. Few nodular opacities also identified; reference right   lower lobe 0.9 cm discrete nodule (image 81, series 2).    IMAGED ABDOMEN: Innumerable hepatic lesions. Ascites. Periportal   lymphadenopathy.    SOFT TISSUES: Unremarkable.    BONES: Unremarkable.      IMPRESSION:.    Pulmonary embolism within the right interlobar pulmonary artery and   within multiple proximal segmental branches of the right lower lobe.    Small bilateral pleural effusions and interlobular septalthickening may   be on the basis of pulmonary edema.    Ill-defined consolidative and groundglass opacities involving all lung   lobes may be secondary to infection.    Few additional discrete subcentimeter nodular opacities; indeterminate   and metastatic disease cannot be excluded. Recommend CT chest follow-up   in 3 months to assess stability.    AP window and left hilar lymphadenopathy; indeterminate and metastatic   disease is a diagnostic consideration.    Hepatic metastatic disease, periportal lymphadenopathy, and ascites.    Findings discussed with Dr. Mercado on 2022 at 10:49 AM.    --- End of Report ---    < end of copied text >           No growth to date.     @ 09:25 .Blood Blood-Peripheral                No growth to date.          RESPIRATORY CULTURES:          Studies  Chest X-RAY  CT SCAN Chest   Venous Dopplers: LE:   CT Abdomen  Others

## 2023-01-02 NOTE — PROGRESS NOTE ADULT - SUBJECTIVE AND OBJECTIVE BOX
NE[facility]OLOGY[facility] [phone]        Patient seen and examined no new complaints. Bladder scan 190 cc.         MEDICATIONS  (STANDING):  chlorhexidine 2% Cloths 1 Application(s) Topical daily  heparin  Infusion.  Unit(s)/Hr (16 mL/Hr) IV Continuous <Continuous>  influenza  Vaccine (HIGH DOSE) 0.7 milliLiter(s) IntraMuscular once  ondansetron Injectable 4 milliGRAM(s) IV Push four times a day  pantoprazole    Tablet 40 milliGRAM(s) Oral before breakfast  piperacillin/tazobactam IVPB.. 3.375 Gram(s) IV Intermittent every 12 hours  predniSONE   Tablet 10 milliGRAM(s) Oral daily  simvastatin 10 milliGRAM(s) Oral at bedtime  sodium bicarbonate  Infusion 0.043 mEq/kG/Hr (50 mL/Hr) IV Continuous <Continuous>  sodium bicarbonate  Infusion 0.043 mEq/kG/Hr (50 mL/Hr) IV Continuous <Continuous>  sucralfate suspension 1 Gram(s) Oral two times a day      VITAL:  T(C): , Max: 36.4 (23 @ 11:05)  T(F): , Max: 97.5 (23 @ 11:05)  HR: 86 (23 @ 11:05)  BP: 90/76 (23 @ 11:05)  BP(mean): 90 (23 @ 14:12)  RR: 18 (23 @ 11:05)  SpO2: 96% (23 @ 11:05)  Wt(kg): --    I and O's:        PHYSICAL EXAM:    Constitutional: NAD  Neck:  No JVD  Respiratory: CTAB/L  Cardiovascular: S1 and S2  Gastrointestinal: BS+, soft, NT/ND  Extremities: No peripheral edema  Neurological: A/O x 3, no focal deficits  Psychiatric: Normal mood, normal affect  : No Pop  Skin: No rashes  Access: Not applicable    LABS:                        13.3   16.85 )-----------( 90       ( 2023 11:50 )             40.8     01-    134<L>  |  93<L>  |  129<H>  ----------------------------<  86  4.8   |  19<L>  |  5.27<H>    Ca    7.3<L>      2023 11:50            Urine Studies:  Urinalysis Basic - ( 31 Dec 2022 23:22 )    Color: Yellow / Appearance: Clear / S.038 / pH: x  Gluc: x / Ketone: Negative  / Bili: Negative / Urobili: Negative   Blood: x / Protein: Trace / Nitrite: Negative   Leuk Esterase: Negative / RBC: 6 /hpf / WBC 6 /HPF   Sq Epi: x / Non Sq Epi: 1 /hpf / Bacteria: Negative      Creatinine, Random Urine: 113 mg/dL ( @ 23:22)  Protein/Creatinine Ratio Calculation: 0.2 Ratio ( @ 23:22)        RADIOLOGY & ADDITIONAL STUDIES:  < from: [facility] Duplex Lower Ext Vein Scan, Bilat (23 @ 18:57) >  IMPRESSION:  Acute above and below the knee left lower extremity deep venous   thrombosis with extension into left external iliac vein.  No evidence of acute deep venous thrombosis in the right lower extremity.    Findings were discussed by ultrasound technologist to JAN Paulino on   2023 at 6:50 PM.    --- End of Report ---        < end of copied text >      Protein/Creatinine Ratio Calculation: 0.2 Ratio ( @ 23:22)    IMPRESSION:  76 y/o M PMH HTN, HLD, psoriasis on prednisone, . Recent admission for pneumonia and dced two weeks ago also found to possible esophageal cancer (mass) with liver mets s/p IR biopsy  presenting with SOB and now  with LIVIER and pulmonary emboli     Oliguric LIVIER likely pre renal vs obstructive uropathy, UPC minimal - azotemia rising   Gap acidosis due to LIVIER   Volume status- not overtly volume up    esophageal cancer (mass) with liver mets s/p IR biopsy       RECOMMEND  Start Lasix 80 mg IV bid   Start Gizrzbkyt65 mg po td   Continue to bladder scan   Dose meds for  Cr CL 15-20 ml/min     D/w Dr. Yarelis Multani [facility]  Office: [phone]                 [facility] [phone]        Patient seen and examined no new complaints. Bladder scan 190 cc.         MEDICATIONS  (STANDING):  chlorhexidine 2% Cloths 1 Application(s) Topical daily  heparin  Infusion.  Unit(s)/Hr (16 mL/Hr) IV Continuous <Continuous>  influenza  Vaccine (HIGH DOSE) 0.7 milliLiter(s) IntraMuscular once  ondansetron Injectable 4 milliGRAM(s) IV Push four times a day  pantoprazole    Tablet 40 milliGRAM(s) Oral before breakfast  piperacillin/tazobactam IVPB.. 3.375 Gram(s) IV Intermittent every 12 hours  predniSONE   Tablet 10 milliGRAM(s) Oral daily  simvastatin 10 milliGRAM(s) Oral at bedtime  sodium bicarbonate  Infusion 0.043 mEq/kG/Hr (50 mL/Hr) IV Continuous <Continuous>  sodium bicarbonate  Infusion 0.043 mEq/kG/Hr (50 mL/Hr) IV Continuous <Continuous>  sucralfate suspension 1 Gram(s) Oral two times a day      VITAL:  T(C): , Max: 36.4 (23 @ 11:05)  T(F): , Max: 97.5 (23 @ 11:05)  HR: 86 (23 @ 11:05)  BP: 90/76 (23 @ 11:05)  BP(mean): 90 (23 @ 14:12)  RR: 18 (23 @ 11:05)  SpO2: 96% (23 @ 11:05)  Wt(kg): --    I and O's:        PHYSICAL EXAM:    Constitutional: NAD  Neck:  No JVD  Respiratory: CTAB/L  Cardiovascular: S1 and S2  Gastrointestinal: BS+, soft, NT/ND  Extremities: No peripheral edema  Neurological: A/O x 3, no focal deficits  Psychiatric: Normal mood, normal affect  : No Pop  Skin: No rashes  Access: Not applicable    LABS:                        13.3   16.85 )-----------( 90       ( 2023 11:50 )             40.8     01-    134<L>  |  93<L>  |  129<H>  ----------------------------<  86  4.8   |  19<L>  |  5.27<H>    Ca    7.3<L>      2023 11:50            Urine Studies:  Urinalysis Basic - ( 31 Dec 2022 23:22 )    Color: Yellow / Appearance: Clear / S.038 / pH: x  Gluc: x / Ketone: Negative  / Bili: Negative / Urobili: Negative   Blood: x / Protein: Trace / Nitrite: Negative   Leuk Esterase: Negative / RBC: 6 /hpf / WBC 6 /HPF   Sq Epi: x / Non Sq Epi: 1 /hpf / Bacteria: Negative      Creatinine, Random Urine: 113 mg/dL ( @ 23:22)  Protein/Creatinine Ratio Calculation: 0.2 Ratio ( @ 23:22)        RADIOLOGY & ADDITIONAL STUDIES:  < from: [facility] Duplex Lower Ext Vein Scan, Bilat (23 @ 18:57) >  IMPRESSION:  Acute above and below the knee left lower extremity deep venous   thrombosis with extension into left external iliac vein.  No evidence of acute deep venous thrombosis in the right lower extremity.    Findings were discussed by ultrasound technologist to JAN Paulino on   2023 at 6:50 PM.    --- End of Report ---        < end of copied text >      Protein/Creatinine Ratio Calculation: 0.2 Ratio ( @ 23:22)    IMPRESSION:  76 y/o M PMH HTN, HLD, psoriasis on prednisone, . Recent admission for pneumonia and dced two weeks ago also found to possible esophageal cancer (mass) with liver mets s/p IR biopsy  presenting with SOB and now  with LIVIER and pulmonary emboli     Oliguric LIVIER likely pre renal vs obstructive uropathy, UPC minimal - azotemia rising   Gap acidosis due to LIVIER   Volume status- not overtly volume up    esophageal cancer (mass) with liver mets s/p IR biopsy       RECOMMEND  Start Lasix 80 mg IV bid   Start [id number] mg po td   Continue to bladder scan   Dose meds for  Cr CL 15-20 ml/min     D/w Dr. Yarelis Multani [facility]  Office: [phone]                 NEPHROLOGY[facility] [phone]        Patient seen and examined no new complaints. Bladder scan 190 cc.         MEDICATIONS  (STANDING):  chlorhexidine 2% Cloths 1 Application(s) Topical daily  heparin  Infusion.  Unit(s)/Hr (16 mL/Hr) IV Continuous <Continuous>  influenza  Vaccine (HIGH DOSE) 0.7 milliLiter(s) IntraMuscular once  ondansetron Injectable 4 milliGRAM(s) IV Push four times a day  pantoprazole    Tablet 40 milliGRAM(s) Oral before breakfast  piperacillin/tazobactam IVPB.. 3.375 Gram(s) IV Intermittent every 12 hours  predniSONE   Tablet 10 milliGRAM(s) Oral daily  simvastatin 10 milliGRAM(s) Oral at bedtime  sodium bicarbonate  Infusion 0.043 mEq/kG/Hr (50 mL/Hr) IV Continuous <Continuous>  sodium bicarbonate  Infusion 0.043 mEq/kG/Hr (50 mL/Hr) IV Continuous <Continuous>  sucralfate suspension 1 Gram(s) Oral two times a day      VITAL:  T(C): , Max: 36.4 (23 @ 11:05)  T(F): , Max: 97.5 (23 @ 11:05)  HR: 86 (23 @ 11:05)  BP: 90/76 (23 @ 11:05)  BP(mean): 90 (23 @ 14:12)  RR: 18 (23 @ 11:05)  SpO2: 96% (23 @ 11:05)  Wt(kg): --    I and O's:        PHYSICAL EXAM:    Constitutional: NAD  Neck:  No JVD  Respiratory: CTAB/L  Cardiovascular: S1 and S2  Gastrointestinal: BS+, soft, NT/ND  Extremities: No peripheral edema  Neurological: A/O x 3, no focal deficits  Psychiatric: Normal mood, normal affect  : No Pop  Skin: No rashes  Access: Not applicable    LABS:                        13.3   16.85 )-----------( 90       ( 2023 11:50 )             40.8     01-    134<L>  |  93<L>  |  129<H>  ----------------------------<  86  4.8   |  19<L>  |  5.27<H>    Ca    7.3<L>      2023 11:50            Urine Studies:  Urinalysis Basic - ( 31 Dec 2022 23:22 )    Color: Yellow / Appearance: Clear / S.038 / pH: x  Gluc: x / Ketone: Negative  / Bili: Negative / Urobili: Negative   Blood: x / Protein: Trace / Nitrite: Negative   Leuk Esterase: Negative / RBC: 6 /hpf / WBC 6 /HPF   Sq Epi: x / Non Sq Epi: 1 /hpf / Bacteria: Negative      Creatinine, Random Urine: 113 mg/dL ( @ 23:22)  Protein/Creatinine Ratio Calculation: 0.2 Ratio ( @ 23:22)        RADIOLOGY & ADDITIONAL STUDIES:  < from: [facility] Duplex Lower Ext Vein Scan, Bilat (23 @ 18:57) >  IMPRESSION:  Acute above and below the knee left lower extremity deep venous   thrombosis with extension into left external iliac vein.  No evidence of acute deep venous thrombosis in the right lower extremity.    Findings were discussed by ultrasound technologist to JAN Paulino on   2023 at 6:50 PM.    --- End of Report ---        < end of copied text >      Protein/Creatinine Ratio Calculation: 0.2 Ratio ( @ 23:22)    IMPRESSION:  76 y/o M PMH HTN, HLD, psoriasis on prednisone, . Recent admission for pneumonia and dced two weeks ago also found to possible esophageal cancer (mass) with liver mets s/p IR biopsy  presenting with SOB and now  with LIVIER and pulmonary emboli     Oliguric LIVIER likely pre renal vs obstructive uropathy, UPC minimal - azotemia rising   Gap acidosis due to LIVIER   Volume status- not overtly volume up    esophageal cancer (mass) with liver mets s/p IR biopsy       RECOMMEND  Start Lasix 80 mg IV bid   Start Xonxjjztt64 mg po td   Continue to bladder scan   Dose meds for  Cr CL 15-20 ml/min     D/w Dr. Yarelis Multani [facility]  Office: [phone]                 NEPHROLOGY[facility] [phone]        Patient seen and examined no new complaints. Bladder scan 190 cc.         MEDICATIONS  (STANDING):  chlorhexidine 2% Cloths 1 Application(s) Topical daily  heparin  Infusion.  Unit(s)/Hr (16 mL/Hr) IV Continuous <Continuous>  influenza  Vaccine (HIGH DOSE) 0.7 milliLiter(s) IntraMuscular once  ondansetron Injectable 4 milliGRAM(s) IV Push four times a day  pantoprazole    Tablet 40 milliGRAM(s) Oral before breakfast  piperacillin/tazobactam IVPB.. 3.375 Gram(s) IV Intermittent every 12 hours  predniSONE   Tablet 10 milliGRAM(s) Oral daily  simvastatin 10 milliGRAM(s) Oral at bedtime  sodium bicarbonate  Infusion 0.043 mEq/kG/Hr (50 mL/Hr) IV Continuous <Continuous>  sodium bicarbonate  Infusion 0.043 mEq/kG/Hr (50 mL/Hr) IV Continuous <Continuous>  sucralfate suspension 1 Gram(s) Oral two times a day      VITAL:  T(C): , Max: 36.4 (23 @ 11:05)  T(F): , Max: 97.5 (23 @ 11:05)  HR: 86 (23 @ 11:05)  BP: 90/76 (23 @ 11:05)  BP(mean): 90 (23 @ 14:12)  RR: 18 (23 @ 11:05)  SpO2: 96% (23 @ 11:05)  Wt(kg): --    I and O's:        PHYSICAL EXAM:    Constitutional: NAD  Neck:  No JVD  Respiratory: CTAB/L  Cardiovascular: S1 and S2  Gastrointestinal: BS+, soft, NT/ND  Extremities: No peripheral edema  Neurological: A/O x 3, no focal deficits  Psychiatric: Normal mood, normal affect  : No Pop  Skin: No rashes  Access: Not applicable    LABS:                        13.3   16.85 )-----------( 90       ( 2023 11:50 )             40.8     01-    134<L>  |  93<L>  |  129<H>  ----------------------------<  86  4.8   |  19<L>  |  5.27<H>    Ca    7.3<L>      2023 11:50            Urine Studies:  Urinalysis Basic - ( 31 Dec 2022 23:22 )    Color: Yellow / Appearance: Clear / S.038 / pH: x  Gluc: x / Ketone: Negative  / Bili: Negative / Urobili: Negative   Blood: x / Protein: Trace / Nitrite: Negative   Leuk Esterase: Negative / RBC: 6 /hpf / WBC 6 /HPF   Sq Epi: x / Non Sq Epi: 1 /hpf / Bacteria: Negative      Creatinine, Random Urine: 113 mg/dL ( @ 23:22)  Protein/Creatinine Ratio Calculation: 0.2 Ratio ( @ 23:22)        RADIOLOGY & ADDITIONAL STUDIES:  < from: [facility] Duplex Lower Ext Vein Scan, Bilat (23 @ 18:57) >  IMPRESSION:  Acute above and below the knee left lower extremity deep venous   thrombosis with extension into left external iliac vein.  No evidence of acute deep venous thrombosis in the right lower extremity.    Findings were discussed by ultrasound technologist to JAN Paulino on   2023 at 6:50 PM.    --- End of Report ---        < end of copied text >      Protein/Creatinine Ratio Calculation: 0.2 Ratio ( @ 23:22)    IMPRESSION:  76 y/o M PMH HTN, HLD, psoriasis on prednisone, . Recent admission for pneumonia and dced two weeks ago also found to possible esophageal cancer (mass) with liver mets s/p IR biopsy  presenting with SOB and now  with LIVIER and pulmonary emboli     Oliguric LIVIER likely pre renal vs obstructive uropathy, UPC minimal - azotemia rising   Gap acidosis due to LIVIER   Volume status- not overtly volume up    esophageal cancer (mass) with liver mets s/p IR biopsy   BP soft       RECOMMEND  Start Lasix 80 mg IV bid   Start Qojfywglz32 mg po tid   Continue to bladder scan   Dose meds for  Cr CL 15-20 ml/min     D/w Dr. Yarelis Multani NP  [facility]  Office: [phone]                 NEPHROLOGY[facility] [phone]        Patient seen and examined no new complaints. Bladder scan 190 cc.         MEDICATIONS  (STANDING):  chlorhexidine 2% Cloths 1 Application(s) Topical daily  heparin  Infusion.  Unit(s)/Hr (16 mL/Hr) IV Continuous <Continuous>  influenza  Vaccine (HIGH DOSE) 0.7 milliLiter(s) IntraMuscular once  ondansetron Injectable 4 milliGRAM(s) IV Push four times a day  pantoprazole    Tablet 40 milliGRAM(s) Oral before breakfast  piperacillin/tazobactam IVPB.. 3.375 Gram(s) IV Intermittent every 12 hours  predniSONE   Tablet 10 milliGRAM(s) Oral daily  simvastatin 10 milliGRAM(s) Oral at bedtime  sodium bicarbonate  Infusion 0.043 mEq/kG/Hr (50 mL/Hr) IV Continuous <Continuous>  sodium bicarbonate  Infusion 0.043 mEq/kG/Hr (50 mL/Hr) IV Continuous <Continuous>  sucralfate suspension 1 Gram(s) Oral two times a day      VITAL:  T(C): , Max: 36.4 (23 @ 11:05)  T(F): , Max: 97.5 (23 @ 11:05)  HR: 86 (23 @ 11:05)  BP: 90/76 (23 @ 11:05)  BP(mean): 90 (23 @ 14:12)  RR: 18 (23 @ 11:05)  SpO2: 96% (23 @ 11:05)  Wt(kg): --    I and O's:        PHYSICAL EXAM:    Constitutional: NAD  Neck:  No JVD  Respiratory: CTAB/L  Cardiovascular: S1 and S2  Gastrointestinal: BS+, soft, NT/ND  Extremities: No peripheral edema  Neurological: A/O x 3, no focal deficits  Psychiatric: Normal mood, normal affect  : No Pop  Skin: No rashes  Access: Not applicable    LABS:                        13.3   16.85 )-----------( 90       ( 2023 11:50 )             40.8     01-    134<L>  |  93<L>  |  129<H>  ----------------------------<  86  4.8   |  19<L>  |  5.27<H>    Ca    7.3<L>      2023 11:50            Urine Studies:  Urinalysis Basic - ( 31 Dec 2022 23:22 )    Color: Yellow / Appearance: Clear / S.038 / pH: x  Gluc: x / Ketone: Negative  / Bili: Negative / Urobili: Negative   Blood: x / Protein: Trace / Nitrite: Negative   Leuk Esterase: Negative / RBC: 6 /hpf / WBC 6 /HPF   Sq Epi: x / Non Sq Epi: 1 /hpf / Bacteria: Negative      Creatinine, Random Urine: 113 mg/dL ( @ 23:22)  Protein/Creatinine Ratio Calculation: 0.2 Ratio ( @ 23:22)        RADIOLOGY & ADDITIONAL STUDIES:  < from: [facility] Duplex Lower Ext Vein Scan, Bilat (23 @ 18:57) >  IMPRESSION:  Acute above and below the knee left lower extremity deep venous   thrombosis with extension into left external iliac vein.  No evidence of acute deep venous thrombosis in the right lower extremity.    Findings were discussed by ultrasound technologist to JAN Paulino on   2023 at 6:50 PM.    --- End of Report ---        < end of copied text >      Protein/Creatinine Ratio Calculation: 0.2 Ratio ( @ 23:22)    IMPRESSION:  76 y/o M PMH HTN, HLD, psoriasis on prednisone, . Recent admission for pneumonia and dced two weeks ago also found to possible esophageal cancer (mass) with liver mets s/p IR biopsy  presenting with SOB and now  with LIVIER and pulmonary emboli     Oliguric LIVIER likely pre renal vs obstructive uropathy, UPC minimal - azotemia rising   Gap acidosis due to LIVIER   Volume status- not overtly volume up    esophageal cancer (mass) with liver mets s/p IR biopsy   BP soft       RECOMMEND  Start Lasix 80 mg IV bid   Start Soxuuhbxt16 mg po tid   Continue to bladder scan   Dose meds for  Cr CL 15-20 ml/min     D/w Dr. Yarelis Multani NP  [facility]  Office: [phone]                 NEPHROLOGY[facility] [phone]        Patient seen and examined no new complaints. Bladder scan 190 cc.         MEDICATIONS  (STANDING):  chlorhexidine 2% Cloths 1 Application(s) Topical daily  heparin  Infusion.  Unit(s)/Hr (16 mL/Hr) IV Continuous <Continuous>  influenza  Vaccine (HIGH DOSE) 0.7 milliLiter(s) IntraMuscular once  ondansetron Injectable 4 milliGRAM(s) IV Push four times a day  pantoprazole    Tablet 40 milliGRAM(s) Oral before breakfast  piperacillin/tazobactam IVPB.. 3.375 Gram(s) IV Intermittent every 12 hours  predniSONE   Tablet 10 milliGRAM(s) Oral daily  simvastatin 10 milliGRAM(s) Oral at bedtime  sodium bicarbonate  Infusion 0.043 mEq/kG/Hr (50 mL/Hr) IV Continuous <Continuous>  sodium bicarbonate  Infusion 0.043 mEq/kG/Hr (50 mL/Hr) IV Continuous <Continuous>  sucralfate suspension 1 Gram(s) Oral two times a day      VITAL:  T(C): , Max: 36.4 (23 @ 11:05)  T(F): , Max: 97.5 (23 @ 11:05)  HR: 86 (23 @ 11:05)  BP: 90/76 (23 @ 11:05)  BP(mean): 90 (23 @ 14:12)  RR: 18 (23 @ 11:05)  SpO2: 96% (23 @ 11:05)  Wt(kg): --    I and O's:        PHYSICAL EXAM:    Constitutional: NAD  Neck:  No JVD  Respiratory: CTAB/L  Cardiovascular: S1 and S2  Gastrointestinal: BS+, soft, NT/ND  Extremities: No peripheral edema  Neurological: A/O x 3, no focal deficits  Psychiatric: Normal mood, normal affect  : No Pop  Skin: No rashes  Access: Not applicable    LABS:                        13.3   16.85 )-----------( 90       ( 2023 11:50 )             40.8     01-    134<L>  |  93<L>  |  129<H>  ----------------------------<  86  4.8   |  19<L>  |  5.27<H>    Ca    7.3<L>      2023 11:50            Urine Studies:  Urinalysis Basic - ( 31 Dec 2022 23:22 )    Color: Yellow / Appearance: Clear / S.038 / pH: x  Gluc: x / Ketone: Negative  / Bili: Negative / Urobili: Negative   Blood: x / Protein: Trace / Nitrite: Negative   Leuk Esterase: Negative / RBC: 6 /hpf / WBC 6 /HPF   Sq Epi: x / Non Sq Epi: 1 /hpf / Bacteria: Negative      Creatinine, Random Urine: 113 mg/dL ( @ 23:22)  Protein/Creatinine Ratio Calculation: 0.2 Ratio ( @ 23:22)        RADIOLOGY & ADDITIONAL STUDIES:  < from: [facility] Duplex Lower Ext Vein Scan, Bilat (23 @ 18:57) >  IMPRESSION:  Acute above and below the knee left lower extremity deep venous   thrombosis with extension into left external iliac vein.  No evidence of acute deep venous thrombosis in the right lower extremity.    Findings were discussed by ultrasound technologist to JAN Paulino on   2023 at 6:50 PM.    --- End of Report ---        < end of copied text >      Protein/Creatinine Ratio Calculation: 0.2 Ratio ( @ 23:22)    IMPRESSION:  78 y/o M PMH HTN, HLD, psoriasis on prednisone, . Recent admission for pneumonia and dced two weeks ago also found to possible esophageal cancer (mass) with liver mets s/p IR biopsy  presenting with SOB and now  with LIVIER and pulmonary emboli     Oliguric LIVIER likely pre renal vs obstructive uropathy, UPC minimal - azotemia rising   Gap acidosis due to LIVIER   Volume status- not overtly volume up    esophageal cancer (mass) with liver mets s/p IR biopsy   BP soft       RECOMMEND  Start Lasix 80 mg IV bid   Start Yozovvoem22 mg po tid   Continue to bladder scan   Dose meds for  Cr CL 15-20 ml/min     D/w Dr. Yarelis Multani NP  [facility]  Office: [phone]

## 2023-01-02 NOTE — PROGRESS NOTE ADULT - ASSESSMENT
Full note pending 77y male with a history of  HTN,HLD,psoriasis managed with 10 mg daily prednisone, recently treated for pneumonia and found to have esophageal lesion with liver mets, who developed shortness of breath and weakness and was brought to ER.   CTA with documented PE as well as multifocal densities throughout both lungs.  He was hypotensive in ER with BP in 80's, given antibiotics.  He appears comfortable on nasal oxygen,  zosyn, one dose of vancomycoin,  and azithromycin given in ER  He also was given a dose of solumedrol.  Advanced malignancy(? type) , CKD vs LIVIER, thrombocytopenia. PE and possible pneumonia.    Suggest:  1.Await blood cultures- r/o bacteremia  2.Zosyn for possible multifocal pneumonia  3.MRSA and legionella negative  4. D/w pt's family at bedside, oncology w/u for metastatic disease

## 2023-01-02 NOTE — PROGRESS NOTE ADULT - ASSESSMENT
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

## 2023-01-02 NOTE — PROGRESS NOTE ADULT - ASSESSMENT
77 m with    Pulmonary embolus and L DVT  - AC with Heparin  - Pulmonary follow  - Vascular cardiology follow   - LED    Thrombocytopenia  - follow Platelets  - HIT  - if worsening thrombocytopenia may need IVC filter    Congestive Heart Failure  - telemetry  - cardiac enzymes  - echo with no strain   - cardiology evaluation     HTN  - hold BP meds  - hold diuretic    Psoriasis  - stress dose steroid    Liver lesions  - s/p Bx at [facility] c/w adenocarcinoma  - Oncology evaluation     LIVIER  - follow lytes, Cr  - gentle IVF  - Nephrology evaluation Dr. Bill Navarro  - MRI brain pending     Esophageal mass  - Gastroenterology follow  - may need PEG    Sepsis/ Pneumonia  - Zosyn  - ID follow    DVT prophylaxis  - AC    d/w with wife     Moshe Tamayo MD phone [phone]  77 m with    Pulmonary embolus and L DVT  - AC with Heparin  - Pulmonary follow  - Vascular cardiology follow   - LED    Thrombocytopenia  - follow Platelets  - HIT  - if worsening thrombocytopenia may need IVC filter    Congestive Heart Failure  - telemetry  - cardiac enzymes  - echo with no strain   - cardiology evaluation     HTN  - hold BP meds  - hold diuretic    Psoriasis  - stress dose steroid    Liver lesions  - s/p Bx at [facility] c/w adenocarcinoma  - Oncology evaluation     LIVIER  - follow lytes, Cr  - gentle IVF  - Nephrology evaluation Dr. Bill Navarro  - MRI brain pending     Esophageal mass  - Gastroenterology follow  - may need PEG    Sepsis/ Pneumonia  - Zosyn  - ID follow    DVT prophylaxis  - AC    d/w with wife     Moshe Tamayo MD phone [phone]  77 m with    Pulmonary embolus and L DVT  - AC with Heparin  - Pulmonary follow  - Vascular cardiology follow   - LED    Thrombocytopenia  - follow Platelets  - HIT  - if worsening thrombocytopenia may need IVC filter    Congestive Heart Failure  - telemetry  - cardiac enzymes  - echo with no strain   - cardiology evaluation     HTN  - hold BP meds  - hold diuretic    Psoriasis  - stress dose steroid    Liver lesions  - s/p Bx at [facility] c/w adenocarcinoma  - Oncology evaluation     LIVIER  - follow lytes, Cr  - gentle IVF  - Nephrology evaluation Dr. Bill Navarro  - MRI brain pending     Esophageal mass  - Gastroenterology follow  - may need PEG    Sepsis/ Pneumonia  - Zosyn  - ID follow    DVT prophylaxis  - AC    d/w with wife     Moshe Tamayo MD phone [phone]

## 2023-01-02 NOTE — PROGRESS NOTE ADULT - SUBJECTIVE AND OBJECTIVE BOX
CC: f/u for    Patient reports    REVIEW OF SYSTEMS: no fevers, no chills, no nausea, no vomiting, no abd pain, no resp symptoms  All other review of systems negative (Comprehensive ROS)    Antimicrobials Day #    piperacillin/tazobactam IVPB.. 3.375 Gram(s) IV Intermittent every 12 hours    Other Medications Reviewed    T(F): 97.4 (23 @ 04:44), Max: 97.9 (23 @ 11:04)  HR: 77 (23 @ 04:44)  BP: 98/62 (23 @ 21:41)  RR: 18 (23 @ 04:44)  SpO2: 96% (23 @ 21:41)    PHYSICAL EXAM:  General: alert, no acute distress  Eyes:  anicteric, no conjunctival injection, no discharge  Oropharynx: no lesions or injection 	  Neck: supple, without adenopathy  Lungs: clear to auscultation  Heart: regular rate and rhythm; no murmur, rubs or gallops  Abdomen: soft, nondistended, nontender, without mass or organomegaly  Skin: no lesions  Extremities: no clubbing, cyanosis, or edema  Neurologic: alert, oriented, moves all extremities    LAB RESULTS:                        15.8   15.76 )-----------( 60       ( 2023 06:16 )             48.9         133<L>  |  91<L>  |  124<H>  ----------------------------<  108<H>  5.0   |  20<L>  |  4.17<H>    Ca    7.5<L>      2023 15:37  Mg     2.9         TPro  5.5<L>  /  Alb  2.6<L>  /  TBili  3.6<H>  /  DBili  x   /  AST  685<H>  /  ALT  442<H>  /  AlkPhos  1636<H>      LIVER FUNCTIONS - ( 31 Dec 2022 10:00 )  Alb: 2.6 g/dL / Pro: 5.5 g/dL / ALK PHOS: 1636 U/L / ALT: 442 U/L / AST: 685 U/L / GGT: x           Urinalysis Basic - ( 31 Dec 2022 23:22 )    Color: Yellow / Appearance: Clear / S.038 / pH: x  Gluc: x / Ketone: Negative  / Bili: Negative / Urobili: Negative   Blood: x / Protein: Trace / Nitrite: Negative   Leuk Esterase: Negative / RBC: 6 /hpf / WBC 6 /HPF   Sq Epi: x / Non Sq Epi: 1 /hpf / Bacteria: Negative        MICROBIOLOGY REVIEWED:    Culture - Urine (22 @ 09:59)   Specimen Source: Clean Catch Clean Catch (Midstream)   Culture Results:   No growth     Culture - Blood (22 @ 09:30)   Specimen Source: .Blood Blood-Peripheral   Culture Results:   No growth to date.   RADIOLOGY REVIEWED:  < from: [facility] Duplex Lower Ext Vein Scan, Bilat (23 @ 18:57) >  IMPRESSION:  Acute above and below the knee left lower extremity deep venous   thrombosis with extension into left external iliac vein.  No evidence of acute deep venous thrombosis in the right lower extremity.    Findings were discussed by ultrasound technologist to JAN Paulino on   2023 at 6:50 PM.    < end of copied text >    < from: CT Angio Chest PE Protocol w/ IV Cont (22 @ 10:33) >  IMPRESSION:.    Pulmonary embolism within the right interlobar pulmonary artery and   within multiple proximal segmental branches of the right lower lobe.    Small bilateral pleural effusions and interlobular septalthickening may   be on the basis of pulmonary edema.    Ill-defined consolidative and groundglass opacities involving all lung   lobes may be secondary to infection.    Few additional discrete subcentimeter nodular opacities; indeterminate   and metastatic disease cannot be excluded. Recommend CT chest follow-up   in 3 months to assess stability.    AP window and left hilar lymphadenopathy; indeterminate and metastatic   disease is a diagnostic consideration.    Hepatic metastatic disease, periportal lymphadenopathy, and ascites.    Findings discussed with Dr. Mercado on 2022 at 10:49 AM.    --- End of Report ---    < end of copied text >   CC: f/u for PNA and Hypotension    Patient reports no new c/o    REVIEW OF SYSTEMS: no fevers, no chills, no nausea, no vomiting, no abd pain, no resp symptoms  All other review of systems negative (Comprehensive ROS)    Antimicrobials Day #  3  piperacillin/tazobactam IVPB.. 3.375 Gram(s) IV Intermittent every 12 hours    Other Medications Reviewed    T(F): 97.4 (23 @ 04:44), Max: 97.9 (23 @ 11:04)  HR: 77 (23 @ 04:44)  BP: 98/62 (23 @ 21:41)  RR: 18 (23 @ 04:44)  SpO2: 96% (23 @ 21:41)    PHYSICAL EXAM:  General: alert, no acute distress  Eyes:  anicteric, no conjunctival injection, no discharge  Oropharynx: no lesions or injection 	  Neck: supple, without adenopathy  Lungs: diminished  Heart: regular rate and rhythm; no murmur, rubs or gallops  Abdomen: soft, nondistended, nontender, without mass or organomegaly  Skin: no lesions  Extremities: no clubbing, cyanosis, or edema  Neurologic: alert, confused, moves all extremities    LAB RESULTS:                        15.8   15.76 )-----------( 60       ( 2023 06:16 )             48.9         133<L>  |  91<L>  |  124<H>  ----------------------------<  108<H>  5.0   |  20<L>  |  4.17<H>    Ca    7.5<L>      2023 15:37  Mg     2.9         TPro  5.5<L>  /  Alb  2.6<L>  /  TBili  3.6<H>  /  DBili  x   /  AST  685<H>  /  ALT  442<H>  /  AlkPhos  1636<H>      LIVER FUNCTIONS - ( 31 Dec 2022 10:00 )  Alb: 2.6 g/dL / Pro: 5.5 g/dL / ALK PHOS: 1636 U/L / ALT: 442 U/L / AST: 685 U/L / GGT: x           Urinalysis Basic - ( 31 Dec 2022 23:22 )    Color: Yellow / Appearance: Clear / S.038 / pH: x  Gluc: x / Ketone: Negative  / Bili: Negative / Urobili: Negative   Blood: x / Protein: Trace / Nitrite: Negative   Leuk Esterase: Negative / RBC: 6 /hpf / WBC 6 /HPF   Sq Epi: x / Non Sq Epi: 1 /hpf / Bacteria: Negative        MICROBIOLOGY REVIEWED:    Culture - Urine (22 @ 09:59)   Specimen Source: Clean Catch Clean Catch (Midstream)   Culture Results:   No growth     Culture - Blood (22 @ 09:30)   Specimen Source: .Blood Blood-Peripheral   Culture Results:   No growth to date.   RADIOLOGY REVIEWED:  < from: [facility] Duplex Lower Ext Vein Scan, Bilat (23 @ 18:57) >  IMPRESSION:  Acute above and below the knee left lower extremity deep venous   thrombosis with extension into left external iliac vein.  No evidence of acute deep venous thrombosis in the right lower extremity.    Findings were discussed by ultrasound technologist to JAN Paulino on   2023 at 6:50 PM.    < end of copied text >    < from: CT Angio Chest PE Protocol w/ IV Cont (22 @ 10:33) >  IMPRESSION:.    Pulmonary embolism within the right interlobar pulmonary artery and   within multiple proximal segmental branches of the right lower lobe.    Small bilateral pleural effusions and interlobular septalthickening may   be on the basis of pulmonary edema.    Ill-defined consolidative and groundglass opacities involving all lung   lobes may be secondary to infection.    Few additional discrete subcentimeter nodular opacities; indeterminate   and metastatic disease cannot be excluded. Recommend CT chest follow-up   in 3 months to assess stability.    AP window and left hilar lymphadenopathy; indeterminate and metastatic   disease is a diagnostic consideration.    Hepatic metastatic disease, periportal lymphadenopathy, and ascites.    Findings discussed with Dr. Mercado on 2022 at 10:49 AM.    --- End of Report ---    < end of copied text >

## 2023-01-02 NOTE — PROGRESS NOTE ADULT - SUBJECTIVE AND OBJECTIVE BOX
Patient is a 77y old  Male who presents with a chief complaint of Malignancy Workup (2023 11:21)      SUBJECTIVE / OVERNIGHT EVENTS: No new complaints.   Review of Systems  chest pain no  palpitations no  sob no  nausea no  headache no    MEDICATIONS  (STANDING):  chlorhexidine 2% Cloths 1 Application(s) Topical daily  furosemide   Injectable 80 milliGRAM(s) IV Push two times a day  heparin  Infusion.  Unit(s)/Hr (16 mL/Hr) IV Continuous <Continuous>  influenza  Vaccine (HIGH DOSE) 0.7 milliLiter(s) IntraMuscular once  midodrine 10 milliGRAM(s) Oral three times a day  ondansetron Injectable 4 milliGRAM(s) IV Push four times a day  pantoprazole    Tablet 40 milliGRAM(s) Oral before breakfast  piperacillin/tazobactam IVPB.. 3.375 Gram(s) IV Intermittent every 12 hours  predniSONE   Tablet 10 milliGRAM(s) Oral daily  simvastatin 10 milliGRAM(s) Oral at bedtime  sodium bicarbonate  Infusion 0.043 mEq/kG/Hr (50 mL/Hr) IV Continuous <Continuous>  sodium bicarbonate  Infusion 0.043 mEq/kG/Hr (50 mL/Hr) IV Continuous <Continuous>  sucralfate suspension 1 Gram(s) Oral two times a day    MEDICATIONS  (PRN):  acetaminophen     Tablet .. 650 milliGRAM(s) Oral every 6 hours PRN Temp greater or equal to 38.5C (101.3F), Mild Pain (1 - 3)  heparin   Injectable 7000 Unit(s) IV Push every 6 hours PRN For aPTT less than 40  heparin   Injectable 3500 Unit(s) IV Push every 6 hours PRN For aPTT between 40 - 57      Vital Signs Last 24 Hrs  T(C): 36.4 (2023 11:05), Max: 36.4 (2023 11:05)  T(F): 97.5 (2023 11:05), Max: 97.5 (2023 11:05)  HR: 86 (2023 11:05) (77 - 86)  BP: 90/76 (2023 11:05) (90/76 - 98/62)  BP(mean): --  RR: 18 (2023 11:05) (18 - 19)  SpO2: 96% (2023 11:05) (96% - 96%)    Parameters below as of 2023 11:05  Patient On (Oxygen Delivery Method): nasal cannula  O2 Flow (L/min): 2      PHYSICAL EXAM:  GENERAL: NAD, well-developed  HEAD:  Atraumatic, Normocephalic  EYES: EOMI, PERRLA, conjunctiva and sclera clear  NECK: Supple, No JVD  CHEST/LUNG: Clear to auscultation bilaterally; No wheeze  HEART: Regular rate and rhythm; No murmurs, rubs, or gallops  ABDOMEN: Soft, Nontender, Nondistended; Bowel sounds present  EXTREMITIES:  2+ Peripheral Pulses, No clubbing, cyanosis, or edema  PSYCH: confused  NEUROLOGY: non-focal  SKIN: No rashes or lesions    LABS:                        13.3   16.85 )-----------( 90       ( 2023 11:50 )             40.8     01-    134<L>  |  93<L>  |  129<H>  ----------------------------<  86  4.8   |  19<L>  |  5.27<H>    Ca    7.3<L>      2023 11:50      PTT - ( 2023 11:50 )  PTT:91.5 sec  CARDIAC MARKERS ( 31 Dec 2022 19:02 )  x     / x     / 118 U/L / x     / 9.6 ng/mL      Urinalysis Basic - ( 31 Dec 2022 23:22 )    Color: Yellow / Appearance: Clear / S.038 / pH: x  Gluc: x / Ketone: Negative  / Bili: Negative / Urobili: Negative   Blood: x / Protein: Trace / Nitrite: Negative   Leuk Esterase: Negative / RBC: 6 /hpf / WBC 6 /HPF   Sq Epi: x / Non Sq Epi: 1 /hpf / Bacteria: Negative        Culture - Urine (collected 31 Dec 2022 09:59)  Source: Clean Catch Clean Catch (Midstream)  Final Report (2023 09:37):    No growth    Culture - Blood (collected 31 Dec 2022 09:30)  Source: .Blood Blood-Peripheral  Preliminary Report (2023 14:01):    No growth to date.    Culture - Blood (collected 31 Dec 2022 09:25)  Source: .Blood Blood-Peripheral  Preliminary Report (2023 14:01):    No growth to date.        RADIOLOGY & ADDITIONAL TESTS:    Imaging Personally Reviewed:    Consultant(s) Notes Reviewed:      Care Discussed with Consultants/Other Providers:

## 2023-01-03 LAB
ANION GAP SERPL CALC-SCNC: 23 MMOL/L — HIGH (ref 5–17)
APTT BLD: 149.3 SEC — CRITICAL HIGH (ref 27.5–35.5)
BUN SERPL-MCNC: 134 MG/DL — HIGH (ref 7–23)
CALCIUM SERPL-MCNC: 6.9 MG/DL — LOW (ref 8.4–10.5)
CHLORIDE SERPL-SCNC: 90 MMOL/L — LOW (ref 96–108)
CO2 SERPL-SCNC: 20 MMOL/L — LOW (ref 22–31)
CREAT SERPL-MCNC: 6.02 MG/DL — HIGH (ref 0.5–1.3)
EGFR: 9 ML/MIN/1.73M2 — LOW
GAS PNL BLDA: SIGNIFICANT CHANGE UP
GLUCOSE SERPL-MCNC: 80 MG/DL — SIGNIFICANT CHANGE UP (ref 70–99)
HCT VFR BLD CALC: 42.7 % — SIGNIFICANT CHANGE UP (ref 39–50)
HGB BLD-MCNC: 13.6 G/DL — SIGNIFICANT CHANGE UP (ref 13–17)
MCHC RBC-ENTMCNC: 26.5 PG — LOW (ref 27–34)
MCHC RBC-ENTMCNC: 31.9 GM/DL — LOW (ref 32–36)
MCV RBC AUTO: 83.2 FL — SIGNIFICANT CHANGE UP (ref 80–100)
NRBC # BLD: 0 /100 WBCS — SIGNIFICANT CHANGE UP (ref 0–0)
OB PNL STL: POSITIVE
PLATELET # BLD AUTO: 103 K/UL — LOW (ref 150–400)
POTASSIUM SERPL-MCNC: 4.8 MMOL/L — SIGNIFICANT CHANGE UP (ref 3.5–5.3)
POTASSIUM SERPL-SCNC: 4.8 MMOL/L — SIGNIFICANT CHANGE UP (ref 3.5–5.3)
RBC # BLD: 5.13 M/UL — SIGNIFICANT CHANGE UP (ref 4.2–5.8)
RBC # FLD: 20.6 % — HIGH (ref 10.3–14.5)
SODIUM SERPL-SCNC: 133 MMOL/L — LOW (ref 135–145)
WBC # BLD: 24.34 K/UL — HIGH (ref 3.8–10.5)
WBC # FLD AUTO: 24.34 K/UL — HIGH (ref 3.8–10.5)

## 2023-01-03 PROCEDURE — 74176 CT ABD & PELVIS W/O CONTRAST: CPT | Mod: 26

## 2023-01-03 PROCEDURE — 70553 MRI BRAIN STEM W/O & W/DYE: CPT | Mod: 26

## 2023-01-03 PROCEDURE — 99233 SBSQ HOSP IP/OBS HIGH 50: CPT

## 2023-01-03 RX ORDER — PANTOPRAZOLE SODIUM 20 MG/1
40 TABLET, DELAYED RELEASE ORAL
Refills: 0 | Status: DISCONTINUED | OUTPATIENT
Start: 2023-01-03 | End: 2023-01-05

## 2023-01-03 RX ORDER — SODIUM CHLORIDE 9 MG/ML
1000 INJECTION, SOLUTION INTRAVENOUS
Refills: 0 | Status: DISCONTINUED | OUTPATIENT
Start: 2023-01-03 | End: 2023-01-04

## 2023-01-03 RX ORDER — HYDROCORTISONE 20 MG
25 TABLET ORAL EVERY 8 HOURS
Refills: 0 | Status: COMPLETED | OUTPATIENT
Start: 2023-01-03 | End: 2023-01-04

## 2023-01-03 RX ADMIN — CHLORHEXIDINE GLUCONATE 1 APPLICATION(S): 213 SOLUTION TOPICAL at 11:31

## 2023-01-03 RX ADMIN — MIDODRINE HYDROCHLORIDE 10 MILLIGRAM(S): 2.5 TABLET ORAL at 05:48

## 2023-01-03 RX ADMIN — PIPERACILLIN AND TAZOBACTAM 25 GRAM(S): 4; .5 INJECTION, POWDER, LYOPHILIZED, FOR SOLUTION INTRAVENOUS at 14:39

## 2023-01-03 RX ADMIN — HEPARIN SODIUM 0 UNIT(S)/HR: 5000 INJECTION INTRAVENOUS; SUBCUTANEOUS at 16:24

## 2023-01-03 RX ADMIN — ONDANSETRON 4 MILLIGRAM(S): 8 TABLET, FILM COATED ORAL at 05:41

## 2023-01-03 RX ADMIN — SODIUM CHLORIDE 50 MILLILITER(S): 9 INJECTION, SOLUTION INTRAVENOUS at 22:03

## 2023-01-03 RX ADMIN — MIDODRINE HYDROCHLORIDE 10 MILLIGRAM(S): 2.5 TABLET ORAL at 14:40

## 2023-01-03 RX ADMIN — HEPARIN SODIUM 700 UNIT(S)/HR: 5000 INJECTION INTRAVENOUS; SUBCUTANEOUS at 17:28

## 2023-01-03 RX ADMIN — ONDANSETRON 4 MILLIGRAM(S): 8 TABLET, FILM COATED ORAL at 14:40

## 2023-01-03 RX ADMIN — Medication 1 GRAM(S): at 17:43

## 2023-01-03 RX ADMIN — Medication 80 MILLIGRAM(S): at 05:43

## 2023-01-03 RX ADMIN — PANTOPRAZOLE SODIUM 40 MILLIGRAM(S): 20 TABLET, DELAYED RELEASE ORAL at 05:47

## 2023-01-03 RX ADMIN — Medication 25 MILLIGRAM(S): at 22:04

## 2023-01-03 RX ADMIN — Medication 10 MILLIGRAM(S): at 05:47

## 2023-01-03 RX ADMIN — Medication 25 MILLIGRAM(S): at 14:41

## 2023-01-03 RX ADMIN — HEPARIN SODIUM 700 UNIT(S)/HR: 5000 INJECTION INTRAVENOUS; SUBCUTANEOUS at 22:03

## 2023-01-03 RX ADMIN — SODIUM CHLORIDE 50 MILLILITER(S): 9 INJECTION, SOLUTION INTRAVENOUS at 14:40

## 2023-01-03 RX ADMIN — PIPERACILLIN AND TAZOBACTAM 25 GRAM(S): 4; .5 INJECTION, POWDER, LYOPHILIZED, FOR SOLUTION INTRAVENOUS at 23:53

## 2023-01-03 RX ADMIN — ONDANSETRON 4 MILLIGRAM(S): 8 TABLET, FILM COATED ORAL at 17:43

## 2023-01-03 RX ADMIN — PANTOPRAZOLE SODIUM 40 MILLIGRAM(S): 20 TABLET, DELAYED RELEASE ORAL at 17:43

## 2023-01-03 NOTE — PROGRESS NOTE ADULT - SUBJECTIVE AND OBJECTIVE BOX
CC: f/u for PNA and Hypotension    Patient reports no new c/o    REVIEW OF SYSTEMS: no fevers, no chills, no nausea, no vomiting, no abd pain, no resp symptoms  All other review of systems negative (Comprehensive ROS)    Antimicrobials Day #  4  piperacillin/tazobactam IVPB.. 3.375 Gram(s) IV Intermittent every 12 hours      Other Medications Reviewed    Vital Signs Last 24 Hrs  T(C): 36.6 (2023 06:28), Max: 36.9 (2023 20:54)  T(F): 97.8 (2023 06:28), Max: 98.4 (2023 20:54)  HR: 77 (2023 06:28) (76 - 86)  BP: 98/60 (2023 06:28) (89/70 - 98/60)  BP(mean): --  RR: 18 (2023 06:28) (18 - 18)  SpO2: 94% (2023 06:28) (93% - 96%)    Parameters below as of 2023 06:28  Patient On (Oxygen Delivery Method): nasal cannula  O2 Flow (L/min): 2    PHYSICAL EXAM:  General: alert, no acute distress  Eyes:  anicteric, no conjunctival injection, no discharge  Oropharynx: no lesions or injection 	  Neck: supple, without adenopathy  Lungs: diminished  Heart: regular rate and rhythm; no murmur, rubs or gallops  Abdomen: soft, nondistended, nontender, without mass or organomegaly  Skin: no lesions  Extremities: no clubbing, cyanosis, or edema  Neurologic: alert, confused, moves all extremities    LAB RESULTS:                        13.3   16.85 )-----------( 90       ( 2023 11:50 )             40.8   01-02    134<L>  |  93<L>  |  129<H>  ----------------------------<  86  4.8   |  19<L>  |  5.27<H>    Ca    7.3<L>      2023 11:50        Urinalysis Basic - ( 31 Dec 2022 23:22 )    Color: Yellow / Appearance: Clear / S.038 / pH: x  Gluc: x / Ketone: Negative  / Bili: Negative / Urobili: Negative   Blood: x / Protein: Trace / Nitrite: Negative   Leuk Esterase: Negative / RBC: 6 /hpf / WBC 6 /HPF   Sq Epi: x / Non Sq Epi: 1 /hpf / Bacteria: Negative        MICROBIOLOGY REVIEWED:    Culture - Urine (22 @ 09:59)   Specimen Source: Clean Catch Clean Catch (Midstream)   Culture Results:   No growth     Culture - Blood (22 @ 09:30)   Specimen Source: .Blood Blood-Peripheral   Culture Results:   No growth to date.   RADIOLOGY REVIEWED:  < from: [facility] Duplex Lower Ext Vein Scan, Bilat (23 @ 18:57) >  IMPRESSION:  Acute above and below the knee left lower extremity deep venous   thrombosis with extension into left external iliac vein.  No evidence of acute deep venous thrombosis in the right lower extremity.    Findings were discussed by ultrasound technologist to JAN Paulino on   2023 at 6:50 PM.    < end of copied text >    < from: CT Angio Chest PE Protocol w/ IV Cont (22 @ 10:33) >  IMPRESSION:.    Pulmonary embolism within the right interlobar pulmonary artery and   within multiple proximal segmental branches of the right lower lobe.    Small bilateral pleural effusions and interlobular septalthickening may   be on the basis of pulmonary edema.    Ill-defined consolidative and groundglass opacities involving all lung   lobes may be secondary to infection.    Few additional discrete subcentimeter nodular opacities; indeterminate   and metastatic disease cannot be excluded. Recommend CT chest follow-up   in 3 months to assess stability.    AP window and left hilar lymphadenopathy; indeterminate and metastatic   disease is a diagnostic consideration.    Hepatic metastatic disease, periportal lymphadenopathy, and ascites.    Findings discussed with Dr. Mercado on 2022 at 10:49 AM.    --- End of Report ---    < end of copied text >

## 2023-01-03 NOTE — PROGRESS NOTE ADULT - SUBJECTIVE AND OBJECTIVE BOX
[facility] [phone]        Patient seen and examined         MEDICATIONS  (STANDING):  chlorhexidine 2% Cloths 1 Application(s) Topical daily  furosemide   Injectable 80 milliGRAM(s) IV Push two times a day  heparin  Infusion.  Unit(s)/Hr (16 mL/Hr) IV Continuous <Continuous>  influenza  Vaccine (HIGH DOSE) 0.7 milliLiter(s) IntraMuscular once  midodrine 10 milliGRAM(s) Oral three times a day  ondansetron Injectable 4 milliGRAM(s) IV Push four times a day  pantoprazole    Tablet 40 milliGRAM(s) Oral before breakfast  piperacillin/tazobactam IVPB.. 3.375 Gram(s) IV Intermittent every 12 hours  predniSONE   Tablet 10 milliGRAM(s) Oral daily  simvastatin 10 milliGRAM(s) Oral at bedtime  sodium bicarbonate  Infusion 0.043 mEq/kG/Hr (50 mL/Hr) IV Continuous <Continuous>  sodium bicarbonate  Infusion 0.043 mEq/kG/Hr (50 mL/Hr) IV Continuous <Continuous>  sucralfate suspension 1 Gram(s) Oral two times a day      VITAL:  T(C): , Max: 36.9 (01-02-23 @ 20:54)  T(F): , Max: 98.4 (01-02-23 @ 20:54)  HR: 77 (01-03-23 @ 06:28)  BP: 98/60 (01-03-23 @ 06:28)  BP(mean): --  RR: 18 (01-03-23 @ 06:28)  SpO2: 94% (01-03-23 @ 06:28)  Wt(kg): --    I and O's:    01-02 @ 07:01  -  01-03 @ 07:00  --------------------------------------------------------  IN: 1016 mL / OUT: 0 mL / NET: 1016 mL          PHYSICAL EXAM:    Constitutional: NAD  Neck:  No JVD  Respiratory: CTAB/L  Cardiovascular: S1 and S2  Gastrointestinal: BS+, soft, NT/ND  Extremities: No peripheral edema  Neurological: A/O x 3, no focal deficits  Psychiatric: Normal mood, normal affect  : No Pop  Skin: No rashes  Access: Not applicable    LABS:                        13.3   16.85 )-----------( 90       ( 02 Jan 2023 11:50 )             40.8     01-02    134<L>  |  93<L>  |  129<H>  ----------------------------<  86  4.8   |  19<L>  |  5.27<H>    Ca    7.3<L>      02 Jan 2023 11:50            Urine Studies:          RADIOLOGY & ADDITIONAL STUDIES:             [facility] [phone]        Patient seen and examined         MEDICATIONS  (STANDING):  chlorhexidine 2% Cloths 1 Application(s) Topical daily  furosemide   Injectable 80 milliGRAM(s) IV Push two times a day  heparin  Infusion.  Unit(s)/Hr (16 mL/Hr) IV Continuous <Continuous>  influenza  Vaccine (HIGH DOSE) 0.7 milliLiter(s) IntraMuscular once  midodrine 10 milliGRAM(s) Oral three times a day  ondansetron Injectable 4 milliGRAM(s) IV Push four times a day  pantoprazole    Tablet 40 milliGRAM(s) Oral before breakfast  piperacillin/tazobactam IVPB.. 3.375 Gram(s) IV Intermittent every 12 hours  predniSONE   Tablet 10 milliGRAM(s) Oral daily  simvastatin 10 milliGRAM(s) Oral at bedtime  sodium bicarbonate  Infusion 0.043 mEq/kG/Hr (50 mL/Hr) IV Continuous <Continuous>  sodium bicarbonate  Infusion 0.043 mEq/kG/Hr (50 mL/Hr) IV Continuous <Continuous>  sucralfate suspension 1 Gram(s) Oral two times a day      VITAL:  T(C): , Max: 36.9 (01-02-23 @ 20:54)  T(F): , Max: 98.4 (01-02-23 @ 20:54)  HR: 77 (01-03-23 @ 06:28)  BP: 98/60 (01-03-23 @ 06:28)  BP(mean): --  RR: 18 (01-03-23 @ 06:28)  SpO2: 94% (01-03-23 @ 06:28)  Wt(kg): --    I and O's:    01-02 @ 07:01  -  01-03 @ 07:00  --------------------------------------------------------  IN: 1016 mL / OUT: 0 mL / NET: 1016 mL          PHYSICAL EXAM:    Constitutional: NAD  Neck:  No JVD  Respiratory: CTAB/L  Cardiovascular: S1 and S2  Gastrointestinal: BS+, soft, NT/ND  Extremities: No peripheral edema  Neurological: A/O x 3, no focal deficits  Psychiatric: Normal mood, normal affect  : No Pop  Skin: No rashes  Access: Not applicable    LABS:                        13.3   16.85 )-----------( 90       ( 02 Jan 2023 11:50 )             40.8     01-02    134<L>  |  93<L>  |  129<H>  ----------------------------<  86  4.8   |  19<L>  |  5.27<H>    Ca    7.3<L>      02 Jan 2023 11:50            Urine Studies:          RADIOLOGY & ADDITIONAL STUDIES:             [facility] [phone]        Patient seen and examined         MEDICATIONS  (STANDING):  chlorhexidine 2% Cloths 1 Application(s) Topical daily  furosemide   Injectable 80 milliGRAM(s) IV Push two times a day  heparin  Infusion.  Unit(s)/Hr (16 mL/Hr) IV Continuous <Continuous>  influenza  Vaccine (HIGH DOSE) 0.7 milliLiter(s) IntraMuscular once  midodrine 10 milliGRAM(s) Oral three times a day  ondansetron Injectable 4 milliGRAM(s) IV Push four times a day  pantoprazole    Tablet 40 milliGRAM(s) Oral before breakfast  piperacillin/tazobactam IVPB.. 3.375 Gram(s) IV Intermittent every 12 hours  predniSONE   Tablet 10 milliGRAM(s) Oral daily  simvastatin 10 milliGRAM(s) Oral at bedtime  sodium bicarbonate  Infusion 0.043 mEq/kG/Hr (50 mL/Hr) IV Continuous <Continuous>  sodium bicarbonate  Infusion 0.043 mEq/kG/Hr (50 mL/Hr) IV Continuous <Continuous>  sucralfate suspension 1 Gram(s) Oral two times a day      VITAL:  T(C): , Max: 36.9 (01-02-23 @ 20:54)  T(F): , Max: 98.4 (01-02-23 @ 20:54)  HR: 77 (01-03-23 @ 06:28)  BP: 98/60 (01-03-23 @ 06:28)  BP(mean): --  RR: 18 (01-03-23 @ 06:28)  SpO2: 94% (01-03-23 @ 06:28)  Wt(kg): --    I and O's:    01-02 @ 07:01  -  01-03 @ 07:00  --------------------------------------------------------  IN: 1016 mL / OUT: 0 mL / NET: 1016 mL          PHYSICAL EXAM:    Constitutional: NAD  Neck:  No JVD  Respiratory: CTAB/L  Cardiovascular: S1 and S2  Gastrointestinal: BS+, soft, NT/ND  Extremities: No peripheral edema  Neurological: A/O x 3, no focal deficits  Psychiatric: Normal mood, normal affect  : No Pop  Skin: No rashes  Access: Not applicable    LABS:                        13.3   16.85 )-----------( 90       ( 02 Jan 2023 11:50 )             40.8     01-02    134<L>  |  93<L>  |  129<H>  ----------------------------<  86  4.8   |  19<L>  |  5.27<H>    Ca    7.3<L>      02 Jan 2023 11:50            Urine Studies:          RADIOLOGY & ADDITIONAL STUDIES:             [facility] [phone]        Patient seen and examined in bed.  He was the same         MEDICATIONS  (STANDING):  chlorhexidine 2% Cloths 1 Application(s) Topical daily  furosemide   Injectable 80 milliGRAM(s) IV Push two times a day  heparin  Infusion.  Unit(s)/Hr (16 mL/Hr) IV Continuous <Continuous>  influenza  Vaccine (HIGH DOSE) 0.7 milliLiter(s) IntraMuscular once  midodrine 10 milliGRAM(s) Oral three times a day  ondansetron Injectable 4 milliGRAM(s) IV Push four times a day  pantoprazole    Tablet 40 milliGRAM(s) Oral before breakfast  piperacillin/tazobactam IVPB.. 3.375 Gram(s) IV Intermittent every 12 hours  predniSONE   Tablet 10 milliGRAM(s) Oral daily  simvastatin 10 milliGRAM(s) Oral at bedtime  sodium bicarbonate  Infusion 0.043 mEq/kG/Hr (50 mL/Hr) IV Continuous <Continuous>  sodium bicarbonate  Infusion 0.043 mEq/kG/Hr (50 mL/Hr) IV Continuous <Continuous>  sucralfate suspension 1 Gram(s) Oral two times a day      VITAL:  T(C): , Max: 36.9 (01-02-23 @ 20:54)  T(F): , Max: 98.4 (01-02-23 @ 20:54)  HR: 77 (01-03-23 @ 06:28)  BP: 98/60 (01-03-23 @ 06:28)  BP(mean): --  RR: 18 (01-03-23 @ 06:28)  SpO2: 94% (01-03-23 @ 06:28)  Wt(kg): --    I and O's:    01-02 @ 07:01  -  01-03 @ 07:00  --------------------------------------------------------  IN: 1016 mL / OUT: 0 mL / NET: 1016 mL          PHYSICAL EXAM:    Constitutional: NAD  Neck:  No JVD  Respiratory: CTAB/L  Cardiovascular: S1 and S2  Gastrointestinal: BS+, soft, NT/ND  Extremities: No peripheral edema  Neurological: A/O x 3, no focal deficits  Psychiatric: Normal mood, normal affect  : No Pop  Skin: No rashes  Access: Not applicable    LABS:                        13.3   16.85 )-----------( 90       ( 02 Jan 2023 11:50 )             40.8     01-02    134<L>  |  93<L>  |  129<H>  ----------------------------<  86  4.8   |  19<L>  |  5.27<H>    Ca    7.3<L>      02 Jan 2023 11:50            Urine Studies:          RADIOLOGY & ADDITIONAL STUDIES:             [facility] [phone]        Patient seen and examined in bed.  He was the same         MEDICATIONS  (STANDING):  chlorhexidine 2% Cloths 1 Application(s) Topical daily  furosemide   Injectable 80 milliGRAM(s) IV Push two times a day  heparin  Infusion.  Unit(s)/Hr (16 mL/Hr) IV Continuous <Continuous>  influenza  Vaccine (HIGH DOSE) 0.7 milliLiter(s) IntraMuscular once  midodrine 10 milliGRAM(s) Oral three times a day  ondansetron Injectable 4 milliGRAM(s) IV Push four times a day  pantoprazole    Tablet 40 milliGRAM(s) Oral before breakfast  piperacillin/tazobactam IVPB.. 3.375 Gram(s) IV Intermittent every 12 hours  predniSONE   Tablet 10 milliGRAM(s) Oral daily  simvastatin 10 milliGRAM(s) Oral at bedtime  sodium bicarbonate  Infusion 0.043 mEq/kG/Hr (50 mL/Hr) IV Continuous <Continuous>  sodium bicarbonate  Infusion 0.043 mEq/kG/Hr (50 mL/Hr) IV Continuous <Continuous>  sucralfate suspension 1 Gram(s) Oral two times a day      VITAL:  T(C): , Max: 36.9 (01-02-23 @ 20:54)  T(F): , Max: 98.4 (01-02-23 @ 20:54)  HR: 77 (01-03-23 @ 06:28)  BP: 98/60 (01-03-23 @ 06:28)  BP(mean): --  RR: 18 (01-03-23 @ 06:28)  SpO2: 94% (01-03-23 @ 06:28)  Wt(kg): --    I and O's:    01-02 @ 07:01  -  01-03 @ 07:00  --------------------------------------------------------  IN: 1016 mL / OUT: 0 mL / NET: 1016 mL          PHYSICAL EXAM:    Constitutional: NAD  Neck:  No JVD  Respiratory: CTAB/L  Cardiovascular: S1 and S2  Gastrointestinal: BS+, soft, NT/ND  Extremities: No peripheral edema  Neurological: A/O x 3, no focal deficits  Psychiatric: Normal mood, normal affect  : No Pop  Skin: No rashes  Access: Not applicable    LABS:                        13.3   16.85 )-----------( 90       ( 02 Jan 2023 11:50 )             40.8     01-02    134<L>  |  93<L>  |  129<H>  ----------------------------<  86  4.8   |  19<L>  |  5.27<H>    Ca    7.3<L>      02 Jan 2023 11:50            Urine Studies:          RADIOLOGY & ADDITIONAL STUDIES:             [facility] [phone]        Patient seen and examined in bed.  He was the same         MEDICATIONS  (STANDING):  chlorhexidine 2% Cloths 1 Application(s) Topical daily  furosemide   Injectable 80 milliGRAM(s) IV Push two times a day  heparin  Infusion.  Unit(s)/Hr (16 mL/Hr) IV Continuous <Continuous>  influenza  Vaccine (HIGH DOSE) 0.7 milliLiter(s) IntraMuscular once  midodrine 10 milliGRAM(s) Oral three times a day  ondansetron Injectable 4 milliGRAM(s) IV Push four times a day  pantoprazole    Tablet 40 milliGRAM(s) Oral before breakfast  piperacillin/tazobactam IVPB.. 3.375 Gram(s) IV Intermittent every 12 hours  predniSONE   Tablet 10 milliGRAM(s) Oral daily  simvastatin 10 milliGRAM(s) Oral at bedtime  sodium bicarbonate  Infusion 0.043 mEq/kG/Hr (50 mL/Hr) IV Continuous <Continuous>  sodium bicarbonate  Infusion 0.043 mEq/kG/Hr (50 mL/Hr) IV Continuous <Continuous>  sucralfate suspension 1 Gram(s) Oral two times a day      VITAL:  T(C): , Max: 36.9 (01-02-23 @ 20:54)  T(F): , Max: 98.4 (01-02-23 @ 20:54)  HR: 77 (01-03-23 @ 06:28)  BP: 98/60 (01-03-23 @ 06:28)  BP(mean): --  RR: 18 (01-03-23 @ 06:28)  SpO2: 94% (01-03-23 @ 06:28)  Wt(kg): --    I and O's:    01-02 @ 07:01  -  01-03 @ 07:00  --------------------------------------------------------  IN: 1016 mL / OUT: 0 mL / NET: 1016 mL          PHYSICAL EXAM:    Constitutional: NAD  Neck:  No JVD  Respiratory: CTAB/L  Cardiovascular: S1 and S2  Gastrointestinal: BS+, soft, NT/ND  Extremities: No peripheral edema  Neurological: A/O x 3, no focal deficits  Psychiatric: Normal mood, normal affect  : No Pop  Skin: No rashes  Access: Not applicable    LABS:                        13.3   16.85 )-----------( 90       ( 02 Jan 2023 11:50 )             40.8     01-02    134<L>  |  93<L>  |  129<H>  ----------------------------<  86  4.8   |  19<L>  |  5.27<H>    Ca    7.3<L>      02 Jan 2023 11:50            Urine Studies:          RADIOLOGY & ADDITIONAL STUDIES:

## 2023-01-03 NOTE — PROGRESS NOTE ADULT - SUBJECTIVE AND OBJECTIVE BOX
Patient is a 77y old  Male who presents with a chief complaint of Malignancy Workup (01 Jan 2023 11:21)      SUBJECTIVE / OVERNIGHT EVENTS: weak.   Review of Systems  chest pain no  palpitations no  sob no  nausea no  headache no    MEDICATIONS  (STANDING):  chlorhexidine 2% Cloths 1 Application(s) Topical daily  heparin  Infusion.  Unit(s)/Hr (16 mL/Hr) IV Continuous <Continuous>  hydrocortisone sodium succinate Injectable 25 milliGRAM(s) IV Push every 8 hours  influenza  Vaccine (HIGH DOSE) 0.7 milliLiter(s) IntraMuscular once  midodrine 10 milliGRAM(s) Oral three times a day  ondansetron Injectable 4 milliGRAM(s) IV Push four times a day  pantoprazole  Injectable 40 milliGRAM(s) IV Push two times a day  piperacillin/tazobactam IVPB.. 3.375 Gram(s) IV Intermittent every 12 hours  simvastatin 10 milliGRAM(s) Oral at bedtime  sodium bicarbonate  Infusion 0.043 mEq/kG/Hr (50 mL/Hr) IV Continuous <Continuous>  sodium chloride 0.45% 1000 milliLiter(s) (50 mL/Hr) IV Continuous <Continuous>  sucralfate suspension 1 Gram(s) Oral two times a day    MEDICATIONS  (PRN):  acetaminophen     Tablet .. 650 milliGRAM(s) Oral every 6 hours PRN Temp greater or equal to 38.5C (101.3F), Mild Pain (1 - 3)  heparin   Injectable 7000 Unit(s) IV Push every 6 hours PRN For aPTT less than 40  heparin   Injectable 3500 Unit(s) IV Push every 6 hours PRN For aPTT between 40 - 57      Vital Signs Last 24 Hrs  T(C): 36.3 (03 Jan 2023 12:20), Max: 36.9 (02 Jan 2023 20:54)  T(F): 97.3 (03 Jan 2023 12:20), Max: 98.4 (02 Jan 2023 20:54)  HR: 80 (03 Jan 2023 14:43) (76 - 80)  BP: 110/68 (03 Jan 2023 14:43) (92/58 - 131/61)  BP(mean): --  RR: 18 (03 Jan 2023 12:20) (18 - 18)  SpO2: 95% (03 Jan 2023 12:20) (93% - 95%)    Parameters below as of 03 Jan 2023 12:20  Patient On (Oxygen Delivery Method): room air        PHYSICAL EXAM:  GENERAL: sick   HEAD:  Atraumatic, Normocephalic  EYES: EOMI, PERRLA, conjunctiva and sclera clear  NECK: Supple, No JVD  CHEST/LUNG: Clear to auscultation bilaterally; No wheeze  HEART: Regular rate and rhythm; No murmurs, rubs, or gallops  ABDOMEN: Soft, Nontender, Nondistended; Bowel sounds present  EXTREMITIES:  2+ L leg edema  PSYCH: confused  NEUROLOGY: non-focal   SKIN: No rashes or lesions    LABS:                        13.6   24.34 )-----------( 103      ( 03 Jan 2023 15:19 )             42.7     01-03    133<L>  |  90<L>  |  134<H>  ----------------------------<  80  4.8   |  20<L>  |  6.02<H>    Ca    6.9<L>      03 Jan 2023 15:19      PTT - ( 03 Jan 2023 15:19 )  PTT:149.3 sec            RADIOLOGY & ADDITIONAL TESTS:    Imaging Personally Reviewed:  < from: MR Head w/wo IV Cont (01.03.23 @ 11:57) >  IMPRESSION:    Acute/subacute lacunar infarcts as described above. No intracranial   hemorrhage or mass effect.  No intracranial mass.    < end of copied text >  < from: [facility] Duplex Lower Ext Vein Scan, Bilat (01.01.23 @ 18:57) >    IMPRESSION:  Acute above and below the knee left lower extremity deep venous   thrombosis with extension into left external iliac vein.  No evidence of acute deep venous thrombosis in the right lower extremity.    < end of copied text >    Consultant(s) Notes Reviewed:      Care Discussed with Consultants/Other Providers:

## 2023-01-03 NOTE — PROGRESS NOTE ADULT - ATTENDING COMMENTS
Given his clot burden, would continue a/c as tolerated  He has proximal LLE DVT (in External iliac vein), would get non-contrast CT abd/pelvis to assess for any extrinsic compression / mass (cant give contrast due to renal function)  difficult case      Nasreen [phone] Given his clot burden, would continue a/c as tolerated  He has proximal LLE DVT (in External iliac vein), would get non-contrast CT abd/pelvis to assess for any extrinsic compression / mass (cant give contrast due to renal function)  difficult case      Nasreen [phone] Given his clot burden, would continue a/c as tolerated  He has proximal LLE DVT (in External iliac vein), would get non-contrast CT abd/pelvis to assess for any extrinsic compression / mass (cant give contrast due to renal function)  difficult case      Nasreen [phone]

## 2023-01-03 NOTE — PROGRESS NOTE ADULT - ASSESSMENT
n/v/d    plan    zofran  bacid one tab tid  low residue lactose free diet  replete electrolytes as needed   PPI IV BID  monitor cbc  monitor stool color  will follow    Advanced care planning was discussed with patient and family.  Advanced care planning forms were reviewed and discussed.  Risks, benefits and alternatives of gastroenterologic procedures were discussed in detail and all questions were answered.    30 minutes spent.

## 2023-01-03 NOTE — PROGRESS NOTE ADULT - ASSESSMENT
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 Trop at 165 and proBNP at [address], TTE showed no sign of RV strain, no clot in transit, and LVEF of 70%.     Problems:  1. Segmental Pulmonary Embolism  2. Acute above and below the knee left lower extremity DVT with extension into left external iliac vein     Plan:  1. Continue Heparin gtt  2. Continue Telemetry monitoring        This is a 78yo Male with PMHx of HTN, HLD, psoriasis on prednisone, and recent possible esophageal/hepatic malignancy/mass presented with acute onset SOB and found to have RLL segmental PE. Trop at 165 and proBNP at 54853, TTE showed no sign of RV strain, no clot in transit, and LVEF of 70%.     Problems:  1. Segmental Pulmonary Embolism  2. Acute above and below the knee left lower extremity DVT with extension into left external iliac vein     Plan:  1. Continue Heparin gtt  2. Continue Telemetry monitoring        This is a 76yo Male with PMHx of HTN, HLD, psoriasis on prednisone, and recent possible esophageal/hepatic malignancy/mass presented with acute onset SOB and found to have RLL segmental PE. Trop at 165 and proBNP at 25467, TTE showed no sign of RV strain, no clot in transit, and LVEF of 70%.     Problems:  1. Segmental Pulmonary Embolism  2. Acute above and below the knee left lower extremity DVT with extension into left external iliac vein     Plan:  1. Continue Heparin gtt  2. Continue Telemetry monitoring        This is a 76yo Male with PMHx of HTN, HLD, psoriasis on prednisone, and recent possible esophageal/hepatic malignancy/mass presented with acute onset SOB and found to have RLL segmental PE. Trop at 165 and proBNP at 40859, TTE showed no sign of RV strain, no clot in transit, and LVEF of 70%.     Problems:  1. Segmental Pulmonary Embolism  2. Acute above and below the knee left lower extremity DVT with extension into left external iliac vein     Plan:  1. Continue Heparin gtt  2. Check non-contrast CT Abdomen and Pelvis  3. Continue Telemetry monitoring        This is a 78yo Male with PMHx of HTN, HLD, psoriasis on prednisone, and recent possible esophageal/hepatic malignancy/mass presented with acute onset SOB and found to have RLL segmental PE. Trop at 165 and proBNP at 20154, TTE showed no sign of RV strain, no clot in transit, and LVEF of 70%.     Problems:  1. Segmental Pulmonary Embolism  2. Acute above and below the knee left lower extremity DVT with extension into left external iliac vein     Plan:  1. Continue Heparin gtt  2. Check non-contrast CT Abdomen and Pelvis  3. Continue Telemetry monitoring        This is a 76yo Male with PMHx of HTN, HLD, psoriasis on prednisone, and recent possible esophageal/hepatic malignancy/mass presented with acute onset SOB and found to have RLL segmental PE. Trop at 165 and proBNP at 98800, TTE showed no sign of RV strain, no clot in transit, and LVEF of 70%.     Problems:  1. Segmental Pulmonary Embolism  2. Acute above and below the knee left lower extremity DVT with extension into left external iliac vein     Plan:  1. Continue Heparin gtt  2. Check non-contrast CT Abdomen and Pelvis  3. Continue Telemetry monitoring

## 2023-01-03 NOTE — PROGRESS NOTE ADULT - SUBJECTIVE AND OBJECTIVE BOX
Date of Service: 01-03-23 @ 11:37    Patient is a 77y old  Male who presents with a chief complaint of Malignancy Workup (01 Jan 2023 11:21)      Any change in ROS:  to me he seems pretty confused:   on rooma ir ; not wearing oxygen :       MEDICATIONS  (STANDING):  chlorhexidine 2% Cloths 1 Application(s) Topical daily  heparin  Infusion.  Unit(s)/Hr (16 mL/Hr) IV Continuous <Continuous>  hydrocortisone sodium succinate Injectable 25 milliGRAM(s) IV Push every 8 hours  influenza  Vaccine (HIGH DOSE) 0.7 milliLiter(s) IntraMuscular once  midodrine 10 milliGRAM(s) Oral three times a day  ondansetron Injectable 4 milliGRAM(s) IV Push four times a day  pantoprazole    Tablet 40 milliGRAM(s) Oral before breakfast  piperacillin/tazobactam IVPB.. 3.375 Gram(s) IV Intermittent every 12 hours  simvastatin 10 milliGRAM(s) Oral at bedtime  sodium bicarbonate  Infusion 0.043 mEq/kG/Hr (50 mL/Hr) IV Continuous <Continuous>  sodium chloride 0.45% 1000 milliLiter(s) (50 mL/Hr) IV Continuous <Continuous>  sucralfate suspension 1 Gram(s) Oral two times a day    MEDICATIONS  (PRN):  acetaminophen     Tablet .. 650 milliGRAM(s) Oral every 6 hours PRN Temp greater or equal to 38.5C (101.3F), Mild Pain (1 - 3)  heparin   Injectable 7000 Unit(s) IV Push every 6 hours PRN For aPTT less than 40  heparin   Injectable 3500 Unit(s) IV Push every 6 hours PRN For aPTT between 40 - 57    Vital Signs Last 24 Hrs  T(C): 36.6 (03 Jan 2023 06:28), Max: 36.9 (02 Jan 2023 20:54)  T(F): 97.8 (03 Jan 2023 06:28), Max: 98.4 (02 Jan 2023 20:54)  HR: 77 (03 Jan 2023 06:28) (76 - 77)  BP: 98/60 (03 Jan 2023 06:28) (89/70 - 98/60)  BP(mean): --  RR: 18 (03 Jan 2023 06:28) (18 - 18)  SpO2: 94% (03 Jan 2023 06:28) (93% - 94%)    Parameters below as of 03 Jan 2023 06:28  Patient On (Oxygen Delivery Method): nasal cannula  O2 Flow (L/min): 2      I&O's Summary    02 Jan 2023 07:01  -  03 Jan 2023 07:00  --------------------------------------------------------  IN: 1016 mL / OUT: 0 mL / NET: 1016 mL          Physical Exam:   GENERAL: NAD, well-groomed, well-developed  HEENT: YOANA/   Atraumatic, Normocephalic  ENMT: No tonsillar erythema, exudates, or enlargement; Moist mucous membranes, Good dentition, No lesions  NECK: Supple, No JVD, Normal thyroid  CHEST/LUNG: no wheezing: scattered raekls+  CVS: Regular rate and rhythm; No murmurs, rubs, or gallops  GI: : Soft, Nontender, Nondistended; Bowel sounds present  NERVOUS SYSTEM:  Alert & awake confused  EXTREMITIES:  mild  edema  LYMPH: No lymphadenopathy noted  SKIN: No rashes or lesions  ENDOCRINOLOGY: No Thyromegaly  PSYCH: confused    Labs:  23, 22, 21                            13.3   16.85 )-----------( 90       ( 02 Jan 2023 11:50 )             40.8                         15.8   15.76 )-----------( 60       ( 01 Jan 2023 06:16 )             48.9                         14.9   18.27 )-----------( 86       ( 31 Dec 2022 19:50 )             45.8                         16.0   25.67 )-----------( 146      ( 31 Dec 2022 10:00 )             49.2     01-02    134<L>  |  93<L>  |  129<H>  ----------------------------<  86  4.8   |  19<L>  |  5.27<H>  01-01    133<L>  |  91<L>  |  124<H>  ----------------------------<  108<H>  5.0   |  20<L>  |  4.17<H>  12-31    133<L>  |  93<L>  |  119<H>  ----------------------------<  111<H>  4.8   |  17<L>  |  3.79<H>    Ca    7.3<L>      02 Jan 2023 11:50  Ca    7.5<L>      01 Jan 2023 15:37    TPro  5.5<L>  /  Alb  2.6<L>  /  TBili  3.6<H>  /  DBili  x   /  AST  685<H>  /  ALT  442<H>  /  AlkPhos  1636<H>  12-31    CAPILLARY BLOOD GLUCOSE            PTT - ( 02 Jan 2023 18:34 )  PTT:49.2 sec    Procalcitonin, Serum: 6.18 ng/mL (01-02 @ 11:50)        RECENT CULTURES:  12-31 @ 09:59 Clean Catch Clean Catch (Midstream)         < from: [facility] Duplex Lower Ext Vein Scan, Bilat (01.01.23 @ 18:57) >    COMPARISON: None available.    TECHNIQUE: Duplex sonography of the BILATERAL LOWER extremity veins with   color and spectral Doppler, with and without compression.    FINDINGS:    RIGHT:  Normal compressibility of the RIGHT common femoral, femoral and popliteal   veins.  Doppler examination shows normal spontaneous and phasic flow.  No RIGHT calf vein thrombosis is detected.    LEFT:  The visualized external iliac vein, common femoral vein, popliteal,   gastrocnemius veins are incompressible and demonstrate intraluminal   echogenic material and lack of color Doppler flow. The left femoral vein   is patent.  Peroneal vein thrombosis. Posterior tibial vein is patent.    IMPRESSION:  Acute above and below the knee left lower extremity deep venous   thrombosis with extension into left external iliac vein.  No evidence of acute deep venous thrombosis in the right lower extremity.    Findings were discussed by ultrasound technologist to JAN Paulino on   1/1/2023 at 6:50 PM.    --- End of Report ---    < end of copied text >  < from: CT Head No Cont (12.31.22 @ 12:27) >  There is moderate cerebral volume loss and patchy white matter   hypoattenuation which is nonspecific in etiology but likely related to   chronic microvascular-type changes.    Partially opacified intracranial vasculature, likely secondary to recent   contrast-enhanced CT scan of the chest.    The visualized paranasal sinuses are clear. The mastoid air cells and   middle ear cavities are clear.    The soft tissues of the scalp are unremarkable. The calvarium is intact.    IMPRESSION:    No CT evidence of acute intracranial hemorrhage or mass.    If clinical suspicion is high for intracranial metastasis, an MRI of the   brain with and without IV contrast is recommended for further evaluation.    --- End of Report ---           MALAIKA PLATT DO; Resident Radiologist  This document has been electronically signed.  ERYN JOVEL MD; Attending Radiologist  This document has been electronically signed.Dec 31 2022 12:50P    < end of copied text >  < from: Xray Chest 1 View- PORTABLE-Urgent (12.31.22 @ 10:13) >    ACC: [id number] EXAM:  XR CHEST PORTABLE URGENT 1V                          PROCEDURE DATE:  12/31/2022          INTERPRETATION:  EXAMINATION: XR CHEST URGENT    CLINICAL INDICATION: Chest Pain    TECHNIQUE: Single frontal, portable view of the chestwas obtained.    COMPARISON: None.    FINDINGS:      The heart is not accurately assessed in this AP projection.  Bilateral patchy opacities in the lower lungs.  There is no pleural effusion.  There is no pneumothorax.  No acute bony abnormality.    IMPRESSION:  Bilateral patchy opacities in the lower lungs, suggestive of pulmonary   vascular congestion versus infection in the appropriate clinical setting.    --- End of Report ---          SHAHZAD MAYBERRY MD; Resident Radiologist  This document has been electronically signed.  BRIELLE MURRELL MD; Attending Radiologist  This document has been electronically signed. Dec 31 2022 10:30AM    < end of copied text >         No growth    12-31 @ 09:30 .Blood Blood-Peripheral                No growth to date.    12-31 @ 09:25 .Blood Blood-Peripheral                No growth to date.          RESPIRATORY CULTURES:    < from: TTE with Doppler (w/Cont) (12.31.22 @ 11:08) >  Pericardium/Pleura: No pericardial effusion seen.  Hemodynamic: Estimated right atrial pressure is 8 mmHg.  Estimated right ventricular systolic pressure equals 49 mm  Hg, assuming right atrial pressure equals 8 mm Hg,  consistent with mild pulmonary hypertension.  ------------------------------------------------------------------------  Conclusions:  1. Normal left ventricular internal dimensions and wall  thicknesses. Normal left ventricular systolic function. No  segmental wall motion abnormalities. Endocardial  visualization enhanced with intravenous injection of  Ultrasonic Enhancing Agent (Definity). LVEF calculated  using biplane Stroud's method is 70%. Unable to determine  diastolic function due to insufficient data.  2. Normal right atrium. The right ventricle is not well  visualized; grossly normal right ventricular systolic  function.  3. Normal tricuspid valve. Minimal tricuspid  regurgitation.Estimated pulmonary artery systolic pressure  equals 49  mm Hg, assuming right atrial pressure equals 8  mm Hg, consistent with mild pulmonary pressures.  4. No pericardial effusion seen.  *** No previous Echo exam.  There is no evidence of RV strain.  ------------------------------------------------------------------------    < end of copied text >        Studies  Chest X-RAY  CT SCAN Chest   Venous Dopplers: LE:   CT Abdomen  Others               Date of Service: 01-03-23 @ 11:37    Patient is a 77y old  Male who presents with a chief complaint of Malignancy Workup (01 Jan 2023 11:21)      Any change in ROS:  to me he seems pretty confused:   on rooma ir ; not wearing oxygen :       MEDICATIONS  (STANDING):  chlorhexidine 2% Cloths 1 Application(s) Topical daily  heparin  Infusion.  Unit(s)/Hr (16 mL/Hr) IV Continuous <Continuous>  hydrocortisone sodium succinate Injectable 25 milliGRAM(s) IV Push every 8 hours  influenza  Vaccine (HIGH DOSE) 0.7 milliLiter(s) IntraMuscular once  midodrine 10 milliGRAM(s) Oral three times a day  ondansetron Injectable 4 milliGRAM(s) IV Push four times a day  pantoprazole    Tablet 40 milliGRAM(s) Oral before breakfast  piperacillin/tazobactam IVPB.. 3.375 Gram(s) IV Intermittent every 12 hours  simvastatin 10 milliGRAM(s) Oral at bedtime  sodium bicarbonate  Infusion 0.043 mEq/kG/Hr (50 mL/Hr) IV Continuous <Continuous>  sodium chloride 0.45% 1000 milliLiter(s) (50 mL/Hr) IV Continuous <Continuous>  sucralfate suspension 1 Gram(s) Oral two times a day    MEDICATIONS  (PRN):  acetaminophen     Tablet .. 650 milliGRAM(s) Oral every 6 hours PRN Temp greater or equal to 38.5C (101.3F), Mild Pain (1 - 3)  heparin   Injectable 7000 Unit(s) IV Push every 6 hours PRN For aPTT less than 40  heparin   Injectable 3500 Unit(s) IV Push every 6 hours PRN For aPTT between 40 - 57    Vital Signs Last 24 Hrs  T(C): 36.6 (03 Jan 2023 06:28), Max: 36.9 (02 Jan 2023 20:54)  T(F): 97.8 (03 Jan 2023 06:28), Max: 98.4 (02 Jan 2023 20:54)  HR: 77 (03 Jan 2023 06:28) (76 - 77)  BP: 98/60 (03 Jan 2023 06:28) (89/70 - 98/60)  BP(mean): --  RR: 18 (03 Jan 2023 06:28) (18 - 18)  SpO2: 94% (03 Jan 2023 06:28) (93% - 94%)    Parameters below as of 03 Jan 2023 06:28  Patient On (Oxygen Delivery Method): nasal cannula  O2 Flow (L/min): 2      I&O's Summary    02 Jan 2023 07:01  -  03 Jan 2023 07:00  --------------------------------------------------------  IN: 1016 mL / OUT: 0 mL / NET: 1016 mL          Physical Exam:   GENERAL: NAD, well-groomed, well-developed  HEENT: YOANA/   Atraumatic, Normocephalic  ENMT: No tonsillar erythema, exudates, or enlargement; Moist mucous membranes, Good dentition, No lesions  NECK: Supple, No JVD, Normal thyroid  CHEST/LUNG: no wheezing: scattered raekls+  CVS: Regular rate and rhythm; No murmurs, rubs, or gallops  GI: : Soft, Nontender, Nondistended; Bowel sounds present  NERVOUS SYSTEM:  Alert & awake confused  EXTREMITIES:  mild  edema  LYMPH: No lymphadenopathy noted  SKIN: No rashes or lesions  ENDOCRINOLOGY: No Thyromegaly  PSYCH: confused    Labs:  23, 22, 21                            13.3   16.85 )-----------( 90       ( 02 Jan 2023 11:50 )             40.8                         15.8   15.76 )-----------( 60       ( 01 Jan 2023 06:16 )             48.9                         14.9   18.27 )-----------( 86       ( 31 Dec 2022 19:50 )             45.8                         16.0   25.67 )-----------( 146      ( 31 Dec 2022 10:00 )             49.2     01-02    134<L>  |  93<L>  |  129<H>  ----------------------------<  86  4.8   |  19<L>  |  5.27<H>  01-01    133<L>  |  91<L>  |  124<H>  ----------------------------<  108<H>  5.0   |  20<L>  |  4.17<H>  12-31    133<L>  |  93<L>  |  119<H>  ----------------------------<  111<H>  4.8   |  17<L>  |  3.79<H>    Ca    7.3<L>      02 Jan 2023 11:50  Ca    7.5<L>      01 Jan 2023 15:37    TPro  5.5<L>  /  Alb  2.6<L>  /  TBili  3.6<H>  /  DBili  x   /  AST  685<H>  /  ALT  442<H>  /  AlkPhos  1636<H>  12-31    CAPILLARY BLOOD GLUCOSE            PTT - ( 02 Jan 2023 18:34 )  PTT:49.2 sec    Procalcitonin, Serum: 6.18 ng/mL (01-02 @ 11:50)        RECENT CULTURES:  12-31 @ 09:59 Clean Catch Clean Catch (Midstream)         < from: [facility] Duplex Lower Ext Vein Scan, Bilat (01.01.23 @ 18:57) >    COMPARISON: None available.    TECHNIQUE: Duplex sonography of the BILATERAL LOWER extremity veins with   color and spectral Doppler, with and without compression.    FINDINGS:    RIGHT:  Normal compressibility of the RIGHT common femoral, femoral and popliteal   veins.  Doppler examination shows normal spontaneous and phasic flow.  No RIGHT calf vein thrombosis is detected.    LEFT:  The visualized external iliac vein, common femoral vein, popliteal,   gastrocnemius veins are incompressible and demonstrate intraluminal   echogenic material and lack of color Doppler flow. The left femoral vein   is patent.  Peroneal vein thrombosis. Posterior tibial vein is patent.    IMPRESSION:  Acute above and below the knee left lower extremity deep venous   thrombosis with extension into left external iliac vein.  No evidence of acute deep venous thrombosis in the right lower extremity.    Findings were discussed by ultrasound technologist to JAN Paulino on   1/1/2023 at 6:50 PM.    --- End of Report ---    < end of copied text >  < from: CT Head No Cont (12.31.22 @ 12:27) >  There is moderate cerebral volume loss and patchy white matter   hypoattenuation which is nonspecific in etiology but likely related to   chronic microvascular-type changes.    Partially opacified intracranial vasculature, likely secondary to recent   contrast-enhanced CT scan of the chest.    The visualized paranasal sinuses are clear. The mastoid air cells and   middle ear cavities are clear.    The soft tissues of the scalp are unremarkable. The calvarium is intact.    IMPRESSION:    No CT evidence of acute intracranial hemorrhage or mass.    If clinical suspicion is high for intracranial metastasis, an MRI of the   brain with and without IV contrast is recommended for further evaluation.    --- End of Report ---           MALAIKA PLATT DO; Resident Radiologist  This document has been electronically signed.  ERYN JOVEL MD; Attending Radiologist  This document has been electronically signed.Dec 31 2022 12:50P    < end of copied text >  < from: Xray Chest 1 View- PORTABLE-Urgent (12.31.22 @ 10:13) >    ACC: [id number] EXAM:  XR CHEST PORTABLE URGENT 1V                          PROCEDURE DATE:  12/31/2022          INTERPRETATION:  EXAMINATION: XR CHEST URGENT    CLINICAL INDICATION: Chest Pain    TECHNIQUE: Single frontal, portable view of the chestwas obtained.    COMPARISON: None.    FINDINGS:      The heart is not accurately assessed in this AP projection.  Bilateral patchy opacities in the lower lungs.  There is no pleural effusion.  There is no pneumothorax.  No acute bony abnormality.    IMPRESSION:  Bilateral patchy opacities in the lower lungs, suggestive of pulmonary   vascular congestion versus infection in the appropriate clinical setting.    --- End of Report ---          SHAHZAD MAYBERRY MD; Resident Radiologist  This document has been electronically signed.  BRIELLE MURRELL MD; Attending Radiologist  This document has been electronically signed. Dec 31 2022 10:30AM    < end of copied text >         No growth    12-31 @ 09:30 .Blood Blood-Peripheral                No growth to date.    12-31 @ 09:25 .Blood Blood-Peripheral                No growth to date.          RESPIRATORY CULTURES:    < from: TTE with Doppler (w/Cont) (12.31.22 @ 11:08) >  Pericardium/Pleura: No pericardial effusion seen.  Hemodynamic: Estimated right atrial pressure is 8 mmHg.  Estimated right ventricular systolic pressure equals 49 mm  Hg, assuming right atrial pressure equals 8 mm Hg,  consistent with mild pulmonary hypertension.  ------------------------------------------------------------------------  Conclusions:  1. Normal left ventricular internal dimensions and wall  thicknesses. Normal left ventricular systolic function. No  segmental wall motion abnormalities. Endocardial  visualization enhanced with intravenous injection of  Ultrasonic Enhancing Agent (Definity). LVEF calculated  using biplane Stroud's method is 70%. Unable to determine  diastolic function due to insufficient data.  2. Normal right atrium. The right ventricle is not well  visualized; grossly normal right ventricular systolic  function.  3. Normal tricuspid valve. Minimal tricuspid  regurgitation.Estimated pulmonary artery systolic pressure  equals 49  mm Hg, assuming right atrial pressure equals 8  mm Hg, consistent with mild pulmonary pressures.  4. No pericardial effusion seen.  *** No previous Echo exam.  There is no evidence of RV strain.  ------------------------------------------------------------------------    < end of copied text >        Studies  Chest X-RAY  CT SCAN Chest   Venous Dopplers: LE:   CT Abdomen  Others               Date of Service: 01-03-23 @ 11:37    Patient is a 77y old  Male who presents with a chief complaint of Malignancy Workup (01 Jan 2023 11:21)      Any change in ROS:  to me he seems pretty confused:   on rooma ir ; not wearing oxygen :       MEDICATIONS  (STANDING):  chlorhexidine 2% Cloths 1 Application(s) Topical daily  heparin  Infusion.  Unit(s)/Hr (16 mL/Hr) IV Continuous <Continuous>  hydrocortisone sodium succinate Injectable 25 milliGRAM(s) IV Push every 8 hours  influenza  Vaccine (HIGH DOSE) 0.7 milliLiter(s) IntraMuscular once  midodrine 10 milliGRAM(s) Oral three times a day  ondansetron Injectable 4 milliGRAM(s) IV Push four times a day  pantoprazole    Tablet 40 milliGRAM(s) Oral before breakfast  piperacillin/tazobactam IVPB.. 3.375 Gram(s) IV Intermittent every 12 hours  simvastatin 10 milliGRAM(s) Oral at bedtime  sodium bicarbonate  Infusion 0.043 mEq/kG/Hr (50 mL/Hr) IV Continuous <Continuous>  sodium chloride 0.45% 1000 milliLiter(s) (50 mL/Hr) IV Continuous <Continuous>  sucralfate suspension 1 Gram(s) Oral two times a day    MEDICATIONS  (PRN):  acetaminophen     Tablet .. 650 milliGRAM(s) Oral every 6 hours PRN Temp greater or equal to 38.5C (101.3F), Mild Pain (1 - 3)  heparin   Injectable 7000 Unit(s) IV Push every 6 hours PRN For aPTT less than 40  heparin   Injectable 3500 Unit(s) IV Push every 6 hours PRN For aPTT between 40 - 57    Vital Signs Last 24 Hrs  T(C): 36.6 (03 Jan 2023 06:28), Max: 36.9 (02 Jan 2023 20:54)  T(F): 97.8 (03 Jan 2023 06:28), Max: 98.4 (02 Jan 2023 20:54)  HR: 77 (03 Jan 2023 06:28) (76 - 77)  BP: 98/60 (03 Jan 2023 06:28) (89/70 - 98/60)  BP(mean): --  RR: 18 (03 Jan 2023 06:28) (18 - 18)  SpO2: 94% (03 Jan 2023 06:28) (93% - 94%)    Parameters below as of 03 Jan 2023 06:28  Patient On (Oxygen Delivery Method): nasal cannula  O2 Flow (L/min): 2      I&O's Summary    02 Jan 2023 07:01  -  03 Jan 2023 07:00  --------------------------------------------------------  IN: 1016 mL / OUT: 0 mL / NET: 1016 mL          Physical Exam:   GENERAL: NAD, well-groomed, well-developed  HEENT: YOANA/   Atraumatic, Normocephalic  ENMT: No tonsillar erythema, exudates, or enlargement; Moist mucous membranes, Good dentition, No lesions  NECK: Supple, No JVD, Normal thyroid  CHEST/LUNG: no wheezing: scattered raekls+  CVS: Regular rate and rhythm; No murmurs, rubs, or gallops  GI: : Soft, Nontender, Nondistended; Bowel sounds present  NERVOUS SYSTEM:  Alert & awake confused  EXTREMITIES:  mild  edema  LYMPH: No lymphadenopathy noted  SKIN: No rashes or lesions  ENDOCRINOLOGY: No Thyromegaly  PSYCH: confused    Labs:  23, 22, 21                            13.3   16.85 )-----------( 90       ( 02 Jan 2023 11:50 )             40.8                         15.8   15.76 )-----------( 60       ( 01 Jan 2023 06:16 )             48.9                         14.9   18.27 )-----------( 86       ( 31 Dec 2022 19:50 )             45.8                         16.0   25.67 )-----------( 146      ( 31 Dec 2022 10:00 )             49.2     01-02    134<L>  |  93<L>  |  129<H>  ----------------------------<  86  4.8   |  19<L>  |  5.27<H>  01-01    133<L>  |  91<L>  |  124<H>  ----------------------------<  108<H>  5.0   |  20<L>  |  4.17<H>  12-31    133<L>  |  93<L>  |  119<H>  ----------------------------<  111<H>  4.8   |  17<L>  |  3.79<H>    Ca    7.3<L>      02 Jan 2023 11:50  Ca    7.5<L>      01 Jan 2023 15:37    TPro  5.5<L>  /  Alb  2.6<L>  /  TBili  3.6<H>  /  DBili  x   /  AST  685<H>  /  ALT  442<H>  /  AlkPhos  1636<H>  12-31    CAPILLARY BLOOD GLUCOSE            PTT - ( 02 Jan 2023 18:34 )  PTT:49.2 sec    Procalcitonin, Serum: 6.18 ng/mL (01-02 @ 11:50)        RECENT CULTURES:  12-31 @ 09:59 Clean Catch Clean Catch (Midstream)         < from: [facility] Duplex Lower Ext Vein Scan, Bilat (01.01.23 @ 18:57) >    COMPARISON: None available.    TECHNIQUE: Duplex sonography of the BILATERAL LOWER extremity veins with   color and spectral Doppler, with and without compression.    FINDINGS:    RIGHT:  Normal compressibility of the RIGHT common femoral, femoral and popliteal   veins.  Doppler examination shows normal spontaneous and phasic flow.  No RIGHT calf vein thrombosis is detected.    LEFT:  The visualized external iliac vein, common femoral vein, popliteal,   gastrocnemius veins are incompressible and demonstrate intraluminal   echogenic material and lack of color Doppler flow. The left femoral vein   is patent.  Peroneal vein thrombosis. Posterior tibial vein is patent.    IMPRESSION:  Acute above and below the knee left lower extremity deep venous   thrombosis with extension into left external iliac vein.  No evidence of acute deep venous thrombosis in the right lower extremity.    Findings were discussed by ultrasound technologist to JAN Paulino on   1/1/2023 at 6:50 PM.    --- End of Report ---    < end of copied text >  < from: CT Head No Cont (12.31.22 @ 12:27) >  There is moderate cerebral volume loss and patchy white matter   hypoattenuation which is nonspecific in etiology but likely related to   chronic microvascular-type changes.    Partially opacified intracranial vasculature, likely secondary to recent   contrast-enhanced CT scan of the chest.    The visualized paranasal sinuses are clear. The mastoid air cells and   middle ear cavities are clear.    The soft tissues of the scalp are unremarkable. The calvarium is intact.    IMPRESSION:    No CT evidence of acute intracranial hemorrhage or mass.    If clinical suspicion is high for intracranial metastasis, an MRI of the   brain with and without IV contrast is recommended for further evaluation.    --- End of Report ---           MALAIKA PLATT DO; Resident Radiologist  This document has been electronically signed.  ERYN JOVEL MD; Attending Radiologist  This document has been electronically signed.Dec 31 2022 12:50P    < end of copied text >  < from: Xray Chest 1 View- PORTABLE-Urgent (12.31.22 @ 10:13) >    ACC: [id number] EXAM:  XR CHEST PORTABLE URGENT 1V                          PROCEDURE DATE:  12/31/2022          INTERPRETATION:  EXAMINATION: XR CHEST URGENT    CLINICAL INDICATION: Chest Pain    TECHNIQUE: Single frontal, portable view of the chestwas obtained.    COMPARISON: None.    FINDINGS:      The heart is not accurately assessed in this AP projection.  Bilateral patchy opacities in the lower lungs.  There is no pleural effusion.  There is no pneumothorax.  No acute bony abnormality.    IMPRESSION:  Bilateral patchy opacities in the lower lungs, suggestive of pulmonary   vascular congestion versus infection in the appropriate clinical setting.    --- End of Report ---          SHAHZAD MAYBERRY MD; Resident Radiologist  This document has been electronically signed.  BRIELLE MURRELL MD; Attending Radiologist  This document has been electronically signed. Dec 31 2022 10:30AM    < end of copied text >         No growth    12-31 @ 09:30 .Blood Blood-Peripheral                No growth to date.    12-31 @ 09:25 .Blood Blood-Peripheral                No growth to date.          RESPIRATORY CULTURES:    < from: TTE with Doppler (w/Cont) (12.31.22 @ 11:08) >  Pericardium/Pleura: No pericardial effusion seen.  Hemodynamic: Estimated right atrial pressure is 8 mmHg.  Estimated right ventricular systolic pressure equals 49 mm  Hg, assuming right atrial pressure equals 8 mm Hg,  consistent with mild pulmonary hypertension.  ------------------------------------------------------------------------  Conclusions:  1. Normal left ventricular internal dimensions and wall  thicknesses. Normal left ventricular systolic function. No  segmental wall motion abnormalities. Endocardial  visualization enhanced with intravenous injection of  Ultrasonic Enhancing Agent (Definity). LVEF calculated  using biplane Stroud's method is 70%. Unable to determine  diastolic function due to insufficient data.  2. Normal right atrium. The right ventricle is not well  visualized; grossly normal right ventricular systolic  function.  3. Normal tricuspid valve. Minimal tricuspid  regurgitation.Estimated pulmonary artery systolic pressure  equals 49  mm Hg, assuming right atrial pressure equals 8  mm Hg, consistent with mild pulmonary pressures.  4. No pericardial effusion seen.  *** No previous Echo exam.  There is no evidence of RV strain.  ------------------------------------------------------------------------    < end of copied text >        Studies  Chest X-RAY  CT SCAN Chest   Venous Dopplers: LE:   CT Abdomen  Others

## 2023-01-03 NOTE — CONSULT NOTE ADULT - ASSESSMENT
INCOMPLETE    Assessment: ***. Vital signs: ***. Exam: ***.      mRS: ***  LKN: Unknown at the time of this encounter  NIHSS: ***    Impression: ***    Recommendations:    Diagnostics:  [x] MRI brain without contrast   [] MRA brain without contrast; MRA neck without contrast   [x] TTE: ***  [] Lipid panel, HbA1c  [] CBC, CMP, coagulation panel, troponin    Medications:  [] Currently on heparin infusion  [] Atorvastatin 80mg (titrate to LDL < 70)   [] DVT prophylaxis as per primary team    Miscellaneous:  [] NPO unless passes dysphagia screen; swallow eval if fails  [] Keep BP permissive up to 220/110 for 48 hours followed by gradual normotension over 2-3 days  [] Telemonitoring  [] Neurological checks and vital signs per unit protocol  [] BGM goals 140-180  [] PT/OT; S/S evaluation    * Case and plan not final until Attending attestation * Assessment: 77 year-old right-handed male w/ PMHx HTN, HLD, psoriasis, found to have possible esophageal cancer (mass) w/ liver mets s/p IR biopsy 12/22. Patient noted to have change in mental status (i.e., confusion), no focal neurological deficits. Unclear last known normal regarding change in mental status. MRI brain w/wo contrast ordered given c/f mets to brain. Neurology consulted for MRI brain w/wo contrast 1/3/23 showing acute/subacute lacunar infarcts at R cerebellum and R occipital. Vital signs: /78, HR 78, afebrile. Notable labs: WBC 24, AG 23, , sCr 6.02, Ca 6.9, procalcitonin 6.18, pO2 arterial 42. On heparin infusion d/t LLE DVT.    mRS: 0  LKN: Unknown at the time of this encounter  NIHSS: 5 (age, BLE drift)    Impression: Confusion localizable to diffuse cerebral dysfunction most likely due to metabolic encephalopathy. Acute/subacute lacunar ischemic infarcts on MRI are not contributing to current presentation. Mechanism of infarcts are unclear at this time, however, most likely due to hypercoagulability of malignancy.    Recommendations:    Diagnostics:  [x] MRI brain w/wo contrast: acute/subacute lacunar infarcts R cerebellum, R occipital  [] MRA brain without contrast; MRA neck without contrast using time of flight  [x] TTE: LVEF 70%, normal LA.  [x] VA Duplex BLE: acute above & below knee LLE DVT extending into L ext iliac vein; no acute DVT RLE.  [] Lipid panel, HbA1c  [] CBC, CMP, coagulation panel, troponin    Medications:  [] Currently on heparin infusion  [] Atorvastatin 80mg (titrate to LDL < 70) if not contraindicated  [] DVT prophylaxis as per primary team    Miscellaneous:  [] NPO unless passes dysphagia screen; swallow eval if fails  [] Keep BP permissive up to 220/110 for 48 hours followed by gradual normotension over 2-3 days  [] Telemonitoring  [] Neurological checks and vital signs per unit protocol  [] BGM goals 140-180  [] PT/OT; S/S evaluation  [] Follow-up Hematology/Oncology evaluation and recommendation    * Case and plan not final until Attending attestation * Assessment: 77 year-old right-handed male w/ PMHx HTN, HLD, psoriasis, former smoker, found to have possible esophageal cancer (mass) w/ liver mets s/p IR biopsy 12/22. Patient noted to have change in mental status (i.e., confusion), no focal neurological deficits. Unclear last known normal regarding change in mental status. MRI brain w/wo contrast ordered given c/f mets to brain. Neurology consulted for MRI brain w/wo contrast 1/3/23 showing acute/subacute lacunar infarcts at R cerebellum and R occipital. Vital signs: /78, HR 78, afebrile. Notable labs: WBC 24, AG 23, , sCr 6.02, Ca 6.9, procalcitonin 6.18, pO2 arterial 42. On heparin infusion d/t LLE DVT.    mRS: 0  LKN: Unknown at the time of this encounter  NIHSS: 5 (age, BLE drift)    Impression: Confusion localizable to diffuse cerebral dysfunction most likely due to metabolic encephalopathy. Acute/subacute lacunar ischemic infarcts on MRI are not contributing to current presentation. Mechanism of infarcts are unclear at this time, however, most likely due to hypercoagulability of malignancy.    Recommendations:    Diagnostics:  [x] MRI brain w/wo contrast: acute/subacute lacunar infarcts R cerebellum, R occipital  [] MRA brain without contrast; MRA neck without contrast using time of flight  [x] TTE: LVEF 70%, normal LA.  [x] VA Duplex BLE: acute above & below knee LLE DVT extending into L ext iliac vein; no acute DVT RLE.  [] Lipid panel, HbA1c  [] CBC, CMP, coagulation panel, troponin    Medications:  [] Currently on heparin infusion  [] Atorvastatin 80mg (titrate to LDL < 70) if not contraindicated  [] DVT prophylaxis as per primary team    Miscellaneous:  [] NPO unless passes dysphagia screen; swallow eval if fails  [] Keep BP permissive up to 220/110 for 48 hours followed by gradual normotension over 2-3 days  [] Telemonitoring  [] Neurological checks and vital signs per unit protocol  [] BGM goals 140-180  [] PT/OT; S/S evaluation  [] Follow-up Hematology/Oncology evaluation and recommendation    * Case and plan not final until Attending attestation *

## 2023-01-03 NOTE — PROVIDER CONTACT NOTE (CRITICAL VALUE NOTIFICATION) - ACTION/TREATMENT ORDERED:
Provider aware, follow FAC protocol
NP aware follow nomogram
PA aware follow nomogram
NP aware, possible incorrect results due to venous blood, will let RN know of further intervention, continue to monitor

## 2023-01-03 NOTE — CONSULT NOTE ADULT - ATTENDING COMMENTS
Briefly 77 year-old right-handed male w/ HTN, HLD, psoriasis, former smoker, found to have possible esophageal cancer (mass) w/ liver mets s/p IR biopsy 12/22. Patient noted to have change in mental status (i.e., confusion), no focal neurological deficits. Unclear last known normal regarding change in mental status. MRI brain w/wo contrast ordered given c/f mets to brain. Neurology consulted for MRI brain w/wo contrast 1/3/23 showing acute/subacute lacunar infarcts at R cerebellum and R occipital. Vital signs: /78, HR 78, afebrile. Notable labs: WBC 24, AG 23, , sCr 6.02, Ca 6.9, procalcitonin 6.18, pO2 arterial 42. On heparin infusion d/t LLE DVT.    mRS: 0  LKN: Unknown at the time of this encounter  NIHSS: 5 (age, BLE drift)  MRI brain with R cerevbellar and R occipital infarcts \    Impression:  embolic appearing infarct on MRI. likely hypercoag from malignancy     Recommendations:    Diagnostics:  [x] MRI brain w/wo contrast: acute/subacute lacunar infarcts R cerebellum, R occipital  [] MRA brain without contrast; MRA neck without contrast using time of flight vs CTA H/N   [x] TTE: LVEF 70%, normal LA.  [x] VA Duplex BLE: acute above & below knee LLE DVT extending into L ext iliac vein; no acute DVT RLE.  [] Lipid panel, HbA1c    Medications:  [] Currently on heparin infusion no objection from stroke standpint ; consider full dose lovenox after heparin drip   [] Atorvastatin 80mg (titrate to LDL < 70) if not contraindicated  [] DVT prophylaxis as per primary team    Miscellaneous:  [] NPO unless passes dysphagia screen; swallow eval if fails  [] Keep BP permissive up to 220/110 for 48 hours followed by gradual normotension over 2-3 days  [] Telemonitoring  [] Neurological checks and vital signs per unit protocol  [] BGM goals 140-180  [] PT/OT; S/S evaluation  [] Follow-up Hematology/Oncology evaluation and recommendation  [] consider APLS labs but may be altered from heparin drip.     Antonio Rod MD  Vascular Neurology  Office: [phone] Briefly 77 year-old right-handed male w/ HTN, HLD, psoriasis, former smoker, found to have possible esophageal cancer (mass) w/ liver mets s/p IR biopsy 12/22. Patient noted to have change in mental status (i.e., confusion), no focal neurological deficits. Unclear last known normal regarding change in mental status. MRI brain w/wo contrast ordered given c/f mets to brain. Neurology consulted for MRI brain w/wo contrast 1/3/23 showing acute/subacute lacunar infarcts at R cerebellum and R occipital. Vital signs: /78, HR 78, afebrile. Notable labs: WBC 24, AG 23, , sCr 6.02, Ca 6.9, procalcitonin 6.18, pO2 arterial 42. On heparin infusion d/t LLE DVT.    mRS: 0  LKN: Unknown at the time of this encounter  NIHSS: 5 (age, BLE drift)  MRI brain with R cerevbellar and R occipital infarcts \    Impression:  embolic appearing infarct on MRI. likely hypercoag from malignancy     Recommendations:    Diagnostics:  [x] MRI brain w/wo contrast: acute/subacute lacunar infarcts R cerebellum, R occipital  [] MRA brain without contrast; MRA neck without contrast using time of flight vs CTA H/N   [x] TTE: LVEF 70%, normal LA.  [x] VA Duplex BLE: acute above & below knee LLE DVT extending into L ext iliac vein; no acute DVT RLE.  [] Lipid panel, HbA1c    Medications:  [] Currently on heparin infusion no objection from stroke standpint ; consider full dose lovenox after heparin drip   [] Atorvastatin 80mg (titrate to LDL < 70) if not contraindicated  [] DVT prophylaxis as per primary team    Miscellaneous:  [] NPO unless passes dysphagia screen; swallow eval if fails  [] Keep BP permissive up to 220/110 for 48 hours followed by gradual normotension over 2-3 days  [] Telemonitoring  [] Neurological checks and vital signs per unit protocol  [] BGM goals 140-180  [] PT/OT; S/S evaluation  [] Follow-up Hematology/Oncology evaluation and recommendation  [] consider APLS labs but may be altered from heparin drip.     Antonio Rod MD  Vascular Neurology  Office: [phone] Briefly 77 year-old right-handed male w/ HTN, HLD, psoriasis, former smoker, found to have possible esophageal cancer (mass) w/ liver mets s/p IR biopsy 12/22. Patient noted to have change in mental status (i.e., confusion), no focal neurological deficits. Unclear last known normal regarding change in mental status. MRI brain w/wo contrast ordered given c/f mets to brain. Neurology consulted for MRI brain w/wo contrast 1/3/23 showing acute/subacute lacunar infarcts at R cerebellum and R occipital. Vital signs: /78, HR 78, afebrile. Notable labs: WBC 24, AG 23, , sCr 6.02, Ca 6.9, procalcitonin 6.18, pO2 arterial 42. On heparin infusion d/t LLE DVT.    mRS: 0  LKN: Unknown at the time of this encounter  NIHSS: 5 (age, BLE drift)  MRI brain with R cerevbellar and R occipital infarcts \    Impression:  embolic appearing infarct on MRI. likely hypercoag from malignancy     Recommendations:    Diagnostics:  [x] MRI brain w/wo contrast: acute/subacute lacunar infarcts R cerebellum, R occipital  [] MRA brain without contrast; MRA neck without contrast using time of flight vs CTA H/N   [x] TTE: LVEF 70%, normal LA.  [x] VA Duplex BLE: acute above & below knee LLE DVT extending into L ext iliac vein; no acute DVT RLE.  [] Lipid panel, HbA1c    Medications:  [] Currently on heparin infusion no objection from stroke standpint ; consider full dose lovenox after heparin drip   [] Atorvastatin 80mg (titrate to LDL < 70) if not contraindicated  [] DVT prophylaxis as per primary team    Miscellaneous:  [] NPO unless passes dysphagia screen; swallow eval if fails  [] Keep BP permissive up to 220/110 for 48 hours followed by gradual normotension over 2-3 days  [] Telemonitoring  [] Neurological checks and vital signs per unit protocol  [] BGM goals 140-180  [] PT/OT; S/S evaluation  [] Follow-up Hematology/Oncology evaluation and recommendation  [] consider APLS labs but may be altered from heparin drip.     Antonio Rod MD  Vascular Neurology  Office: [phone]

## 2023-01-03 NOTE — CONSULT NOTE ADULT - SUBJECTIVE AND OBJECTIVE BOX
Neurology - Consult Note - 01-03-23  -  Jordan Daniels MD  PGY-3 Neurology  Spectra: [phone] ([facility]), [phone] ([facility])  -    Name: ALIA JOVEL; 77y (1945)    Reason for Admission: Shortness of breath    Chief Complaint:     HPI:  6 y/o M PMH HTN, HLD, psoriasis on prednisone 10mg daily presenting with SOB. Recent admission for pneumonia and dced two weeks ago also found to possible esophageal cancer (mass) with liver mets s/p IR biopsy 12/22. Today patient woke up with shortness of breath at rest, worse with exertion with no associated LOC, chest pain, palpitations, diaphoresis, focal weakness, numbness/tingling.  Reports intermittent nonbloody vomiting and diarrhea with no fever/chills, cough, rashes, dysuria, or hematuria. Reports decreased PO intake     Received 1L IVF, azithro, zosyn, vanc, and solumedrol 100mg IVP x1 in ED.    (31 Dec 2022 16:20)      Review of Systems:  INCOMPLETE   CONSTITUTIONAL: No fevers or chills  EYES/ENT: No visual changes or no throat pain   NECK: No pain or stiffness  RESPIRATORY: No hemoptysis or shortness of breath  CARDIOVASCULAR: No chest pain or palpitations  GASTROINTESTINAL: No melena or hematochezia  GENITOURINARY: No dysuria or hematuria  NEUROLOGICAL: +As stated in HPI above  SKIN: No itching, burning, rashes, or lesions   All other review of systems is negative unless indicated above.    Allergies:      PMHx:   Psoriasis    Esophageal mass    Benign essential HTN      PFHx:     PSuHx:       Medications:  MEDICATIONS  (STANDING):  chlorhexidine 2% Cloths 1 Application(s) Topical daily  heparin  Infusion.  Unit(s)/Hr (16 mL/Hr) IV Continuous <Continuous>  hydrocortisone sodium succinate Injectable 25 milliGRAM(s) IV Push every 8 hours  influenza  Vaccine (HIGH DOSE) 0.7 milliLiter(s) IntraMuscular once  midodrine 10 milliGRAM(s) Oral three times a day  ondansetron Injectable 4 milliGRAM(s) IV Push four times a day  pantoprazole  Injectable 40 milliGRAM(s) IV Push two times a day  piperacillin/tazobactam IVPB.. 3.375 Gram(s) IV Intermittent every 12 hours  simvastatin 10 milliGRAM(s) Oral at bedtime  sodium bicarbonate  Infusion 0.043 mEq/kG/Hr (50 mL/Hr) IV Continuous <Continuous>  sodium chloride 0.45% 1000 milliLiter(s) (50 mL/Hr) IV Continuous <Continuous>  sucralfate suspension 1 Gram(s) Oral two times a day    MEDICATIONS  (PRN):  acetaminophen     Tablet .. 650 milliGRAM(s) Oral every 6 hours PRN Temp greater or equal to 38.5C (101.3F), Mild Pain (1 - 3)  heparin   Injectable 7000 Unit(s) IV Push every 6 hours PRN For aPTT less than 40  heparin   Injectable 3500 Unit(s) IV Push every 6 hours PRN For aPTT between 40 - 57      Vitals:  T(C): 36.3 (01-03-23 @ 20:50), Max: 36.6 (01-03-23 @ 06:28)  HR: 78 (01-03-23 @ 20:50) (76 - 80)  BP: 113/78 (01-03-23 @ 20:50) (98/60 - 131/61)  RR: 18 (01-03-23 @ 20:50) (18 - 18)  SpO2: 92% (01-03-23 @ 20:50) (92% - 95%)    Physical Examination: INCOMPLETE  ***    Labs:                        13.6   24.34 )-----------( 103      ( 03 Jan 2023 15:19 )             42.7     01-03    133<L>  |  90<L>  |  134<H>  ----------------------------<  80  4.8   |  20<L>  |  6.02<H>    Ca    6.9<L>      03 Jan 2023 15:19      CAPILLARY BLOOD GLUCOSE    PTT - ( 03 Jan 2023 15:19 )  PTT:149.3 sec    Radiology:  MR Head w/wo IV Cont:  (03 Jan 2023 11:57)  < from: MR Head w/wo IV Cont (01.03.23 @ 11:57) >  COMPARISON: CT head 12/31/2022    FINDINGS:    Tiny foci of restricted diffusion is seen in the right cerebellum and   right occipital lobe consistent with acute/subacute lacunar infarct. No   hemorrhagic conversion or mass effect.    There are foci of T2/FLAIR signal hyperintensity within the hemispheric   white matter, which are nonspecific but likely related to sequelae of   microvascular disease.    No abnormal extra-axial fluid collections are present. No abnormal   intraparenchymal enhancement.    Major flow-voids at thebase of the brain follow expected course and   contour.    The calvarium is intact. Right maxillary sinus mucosal thickening.    IMPRESSION:    Acute/subacute lacunar infarcts as described above. No intracranial   hemorrhage or mass effect.  No intracranial mass.       CT Head No Cont:  (31 Dec 2022 12:27)  IMPRESSION:    No CT evidence of acute intracranial hemorrhage or mass.    If clinical suspicion is high for intracranial metastasis, an MRI of the   brain with and without IV contrast is recommended for further evaluation.      VA Duplex Lower Ext Vein Scan, Bilat (01.01.23 @ 18:57)    IMPRESSION:  Acute above and below the knee left lower extremity deep venous   thrombosis with extension into left external iliac vein.  No evidence of acute deep venous thrombosis in the right lower extremity.    Neurology - Consult Note - 01-03-23  -  Jordan Daniels MD  PGY-3 Neurology  Spectra: [phone] ([facility]), [phone] ([facility])  -    Name: ALIA JOVEL; 77y (1945)    Reason for Admission: Shortness of breath    Chief Complaint:     HPI:  6 y/o M PMH HTN, HLD, psoriasis on prednisone 10mg daily presenting with SOB. Recent admission for pneumonia and dced two weeks ago also found to possible esophageal cancer (mass) with liver mets s/p IR biopsy 12/22. Today patient woke up with shortness of breath at rest, worse with exertion with no associated LOC, chest pain, palpitations, diaphoresis, focal weakness, numbness/tingling.  Reports intermittent nonbloody vomiting and diarrhea with no fever/chills, cough, rashes, dysuria, or hematuria. Reports decreased PO intake     Received 1L IVF, azithro, zosyn, vanc, and solumedrol 100mg IVP x1 in ED.    (31 Dec 2022 16:20)      Review of Systems:  INCOMPLETE   CONSTITUTIONAL: No fevers or chills  EYES/ENT: No visual changes or no throat pain   NECK: No pain or stiffness  RESPIRATORY: No hemoptysis or shortness of breath  CARDIOVASCULAR: No chest pain or palpitations  GASTROINTESTINAL: No melena or hematochezia  GENITOURINARY: No dysuria or hematuria  NEUROLOGICAL: +As stated in HPI above  SKIN: No itching, burning, rashes, or lesions   All other review of systems is negative unless indicated above.    Allergies:      PMHx:   Psoriasis    Esophageal mass    Benign essential HTN      PFHx:     PSuHx:       Medications:  MEDICATIONS  (STANDING):  chlorhexidine 2% Cloths 1 Application(s) Topical daily  heparin  Infusion.  Unit(s)/Hr (16 mL/Hr) IV Continuous <Continuous>  hydrocortisone sodium succinate Injectable 25 milliGRAM(s) IV Push every 8 hours  influenza  Vaccine (HIGH DOSE) 0.7 milliLiter(s) IntraMuscular once  midodrine 10 milliGRAM(s) Oral three times a day  ondansetron Injectable 4 milliGRAM(s) IV Push four times a day  pantoprazole  Injectable 40 milliGRAM(s) IV Push two times a day  piperacillin/tazobactam IVPB.. 3.375 Gram(s) IV Intermittent every 12 hours  simvastatin 10 milliGRAM(s) Oral at bedtime  sodium bicarbonate  Infusion 0.043 mEq/kG/Hr (50 mL/Hr) IV Continuous <Continuous>  sodium chloride 0.45% 1000 milliLiter(s) (50 mL/Hr) IV Continuous <Continuous>  sucralfate suspension 1 Gram(s) Oral two times a day    MEDICATIONS  (PRN):  acetaminophen     Tablet .. 650 milliGRAM(s) Oral every 6 hours PRN Temp greater or equal to 38.5C (101.3F), Mild Pain (1 - 3)  heparin   Injectable 7000 Unit(s) IV Push every 6 hours PRN For aPTT less than 40  heparin   Injectable 3500 Unit(s) IV Push every 6 hours PRN For aPTT between 40 - 57      Vitals:  T(C): 36.3 (01-03-23 @ 20:50), Max: 36.6 (01-03-23 @ 06:28)  HR: 78 (01-03-23 @ 20:50) (76 - 80)  BP: 113/78 (01-03-23 @ 20:50) (98/60 - 131/61)  RR: 18 (01-03-23 @ 20:50) (18 - 18)  SpO2: 92% (01-03-23 @ 20:50) (92% - 95%)    Physical Examination: INCOMPLETE  ***    Labs:                        13.6   24.34 )-----------( 103      ( 03 Jan 2023 15:19 )             42.7     01-03    133<L>  |  90<L>  |  134<H>  ----------------------------<  80  4.8   |  20<L>  |  6.02<H>    Ca    6.9<L>      03 Jan 2023 15:19      CAPILLARY BLOOD GLUCOSE    PTT - ( 03 Jan 2023 15:19 )  PTT:149.3 sec    Radiology:  MR Head w/wo IV Cont:  (03 Jan 2023 11:57)  < from: MR Head w/wo IV Cont (01.03.23 @ 11:57) >  COMPARISON: CT head 12/31/2022    FINDINGS:    Tiny foci of restricted diffusion is seen in the right cerebellum and   right occipital lobe consistent with acute/subacute lacunar infarct. No   hemorrhagic conversion or mass effect.    There are foci of T2/FLAIR signal hyperintensity within the hemispheric   white matter, which are nonspecific but likely related to sequelae of   microvascular disease.    No abnormal extra-axial fluid collections are present. No abnormal   intraparenchymal enhancement.    Major flow-voids at thebase of the brain follow expected course and   contour.    The calvarium is intact. Right maxillary sinus mucosal thickening.    IMPRESSION:    Acute/subacute lacunar infarcts as described above. No intracranial   hemorrhage or mass effect.  No intracranial mass.       CT Head No Cont:  (31 Dec 2022 12:27)  IMPRESSION:    No CT evidence of acute intracranial hemorrhage or mass.    If clinical suspicion is high for intracranial metastasis, an MRI of the   brain with and without IV contrast is recommended for further evaluation.      VA Duplex Lower Ext Vein Scan, Bilat (01.01.23 @ 18:57)    IMPRESSION:  Acute above and below the knee left lower extremity deep venous   thrombosis with extension into left external iliac vein.  No evidence of acute deep venous thrombosis in the right lower extremity.    Neurology - Consult Note - 01-03-23  -  Jordan Daniels MD  PGY-3 Neurology  Spectra: [phone] ([facility]), [phone] ([facility])  -    Name: ALIA JOVEL; 77y (1945)    Reason for Admission: Shortness of breath    Chief Complaint:     HPI:  6 y/o M PMH HTN, HLD, psoriasis on prednisone 10mg daily presenting with SOB. Recent admission for pneumonia and dced two weeks ago also found to possible esophageal cancer (mass) with liver mets s/p IR biopsy 12/22. Today patient woke up with shortness of breath at rest, worse with exertion with no associated LOC, chest pain, palpitations, diaphoresis, focal weakness, numbness/tingling.  Reports intermittent nonbloody vomiting and diarrhea with no fever/chills, cough, rashes, dysuria, or hematuria. Reports decreased PO intake     Received 1L IVF, azithro, zosyn, vanc, and solumedrol 100mg IVP x1 in ED.    (31 Dec 2022 16:20)      Review of Systems:  INCOMPLETE   CONSTITUTIONAL: No fevers or chills  EYES/ENT: No visual changes or no throat pain   NECK: No pain or stiffness  RESPIRATORY: No hemoptysis or shortness of breath  CARDIOVASCULAR: No chest pain or palpitations  GASTROINTESTINAL: No melena or hematochezia  GENITOURINARY: No dysuria or hematuria  NEUROLOGICAL: +As stated in HPI above  SKIN: No itching, burning, rashes, or lesions   All other review of systems is negative unless indicated above.    Allergies:      PMHx:   Psoriasis    Esophageal mass    Benign essential HTN      PFHx:     PSuHx:       Medications:  MEDICATIONS  (STANDING):  chlorhexidine 2% Cloths 1 Application(s) Topical daily  heparin  Infusion.  Unit(s)/Hr (16 mL/Hr) IV Continuous <Continuous>  hydrocortisone sodium succinate Injectable 25 milliGRAM(s) IV Push every 8 hours  influenza  Vaccine (HIGH DOSE) 0.7 milliLiter(s) IntraMuscular once  midodrine 10 milliGRAM(s) Oral three times a day  ondansetron Injectable 4 milliGRAM(s) IV Push four times a day  pantoprazole  Injectable 40 milliGRAM(s) IV Push two times a day  piperacillin/tazobactam IVPB.. 3.375 Gram(s) IV Intermittent every 12 hours  simvastatin 10 milliGRAM(s) Oral at bedtime  sodium bicarbonate  Infusion 0.043 mEq/kG/Hr (50 mL/Hr) IV Continuous <Continuous>  sodium chloride 0.45% 1000 milliLiter(s) (50 mL/Hr) IV Continuous <Continuous>  sucralfate suspension 1 Gram(s) Oral two times a day    MEDICATIONS  (PRN):  acetaminophen     Tablet .. 650 milliGRAM(s) Oral every 6 hours PRN Temp greater or equal to 38.5C (101.3F), Mild Pain (1 - 3)  heparin   Injectable 7000 Unit(s) IV Push every 6 hours PRN For aPTT less than 40  heparin   Injectable 3500 Unit(s) IV Push every 6 hours PRN For aPTT between 40 - 57      Vitals:  T(C): 36.3 (01-03-23 @ 20:50), Max: 36.6 (01-03-23 @ 06:28)  HR: 78 (01-03-23 @ 20:50) (76 - 80)  BP: 113/78 (01-03-23 @ 20:50) (98/60 - 131/61)  RR: 18 (01-03-23 @ 20:50) (18 - 18)  SpO2: 92% (01-03-23 @ 20:50) (92% - 95%)    Physical Examination: INCOMPLETE  ***    Labs:                        13.6   24.34 )-----------( 103      ( 03 Jan 2023 15:19 )             42.7     01-03    133<L>  |  90<L>  |  134<H>  ----------------------------<  80  4.8   |  20<L>  |  6.02<H>    Ca    6.9<L>      03 Jan 2023 15:19      CAPILLARY BLOOD GLUCOSE    PTT - ( 03 Jan 2023 15:19 )  PTT:149.3 sec    Radiology:  MR Head w/wo IV Cont:  (03 Jan 2023 11:57)  < from: MR Head w/wo IV Cont (01.03.23 @ 11:57) >  COMPARISON: CT head 12/31/2022    FINDINGS:    Tiny foci of restricted diffusion is seen in the right cerebellum and   right occipital lobe consistent with acute/subacute lacunar infarct. No   hemorrhagic conversion or mass effect.    There are foci of T2/FLAIR signal hyperintensity within the hemispheric   white matter, which are nonspecific but likely related to sequelae of   microvascular disease.    No abnormal extra-axial fluid collections are present. No abnormal   intraparenchymal enhancement.    Major flow-voids at thebase of the brain follow expected course and   contour.    The calvarium is intact. Right maxillary sinus mucosal thickening.    IMPRESSION:    Acute/subacute lacunar infarcts as described above. No intracranial   hemorrhage or mass effect.  No intracranial mass.       CT Head No Cont:  (31 Dec 2022 12:27)  IMPRESSION:    No CT evidence of acute intracranial hemorrhage or mass.    If clinical suspicion is high for intracranial metastasis, an MRI of the   brain with and without IV contrast is recommended for further evaluation.      VA Duplex Lower Ext Vein Scan, Bilat (01.01.23 @ 18:57)    IMPRESSION:  Acute above and below the knee left lower extremity deep venous   thrombosis with extension into left external iliac vein.  No evidence of acute deep venous thrombosis in the right lower extremity.    Neurology - Consult Note - 01-03-23  -  Jordan Daniels MD  PGY-3 Neurology  Spectra: [phone] ([facility]), [phone] ([facility])  -    Name: ALIA JOVEL; 77y (1945)    Reason for Admission: Shortness of breath    Chief Complaint:     HPI:    Per Medicine H&P:  "78 y/o M PMH HTN, HLD, psoriasis on prednisone 10mg daily presenting with SOB. Recent admission for pneumonia and dced two weeks ago also found to possible esophageal cancer (mass) with liver mets s/p IR biopsy 12/22. Today patient woke up with shortness of breath at rest, worse with exertion with no associated LOC, chest pain, palpitations, diaphoresis, focal weakness, numbness/tingling.  Reports intermittent nonbloody vomiting and diarrhea with no fever/chills, cough, rashes, dysuria, or hematuria. Reports decreased PO intake     Received 1L IVF, azithro, zosyn, vanc, and solumedrol 100mg IVP x1 in ED."  (31 Dec 2022 16:20)    Neurology consulted for acute/subacute lacunar infarcts on MRI brain w/wo contrast on 1/3/23 -- R cerebellum, R occipital. Unable to obtain reliable history from patient. He does state that he quit smoking 7 years ago.    Review of Systems: unable to obtain a reliable ROS due to patient's current cognitive status. He only endorses that he feels worn down.  CONSTITUTIONAL: No fevers or chills  EYES/ENT: No visual changes    NEUROLOGICAL: +As stated in HPI above  All other review of systems is negative unless indicated above.    Allergies:      PMHx:   Psoriasis  Esophageal mass  Benign essential HTN      PFHx:     PSuHx:       Medications:  MEDICATIONS  (STANDING):  chlorhexidine 2% Cloths 1 Application(s) Topical daily  heparin  Infusion.  Unit(s)/Hr (16 mL/Hr) IV Continuous <Continuous>  hydrocortisone sodium succinate Injectable 25 milliGRAM(s) IV Push every 8 hours  influenza  Vaccine (HIGH DOSE) 0.7 milliLiter(s) IntraMuscular once  midodrine 10 milliGRAM(s) Oral three times a day  ondansetron Injectable 4 milliGRAM(s) IV Push four times a day  pantoprazole  Injectable 40 milliGRAM(s) IV Push two times a day  piperacillin/tazobactam IVPB.. 3.375 Gram(s) IV Intermittent every 12 hours  simvastatin 10 milliGRAM(s) Oral at bedtime  sodium bicarbonate  Infusion 0.043 mEq/kG/Hr (50 mL/Hr) IV Continuous <Continuous>  sodium chloride 0.45% 1000 milliLiter(s) (50 mL/Hr) IV Continuous <Continuous>  sucralfate suspension 1 Gram(s) Oral two times a day    MEDICATIONS  (PRN):  acetaminophen     Tablet .. 650 milliGRAM(s) Oral every 6 hours PRN Temp greater or equal to 38.5C (101.3F), Mild Pain (1 - 3)  heparin   Injectable 7000 Unit(s) IV Push every 6 hours PRN For aPTT less than 40  heparin   Injectable 3500 Unit(s) IV Push every 6 hours PRN For aPTT between 40 - 57      Vitals:  T(C): 36.3 (01-03-23 @ 20:50), Max: 36.6 (01-03-23 @ 06:28)  HR: 78 (01-03-23 @ 20:50) (76 - 80)  BP: 113/78 (01-03-23 @ 20:50) (98/60 - 131/61)  RR: 18 (01-03-23 @ 20:50) (18 - 18)  SpO2: 92% (01-03-23 @ 20:50) (92% - 95%)      Neurological Examination:      - Mental Status: Alert, awake, oriented to person, month, year; not oriented to age ("89"), location ("apartment," "condo") or situation; speech is fluent with intact naming, intact repetition, and follows 1-step and 3-step mid-line crossing commands.    - Cranial Nerves:  II: Visual fields are full to confrontation; pupils are equal, round, and reactive to light  III, IV, VI: Extraocular movements are intact without nystagmus  V: Facial sensation is intact in the V1-V3 distribution bilaterally  VII: Face is symmetric with normal eye closure and smile  VIII: Hearing is intact to conversation and finger rub  IX, X: Uvula is midline and soft palate rises symmetrically  XI: Shoulder shrug intact  XII: Tongue protrudes in the midline    - Motor/Strength Testing:  - Bilateral upper extremities: no drift  - Bilateral lower extremities: fall to bed with effort against gravity (lack of effort)                                   Right           Left  Biceps                      5                 5  Triceps                     5                 5  Hand                  5                 5  Hip Flex                   Patient refusing exam  Knee Ext	      Patient refusing exam  Dorsiflex                  Patient refusing exam  Plantarflex               5                  5      - Sensory: Intact throughout to light touch.   - Coordination: Finger-nose-finger intact with slight dysmetria bilaterally vs tremor  - Gait: Patient refusing exam      Labs:                        13.6   24.34 )-----------( 103      ( 03 Jan 2023 15:19 )             42.7     01-03    133<L>  |  90<L>  |  134<H>  ----------------------------<  80  4.8   |  20<L>  |  6.02<H>    Ca    6.9<L>      03 Jan 2023 15:19      CAPILLARY BLOOD GLUCOSE    PTT - ( 03 Jan 2023 15:19 )  PTT:149.3 sec      MR Head w/wo IV Cont:  (03 Jan 2023 11:57)  COMPARISON: CT head 12/31/2022    FINDINGS:    Tiny foci of restricted diffusion is seen in the right cerebellum and   right occipital lobe consistent with acute/subacute lacunar infarct. No   hemorrhagic conversion or mass effect.    There are foci of T2/FLAIR signal hyperintensity within the hemispheric   white matter, which are nonspecific but likely related to sequelae of   microvascular disease.    No abnormal extra-axial fluid collections are present. No abnormal   intraparenchymal enhancement.    Major flow-voids at the base of the brain follow expected course and   contour.    The calvarium is intact. Right maxillary sinus mucosal thickening.    IMPRESSION:    Acute/subacute lacunar infarcts as described above. No intracranial   hemorrhage or mass effect.  No intracranial mass.       CT Head No Cont:  (31 Dec 2022 12:27)  IMPRESSION:    No CT evidence of acute intracranial hemorrhage or mass.    If clinical suspicion is high for intracranial metastasis, an MRI of the   brain with and without IV contrast is recommended for further evaluation.      VA Duplex Lower Ext Vein Scan, Bilat (01.01.23 @ 18:57)    IMPRESSION:  Acute above and below the knee left lower extremity deep venous   thrombosis with extension into left external iliac vein.  No evidence of acute deep venous thrombosis in the right lower extremity.        TTE with Doppler (w/Cont) (12.31.22 @ 11:08)  Left Atrium: Normal left atrium  Conclusions:  1. Normal left ventricular internal dimensions and wall  thicknesses. Normal left ventricular systolic function. No  segmental wall motion abnormalities. Endocardial  visualization enhanced with intravenous injection of  Ultrasonic Enhancing Agent (Definity). LVEF calculated  using biplane Stroud's method is 70%. Unable to determine  diastolic function due to insufficient data.  2. Normal right atrium. The right ventricle is not well  visualized; grossly normal right ventricular systolic  function.  3. Normal tricuspid valve. Minimal tricuspid  regurgitation. Estimated pulmonary artery systolic pressure  equals 49  mm Hg, assuming right atrial pressure equals 8  mm Hg, consistent with mild pulmonary pressures.  4. No pericardial effusion seen.  *** No previous Echo exam.  There is no evidence of RV strain.  Neurology - Consult Note - 01-03-23  -  Jordan Daniels MD  PGY-3 Neurology  Spectra: [phone] ([facility]), [phone] ([facility])  -    Name: ALIA JOVEL; 77y (1945)    Reason for Admission: Shortness of breath    Chief Complaint:     HPI:    Per Medicine H&P:  "78 y/o M PMH HTN, HLD, psoriasis on prednisone 10mg daily presenting with SOB. Recent admission for pneumonia and dced two weeks ago also found to possible esophageal cancer (mass) with liver mets s/p IR biopsy 12/22. Today patient woke up with shortness of breath at rest, worse with exertion with no associated LOC, chest pain, palpitations, diaphoresis, focal weakness, numbness/tingling.  Reports intermittent nonbloody vomiting and diarrhea with no fever/chills, cough, rashes, dysuria, or hematuria. Reports decreased PO intake     Received 1L IVF, azithro, zosyn, vanc, and solumedrol 100mg IVP x1 in ED."  (31 Dec 2022 16:20)    Neurology consulted for acute/subacute lacunar infarcts on MRI brain w/wo contrast on 1/3/23 -- R cerebellum, R occipital. Unable to obtain reliable history from patient. He does state that he quit smoking 7 years ago.    Review of Systems: unable to obtain a reliable ROS due to patient's current cognitive status. He only endorses that he feels worn down.  CONSTITUTIONAL: No fevers or chills  EYES/ENT: No visual changes    NEUROLOGICAL: +As stated in HPI above  All other review of systems is negative unless indicated above.    Allergies:      PMHx:   Psoriasis  Esophageal mass  Benign essential HTN      PFHx:     PSuHx:       Medications:  MEDICATIONS  (STANDING):  chlorhexidine 2% Cloths 1 Application(s) Topical daily  heparin  Infusion.  Unit(s)/Hr (16 mL/Hr) IV Continuous <Continuous>  hydrocortisone sodium succinate Injectable 25 milliGRAM(s) IV Push every 8 hours  influenza  Vaccine (HIGH DOSE) 0.7 milliLiter(s) IntraMuscular once  midodrine 10 milliGRAM(s) Oral three times a day  ondansetron Injectable 4 milliGRAM(s) IV Push four times a day  pantoprazole  Injectable 40 milliGRAM(s) IV Push two times a day  piperacillin/tazobactam IVPB.. 3.375 Gram(s) IV Intermittent every 12 hours  simvastatin 10 milliGRAM(s) Oral at bedtime  sodium bicarbonate  Infusion 0.043 mEq/kG/Hr (50 mL/Hr) IV Continuous <Continuous>  sodium chloride 0.45% 1000 milliLiter(s) (50 mL/Hr) IV Continuous <Continuous>  sucralfate suspension 1 Gram(s) Oral two times a day    MEDICATIONS  (PRN):  acetaminophen     Tablet .. 650 milliGRAM(s) Oral every 6 hours PRN Temp greater or equal to 38.5C (101.3F), Mild Pain (1 - 3)  heparin   Injectable 7000 Unit(s) IV Push every 6 hours PRN For aPTT less than 40  heparin   Injectable 3500 Unit(s) IV Push every 6 hours PRN For aPTT between 40 - 57      Vitals:  T(C): 36.3 (01-03-23 @ 20:50), Max: 36.6 (01-03-23 @ 06:28)  HR: 78 (01-03-23 @ 20:50) (76 - 80)  BP: 113/78 (01-03-23 @ 20:50) (98/60 - 131/61)  RR: 18 (01-03-23 @ 20:50) (18 - 18)  SpO2: 92% (01-03-23 @ 20:50) (92% - 95%)      Neurological Examination:      - Mental Status: Alert, awake, oriented to person, month, year; not oriented to age ("89"), location ("apartment," "condo") or situation; speech is fluent with intact naming, intact repetition, and follows 1-step and 3-step mid-line crossing commands.    - Cranial Nerves:  II: Visual fields are full to confrontation; pupils are equal, round, and reactive to light  III, IV, VI: Extraocular movements are intact without nystagmus  V: Facial sensation is intact in the V1-V3 distribution bilaterally  VII: Face is symmetric with normal eye closure and smile  VIII: Hearing is intact to conversation and finger rub  IX, X: Uvula is midline and soft palate rises symmetrically  XI: Shoulder shrug intact  XII: Tongue protrudes in the midline    - Motor/Strength Testing:  - Bilateral upper extremities: no drift  - Bilateral lower extremities: fall to bed with effort against gravity (lack of effort)                                   Right           Left  Biceps                      5                 5  Triceps                     5                 5  Hand                  5                 5  Hip Flex                   Patient refusing exam  Knee Ext	      Patient refusing exam  Dorsiflex                  Patient refusing exam  Plantarflex               5                  5      - Sensory: Intact throughout to light touch.   - Coordination: Finger-nose-finger intact with slight dysmetria bilaterally vs tremor  - Gait: Patient refusing exam      Labs:                        13.6   24.34 )-----------( 103      ( 03 Jan 2023 15:19 )             42.7     01-03    133<L>  |  90<L>  |  134<H>  ----------------------------<  80  4.8   |  20<L>  |  6.02<H>    Ca    6.9<L>      03 Jan 2023 15:19      CAPILLARY BLOOD GLUCOSE    PTT - ( 03 Jan 2023 15:19 )  PTT:149.3 sec      MR Head w/wo IV Cont:  (03 Jan 2023 11:57)  COMPARISON: CT head 12/31/2022    FINDINGS:    Tiny foci of restricted diffusion is seen in the right cerebellum and   right occipital lobe consistent with acute/subacute lacunar infarct. No   hemorrhagic conversion or mass effect.    There are foci of T2/FLAIR signal hyperintensity within the hemispheric   white matter, which are nonspecific but likely related to sequelae of   microvascular disease.    No abnormal extra-axial fluid collections are present. No abnormal   intraparenchymal enhancement.    Major flow-voids at the base of the brain follow expected course and   contour.    The calvarium is intact. Right maxillary sinus mucosal thickening.    IMPRESSION:    Acute/subacute lacunar infarcts as described above. No intracranial   hemorrhage or mass effect.  No intracranial mass.       CT Head No Cont:  (31 Dec 2022 12:27)  IMPRESSION:    No CT evidence of acute intracranial hemorrhage or mass.    If clinical suspicion is high for intracranial metastasis, an MRI of the   brain with and without IV contrast is recommended for further evaluation.      VA Duplex Lower Ext Vein Scan, Bilat (01.01.23 @ 18:57)    IMPRESSION:  Acute above and below the knee left lower extremity deep venous   thrombosis with extension into left external iliac vein.  No evidence of acute deep venous thrombosis in the right lower extremity.        TTE with Doppler (w/Cont) (12.31.22 @ 11:08)  Left Atrium: Normal left atrium  Conclusions:  1. Normal left ventricular internal dimensions and wall  thicknesses. Normal left ventricular systolic function. No  segmental wall motion abnormalities. Endocardial  visualization enhanced with intravenous injection of  Ultrasonic Enhancing Agent (Definity). LVEF calculated  using biplane Stroud's method is 70%. Unable to determine  diastolic function due to insufficient data.  2. Normal right atrium. The right ventricle is not well  visualized; grossly normal right ventricular systolic  function.  3. Normal tricuspid valve. Minimal tricuspid  regurgitation. Estimated pulmonary artery systolic pressure  equals 49  mm Hg, assuming right atrial pressure equals 8  mm Hg, consistent with mild pulmonary pressures.  4. No pericardial effusion seen.  *** No previous Echo exam.  There is no evidence of RV strain.  Neurology - Consult Note - 01-03-23  -  Jordan Daniels MD  PGY-3 Neurology  Spectra: [phone] ([facility]), [phone] ([facility])  -    Name: ALIA JOVEL; 77y (1945)    Reason for Admission: Shortness of breath    Chief Complaint:     HPI:    Per Medicine H&P:  "78 y/o M PMH HTN, HLD, psoriasis on prednisone 10mg daily presenting with SOB. Recent admission for pneumonia and dced two weeks ago also found to possible esophageal cancer (mass) with liver mets s/p IR biopsy 12/22. Today patient woke up with shortness of breath at rest, worse with exertion with no associated LOC, chest pain, palpitations, diaphoresis, focal weakness, numbness/tingling.  Reports intermittent nonbloody vomiting and diarrhea with no fever/chills, cough, rashes, dysuria, or hematuria. Reports decreased PO intake     Received 1L IVF, azithro, zosyn, vanc, and solumedrol 100mg IVP x1 in ED."  (31 Dec 2022 16:20)    Neurology consulted for acute/subacute lacunar infarcts on MRI brain w/wo contrast on 1/3/23 -- R cerebellum, R occipital. Unable to obtain reliable history from patient. He does state that he quit smoking 7 years ago.    Review of Systems: unable to obtain a reliable ROS due to patient's current cognitive status. He only endorses that he feels worn down.  CONSTITUTIONAL: No fevers or chills  EYES/ENT: No visual changes    NEUROLOGICAL: +As stated in HPI above  All other review of systems is negative unless indicated above.    Allergies:      PMHx:   Psoriasis  Esophageal mass  Benign essential HTN      PFHx:     PSuHx:       Medications:  MEDICATIONS  (STANDING):  chlorhexidine 2% Cloths 1 Application(s) Topical daily  heparin  Infusion.  Unit(s)/Hr (16 mL/Hr) IV Continuous <Continuous>  hydrocortisone sodium succinate Injectable 25 milliGRAM(s) IV Push every 8 hours  influenza  Vaccine (HIGH DOSE) 0.7 milliLiter(s) IntraMuscular once  midodrine 10 milliGRAM(s) Oral three times a day  ondansetron Injectable 4 milliGRAM(s) IV Push four times a day  pantoprazole  Injectable 40 milliGRAM(s) IV Push two times a day  piperacillin/tazobactam IVPB.. 3.375 Gram(s) IV Intermittent every 12 hours  simvastatin 10 milliGRAM(s) Oral at bedtime  sodium bicarbonate  Infusion 0.043 mEq/kG/Hr (50 mL/Hr) IV Continuous <Continuous>  sodium chloride 0.45% 1000 milliLiter(s) (50 mL/Hr) IV Continuous <Continuous>  sucralfate suspension 1 Gram(s) Oral two times a day    MEDICATIONS  (PRN):  acetaminophen     Tablet .. 650 milliGRAM(s) Oral every 6 hours PRN Temp greater or equal to 38.5C (101.3F), Mild Pain (1 - 3)  heparin   Injectable 7000 Unit(s) IV Push every 6 hours PRN For aPTT less than 40  heparin   Injectable 3500 Unit(s) IV Push every 6 hours PRN For aPTT between 40 - 57      Vitals:  T(C): 36.3 (01-03-23 @ 20:50), Max: 36.6 (01-03-23 @ 06:28)  HR: 78 (01-03-23 @ 20:50) (76 - 80)  BP: 113/78 (01-03-23 @ 20:50) (98/60 - 131/61)  RR: 18 (01-03-23 @ 20:50) (18 - 18)  SpO2: 92% (01-03-23 @ 20:50) (92% - 95%)      Neurological Examination:      - Mental Status: Alert, awake, oriented to person, month, year; not oriented to age ("89"), location ("apartment," "condo") or situation; speech is fluent with intact naming, intact repetition, and follows 1-step and 3-step mid-line crossing commands.    - Cranial Nerves:  II: Visual fields are full to confrontation; pupils are equal, round, and reactive to light  III, IV, VI: Extraocular movements are intact without nystagmus  V: Facial sensation is intact in the V1-V3 distribution bilaterally  VII: Face is symmetric with normal eye closure and smile  VIII: Hearing is intact to conversation and finger rub  IX, X: Uvula is midline and soft palate rises symmetrically  XI: Shoulder shrug intact  XII: Tongue protrudes in the midline    - Motor/Strength Testing:  - Bilateral upper extremities: no drift  - Bilateral lower extremities: fall to bed with effort against gravity (lack of effort)                                   Right           Left  Biceps                      5                 5  Triceps                     5                 5  Hand                  5                 5  Hip Flex                   Patient refusing exam  Knee Ext	      Patient refusing exam  Dorsiflex                  Patient refusing exam  Plantarflex               5                  5      - Sensory: Intact throughout to light touch.   - Coordination: Finger-nose-finger intact with slight dysmetria bilaterally vs tremor  - Gait: Patient refusing exam      Labs:                        13.6   24.34 )-----------( 103      ( 03 Jan 2023 15:19 )             42.7     01-03    133<L>  |  90<L>  |  134<H>  ----------------------------<  80  4.8   |  20<L>  |  6.02<H>    Ca    6.9<L>      03 Jan 2023 15:19      CAPILLARY BLOOD GLUCOSE    PTT - ( 03 Jan 2023 15:19 )  PTT:149.3 sec      MR Head w/wo IV Cont:  (03 Jan 2023 11:57)  COMPARISON: CT head 12/31/2022    FINDINGS:    Tiny foci of restricted diffusion is seen in the right cerebellum and   right occipital lobe consistent with acute/subacute lacunar infarct. No   hemorrhagic conversion or mass effect.    There are foci of T2/FLAIR signal hyperintensity within the hemispheric   white matter, which are nonspecific but likely related to sequelae of   microvascular disease.    No abnormal extra-axial fluid collections are present. No abnormal   intraparenchymal enhancement.    Major flow-voids at the base of the brain follow expected course and   contour.    The calvarium is intact. Right maxillary sinus mucosal thickening.    IMPRESSION:    Acute/subacute lacunar infarcts as described above. No intracranial   hemorrhage or mass effect.  No intracranial mass.       CT Head No Cont:  (31 Dec 2022 12:27)  IMPRESSION:    No CT evidence of acute intracranial hemorrhage or mass.    If clinical suspicion is high for intracranial metastasis, an MRI of the   brain with and without IV contrast is recommended for further evaluation.      VA Duplex Lower Ext Vein Scan, Bilat (01.01.23 @ 18:57)    IMPRESSION:  Acute above and below the knee left lower extremity deep venous   thrombosis with extension into left external iliac vein.  No evidence of acute deep venous thrombosis in the right lower extremity.        TTE with Doppler (w/Cont) (12.31.22 @ 11:08)  Left Atrium: Normal left atrium  Conclusions:  1. Normal left ventricular internal dimensions and wall  thicknesses. Normal left ventricular systolic function. No  segmental wall motion abnormalities. Endocardial  visualization enhanced with intravenous injection of  Ultrasonic Enhancing Agent (Definity). LVEF calculated  using biplane Stroud's method is 70%. Unable to determine  diastolic function due to insufficient data.  2. Normal right atrium. The right ventricle is not well  visualized; grossly normal right ventricular systolic  function.  3. Normal tricuspid valve. Minimal tricuspid  regurgitation. Estimated pulmonary artery systolic pressure  equals 49  mm Hg, assuming right atrial pressure equals 8  mm Hg, consistent with mild pulmonary pressures.  4. No pericardial effusion seen.  *** No previous Echo exam.  There is no evidence of RV strain.

## 2023-01-03 NOTE — PROGRESS NOTE ADULT - SUBJECTIVE AND OBJECTIVE BOX
Patient seen and examined at bedside.    Overnight Events: No acute events. Soft BPs overnight and tarry stools reported.     Review Of Systems: No chest pain, shortness of breath, or palpitations            Current Meds:  acetaminophen     Tablet .. 650 milliGRAM(s) Oral every 6 hours PRN  chlorhexidine 2% Cloths 1 Application(s) Topical daily  furosemide   Injectable 80 milliGRAM(s) IV Push two times a day  heparin   Injectable 7000 Unit(s) IV Push every 6 hours PRN  heparin   Injectable 3500 Unit(s) IV Push every 6 hours PRN  heparin  Infusion.  Unit(s)/Hr IV Continuous <Continuous>  influenza  Vaccine (HIGH DOSE) 0.7 milliLiter(s) IntraMuscular once  midodrine 10 milliGRAM(s) Oral three times a day  ondansetron Injectable 4 milliGRAM(s) IV Push four times a day  pantoprazole    Tablet 40 milliGRAM(s) Oral before breakfast  piperacillin/tazobactam IVPB.. 3.375 Gram(s) IV Intermittent every 12 hours  predniSONE   Tablet 10 milliGRAM(s) Oral daily  simvastatin 10 milliGRAM(s) Oral at bedtime  sodium bicarbonate  Infusion 0.043 mEq/kG/Hr IV Continuous <Continuous>  sodium bicarbonate  Infusion 0.043 mEq/kG/Hr IV Continuous <Continuous>  sucralfate suspension 1 Gram(s) Oral two times a day      Vitals:  T(F): 97.8 (01-03), Max: 98.4 (01-02)  HR: 77 (01-03) (76 - 86)  BP: 98/60 (01-03) (89/70 - 98/60)  RR: 18 (01-03)  SpO2: 94% (01-03)  I&O's Summary    02 Jan 2023 07:01  -  03 Jan 2023 07:00  --------------------------------------------------------  IN: 1016 mL / OUT: 0 mL / NET: 1016 mL        Physical Exam:  Appearance: No acute distress; well appearing  Eyes: EOMI, pink conjunctiva  HEENT: Normal oral mucosa  Cardiovascular: RRR, S1, S2, no murmurs, rubs, or gallops; no edema; no JVD  Respiratory: Clear to auscultation bilaterally  Gastrointestinal: soft, non-tender, non-distended with normal bowel sounds  Musculoskeletal: No clubbing; no joint deformity   Neurologic: Non-focal  Psychiatry: AAOx3, mood & affect appropriate  Skin: No rashes, ecchymoses, or cyanosis                          13.3   16.85 )-----------( 90       ( 02 Jan 2023 11:50 )             40.8     01-02    134<L>  |  93<L>  |  129<H>  ----------------------------<  86  4.8   |  19<L>  |  5.27<H>    Ca    7.3<L>      02 Jan 2023 11:50      PTT - ( 02 Jan 2023 18:34 )  PTT:49.2 sec  CARDIAC MARKERS ( 01 Jan 2023 02:17 )  144 ng/L / x     / x     / x     / x     / x      CARDIAC MARKERS ( 31 Dec 2022 19:02 )  139 ng/L / x     / x     / 118 U/L / x     / 9.6 ng/mL  CARDIAC MARKERS ( 31 Dec 2022 14:21 )  147 ng/L / x     / x     / x     / x     / x      CARDIAC MARKERS ( 31 Dec 2022 10:00 )  165 ng/L / x     / x     / x     / x     / x          Serum Pro-Brain Natriuretic Peptide: [id number] pg/mL (12-31 @ 10:00)        Echo:  TTE 12/31/22:  Conclusions:  1. Normal left ventricular internal dimensions and wall  thicknesses. Normal left ventricular systolic function. No  segmental wall motion abnormalities. Endocardial  visualization enhanced with intravenous injection of  Ultrasonic Enhancing Agent (Definity). LVEF calculated  using biplane Stroud's method is 70%. Unable to determine  diastolic function due to insufficient data.  2. Normal right atrium. The right ventricle is not well  visualized; grossly normal right ventricular systolic  function.  3. Normal tricuspid valve. Minimal tricuspid  regurgitation.Estimated pulmonary artery systolic pressure  equals 49  mm Hg, assuming right atrial pressure equals 8  mm Hg, consistent with mild pulmonary pressures.  4. No pericardial effusion seen.  *** No previous Echo exam.  There is no evidence of RV strain.     Patient seen and examined at bedside.    Overnight Events: No acute events. Soft BPs overnight and tarry stools reported.     Review Of Systems: No chest pain, shortness of breath, or palpitations            Current Meds:  acetaminophen     Tablet .. 650 milliGRAM(s) Oral every 6 hours PRN  chlorhexidine 2% Cloths 1 Application(s) Topical daily  furosemide   Injectable 80 milliGRAM(s) IV Push two times a day  heparin   Injectable 7000 Unit(s) IV Push every 6 hours PRN  heparin   Injectable 3500 Unit(s) IV Push every 6 hours PRN  heparin  Infusion.  Unit(s)/Hr IV Continuous <Continuous>  influenza  Vaccine (HIGH DOSE) 0.7 milliLiter(s) IntraMuscular once  midodrine 10 milliGRAM(s) Oral three times a day  ondansetron Injectable 4 milliGRAM(s) IV Push four times a day  pantoprazole    Tablet 40 milliGRAM(s) Oral before breakfast  piperacillin/tazobactam IVPB.. 3.375 Gram(s) IV Intermittent every 12 hours  predniSONE   Tablet 10 milliGRAM(s) Oral daily  simvastatin 10 milliGRAM(s) Oral at bedtime  sodium bicarbonate  Infusion 0.043 mEq/kG/Hr IV Continuous <Continuous>  sodium bicarbonate  Infusion 0.043 mEq/kG/Hr IV Continuous <Continuous>  sucralfate suspension 1 Gram(s) Oral two times a day      Vitals:  T(F): 97.8 (01-03), Max: 98.4 (01-02)  HR: 77 (01-03) (76 - 86)  BP: 98/60 (01-03) (89/70 - 98/60)  RR: 18 (01-03)  SpO2: 94% (01-03)  I&O's Summary    02 Jan 2023 07:01  -  03 Jan 2023 07:00  --------------------------------------------------------  IN: 1016 mL / OUT: 0 mL / NET: 1016 mL        Physical Exam:  Appearance: No acute distress; well appearing  Eyes: EOMI, pink conjunctiva  HEENT: Normal oral mucosa  Cardiovascular: RRR, S1, S2, no murmurs, rubs, or gallops; no edema; no JVD  Respiratory: Clear to auscultation bilaterally  Gastrointestinal: soft, non-tender, non-distended with normal bowel sounds  Musculoskeletal: No clubbing; no joint deformity   Neurologic: Non-focal  Psychiatry: AAOx3, mood & affect appropriate  Skin: No rashes, ecchymoses, or cyanosis                          13.3   16.85 )-----------( 90       ( 02 Jan 2023 11:50 )             40.8     01-02    134<L>  |  93<L>  |  129<H>  ----------------------------<  86  4.8   |  19<L>  |  5.27<H>    Ca    7.3<L>      02 Jan 2023 11:50      PTT - ( 02 Jan 2023 18:34 )  PTT:49.2 sec  CARDIAC MARKERS ( 01 Jan 2023 02:17 )  144 ng/L / x     / x     / x     / x     / x      CARDIAC MARKERS ( 31 Dec 2022 19:02 )  139 ng/L / x     / x     / 118 U/L / x     / 9.6 ng/mL  CARDIAC MARKERS ( 31 Dec 2022 14:21 )  147 ng/L / x     / x     / x     / x     / x      CARDIAC MARKERS ( 31 Dec 2022 10:00 )  165 ng/L / x     / x     / x     / x     / x          Serum Pro-Brain Natriuretic Peptide: [id number] pg/mL (12-31 @ 10:00)        Echo:  TTE 12/31/22:  Conclusions:  1. Normal left ventricular internal dimensions and wall  thicknesses. Normal left ventricular systolic function. No  segmental wall motion abnormalities. Endocardial  visualization enhanced with intravenous injection of  Ultrasonic Enhancing Agent (Definity). LVEF calculated  using biplane Stroud's method is 70%. Unable to determine  diastolic function due to insufficient data.  2. Normal right atrium. The right ventricle is not well  visualized; grossly normal right ventricular systolic  function.  3. Normal tricuspid valve. Minimal tricuspid  regurgitation.Estimated pulmonary artery systolic pressure  equals 49  mm Hg, assuming right atrial pressure equals 8  mm Hg, consistent with mild pulmonary pressures.  4. No pericardial effusion seen.  *** No previous Echo exam.  There is no evidence of RV strain.     Patient seen and examined at bedside.    Overnight Events: No acute events. Soft BPs overnight and tarry stools reported.     Review Of Systems: No chest pain, shortness of breath, or palpitations            Current Meds:  acetaminophen     Tablet .. 650 milliGRAM(s) Oral every 6 hours PRN  chlorhexidine 2% Cloths 1 Application(s) Topical daily  furosemide   Injectable 80 milliGRAM(s) IV Push two times a day  heparin   Injectable 7000 Unit(s) IV Push every 6 hours PRN  heparin   Injectable 3500 Unit(s) IV Push every 6 hours PRN  heparin  Infusion.  Unit(s)/Hr IV Continuous <Continuous>  influenza  Vaccine (HIGH DOSE) 0.7 milliLiter(s) IntraMuscular once  midodrine 10 milliGRAM(s) Oral three times a day  ondansetron Injectable 4 milliGRAM(s) IV Push four times a day  pantoprazole    Tablet 40 milliGRAM(s) Oral before breakfast  piperacillin/tazobactam IVPB.. 3.375 Gram(s) IV Intermittent every 12 hours  predniSONE   Tablet 10 milliGRAM(s) Oral daily  simvastatin 10 milliGRAM(s) Oral at bedtime  sodium bicarbonate  Infusion 0.043 mEq/kG/Hr IV Continuous <Continuous>  sodium bicarbonate  Infusion 0.043 mEq/kG/Hr IV Continuous <Continuous>  sucralfate suspension 1 Gram(s) Oral two times a day      Vitals:  T(F): 97.8 (01-03), Max: 98.4 (01-02)  HR: 77 (01-03) (76 - 86)  BP: 98/60 (01-03) (89/70 - 98/60)  RR: 18 (01-03)  SpO2: 94% (01-03)  I&O's Summary    02 Jan 2023 07:01  -  03 Jan 2023 07:00  --------------------------------------------------------  IN: 1016 mL / OUT: 0 mL / NET: 1016 mL        Physical Exam:  Appearance: No acute distress; well appearing  Eyes: EOMI, pink conjunctiva  HEENT: Normal oral mucosa  Cardiovascular: RRR, S1, S2, no murmurs, rubs, or gallops; no edema; no JVD  Respiratory: Clear to auscultation bilaterally  Gastrointestinal: soft, non-tender, non-distended with normal bowel sounds  Musculoskeletal: No clubbing; no joint deformity   Neurologic: Non-focal  Psychiatry: AAOx3, mood & affect appropriate  Skin: No rashes, ecchymoses, or cyanosis                          13.3   16.85 )-----------( 90       ( 02 Jan 2023 11:50 )             40.8     01-02    134<L>  |  93<L>  |  129<H>  ----------------------------<  86  4.8   |  19<L>  |  5.27<H>    Ca    7.3<L>      02 Jan 2023 11:50      PTT - ( 02 Jan 2023 18:34 )  PTT:49.2 sec  CARDIAC MARKERS ( 01 Jan 2023 02:17 )  144 ng/L / x     / x     / x     / x     / x      CARDIAC MARKERS ( 31 Dec 2022 19:02 )  139 ng/L / x     / x     / 118 U/L / x     / 9.6 ng/mL  CARDIAC MARKERS ( 31 Dec 2022 14:21 )  147 ng/L / x     / x     / x     / x     / x      CARDIAC MARKERS ( 31 Dec 2022 10:00 )  165 ng/L / x     / x     / x     / x     / x          Serum Pro-Brain Natriuretic Peptide: [id number] pg/mL (12-31 @ 10:00)        Echo:  TTE 12/31/22:  Conclusions:  1. Normal left ventricular internal dimensions and wall  thicknesses. Normal left ventricular systolic function. No  segmental wall motion abnormalities. Endocardial  visualization enhanced with intravenous injection of  Ultrasonic Enhancing Agent (Definity). LVEF calculated  using biplane Stroud's method is 70%. Unable to determine  diastolic function due to insufficient data.  2. Normal right atrium. The right ventricle is not well  visualized; grossly normal right ventricular systolic  function.  3. Normal tricuspid valve. Minimal tricuspid  regurgitation.Estimated pulmonary artery systolic pressure  equals 49  mm Hg, assuming right atrial pressure equals 8  mm Hg, consistent with mild pulmonary pressures.  4. No pericardial effusion seen.  *** No previous Echo exam.  There is no evidence of RV strain.     Patient seen and examined at bedside.    Overnight Events: No acute events. Soft BPs overnight and tarry stools reported. Fecal occult positive.    Review Of Systems: No chest pain, shortness of breath, or palpitations            Current Meds:  acetaminophen     Tablet .. 650 milliGRAM(s) Oral every 6 hours PRN  chlorhexidine 2% Cloths 1 Application(s) Topical daily  furosemide   Injectable 80 milliGRAM(s) IV Push two times a day  heparin   Injectable 7000 Unit(s) IV Push every 6 hours PRN  heparin   Injectable 3500 Unit(s) IV Push every 6 hours PRN  heparin  Infusion.  Unit(s)/Hr IV Continuous <Continuous>  influenza  Vaccine (HIGH DOSE) 0.7 milliLiter(s) IntraMuscular once  midodrine 10 milliGRAM(s) Oral three times a day  ondansetron Injectable 4 milliGRAM(s) IV Push four times a day  pantoprazole    Tablet 40 milliGRAM(s) Oral before breakfast  piperacillin/tazobactam IVPB.. 3.375 Gram(s) IV Intermittent every 12 hours  predniSONE   Tablet 10 milliGRAM(s) Oral daily  simvastatin 10 milliGRAM(s) Oral at bedtime  sodium bicarbonate  Infusion 0.043 mEq/kG/Hr IV Continuous <Continuous>  sodium bicarbonate  Infusion 0.043 mEq/kG/Hr IV Continuous <Continuous>  sucralfate suspension 1 Gram(s) Oral two times a day      Vitals:  T(F): 97.8 (01-03), Max: 98.4 (01-02)  HR: 77 (01-03) (76 - 86)  BP: 98/60 (01-03) (89/70 - 98/60)  RR: 18 (01-03)  SpO2: 94% (01-03)  I&O's Summary    02 Jan 2023 07:01  -  03 Jan 2023 07:00  --------------------------------------------------------  IN: 1016 mL / OUT: 0 mL / NET: 1016 mL        Physical Exam:  Appearance: No acute distress; well appearing  Eyes: EOMI, pink conjunctiva  HEENT: Normal oral mucosa  Cardiovascular: RRR, S1, S2, no murmurs, rubs, or gallops; no edema; no JVD  Respiratory: Clear to auscultation bilaterally  Gastrointestinal: soft, non-tender, non-distended with normal bowel sounds  Musculoskeletal: No clubbing; no joint deformity   Neurologic: Non-focal  Psychiatry: AAOx3, mood & affect appropriate  Skin: No rashes, ecchymoses, or cyanosis                          13.3   16.85 )-----------( 90       ( 02 Jan 2023 11:50 )             40.8     01-02    134<L>  |  93<L>  |  129<H>  ----------------------------<  86  4.8   |  19<L>  |  5.27<H>    Ca    7.3<L>      02 Jan 2023 11:50      PTT - ( 02 Jan 2023 18:34 )  PTT:49.2 sec  CARDIAC MARKERS ( 01 Jan 2023 02:17 )  144 ng/L / x     / x     / x     / x     / x      CARDIAC MARKERS ( 31 Dec 2022 19:02 )  139 ng/L / x     / x     / 118 U/L / x     / 9.6 ng/mL  CARDIAC MARKERS ( 31 Dec 2022 14:21 )  147 ng/L / x     / x     / x     / x     / x      CARDIAC MARKERS ( 31 Dec 2022 10:00 )  165 ng/L / x     / x     / x     / x     / x          Serum Pro-Brain Natriuretic Peptide: [id number] pg/mL (12-31 @ 10:00)        Echo:  TTE 12/31/22:  Conclusions:  1. Normal left ventricular internal dimensions and wall  thicknesses. Normal left ventricular systolic function. No  segmental wall motion abnormalities. Endocardial  visualization enhanced with intravenous injection of  Ultrasonic Enhancing Agent (Definity). LVEF calculated  using biplane Stroud's method is 70%. Unable to determine  diastolic function due to insufficient data.  2. Normal right atrium. The right ventricle is not well  visualized; grossly normal right ventricular systolic  function.  3. Normal tricuspid valve. Minimal tricuspid  regurgitation.Estimated pulmonary artery systolic pressure  equals 49  mm Hg, assuming right atrial pressure equals 8  mm Hg, consistent with mild pulmonary pressures.  4. No pericardial effusion seen.  *** No previous Echo exam.  There is no evidence of RV strain.     Patient seen and examined at bedside.    Overnight Events: No acute events. Soft BPs overnight and tarry stools reported. Fecal occult positive.    Review Of Systems: No chest pain, shortness of breath, or palpitations            Current Meds:  acetaminophen     Tablet .. 650 milliGRAM(s) Oral every 6 hours PRN  chlorhexidine 2% Cloths 1 Application(s) Topical daily  furosemide   Injectable 80 milliGRAM(s) IV Push two times a day  heparin   Injectable 7000 Unit(s) IV Push every 6 hours PRN  heparin   Injectable 3500 Unit(s) IV Push every 6 hours PRN  heparin  Infusion.  Unit(s)/Hr IV Continuous <Continuous>  influenza  Vaccine (HIGH DOSE) 0.7 milliLiter(s) IntraMuscular once  midodrine 10 milliGRAM(s) Oral three times a day  ondansetron Injectable 4 milliGRAM(s) IV Push four times a day  pantoprazole    Tablet 40 milliGRAM(s) Oral before breakfast  piperacillin/tazobactam IVPB.. 3.375 Gram(s) IV Intermittent every 12 hours  predniSONE   Tablet 10 milliGRAM(s) Oral daily  simvastatin 10 milliGRAM(s) Oral at bedtime  sodium bicarbonate  Infusion 0.043 mEq/kG/Hr IV Continuous <Continuous>  sodium bicarbonate  Infusion 0.043 mEq/kG/Hr IV Continuous <Continuous>  sucralfate suspension 1 Gram(s) Oral two times a day      Vitals:  T(F): 97.8 (01-03), Max: 98.4 (01-02)  HR: 77 (01-03) (76 - 86)  BP: 98/60 (01-03) (89/70 - 98/60)  RR: 18 (01-03)  SpO2: 94% (01-03)  I&O's Summary    02 Jan 2023 07:01  -  03 Jan 2023 07:00  --------------------------------------------------------  IN: 1016 mL / OUT: 0 mL / NET: 1016 mL        Physical Exam:  Appearance: No acute distress; well appearing  Eyes: EOMI, pink conjunctiva  HEENT: Normal oral mucosa  Cardiovascular: RRR, S1, S2, no murmurs, rubs, or gallops; no edema; no JVD  Respiratory: Clear to auscultation bilaterally  Gastrointestinal: soft, non-tender, non-distended with normal bowel sounds  Musculoskeletal: No clubbing; no joint deformity   Neurologic: Non-focal  Psychiatry: AAOx3, mood & affect appropriate  Skin: No rashes, ecchymoses, or cyanosis                          13.3   16.85 )-----------( 90       ( 02 Jan 2023 11:50 )             40.8     01-02    134<L>  |  93<L>  |  129<H>  ----------------------------<  86  4.8   |  19<L>  |  5.27<H>    Ca    7.3<L>      02 Jan 2023 11:50      PTT - ( 02 Jan 2023 18:34 )  PTT:49.2 sec  CARDIAC MARKERS ( 01 Jan 2023 02:17 )  144 ng/L / x     / x     / x     / x     / x      CARDIAC MARKERS ( 31 Dec 2022 19:02 )  139 ng/L / x     / x     / 118 U/L / x     / 9.6 ng/mL  CARDIAC MARKERS ( 31 Dec 2022 14:21 )  147 ng/L / x     / x     / x     / x     / x      CARDIAC MARKERS ( 31 Dec 2022 10:00 )  165 ng/L / x     / x     / x     / x     / x          Serum Pro-Brain Natriuretic Peptide: [id number] pg/mL (12-31 @ 10:00)        Echo:  TTE 12/31/22:  Conclusions:  1. Normal left ventricular internal dimensions and wall  thicknesses. Normal left ventricular systolic function. No  segmental wall motion abnormalities. Endocardial  visualization enhanced with intravenous injection of  Ultrasonic Enhancing Agent (Definity). LVEF calculated  using biplane Stroud's method is 70%. Unable to determine  diastolic function due to insufficient data.  2. Normal right atrium. The right ventricle is not well  visualized; grossly normal right ventricular systolic  function.  3. Normal tricuspid valve. Minimal tricuspid  regurgitation.Estimated pulmonary artery systolic pressure  equals 49  mm Hg, assuming right atrial pressure equals 8  mm Hg, consistent with mild pulmonary pressures.  4. No pericardial effusion seen.  *** No previous Echo exam.  There is no evidence of RV strain.     Patient seen and examined at bedside.    Overnight Events: No acute events. Soft BPs overnight and tarry stools reported. Fecal occult positive.    Review Of Systems: No chest pain, shortness of breath, or palpitations            Current Meds:  acetaminophen     Tablet .. 650 milliGRAM(s) Oral every 6 hours PRN  chlorhexidine 2% Cloths 1 Application(s) Topical daily  furosemide   Injectable 80 milliGRAM(s) IV Push two times a day  heparin   Injectable 7000 Unit(s) IV Push every 6 hours PRN  heparin   Injectable 3500 Unit(s) IV Push every 6 hours PRN  heparin  Infusion.  Unit(s)/Hr IV Continuous <Continuous>  influenza  Vaccine (HIGH DOSE) 0.7 milliLiter(s) IntraMuscular once  midodrine 10 milliGRAM(s) Oral three times a day  ondansetron Injectable 4 milliGRAM(s) IV Push four times a day  pantoprazole    Tablet 40 milliGRAM(s) Oral before breakfast  piperacillin/tazobactam IVPB.. 3.375 Gram(s) IV Intermittent every 12 hours  predniSONE   Tablet 10 milliGRAM(s) Oral daily  simvastatin 10 milliGRAM(s) Oral at bedtime  sodium bicarbonate  Infusion 0.043 mEq/kG/Hr IV Continuous <Continuous>  sodium bicarbonate  Infusion 0.043 mEq/kG/Hr IV Continuous <Continuous>  sucralfate suspension 1 Gram(s) Oral two times a day      Vitals:  T(F): 97.8 (01-03), Max: 98.4 (01-02)  HR: 77 (01-03) (76 - 86)  BP: 98/60 (01-03) (89/70 - 98/60)  RR: 18 (01-03)  SpO2: 94% (01-03)  I&O's Summary    02 Jan 2023 07:01  -  03 Jan 2023 07:00  --------------------------------------------------------  IN: 1016 mL / OUT: 0 mL / NET: 1016 mL        Physical Exam:  Appearance: No acute distress; well appearing  Eyes: EOMI, pink conjunctiva  HEENT: Normal oral mucosa  Cardiovascular: RRR, S1, S2, no murmurs, rubs, or gallops; no edema; no JVD  Respiratory: Clear to auscultation bilaterally  Gastrointestinal: soft, non-tender, non-distended with normal bowel sounds  Musculoskeletal: No clubbing; no joint deformity   Neurologic: Non-focal  Psychiatry: AAOx3, mood & affect appropriate  Skin: No rashes, ecchymoses, or cyanosis                          13.3   16.85 )-----------( 90       ( 02 Jan 2023 11:50 )             40.8     01-02    134<L>  |  93<L>  |  129<H>  ----------------------------<  86  4.8   |  19<L>  |  5.27<H>    Ca    7.3<L>      02 Jan 2023 11:50      PTT - ( 02 Jan 2023 18:34 )  PTT:49.2 sec  CARDIAC MARKERS ( 01 Jan 2023 02:17 )  144 ng/L / x     / x     / x     / x     / x      CARDIAC MARKERS ( 31 Dec 2022 19:02 )  139 ng/L / x     / x     / 118 U/L / x     / 9.6 ng/mL  CARDIAC MARKERS ( 31 Dec 2022 14:21 )  147 ng/L / x     / x     / x     / x     / x      CARDIAC MARKERS ( 31 Dec 2022 10:00 )  165 ng/L / x     / x     / x     / x     / x          Serum Pro-Brain Natriuretic Peptide: [id number] pg/mL (12-31 @ 10:00)        Echo:  TTE 12/31/22:  Conclusions:  1. Normal left ventricular internal dimensions and wall  thicknesses. Normal left ventricular systolic function. No  segmental wall motion abnormalities. Endocardial  visualization enhanced with intravenous injection of  Ultrasonic Enhancing Agent (Definity). LVEF calculated  using biplane Stroud's method is 70%. Unable to determine  diastolic function due to insufficient data.  2. Normal right atrium. The right ventricle is not well  visualized; grossly normal right ventricular systolic  function.  3. Normal tricuspid valve. Minimal tricuspid  regurgitation.Estimated pulmonary artery systolic pressure  equals 49  mm Hg, assuming right atrial pressure equals 8  mm Hg, consistent with mild pulmonary pressures.  4. No pericardial effusion seen.  *** No previous Echo exam.  There is no evidence of RV strain.     Patient seen and examined at bedside.    Overnight Events: No acute events. Soft BPs overnight and tarry stools reported. Fecal occult positive.    Review Of Systems: No chest pain, shortness of breath, or palpitations            Current Meds:  acetaminophen     Tablet .. 650 milliGRAM(s) Oral every 6 hours PRN  chlorhexidine 2% Cloths 1 Application(s) Topical daily  furosemide   Injectable 80 milliGRAM(s) IV Push two times a day  heparin   Injectable 7000 Unit(s) IV Push every 6 hours PRN  heparin   Injectable 3500 Unit(s) IV Push every 6 hours PRN  heparin  Infusion.  Unit(s)/Hr IV Continuous <Continuous>  influenza  Vaccine (HIGH DOSE) 0.7 milliLiter(s) IntraMuscular once  midodrine 10 milliGRAM(s) Oral three times a day  ondansetron Injectable 4 milliGRAM(s) IV Push four times a day  pantoprazole    Tablet 40 milliGRAM(s) Oral before breakfast  piperacillin/tazobactam IVPB.. 3.375 Gram(s) IV Intermittent every 12 hours  predniSONE   Tablet 10 milliGRAM(s) Oral daily  simvastatin 10 milliGRAM(s) Oral at bedtime  sodium bicarbonate  Infusion 0.043 mEq/kG/Hr IV Continuous <Continuous>  sodium bicarbonate  Infusion 0.043 mEq/kG/Hr IV Continuous <Continuous>  sucralfate suspension 1 Gram(s) Oral two times a day      Vitals:  T(F): 97.8 (01-03), Max: 98.4 (01-02)  HR: 77 (01-03) (76 - 86)  BP: 98/60 (01-03) (89/70 - 98/60)  RR: 18 (01-03)  SpO2: 94% (01-03)  I&O's Summary    02 Jan 2023 07:01  -  03 Jan 2023 07:00  --------------------------------------------------------  IN: 1016 mL / OUT: 0 mL / NET: 1016 mL        Physical Exam:  Appearance: No acute distress; well appearing  Eyes: EOMI, pink conjunctiva  HEENT: Normal oral mucosa  Cardiovascular: RRR, S1, S2, no murmurs, rubs, or gallops; no JVD  Respiratory: Clear to auscultation bilaterally  Gastrointestinal: soft, non-tender, non-distended with normal bowel sounds  Musculoskeletal: No clubbing; no joint deformity   Ext: LLE 2+ edema, RLE 1+ edema   Neurologic: Non-focal  Psychiatry: AAOx3, mood & affect appropriate  Skin: No rashes, ecchymoses, or cyanosis                          13.3   16.85 )-----------( 90       ( 02 Jan 2023 11:50 )             40.8     01-02    134<L>  |  93<L>  |  129<H>  ----------------------------<  86  4.8   |  19<L>  |  5.27<H>    Ca    7.3<L>      02 Jan 2023 11:50      PTT - ( 02 Jan 2023 18:34 )  PTT:49.2 sec  CARDIAC MARKERS ( 01 Jan 2023 02:17 )  144 ng/L / x     / x     / x     / x     / x      CARDIAC MARKERS ( 31 Dec 2022 19:02 )  139 ng/L / x     / x     / 118 U/L / x     / 9.6 ng/mL  CARDIAC MARKERS ( 31 Dec 2022 14:21 )  147 ng/L / x     / x     / x     / x     / x      CARDIAC MARKERS ( 31 Dec 2022 10:00 )  165 ng/L / x     / x     / x     / x     / x          Serum Pro-Brain Natriuretic Peptide: [id number] pg/mL (12-31 @ 10:00)        Echo:  TTE 12/31/22:  Conclusions:  1. Normal left ventricular internal dimensions and wall  thicknesses. Normal left ventricular systolic function. No  segmental wall motion abnormalities. Endocardial  visualization enhanced with intravenous injection of  Ultrasonic Enhancing Agent (Definity). LVEF calculated  using biplane Stroud's method is 70%. Unable to determine  diastolic function due to insufficient data.  2. Normal right atrium. The right ventricle is not well  visualized; grossly normal right ventricular systolic  function.  3. Normal tricuspid valve. Minimal tricuspid  regurgitation.Estimated pulmonary artery systolic pressure  equals 49  mm Hg, assuming right atrial pressure equals 8  mm Hg, consistent with mild pulmonary pressures.  4. No pericardial effusion seen.  *** No previous Echo exam.  There is no evidence of RV strain.     Patient seen and examined at bedside.    Overnight Events: No acute events. Soft BPs overnight and tarry stools reported. Fecal occult positive.    Review Of Systems: No chest pain, shortness of breath, or palpitations            Current Meds:  acetaminophen     Tablet .. 650 milliGRAM(s) Oral every 6 hours PRN  chlorhexidine 2% Cloths 1 Application(s) Topical daily  furosemide   Injectable 80 milliGRAM(s) IV Push two times a day  heparin   Injectable 7000 Unit(s) IV Push every 6 hours PRN  heparin   Injectable 3500 Unit(s) IV Push every 6 hours PRN  heparin  Infusion.  Unit(s)/Hr IV Continuous <Continuous>  influenza  Vaccine (HIGH DOSE) 0.7 milliLiter(s) IntraMuscular once  midodrine 10 milliGRAM(s) Oral three times a day  ondansetron Injectable 4 milliGRAM(s) IV Push four times a day  pantoprazole    Tablet 40 milliGRAM(s) Oral before breakfast  piperacillin/tazobactam IVPB.. 3.375 Gram(s) IV Intermittent every 12 hours  predniSONE   Tablet 10 milliGRAM(s) Oral daily  simvastatin 10 milliGRAM(s) Oral at bedtime  sodium bicarbonate  Infusion 0.043 mEq/kG/Hr IV Continuous <Continuous>  sodium bicarbonate  Infusion 0.043 mEq/kG/Hr IV Continuous <Continuous>  sucralfate suspension 1 Gram(s) Oral two times a day      Vitals:  T(F): 97.8 (01-03), Max: 98.4 (01-02)  HR: 77 (01-03) (76 - 86)  BP: 98/60 (01-03) (89/70 - 98/60)  RR: 18 (01-03)  SpO2: 94% (01-03)  I&O's Summary    02 Jan 2023 07:01  -  03 Jan 2023 07:00  --------------------------------------------------------  IN: 1016 mL / OUT: 0 mL / NET: 1016 mL        Physical Exam:  Appearance: No acute distress; well appearing  Eyes: EOMI, pink conjunctiva  HEENT: Normal oral mucosa  Cardiovascular: RRR, S1, S2, no murmurs, rubs, or gallops; no JVD  Respiratory: Clear to auscultation bilaterally  Gastrointestinal: soft, non-tender, non-distended with normal bowel sounds  Musculoskeletal: No clubbing; no joint deformity   Ext: LLE 2+ edema, RLE 1+ edema   Neurologic: Non-focal  Psychiatry: AAOx3, mood & affect appropriate  Skin: No rashes, ecchymoses, or cyanosis                          13.3   16.85 )-----------( 90       ( 02 Jan 2023 11:50 )             40.8     01-02    134<L>  |  93<L>  |  129<H>  ----------------------------<  86  4.8   |  19<L>  |  5.27<H>    Ca    7.3<L>      02 Jan 2023 11:50      PTT - ( 02 Jan 2023 18:34 )  PTT:49.2 sec  CARDIAC MARKERS ( 01 Jan 2023 02:17 )  144 ng/L / x     / x     / x     / x     / x      CARDIAC MARKERS ( 31 Dec 2022 19:02 )  139 ng/L / x     / x     / 118 U/L / x     / 9.6 ng/mL  CARDIAC MARKERS ( 31 Dec 2022 14:21 )  147 ng/L / x     / x     / x     / x     / x      CARDIAC MARKERS ( 31 Dec 2022 10:00 )  165 ng/L / x     / x     / x     / x     / x          Serum Pro-Brain Natriuretic Peptide: 04501 pg/mL (12-31 @ 10:00)        Echo:  TTE 12/31/22:  Conclusions:  1. Normal left ventricular internal dimensions and wall  thicknesses. Normal left ventricular systolic function. No  segmental wall motion abnormalities. Endocardial  visualization enhanced with intravenous injection of  Ultrasonic Enhancing Agent (Definity). LVEF calculated  using biplane Stroud's method is 70%. Unable to determine  diastolic function due to insufficient data.  2. Normal right atrium. The right ventricle is not well  visualized; grossly normal right ventricular systolic  function.  3. Normal tricuspid valve. Minimal tricuspid  regurgitation.Estimated pulmonary artery systolic pressure  equals 49  mm Hg, assuming right atrial pressure equals 8  mm Hg, consistent with mild pulmonary pressures.  4. No pericardial effusion seen.  *** No previous Echo exam.  There is no evidence of RV strain.     Patient seen and examined at bedside.    Overnight Events: No acute events. Soft BPs overnight and tarry stools reported. Fecal occult positive.    Review Of Systems: No chest pain, shortness of breath, or palpitations            Current Meds:  acetaminophen     Tablet .. 650 milliGRAM(s) Oral every 6 hours PRN  chlorhexidine 2% Cloths 1 Application(s) Topical daily  furosemide   Injectable 80 milliGRAM(s) IV Push two times a day  heparin   Injectable 7000 Unit(s) IV Push every 6 hours PRN  heparin   Injectable 3500 Unit(s) IV Push every 6 hours PRN  heparin  Infusion.  Unit(s)/Hr IV Continuous <Continuous>  influenza  Vaccine (HIGH DOSE) 0.7 milliLiter(s) IntraMuscular once  midodrine 10 milliGRAM(s) Oral three times a day  ondansetron Injectable 4 milliGRAM(s) IV Push four times a day  pantoprazole    Tablet 40 milliGRAM(s) Oral before breakfast  piperacillin/tazobactam IVPB.. 3.375 Gram(s) IV Intermittent every 12 hours  predniSONE   Tablet 10 milliGRAM(s) Oral daily  simvastatin 10 milliGRAM(s) Oral at bedtime  sodium bicarbonate  Infusion 0.043 mEq/kG/Hr IV Continuous <Continuous>  sodium bicarbonate  Infusion 0.043 mEq/kG/Hr IV Continuous <Continuous>  sucralfate suspension 1 Gram(s) Oral two times a day      Vitals:  T(F): 97.8 (01-03), Max: 98.4 (01-02)  HR: 77 (01-03) (76 - 86)  BP: 98/60 (01-03) (89/70 - 98/60)  RR: 18 (01-03)  SpO2: 94% (01-03)  I&O's Summary    02 Jan 2023 07:01  -  03 Jan 2023 07:00  --------------------------------------------------------  IN: 1016 mL / OUT: 0 mL / NET: 1016 mL        Physical Exam:  Appearance: No acute distress; well appearing  Eyes: EOMI, pink conjunctiva  HEENT: Normal oral mucosa  Cardiovascular: RRR, S1, S2, no murmurs, rubs, or gallops; no JVD  Respiratory: Clear to auscultation bilaterally  Gastrointestinal: soft, non-tender, non-distended with normal bowel sounds  Musculoskeletal: No clubbing; no joint deformity   Ext: LLE 2+ edema, RLE 1+ edema   Neurologic: Non-focal  Psychiatry: AAOx3, mood & affect appropriate  Skin: No rashes, ecchymoses, or cyanosis                          13.3   16.85 )-----------( 90       ( 02 Jan 2023 11:50 )             40.8     01-02    134<L>  |  93<L>  |  129<H>  ----------------------------<  86  4.8   |  19<L>  |  5.27<H>    Ca    7.3<L>      02 Jan 2023 11:50      PTT - ( 02 Jan 2023 18:34 )  PTT:49.2 sec  CARDIAC MARKERS ( 01 Jan 2023 02:17 )  144 ng/L / x     / x     / x     / x     / x      CARDIAC MARKERS ( 31 Dec 2022 19:02 )  139 ng/L / x     / x     / 118 U/L / x     / 9.6 ng/mL  CARDIAC MARKERS ( 31 Dec 2022 14:21 )  147 ng/L / x     / x     / x     / x     / x      CARDIAC MARKERS ( 31 Dec 2022 10:00 )  165 ng/L / x     / x     / x     / x     / x          Serum Pro-Brain Natriuretic Peptide: [id number] pg/mL (12-31 @ 10:00)        Echo:  TTE 12/31/22:  Conclusions:  1. Normal left ventricular internal dimensions and wall  thicknesses. Normal left ventricular systolic function. No  segmental wall motion abnormalities. Endocardial  visualization enhanced with intravenous injection of  Ultrasonic Enhancing Agent (Definity). LVEF calculated  using biplane Stroud's method is 70%. Unable to determine  diastolic function due to insufficient data.  2. Normal right atrium. The right ventricle is not well  visualized; grossly normal right ventricular systolic  function.  3. Normal tricuspid valve. Minimal tricuspid  regurgitation.Estimated pulmonary artery systolic pressure  equals 49  mm Hg, assuming right atrial pressure equals 8  mm Hg, consistent with mild pulmonary pressures.  4. No pericardial effusion seen.  *** No previous Echo exam.  There is no evidence of RV strain.

## 2023-01-03 NOTE — PROGRESS NOTE ADULT - ASSESSMENT
Full note pending 77y male with a history of  HTN,HLD,psoriasis managed with 10 mg daily prednisone, recently treated for pneumonia and found to have esophageal lesion with liver mets, who developed shortness of breath and weakness and was brought to ER.   CTA with documented PE as well as multifocal densities throughout both lungs.  He was hypotensive in ER with BP in 80's, given antibiotics.  He appears comfortable on nasal oxygen,  zosyn, one dose of vancomycoin,  and azithromycin given in ER  He also was given a dose of solumedrol.  Advanced malignancy(? type) , CKD vs LIVIER, thrombocytopenia. PE and possible pneumonia.    Suggest:  1.Await blood cultures- r/o bacteremia  2.Zosyn for possible multifocal pneumonia day 4/5  3.MRSA and legionella negative  4. D/w pt's family at bedside, oncology w/u for metastatic disease

## 2023-01-03 NOTE — PROGRESS NOTE ADULT - ASSESSMENT
77 m with    Pulmonary embolus and L DVT   - AC with Heparin  - Pulmonary follow  - Vascular cardiology follow  noted  - CT abdomen and Pelvis    Thrombocytopenia  - follow Platelets  - HIT  - if worsening thrombocytopenia may need IVC filter    Congestive Heart Failure  - telemetry  - cardiac enzymes  - echo with no strain   - cardiology evaluation     HTN  - hold BP meds  - hold diuretic    Psoriasis  - stress dose steroid    Liver lesions  - s/p Bx at [facility] c/w adenocarcinoma  - Oncology evaluation     LIVIER  - follow lytes, Cr  - gentle IVF  - Nephrology evaluation Dr. Khan     CVA acute/ subacute with Confusion  - MRI brain noted  - Neurology evaluation      Esophageal mass  - Gastroenterology follow  - may need PEG    Sepsis/ Pneumonia  - Zosyn  - ID follow    DVT prophylaxis  - AC    Moshe Tamayo MD phone [phone]  77 m with    Pulmonary embolus and L DVT   - AC with Heparin  - Pulmonary follow  - Vascular cardiology follow  noted  - CT abdomen and Pelvis    Thrombocytopenia  - follow Platelets  - HIT  - if worsening thrombocytopenia may need IVC filter    Congestive Heart Failure  - telemetry  - cardiac enzymes  - echo with no strain   - cardiology evaluation     HTN  - hold BP meds  - hold diuretic    Psoriasis  - stress dose steroid    Liver lesions  - s/p Bx at [facility] c/w adenocarcinoma  - Oncology evaluation     LIVIER  - follow lytes, Cr  - gentle IVF  - Nephrology evaluation Dr. Khan     CVA acute/ subacute with Confusion  - MRI brain noted  - Neurology evaluation      Esophageal mass  - Gastroenterology follow  - may need PEG    Sepsis/ Pneumonia  - Zosyn  - ID follow    DVT prophylaxis  - AC    Moshe Tamayo MD phone [phone]  77 m with    Pulmonary embolus and L DVT   - AC with Heparin  - Pulmonary follow  - Vascular cardiology follow  noted  - CT abdomen and Pelvis    Thrombocytopenia  - follow Platelets  - HIT  - if worsening thrombocytopenia may need IVC filter    Congestive Heart Failure  - telemetry  - cardiac enzymes  - echo with no strain   - cardiology evaluation     HTN  - hold BP meds  - hold diuretic    Psoriasis  - stress dose steroid    Liver lesions  - s/p Bx at [facility] c/w adenocarcinoma  - Oncology evaluation     LIVIER  - follow lytes, Cr  - gentle IVF  - Nephrology evaluation Dr. Khan     CVA acute/ subacute with Confusion  - MRI brain noted  - Neurology evaluation      Esophageal mass  - Gastroenterology follow  - may need PEG    Sepsis/ Pneumonia  - Zosyn  - ID follow    DVT prophylaxis  - AC    Moshe Tamayo MD phone [phone]

## 2023-01-03 NOTE — PROGRESS NOTE ADULT - ASSESSMENT
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

## 2023-01-03 NOTE — PROVIDER CONTACT NOTE (CRITICAL VALUE NOTIFICATION) - BACKGROUND
Pt has hx of liver mass, sepsis, AMS altered
77YM admitted with SOB and PE

## 2023-01-03 NOTE — PROGRESS NOTE ADULT - ASSESSMENT
76 y/o M PMH HTN, HLD, psoriasis on prednisone, . Recent admission for pneumonia and dced two weeks ago also found to possible esophageal cancer (mass) with liver mets s/p IR biopsy 12/2 presenting with SOB and now  with LIVIER and pulmonary emboli and + DVT     Oliguric LIVIER likely pre renal in light of low BP;  Bladder scan was not very impressive   Gap acidosis due to LIVIER   Volume status- not overtly volume up    esophageal cancer (mass) with liver mets s/p IR biopsy 12/22  BP soft   Hyponatremia       RECOMMEND     Start [id number] mg po tid   Continue to bladder scan   Dose meds for  Cr CL 15-20 ml/min        76 y/o M PMH HTN, HLD, psoriasis on prednisone, . Recent admission for pneumonia and dced two weeks ago also found to possible esophageal cancer (mass) with liver mets s/p IR biopsy 12/2 presenting with SOB and now  with LIVIER and pulmonary emboli and + DVT     Oliguric LIVIER likely pre renal in light of low BP;  Bladder scan was not very impressive   Gap acidosis due to LIVIER   Volume status- not overtly volume up    esophageal cancer (mass) with liver mets s/p IR biopsy 12/22  BP soft   Hyponatremia       RECOMMEND     Start [id number] mg po tid   Continue to bladder scan   Dose meds for  Cr CL 15-20 ml/min        78 y/o M PMH HTN, HLD, psoriasis on prednisone, . Recent admission for pneumonia and dced two weeks ago also found to possible esophageal cancer (mass) with liver mets s/p IR biopsy 12/2 presenting with SOB and now  with LIVIER and pulmonary emboli and + DVT     Oliguric LIVIER likely pre renal in light of low BP;  Bladder scan was not very impressive   Gap acidosis due to LIVIER   Volume status- not overtly volume up    esophageal cancer (mass) with liver mets s/p IR biopsy 12/22  BP soft   Hyponatremia       RECOMMEND     Start [id number] mg po tid   Continue to bladder scan   Dose meds for  Cr CL 15-20 ml/min        76 y/o M PMH HTN, HLD, psoriasis on prednisone, . Recent admission for pneumonia and dced two weeks ago also found to possible esophageal cancer (mass) with liver mets s/p IR biopsy 12/2 presenting with SOB and now  with LIVIER and pulmonary emboli and + DVT     Oliguric LIVIER likely pre renal in light of low BP;  Bladder scan was not very impressive   Gap acidosis due to LIVIER   Volume status- not overtly volume up   Esophageal cancer (mass) with liver mets s/p IR biopsy 12/22  BP soft   Hyponatremia       RECOMMEND     Start hydrocortisone 25mg IVP tid x 5 doses and stop the PO prednisone for now (in case he is suffering from adrenal insufficiency)  If the SBP > 120 then dc Midodrine  DC the lasix   COnt the IVF   Continue to bladder scan   Dose meds for  Cr CL 15-20 ml/min     DW PCP     [facility]   [facility]   [phone]    78 y/o M PMH HTN, HLD, psoriasis on prednisone, . Recent admission for pneumonia and dced two weeks ago also found to possible esophageal cancer (mass) with liver mets s/p IR biopsy 12/2 presenting with SOB and now  with LIVIER and pulmonary emboli and + DVT     Oliguric LIVIER likely pre renal in light of low BP;  Bladder scan was not very impressive   Gap acidosis due to LIVIER   Volume status- not overtly volume up   Esophageal cancer (mass) with liver mets s/p IR biopsy 12/22  BP soft   Hyponatremia       RECOMMEND     Start hydrocortisone 25mg IVP tid x 5 doses and stop the PO prednisone for now (in case he is suffering from adrenal insufficiency)  If the SBP > 120 then dc Midodrine  DC the lasix   COnt the IVF   Continue to bladder scan   Dose meds for  Cr CL 15-20 ml/min     DW PCP     [facility]   [facility]   [phone]    76 y/o M PMH HTN, HLD, psoriasis on prednisone, . Recent admission for pneumonia and dced two weeks ago also found to possible esophageal cancer (mass) with liver mets s/p IR biopsy 12/2 presenting with SOB and now  with LIVIER and pulmonary emboli and + DVT     Oliguric LIVIER likely pre renal in light of low BP;  Bladder scan was not very impressive   Gap acidosis due to LIVIER   Volume status- not overtly volume up   Esophageal cancer (mass) with liver mets s/p IR biopsy 12/22  BP soft   Hyponatremia       RECOMMEND     Start hydrocortisone 25mg IVP tid x 5 doses and stop the PO prednisone for now (in case he is suffering from adrenal insufficiency)  If the SBP > 120 then dc Midodrine  DC the lasix   COnt the IVF   Continue to bladder scan   Dose meds for  Cr CL 15-20 ml/min     DW PCP     Sayed [facility]   [facility]   [phone]

## 2023-01-03 NOTE — PROVIDER CONTACT NOTE (CRITICAL VALUE NOTIFICATION) - RECOMMENDATIONS
follow nomogram, hold for 1 hour and decrease to 10ml/hr
notify NP
Notify provider ,follow FAC protocol
follow nomogram, hold for 1 hour and will decrease rate as per nomogram

## 2023-01-03 NOTE — PROGRESS NOTE ADULT - SUBJECTIVE AND OBJECTIVE BOX
INTERVAL HPI/OVERNIGHT EVENTS:  pt seen and examined, events noted  no N/V/D  had melena this am, hgb stable, occult positive     Allergies    No Known Allergies    Intolerances      General:  No wt loss, fevers, chills, night sweats, fatigue,   Eyes:  Good vision, no reported pain  ENT:  No sore throat, pain, runny nose, dysphagia  CV:  No pain, palpitations, hypo/hypertension  Resp:  No dyspnea, cough, tachypnea, wheezing  GI:  No pain, No nausea, No vomiting, No diarrhea, No constipation, No weight loss, No fever, No pruritis, No rectal bleeding, + tarry stools, No dysphagia,  :  No pain, bleeding, incontinence, nocturia  Muscle:  No pain, weakness  Neuro:  No weakness, tingling, memory problems  Psych:  No fatigue, insomnia, mood problems, depression  Endocrine:  No polyuria, polydipsia, cold/heat intolerance  Heme:  No petechiae, ecchymosis, easy bruisability  Skin:  No rash, tattoos, scars, edema      PHYSICAL EXAM:   Vital Signs Last 24 Hrs  T(C): 36.6 (2023 06:28), Max: 36.9 (2023 20:54)  T(F): 97.8 (2023 06:28), Max: 98.4 (2023 20:54)  HR: 77 (2023 06:28) (76 - 77)  BP: 98/60 (2023 06:28) (89/70 - 98/60)  BP(mean): --  RR: 18 (2023 06:28) (18 - 18)  SpO2: 94% (2023 06:28) (93% - 94%)    Parameters below as of 2023 06:28  Patient On (Oxygen Delivery Method): nasal cannula  O2 Flow (L/min): 2      Daily     Daily I&O's Summary      GENERAL:  Appears stated age, well-groomed, well-nourished, no distress  HEENT:  NC/AT,  conjunctivae clear and pink, no thyromegaly, nodules, adenopathy, no JVD, sclera -anicteric  CHEST:  Full & symmetric excursion, no increased effort, breath sounds clear  HEART:  Regular rhythm, S1, S2, no murmur/rub/S3/S4, no abdominal bruit, no edema  ABDOMEN:  Soft, non-tender, non-distended, normoactive bowel sounds,  no masses ,no hepato-splenomegaly, no signs of chronic liver disease  EXTEREMITIES:  no cyanosis,clubbing or edema  SKIN:  No rash/erythema/ecchymoses/petechiae/wounds/abscess/warm/dry  NEURO:  Alert, oriented, no asterixis, no tremor, no encephalopathy      LABS:                        13.3   16.85 )-----------( 90       ( 2023 11:50 )             40.8   01-    134<L>  |  93<L>  |  129<H>  ----------------------------<  86  4.8   |  19<L>  |  5.27<H>    Ca    7.3<L>      2023 11:50        PTT - ( 2023 11:50 )  PTT:91.5 sec  Urinalysis Basic - ( 31 Dec 2022 23:22 )    Color: Yellow / Appearance: Clear / S.038 / pH: x  Gluc: x / Ketone: Negative  / Bili: Negative / Urobili: Negative   Blood: x / Protein: Trace / Nitrite: Negative   Leuk Esterase: Negative / RBC: 6 /hpf / WBC 6 /HPF   Sq Epi: x / Non Sq Epi: 1 /hpf / Bacteria: Negative      amylase   lipase  RADIOLOGY & ADDITIONAL TESTS:

## 2023-01-04 LAB
APTT BLD: 62.1 SEC — HIGH (ref 27.5–35.5)
APTT BLD: 94.5 SEC — HIGH (ref 27.5–35.5)
HCT VFR BLD CALC: 41.9 % — SIGNIFICANT CHANGE UP (ref 39–50)
HGB BLD-MCNC: 13.5 G/DL — SIGNIFICANT CHANGE UP (ref 13–17)
MCHC RBC-ENTMCNC: 26.7 PG — LOW (ref 27–34)
MCHC RBC-ENTMCNC: 32.2 GM/DL — SIGNIFICANT CHANGE UP (ref 32–36)
MCV RBC AUTO: 83 FL — SIGNIFICANT CHANGE UP (ref 80–100)
NRBC # BLD: 0 /100 WBCS — SIGNIFICANT CHANGE UP (ref 0–0)
PLATELET # BLD AUTO: 98 K/UL — LOW (ref 150–400)
RBC # BLD: 5.05 M/UL — SIGNIFICANT CHANGE UP (ref 4.2–5.8)
RBC # FLD: 21.1 % — HIGH (ref 10.3–14.5)
WBC # BLD: 22.73 K/UL — HIGH (ref 3.8–10.5)
WBC # FLD AUTO: 22.73 K/UL — HIGH (ref 3.8–10.5)

## 2023-01-04 PROCEDURE — 70544 MR ANGIOGRAPHY HEAD W/O DYE: CPT | Mod: 26

## 2023-01-04 PROCEDURE — 99233 SBSQ HOSP IP/OBS HIGH 50: CPT

## 2023-01-04 PROCEDURE — 71045 X-RAY EXAM CHEST 1 VIEW: CPT | Mod: 26

## 2023-01-04 RX ORDER — BUMETANIDE 0.25 MG/ML
2 INJECTION INTRAMUSCULAR; INTRAVENOUS
Refills: 0 | Status: DISCONTINUED | OUTPATIENT
Start: 2023-01-04 | End: 2023-01-05

## 2023-01-04 RX ADMIN — Medication 1 GRAM(S): at 18:15

## 2023-01-04 RX ADMIN — SIMVASTATIN 10 MILLIGRAM(S): 20 TABLET, FILM COATED ORAL at 22:58

## 2023-01-04 RX ADMIN — ONDANSETRON 4 MILLIGRAM(S): 8 TABLET, FILM COATED ORAL at 00:02

## 2023-01-04 RX ADMIN — MIDODRINE HYDROCHLORIDE 10 MILLIGRAM(S): 2.5 TABLET ORAL at 12:37

## 2023-01-04 RX ADMIN — SODIUM CHLORIDE 50 MILLILITER(S): 9 INJECTION, SOLUTION INTRAVENOUS at 08:55

## 2023-01-04 RX ADMIN — MIDODRINE HYDROCHLORIDE 10 MILLIGRAM(S): 2.5 TABLET ORAL at 22:57

## 2023-01-04 RX ADMIN — PANTOPRAZOLE SODIUM 40 MILLIGRAM(S): 20 TABLET, DELAYED RELEASE ORAL at 06:42

## 2023-01-04 RX ADMIN — Medication 1 GRAM(S): at 06:38

## 2023-01-04 RX ADMIN — ONDANSETRON 4 MILLIGRAM(S): 8 TABLET, FILM COATED ORAL at 12:36

## 2023-01-04 RX ADMIN — Medication 25 MILLIGRAM(S): at 06:45

## 2023-01-04 RX ADMIN — Medication 25 MILLIGRAM(S): at 12:37

## 2023-01-04 RX ADMIN — MIDODRINE HYDROCHLORIDE 10 MILLIGRAM(S): 2.5 TABLET ORAL at 06:38

## 2023-01-04 RX ADMIN — PIPERACILLIN AND TAZOBACTAM 25 GRAM(S): 4; .5 INJECTION, POWDER, LYOPHILIZED, FOR SOLUTION INTRAVENOUS at 12:37

## 2023-01-04 RX ADMIN — CHLORHEXIDINE GLUCONATE 1 APPLICATION(S): 213 SOLUTION TOPICAL at 11:15

## 2023-01-04 RX ADMIN — ONDANSETRON 4 MILLIGRAM(S): 8 TABLET, FILM COATED ORAL at 06:43

## 2023-01-04 RX ADMIN — HEPARIN SODIUM 700 UNIT(S)/HR: 5000 INJECTION INTRAVENOUS; SUBCUTANEOUS at 08:55

## 2023-01-04 RX ADMIN — PANTOPRAZOLE SODIUM 40 MILLIGRAM(S): 20 TABLET, DELAYED RELEASE ORAL at 18:15

## 2023-01-04 RX ADMIN — BUMETANIDE 2 MILLIGRAM(S): 0.25 INJECTION INTRAMUSCULAR; INTRAVENOUS at 15:35

## 2023-01-04 RX ADMIN — ONDANSETRON 4 MILLIGRAM(S): 8 TABLET, FILM COATED ORAL at 18:15

## 2023-01-04 RX ADMIN — HEPARIN SODIUM 700 UNIT(S)/HR: 5000 INJECTION INTRAVENOUS; SUBCUTANEOUS at 00:31

## 2023-01-04 RX ADMIN — HEPARIN SODIUM 700 UNIT(S)/HR: 5000 INJECTION INTRAVENOUS; SUBCUTANEOUS at 15:35

## 2023-01-04 RX ADMIN — Medication 25 MILLIGRAM(S): at 22:57

## 2023-01-04 NOTE — PROGRESS NOTE ADULT - ASSESSMENT
nausea/vomiting   diarrhea     zofran  bacid one tab tid  low residue lactose free diet  replete electrolytes as needed   PPI IV BID  monitor cbc  monitor stool color  will follow    Advanced care planning was discussed with patient and family.  Advanced care planning forms were reviewed and discussed.  Risks, benefits and alternatives of gastroenterologic procedures were discussed in detail and all questions were answered.    30 minutes spent.

## 2023-01-04 NOTE — PROVIDER CONTACT NOTE (OTHER) - RECOMMENDATIONS
notify provider. occult test?
reorient patient, let him rest. retry when he can be redirected
notify provider. add aPTT in phlebotomy AM draw?

## 2023-01-04 NOTE — PROVIDER CONTACT NOTE (OTHER) - SITUATION
pt on heparin gtt, 10 cc. unable to get 12:30 AM aPTT. pt hardstick. last aPTT 49.2
pt refusing to allow staff to put his continuous pulse monitor into the tele box for constant monitoring.
tarry semi-formed stools x2, pt on heparin gtt

## 2023-01-04 NOTE — PROGRESS NOTE ADULT - SUBJECTIVE AND OBJECTIVE BOX
INTERVAL HPI/OVERNIGHT EVENTS:  pt seen and examined, events noted  tolerating diet, no N/V/D  has more melena ON, hgb remains stable     Allergies    No Known Allergies    Intolerances      General:  No wt loss, fevers, chills, night sweats, fatigue,   Eyes:  Good vision, no reported pain  ENT:  No sore throat, pain, runny nose, dysphagia  CV:  No pain, palpitations, hypo/hypertension  Resp:  No dyspnea, cough, tachypnea, wheezing  GI:  No pain, No nausea, No vomiting, No diarrhea, No constipation, No weight loss, No fever, No pruritis, No rectal bleeding, + tarry stools, No dysphagia,  :  No pain, bleeding, incontinence, nocturia  Muscle:  No pain, weakness  Neuro:  No weakness, tingling, memory problems  Psych:  No fatigue, insomnia, mood problems, depression  Endocrine:  No polyuria, polydipsia, cold/heat intolerance  Heme:  No petechiae, ecchymosis, easy bruisability  Skin:  No rash, tattoos, scars, edema      PHYSICAL EXAM:   Vital Signs Last 24 Hrs  T(C): 36.3 (2023 06:38), Max: 36.3 (2023 12:20)  T(F): 97.3 (2023 06:38), Max: 97.3 (2023 12:20)  HR: 72 (2023 06:38) (72 - 80)  BP: 94/57 (2023 06:38) (94/57 - 131/61)  BP(mean): --  RR: 18 (2023 06:38) (18 - 18)  SpO2: 90% (2023 06:38) (90% - 95%)    Parameters below as of 2023 06:38  Patient On (Oxygen Delivery Method): nasal cannula  O2 Flow (L/min): 2      Daily     Daily I&O's Summary        GENERAL:  Appears stated age, well-groomed, well-nourished, no distress  HEENT:  NC/AT,  conjunctivae clear and pink, no thyromegaly, nodules, adenopathy, no JVD, sclera -anicteric  CHEST:  Full & symmetric excursion, no increased effort, breath sounds clear  HEART:  Regular rhythm, S1, S2, no murmur/rub/S3/S4, no abdominal bruit, no edema  ABDOMEN:  Soft, non-tender, non-distended, normoactive bowel sounds,  no masses ,no hepato-splenomegaly, no signs of chronic liver disease  EXTEREMITIES:  no cyanosis,clubbing or edema  SKIN:  No rash/erythema/ecchymoses/petechiae/wounds/abscess/warm/dry  NEURO:  Alert, oriented, no asterixis, no tremor, no encephalopathy      LABS:                        13.5   22.73 )-----------( 98       ( 2023 07:10 )             41.9   01-03    133<L>  |  90<L>  |  134<H>  ----------------------------<  80  4.8   |  20<L>  |  6.02<H>    Ca    6.9<L>      2023 15:19      PTT - ( 2023 11:50 )  PTT:91.5 sec    Urinalysis Basic - ( 31 Dec 2022 23:22 )    Color: Yellow / Appearance: Clear / S.038 / pH: x  Gluc: x / Ketone: Negative  / Bili: Negative / Urobili: Negative   Blood: x / Protein: Trace / Nitrite: Negative   Leuk Esterase: Negative / RBC: 6 /hpf / WBC 6 /HPF   Sq Epi: x / Non Sq Epi: 1 /hpf / Bacteria: Negative      amylase   lipase  RADIOLOGY & ADDITIONAL TESTS:

## 2023-01-04 NOTE — PROGRESS NOTE ADULT - SUBJECTIVE AND OBJECTIVE BOX
Date of Service: 01-04-23 @ 16:13    Patient is a 77y old  Male who presents with a chief complaint of Malignancy Workup (01 Jan 2023 11:21)      Any change in ROS:  doing  ok : some confusion : on 2 L of oxygen      MEDICATIONS  (STANDING):  buMETAnide Injectable 2 milliGRAM(s) IV Push two times a day  chlorhexidine 2% Cloths 1 Application(s) Topical daily  heparin  Infusion.  Unit(s)/Hr (16 mL/Hr) IV Continuous <Continuous>  hydrocortisone sodium succinate Injectable 25 milliGRAM(s) IV Push every 8 hours  influenza  Vaccine (HIGH DOSE) 0.7 milliLiter(s) IntraMuscular once  midodrine 10 milliGRAM(s) Oral three times a day  ondansetron Injectable 4 milliGRAM(s) IV Push four times a day  pantoprazole  Injectable 40 milliGRAM(s) IV Push two times a day  piperacillin/tazobactam IVPB.. 3.375 Gram(s) IV Intermittent every 12 hours  simvastatin 10 milliGRAM(s) Oral at bedtime  sodium bicarbonate  Infusion 0.043 mEq/kG/Hr (50 mL/Hr) IV Continuous <Continuous>  sucralfate suspension 1 Gram(s) Oral two times a day    MEDICATIONS  (PRN):  acetaminophen     Tablet .. 650 milliGRAM(s) Oral every 6 hours PRN Temp greater or equal to 38.5C (101.3F), Mild Pain (1 - 3)  heparin   Injectable 7000 Unit(s) IV Push every 6 hours PRN For aPTT less than 40  heparin   Injectable 3500 Unit(s) IV Push every 6 hours PRN For aPTT between 40 - 57    Vital Signs Last 24 Hrs  T(C): 36.3 (04 Jan 2023 11:14), Max: 36.3 (03 Jan 2023 20:50)  T(F): 97.3 (04 Jan 2023 11:14), Max: 97.3 (03 Jan 2023 20:50)  HR: 77 (04 Jan 2023 15:38) (72 - 84)  BP: 105/68 (04 Jan 2023 15:38) (94/57 - 113/78)  BP(mean): --  RR: 19 (04 Jan 2023 11:14) (18 - 19)  SpO2: 91% (04 Jan 2023 11:14) (90% - 92%)    Parameters below as of 04 Jan 2023 11:14  Patient On (Oxygen Delivery Method): nasal cannula  O2 Flow (L/min): 2      I&O's Summary        Physical Exam:   GENERAL: NAD, well-groomed, well-developed  HEENT: YOANA/   Atraumatic, Normocephalic  ENMT: No tonsillar erythema, exudates, or enlargement; Moist mucous membranes, Good dentition, No lesions  NECK: Supple, No JVD, Normal thyroid  CHEST/LUNG:no wheezing:   CVS: Regular rate and rhythm; No murmurs, rubs, or gallops  GI: : Soft, Nontender, Nondistended; Bowel sounds present  NERVOUS SYSTEM:  Alert & awake  x 2  EXTREMITIES: +edema  LYMPH: No lymphadenopathy noted  SKIN: No rashes or lesions  ENDOCRINOLOGY: No Thyromegaly  PSYCH: confused    Labs:  ABG - ( 03 Jan 2023 16:40 )  pH, Arterial: 7.33  pH, Blood: x     /  pCO2: 37    /  pO2: 42    / HCO3: 20    / Base Excess: -5.8  /  SaO2: 67.7            23, 22, 21                            13.5   22.73 )-----------( 98       ( 04 Jan 2023 07:10 )             41.9                         13.6   24.34 )-----------( 103      ( 03 Jan 2023 15:19 )             42.7                         13.3   16.85 )-----------( 90       ( 02 Jan 2023 11:50 )             40.8                         15.8   15.76 )-----------( 60       ( 01 Jan 2023 06:16 )             48.9                         14.9   18.27 )-----------( 86       ( 31 Dec 2022 19:50 )             45.8     01-03    133<L>  |  90<L>  |  134<H>  ----------------------------<  80  4.8   |  20<L>  |  6.02<H>  01-02    134<L>  |  93<L>  |  129<H>  ----------------------------<  86  4.8   |  19<L>  |  5.27<H>  01-01    133<L>  |  91<L>  |  124<H>  ----------------------------<  108<H>  5.0   |  20<L>  |  4.17<H>    Ca    6.9<L>      03 Jan 2023 15:19      CAPILLARY BLOOD GLUCOSE            PTT - ( 04 Jan 2023 07:11 )  PTT:62.1 sec    Procalcitonin, Serum: 6.18 ng/mL (01-02 @ 11:50)        RECENT CULTURES:  12-31 @ 09:59 Clean Catch Clean Catch (Midstream)         rad< from: Xray Chest 1 View- PORTABLE-Routine (Xray Chest 1 View- PORTABLE-Routine .) (01.04.23 @ 12:21) >    ACC: [id number] EXAM:  XR CHEST PORTABLE ROUTINE 1V                          PROCEDURE DATE:  01/04/2023          INTERPRETATION:  CLINICAL INDICATION: sob    TECHNIQUE: Single frontal, portable view of the chest was obtained.    COMPARISON: Chest x-ray 12/31/2022.    FINDINGS:  The heart size is normal.  Unchanged bilateral lower lung patchy opacities.  There is no pneumothorax or pleural effusion.    IMPRESSION:  Unchanged bilateral lower lung patchy opacities.    --- End of Report ---           NOEL HAWKINS MD; Resident Radiologist  This document has been electronically signed.  PHUC HUBBARD MD; Attending Radiologist  This document has been electronically signed. Jan 4 2023  1:18PM    < end of copied text >         No growth    12-31 @ 09:30 .Blood Blood-Peripheral         < from: TTE with Doppler (w/Cont) (12.31.22 @ 11:08) >  1. Normal left ventricular internal dimensions and wall  thicknesses. Normal left ventricular systolic function. No  segmental wall motion abnormalities. Endocardial  visualization enhanced with intravenous injection of  Ultrasonic Enhancing Agent (Definity). LVEF calculated  using biplane Stroud's method is 70%. Unable to determine  diastolic function due to insufficient data.  2. Normal right atrium. The right ventricle is not well  visualized; grossly normal right ventricular systolic  function.  3. Normal tricuspid valve. Minimal tricuspid  regurgitation.Estimated pulmonary artery systolic pressure  equals 49  mm Hg, assuming right atrial pressure equals 8  mm Hg, consistent with mild pulmonary pressures.  4. No pericardial effusion seen.  *** No previous Echo exam.  There is no evidence of RV strain.  ------------------------------------------------------------------------  Confirmed on  12/31/2022 - 12:27:48 by Alison Garcia M.D.  ------------------------------------------------------------------------    < end of copied text >         No growth to date.    12-31 @ 09:25 .Blood Blood-Peripheral                No growth to date.          RESPIRATORY CULTURES:          Studies  Chest X-RAY  CT SCAN Chest   Venous Dopplers: LE:   CT Abdomen  Others               Date of Service: 01-04-23 @ 16:13    Patient is a 77y old  Male who presents with a chief complaint of Malignancy Workup (01 Jan 2023 11:21)      Any change in ROS:  doing  ok : some confusion : on 2 L of oxygen      MEDICATIONS  (STANDING):  buMETAnide Injectable 2 milliGRAM(s) IV Push two times a day  chlorhexidine 2% Cloths 1 Application(s) Topical daily  heparin  Infusion.  Unit(s)/Hr (16 mL/Hr) IV Continuous <Continuous>  hydrocortisone sodium succinate Injectable 25 milliGRAM(s) IV Push every 8 hours  influenza  Vaccine (HIGH DOSE) 0.7 milliLiter(s) IntraMuscular once  midodrine 10 milliGRAM(s) Oral three times a day  ondansetron Injectable 4 milliGRAM(s) IV Push four times a day  pantoprazole  Injectable 40 milliGRAM(s) IV Push two times a day  piperacillin/tazobactam IVPB.. 3.375 Gram(s) IV Intermittent every 12 hours  simvastatin 10 milliGRAM(s) Oral at bedtime  sodium bicarbonate  Infusion 0.043 mEq/kG/Hr (50 mL/Hr) IV Continuous <Continuous>  sucralfate suspension 1 Gram(s) Oral two times a day    MEDICATIONS  (PRN):  acetaminophen     Tablet .. 650 milliGRAM(s) Oral every 6 hours PRN Temp greater or equal to 38.5C (101.3F), Mild Pain (1 - 3)  heparin   Injectable 7000 Unit(s) IV Push every 6 hours PRN For aPTT less than 40  heparin   Injectable 3500 Unit(s) IV Push every 6 hours PRN For aPTT between 40 - 57    Vital Signs Last 24 Hrs  T(C): 36.3 (04 Jan 2023 11:14), Max: 36.3 (03 Jan 2023 20:50)  T(F): 97.3 (04 Jan 2023 11:14), Max: 97.3 (03 Jan 2023 20:50)  HR: 77 (04 Jan 2023 15:38) (72 - 84)  BP: 105/68 (04 Jan 2023 15:38) (94/57 - 113/78)  BP(mean): --  RR: 19 (04 Jan 2023 11:14) (18 - 19)  SpO2: 91% (04 Jan 2023 11:14) (90% - 92%)    Parameters below as of 04 Jan 2023 11:14  Patient On (Oxygen Delivery Method): nasal cannula  O2 Flow (L/min): 2      I&O's Summary        Physical Exam:   GENERAL: NAD, well-groomed, well-developed  HEENT: YOANA/   Atraumatic, Normocephalic  ENMT: No tonsillar erythema, exudates, or enlargement; Moist mucous membranes, Good dentition, No lesions  NECK: Supple, No JVD, Normal thyroid  CHEST/LUNG:no wheezing:   CVS: Regular rate and rhythm; No murmurs, rubs, or gallops  GI: : Soft, Nontender, Nondistended; Bowel sounds present  NERVOUS SYSTEM:  Alert & awake  x 2  EXTREMITIES: +edema  LYMPH: No lymphadenopathy noted  SKIN: No rashes or lesions  ENDOCRINOLOGY: No Thyromegaly  PSYCH: confused    Labs:  ABG - ( 03 Jan 2023 16:40 )  pH, Arterial: 7.33  pH, Blood: x     /  pCO2: 37    /  pO2: 42    / HCO3: 20    / Base Excess: -5.8  /  SaO2: 67.7            23, 22, 21                            13.5   22.73 )-----------( 98       ( 04 Jan 2023 07:10 )             41.9                         13.6   24.34 )-----------( 103      ( 03 Jan 2023 15:19 )             42.7                         13.3   16.85 )-----------( 90       ( 02 Jan 2023 11:50 )             40.8                         15.8   15.76 )-----------( 60       ( 01 Jan 2023 06:16 )             48.9                         14.9   18.27 )-----------( 86       ( 31 Dec 2022 19:50 )             45.8     01-03    133<L>  |  90<L>  |  134<H>  ----------------------------<  80  4.8   |  20<L>  |  6.02<H>  01-02    134<L>  |  93<L>  |  129<H>  ----------------------------<  86  4.8   |  19<L>  |  5.27<H>  01-01    133<L>  |  91<L>  |  124<H>  ----------------------------<  108<H>  5.0   |  20<L>  |  4.17<H>    Ca    6.9<L>      03 Jan 2023 15:19      CAPILLARY BLOOD GLUCOSE            PTT - ( 04 Jan 2023 07:11 )  PTT:62.1 sec    Procalcitonin, Serum: 6.18 ng/mL (01-02 @ 11:50)        RECENT CULTURES:  12-31 @ 09:59 Clean Catch Clean Catch (Midstream)         rad< from: Xray Chest 1 View- PORTABLE-Routine (Xray Chest 1 View- PORTABLE-Routine .) (01.04.23 @ 12:21) >    ACC: [id number] EXAM:  XR CHEST PORTABLE ROUTINE 1V                          PROCEDURE DATE:  01/04/2023          INTERPRETATION:  CLINICAL INDICATION: sob    TECHNIQUE: Single frontal, portable view of the chest was obtained.    COMPARISON: Chest x-ray 12/31/2022.    FINDINGS:  The heart size is normal.  Unchanged bilateral lower lung patchy opacities.  There is no pneumothorax or pleural effusion.    IMPRESSION:  Unchanged bilateral lower lung patchy opacities.    --- End of Report ---           NOEL HAWKINS MD; Resident Radiologist  This document has been electronically signed.  PHUC HUBBARD MD; Attending Radiologist  This document has been electronically signed. Jan 4 2023  1:18PM    < end of copied text >         No growth    12-31 @ 09:30 .Blood Blood-Peripheral         < from: TTE with Doppler (w/Cont) (12.31.22 @ 11:08) >  1. Normal left ventricular internal dimensions and wall  thicknesses. Normal left ventricular systolic function. No  segmental wall motion abnormalities. Endocardial  visualization enhanced with intravenous injection of  Ultrasonic Enhancing Agent (Definity). LVEF calculated  using biplane Stroud's method is 70%. Unable to determine  diastolic function due to insufficient data.  2. Normal right atrium. The right ventricle is not well  visualized; grossly normal right ventricular systolic  function.  3. Normal tricuspid valve. Minimal tricuspid  regurgitation.Estimated pulmonary artery systolic pressure  equals 49  mm Hg, assuming right atrial pressure equals 8  mm Hg, consistent with mild pulmonary pressures.  4. No pericardial effusion seen.  *** No previous Echo exam.  There is no evidence of RV strain.  ------------------------------------------------------------------------  Confirmed on  12/31/2022 - 12:27:48 by Alison Garcia M.D.  ------------------------------------------------------------------------    < end of copied text >         No growth to date.    12-31 @ 09:25 .Blood Blood-Peripheral                No growth to date.          RESPIRATORY CULTURES:          Studies  Chest X-RAY  CT SCAN Chest   Venous Dopplers: LE:   CT Abdomen  Others               Date of Service: 01-04-23 @ 16:13    Patient is a 77y old  Male who presents with a chief complaint of Malignancy Workup (01 Jan 2023 11:21)      Any change in ROS:  doing  ok : some confusion : on 2 L of oxygen      MEDICATIONS  (STANDING):  buMETAnide Injectable 2 milliGRAM(s) IV Push two times a day  chlorhexidine 2% Cloths 1 Application(s) Topical daily  heparin  Infusion.  Unit(s)/Hr (16 mL/Hr) IV Continuous <Continuous>  hydrocortisone sodium succinate Injectable 25 milliGRAM(s) IV Push every 8 hours  influenza  Vaccine (HIGH DOSE) 0.7 milliLiter(s) IntraMuscular once  midodrine 10 milliGRAM(s) Oral three times a day  ondansetron Injectable 4 milliGRAM(s) IV Push four times a day  pantoprazole  Injectable 40 milliGRAM(s) IV Push two times a day  piperacillin/tazobactam IVPB.. 3.375 Gram(s) IV Intermittent every 12 hours  simvastatin 10 milliGRAM(s) Oral at bedtime  sodium bicarbonate  Infusion 0.043 mEq/kG/Hr (50 mL/Hr) IV Continuous <Continuous>  sucralfate suspension 1 Gram(s) Oral two times a day    MEDICATIONS  (PRN):  acetaminophen     Tablet .. 650 milliGRAM(s) Oral every 6 hours PRN Temp greater or equal to 38.5C (101.3F), Mild Pain (1 - 3)  heparin   Injectable 7000 Unit(s) IV Push every 6 hours PRN For aPTT less than 40  heparin   Injectable 3500 Unit(s) IV Push every 6 hours PRN For aPTT between 40 - 57    Vital Signs Last 24 Hrs  T(C): 36.3 (04 Jan 2023 11:14), Max: 36.3 (03 Jan 2023 20:50)  T(F): 97.3 (04 Jan 2023 11:14), Max: 97.3 (03 Jan 2023 20:50)  HR: 77 (04 Jan 2023 15:38) (72 - 84)  BP: 105/68 (04 Jan 2023 15:38) (94/57 - 113/78)  BP(mean): --  RR: 19 (04 Jan 2023 11:14) (18 - 19)  SpO2: 91% (04 Jan 2023 11:14) (90% - 92%)    Parameters below as of 04 Jan 2023 11:14  Patient On (Oxygen Delivery Method): nasal cannula  O2 Flow (L/min): 2      I&O's Summary        Physical Exam:   GENERAL: NAD, well-groomed, well-developed  HEENT: YOANA/   Atraumatic, Normocephalic  ENMT: No tonsillar erythema, exudates, or enlargement; Moist mucous membranes, Good dentition, No lesions  NECK: Supple, No JVD, Normal thyroid  CHEST/LUNG:no wheezing:   CVS: Regular rate and rhythm; No murmurs, rubs, or gallops  GI: : Soft, Nontender, Nondistended; Bowel sounds present  NERVOUS SYSTEM:  Alert & awake  x 2  EXTREMITIES: +edema  LYMPH: No lymphadenopathy noted  SKIN: No rashes or lesions  ENDOCRINOLOGY: No Thyromegaly  PSYCH: confused    Labs:  ABG - ( 03 Jan 2023 16:40 )  pH, Arterial: 7.33  pH, Blood: x     /  pCO2: 37    /  pO2: 42    / HCO3: 20    / Base Excess: -5.8  /  SaO2: 67.7            23, 22, 21                            13.5   22.73 )-----------( 98       ( 04 Jan 2023 07:10 )             41.9                         13.6   24.34 )-----------( 103      ( 03 Jan 2023 15:19 )             42.7                         13.3   16.85 )-----------( 90       ( 02 Jan 2023 11:50 )             40.8                         15.8   15.76 )-----------( 60       ( 01 Jan 2023 06:16 )             48.9                         14.9   18.27 )-----------( 86       ( 31 Dec 2022 19:50 )             45.8     01-03    133<L>  |  90<L>  |  134<H>  ----------------------------<  80  4.8   |  20<L>  |  6.02<H>  01-02    134<L>  |  93<L>  |  129<H>  ----------------------------<  86  4.8   |  19<L>  |  5.27<H>  01-01    133<L>  |  91<L>  |  124<H>  ----------------------------<  108<H>  5.0   |  20<L>  |  4.17<H>    Ca    6.9<L>      03 Jan 2023 15:19      CAPILLARY BLOOD GLUCOSE            PTT - ( 04 Jan 2023 07:11 )  PTT:62.1 sec    Procalcitonin, Serum: 6.18 ng/mL (01-02 @ 11:50)        RECENT CULTURES:  12-31 @ 09:59 Clean Catch Clean Catch (Midstream)         rad< from: Xray Chest 1 View- PORTABLE-Routine (Xray Chest 1 View- PORTABLE-Routine .) (01.04.23 @ 12:21) >    ACC: [id number] EXAM:  XR CHEST PORTABLE ROUTINE 1V                          PROCEDURE DATE:  01/04/2023          INTERPRETATION:  CLINICAL INDICATION: sob    TECHNIQUE: Single frontal, portable view of the chest was obtained.    COMPARISON: Chest x-ray 12/31/2022.    FINDINGS:  The heart size is normal.  Unchanged bilateral lower lung patchy opacities.  There is no pneumothorax or pleural effusion.    IMPRESSION:  Unchanged bilateral lower lung patchy opacities.    --- End of Report ---           NOEL HAWKINS MD; Resident Radiologist  This document has been electronically signed.  PHUC HUBBARD MD; Attending Radiologist  This document has been electronically signed. Jan 4 2023  1:18PM    < end of copied text >         No growth    12-31 @ 09:30 .Blood Blood-Peripheral         < from: TTE with Doppler (w/Cont) (12.31.22 @ 11:08) >  1. Normal left ventricular internal dimensions and wall  thicknesses. Normal left ventricular systolic function. No  segmental wall motion abnormalities. Endocardial  visualization enhanced with intravenous injection of  Ultrasonic Enhancing Agent (Definity). LVEF calculated  using biplane Stroud's method is 70%. Unable to determine  diastolic function due to insufficient data.  2. Normal right atrium. The right ventricle is not well  visualized; grossly normal right ventricular systolic  function.  3. Normal tricuspid valve. Minimal tricuspid  regurgitation.Estimated pulmonary artery systolic pressure  equals 49  mm Hg, assuming right atrial pressure equals 8  mm Hg, consistent with mild pulmonary pressures.  4. No pericardial effusion seen.  *** No previous Echo exam.  There is no evidence of RV strain.  ------------------------------------------------------------------------  Confirmed on  12/31/2022 - 12:27:48 by Alison Garcia M.D.  ------------------------------------------------------------------------    < end of copied text >         No growth to date.    12-31 @ 09:25 .Blood Blood-Peripheral                No growth to date.          RESPIRATORY CULTURES:          Studies  Chest X-RAY  CT SCAN Chest   Venous Dopplers: LE:   CT Abdomen  Others

## 2023-01-04 NOTE — PROVIDER CONTACT NOTE (OTHER) - ACTION/TREATMENT ORDERED:
provider aware- no interventions ordered at this time .
provider made aware. aPTT included in AM draw
provider made aware. occult test ordered

## 2023-01-04 NOTE — PROGRESS NOTE ADULT - ASSESSMENT
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  : hematology following:  he likely has metastatic disease also: to his lungs: He is being empirically treated for pneumonia to : with zosyn:  d4/7 TODAY : all cultures are negative so far:  id following : his repeat chest xray with persistent brina opacities:  he may have underlying metastatic disease to lungs also : he iso n nhco3 infusion:  need to be careful for fluid overlaod: He has metabolic acidosis on vbg   Pulmonary embolism: DVT: ON AC ; HEPARIN : PLTS ARE LOW:  stable:    Acute diastolic heart failure: : he is not on any diuretics and needs to be careful as hs he has diastolic dysfunction : watch for fluid overlaod:   CKD: per renal    Psoriasis :  on chr steroids    HTN: controlled:    dw acp  ; overall prognosis seems very guarded:

## 2023-01-04 NOTE — PROGRESS NOTE ADULT - ASSESSMENT
This is a 78yo Male with PMHx of HTN, HLD, psoriasis on prednisone, and recent possible esophageal/hepatic malignancy/mass presented with acute onset SOB and found to have RLL segmental PE. Trop at 165 and proBNP at [address], TTE showed no sign of RV strain, no clot in transit, and LVEF of 70%.     Problems:  1. Segmental Pulmonary Embolism  2. Acute above and below the knee left lower extremity DVT with extension into left external iliac vein     Plan:  1. Continue Heparin gtt  2. Continue Telemetry monitoring   3. CT A/P results as above This is a 76yo Male with PMHx of HTN, HLD, psoriasis on prednisone, and recent possible esophageal/hepatic malignancy/mass presented with acute onset SOB and found to have RLL segmental PE. Trop at 165 and [place]BNP at [address], TTE showed no sign of RV strain, no clot in transit, and LVEF of 70%.     Problems:  1. Segmental Pulmonary Embolism  2. Acute above and below the knee left lower extremity DVT with extension into left external iliac vein     Plan:  1. Continue Heparin gtt  2. Continue Telemetry monitoring   3. CT A/P results as above This is a 76yo Male with PMHx of HTN, HLD, psoriasis on prednisone, and recent possible esophageal/hepatic malignancy/mass presented with acute onset SOB and found to have RLL segmental PE. Trop at 165 and proBNP at 04299, TTE showed no sign of RV strain, no clot in transit, and LVEF of 70%.     Problems:  1. Segmental Pulmonary Embolism  2. Acute above and below the knee left lower extremity DVT with extension into left external iliac vein     Plan:  1. Continue Heparin gtt  2. Continue Telemetry monitoring   3. CT A/P results as above

## 2023-01-04 NOTE — PROVIDER CONTACT NOTE (OTHER) - ASSESSMENT
pt took off nasal cannula and refused to put back on. pt refused us to take a one time measure of his SPO2. went in room with DAVIDA Arana
pt A&Ox2 on 2L NC. VSS. Last aPTT 49.2
pt A&Ox2-3 on 2L NC. VSS. pt denies any cp, sob, palpitations, discomfort. IV site & dressing assessed WDL w/ no bleeding

## 2023-01-04 NOTE — PROGRESS NOTE ADULT - SUBJECTIVE AND OBJECTIVE BOX
[facility] [phone]        Patient seen and examined in bed.  He was the same   Still confused         MEDICATIONS  (STANDING):  chlorhexidine 2% Cloths 1 Application(s) Topical daily  heparin  Infusion.  Unit(s)/Hr (16 mL/Hr) IV Continuous <Continuous>  hydrocortisone sodium succinate Injectable 25 milliGRAM(s) IV Push every 8 hours  influenza  Vaccine (HIGH DOSE) 0.7 milliLiter(s) IntraMuscular once  midodrine 10 milliGRAM(s) Oral three times a day  ondansetron Injectable 4 milliGRAM(s) IV Push four times a day  pantoprazole  Injectable 40 milliGRAM(s) IV Push two times a day  piperacillin/tazobactam IVPB.. 3.375 Gram(s) IV Intermittent every 12 hours  simvastatin 10 milliGRAM(s) Oral at bedtime  sodium bicarbonate  Infusion 0.043 mEq/kG/Hr (50 mL/Hr) IV Continuous <Continuous>  sodium chloride 0.45% 1000 milliLiter(s) (50 mL/Hr) IV Continuous <Continuous>  sucralfate suspension 1 Gram(s) Oral two times a day      VITAL:  T(C): , Max: 36.3 (01-03-23 @ 12:20)  T(F): , Max: 97.3 (01-03-23 @ 12:20)  HR: 72 (01-04-23 @ 06:38)  BP: 94/57 (01-04-23 @ 06:38)  BP(mean): --  RR: 18 (01-04-23 @ 06:38)  SpO2: 90% (01-04-23 @ 06:38)  Wt(kg): --    I and O's:        PHYSICAL EXAM:    Constitutional: NAD  Neck:  No JVD  Respiratory: rhonchi   Cardiovascular: S1 and S2  Gastrointestinal: BS+, soft, NT/ND  Extremities: No peripheral edema  Neurological: A/O x 3, no focal deficits  Psychiatric: Normal mood, normal affect  : No Pop  Skin: No rashes  Access: Not applicable    LABS:                        13.5   22.73 )-----------( 98       ( 04 Jan 2023 07:10 )             41.9     01-03    133<L>  |  90<L>  |  134<H>  ----------------------------<  80  4.8   |  20<L>  |  6.02<H>    Ca    6.9<L>      03 Jan 2023 15:19            Urine Studies:          RADIOLOGY & ADDITIONAL STUDIES:             [facility] [phone]        Patient seen and examined in bed.  He was the same   Still confused         MEDICATIONS  (STANDING):  chlorhexidine 2% Cloths 1 Application(s) Topical daily  heparin  Infusion.  Unit(s)/Hr (16 mL/Hr) IV Continuous <Continuous>  hydrocortisone sodium succinate Injectable 25 milliGRAM(s) IV Push every 8 hours  influenza  Vaccine (HIGH DOSE) 0.7 milliLiter(s) IntraMuscular once  midodrine 10 milliGRAM(s) Oral three times a day  ondansetron Injectable 4 milliGRAM(s) IV Push four times a day  pantoprazole  Injectable 40 milliGRAM(s) IV Push two times a day  piperacillin/tazobactam IVPB.. 3.375 Gram(s) IV Intermittent every 12 hours  simvastatin 10 milliGRAM(s) Oral at bedtime  sodium bicarbonate  Infusion 0.043 mEq/kG/Hr (50 mL/Hr) IV Continuous <Continuous>  sodium chloride 0.45% 1000 milliLiter(s) (50 mL/Hr) IV Continuous <Continuous>  sucralfate suspension 1 Gram(s) Oral two times a day      VITAL:  T(C): , Max: 36.3 (01-03-23 @ 12:20)  T(F): , Max: 97.3 (01-03-23 @ 12:20)  HR: 72 (01-04-23 @ 06:38)  BP: 94/57 (01-04-23 @ 06:38)  BP(mean): --  RR: 18 (01-04-23 @ 06:38)  SpO2: 90% (01-04-23 @ 06:38)  Wt(kg): --    I and O's:        PHYSICAL EXAM:    Constitutional: NAD  Neck:  No JVD  Respiratory: rhonchi   Cardiovascular: S1 and S2  Gastrointestinal: BS+, soft, NT/ND  Extremities: No peripheral edema  Neurological: A/O x 3, no focal deficits  Psychiatric: Normal mood, normal affect  : No Pop  Skin: No rashes  Access: Not applicable    LABS:                        13.5   22.73 )-----------( 98       ( 04 Jan 2023 07:10 )             41.9     01-03    133<L>  |  90<L>  |  134<H>  ----------------------------<  80  4.8   |  20<L>  |  6.02<H>    Ca    6.9<L>      03 Jan 2023 15:19            Urine Studies:          RADIOLOGY & ADDITIONAL STUDIES:             [facility] [phone]        Patient seen and examined in bed.  He was the same   Still confused         MEDICATIONS  (STANDING):  chlorhexidine 2% Cloths 1 Application(s) Topical daily  heparin  Infusion.  Unit(s)/Hr (16 mL/Hr) IV Continuous <Continuous>  hydrocortisone sodium succinate Injectable 25 milliGRAM(s) IV Push every 8 hours  influenza  Vaccine (HIGH DOSE) 0.7 milliLiter(s) IntraMuscular once  midodrine 10 milliGRAM(s) Oral three times a day  ondansetron Injectable 4 milliGRAM(s) IV Push four times a day  pantoprazole  Injectable 40 milliGRAM(s) IV Push two times a day  piperacillin/tazobactam IVPB.. 3.375 Gram(s) IV Intermittent every 12 hours  simvastatin 10 milliGRAM(s) Oral at bedtime  sodium bicarbonate  Infusion 0.043 mEq/kG/Hr (50 mL/Hr) IV Continuous <Continuous>  sodium chloride 0.45% 1000 milliLiter(s) (50 mL/Hr) IV Continuous <Continuous>  sucralfate suspension 1 Gram(s) Oral two times a day      VITAL:  T(C): , Max: 36.3 (01-03-23 @ 12:20)  T(F): , Max: 97.3 (01-03-23 @ 12:20)  HR: 72 (01-04-23 @ 06:38)  BP: 94/57 (01-04-23 @ 06:38)  BP(mean): --  RR: 18 (01-04-23 @ 06:38)  SpO2: 90% (01-04-23 @ 06:38)  Wt(kg): --    I and O's:        PHYSICAL EXAM:    Constitutional: NAD  Neck:  No JVD  Respiratory: rhonchi   Cardiovascular: S1 and S2  Gastrointestinal: BS+, soft, NT/ND  Extremities: No peripheral edema  Neurological: A/O x 3, no focal deficits  Psychiatric: Normal mood, normal affect  : No Pop  Skin: No rashes  Access: Not applicable    LABS:                        13.5   22.73 )-----------( 98       ( 04 Jan 2023 07:10 )             41.9     01-03    133<L>  |  90<L>  |  134<H>  ----------------------------<  80  4.8   |  20<L>  |  6.02<H>    Ca    6.9<L>      03 Jan 2023 15:19            Urine Studies:          RADIOLOGY & ADDITIONAL STUDIES:

## 2023-01-04 NOTE — PROGRESS NOTE ADULT - ASSESSMENT
77y male with a history of  HTN,HLD,psoriasis managed with 10 mg daily prednisone, recently treated for pneumonia and found to have esophageal lesion with liver mets, who developed shortness of breath and weakness and was brought to ER.   CTA with documented PE as well as multifocal densities throughout both lungs.  He was hypotensive in ER with BP in 80's, given antibiotics.  He appears comfortable on nasal oxygen,  zosyn, one dose of vancomycoin,  and azithromycin given in ER  He also was given a dose of solumedrol.  Advanced malignancy(? type) , CKD vs LIVIER, thrombocytopenia. PE and possible pneumonia.    Suggest:  1.Await blood cultures- r/o bacteremia, so far negative  2.Zosyn for possible multifocal pneumonia day 5/5 today  3. Monitor WBC, possible steroids induced leukocytosis  4. D/w pt's family at bedside, oncology w/u for metastatic disease 16:13

## 2023-01-04 NOTE — PROVIDER CONTACT NOTE (OTHER) - BACKGROUND
pt admitted for sob
pt admitted for sob
pt A&O 0-1. disoriented to place, time, situation. pt admitted for SOB, found to have R LL PE and LE DVT, on heparin gtt. pt refused bladder scan at 12am.

## 2023-01-04 NOTE — PROGRESS NOTE ADULT - ASSESSMENT
77 m with    Pulmonary embolus and L DVT   - AC with Heparin  - Pulmonary follow  - Vascular cardiology follow     Thrombocytopenia  - follow Platelets  - HIT  - if worsening thrombocytopenia may need IVC filter    Congestive Heart Failure  - telemetry  - cardiac enzymes  - echo with no strain   - cardiology evaluation     HTN  - hold BP meds  - hold diuretic    Psoriasis  - stress dose steroid    Liver lesions  - s/p Bx at [facility] c/w adenocarcinoma  - Oncology follow    LIVIER  - follow lytes, Cr  - gentle IVF  - Nephrology evaluation Dr. Khan     CVA acute/ subacute with Confusion  - MRI brain noted  - Neurology evaluation      Esophageal mass  - Gastroenterology follow. await recommendations   - may need PEG    Sepsis/ Pneumonia  - Zosyn  - ID follow    DVT prophylaxis  - AC    Moshe Tamayo MD phone [phone]  77 m with    Pulmonary embolus and L DVT   - AC with Heparin  - Pulmonary follow  - Vascular cardiology follow     Thrombocytopenia  - follow Platelets  - HIT  - if worsening thrombocytopenia may need IVC filter    Congestive Heart Failure  - telemetry  - cardiac enzymes  - echo with no strain   - cardiology evaluation     HTN  - hold BP meds  - hold diuretic    Psoriasis  - stress dose steroid    Liver lesions  - s/p Bx at [facility] c/w adenocarcinoma  - Oncology follow    LIVIER  - follow lytes, Cr  - gentle IVF  - Nephrology evaluation Dr. Khan     CVA acute/ subacute with Confusion  - MRI brain noted  - Neurology evaluation      Esophageal mass  - Gastroenterology follow. await recommendations   - may need PEG    Sepsis/ Pneumonia  - Zosyn  - ID follow    DVT prophylaxis  - AC    Moshe Tamayo MD phone [phone]  77 m with    Pulmonary embolus and L DVT   - AC with Heparin  - Pulmonary follow  - Vascular cardiology follow     Thrombocytopenia  - follow Platelets  - HIT  - if worsening thrombocytopenia may need IVC filter    Congestive Heart Failure  - telemetry  - cardiac enzymes  - echo with no strain   - cardiology evaluation     HTN  - hold BP meds  - hold diuretic    Psoriasis  - stress dose steroid    Liver lesions  - s/p Bx at [facility] c/w adenocarcinoma  - Oncology follow    LIVIER  - follow lytes, Cr  - gentle IVF  - Nephrology evaluation Dr. Khan     CVA acute/ subacute with Confusion  - MRI brain noted  - Neurology evaluation      Esophageal mass  - Gastroenterology follow. await recommendations   - may need PEG    Sepsis/ Pneumonia  - Zosyn  - ID follow    DVT prophylaxis  - AC    Moshe Tamayo MD phone [phone]  77 m with    Pulmonary embolus and L DVT   - AC with Heparin  - Pulmonary follow  - Vascular cardiology follow     Thrombocytopenia  - follow Platelets  - HIT  - if worsening thrombocytopenia may need IVC filter    Congestive Heart Failure  - telemetry  - cardiac enzymes  - echo with no strain   - cardiology evaluation     HTN  - hold BP meds  - hold diuretic    Psoriasis  - stress dose steroid    Liver lesions  - s/p Bx at [facility] c/w adenocarcinoma  - Oncology follow    LIVIER  - follow lytes, Cr  - gentle IVF  - Nephrology evaluation Dr. Khan     CVA acute/ subacute with Confusion  - MRI brain noted  - Neurology evaluation noted     Esophageal mass  - Gastroenterology follow. await recommendations   - may need PEG    Sepsis/ Pneumonia  - Zosyn  - ID follow    DVT prophylaxis  - AC    Moshe Tamayo MD phone [phone]  77 m with    Pulmonary embolus and L DVT   - AC with Heparin  - Pulmonary follow  - Vascular cardiology follow     Thrombocytopenia  - follow Platelets  - HIT  - if worsening thrombocytopenia may need IVC filter    Congestive Heart Failure  - telemetry  - cardiac enzymes  - echo with no strain   - cardiology evaluation     HTN  - hold BP meds  - hold diuretic    Psoriasis  - stress dose steroid    Liver lesions  - s/p Bx at [facility] c/w adenocarcinoma  - Oncology follow    LIVIER  - follow lytes, Cr  - gentle IVF  - Nephrology evaluation Dr. Khan     CVA acute/ subacute with Confusion  - MRI brain noted  - Neurology evaluation noted     Esophageal mass  - Gastroenterology follow. await recommendations   - may need PEG    Sepsis/ Pneumonia  - Zosyn  - ID follow    DVT prophylaxis  - AC    Moshe Tamayo MD phone [phone]  77 m with    Pulmonary embolus and L DVT   - AC with Heparin  - Pulmonary follow  - Vascular cardiology follow     Thrombocytopenia  - follow Platelets  - HIT  - if worsening thrombocytopenia may need IVC filter    Congestive Heart Failure  - telemetry  - cardiac enzymes  - echo with no strain   - cardiology evaluation     HTN  - hold BP meds  - hold diuretic    Psoriasis  - stress dose steroid    Liver lesions  - s/p Bx at [facility] c/w adenocarcinoma  - Oncology follow    LIVIER  - follow lytes, Cr  - gentle IVF  - Nephrology evaluation Dr. Khan     CVA acute/ subacute with Confusion  - MRI brain noted  - Neurology evaluation noted     Esophageal mass  - Gastroenterology follow. await recommendations   - may need PEG    Sepsis/ Pneumonia  - Zosyn  - ID follow    DVT prophylaxis  - AC    Moshe Tamayo MD phone [phone]  77 m with    Pulmonary embolus and L DVT   - AC with Heparin  - Pulmonary follow  - Vascular cardiology follow     Thrombocytopenia  - follow Platelets  - HIT  - if worsening thrombocytopenia may need IVC filter    Congestive Heart Failure  - telemetry  - cardiac enzymes  - echo with no strain   - cardiology evaluation     HTN  - hold BP meds  - hold diuretic    Psoriasis  - stress dose steroid    Liver lesions  - s/p Bx at [facility] c/w adenocarcinoma  - Oncology follow    LIVIER  - follow lytes, Cr  - gentle IVF  - Nephrology evaluation Dr. Khan     CVA acute/ subacute with Confusion  - MRI brain noted  - Neurology evaluation noted     Esophageal mass  - Gastroenterology follow. await recommendations   - may need PEG    Sepsis/ Pneumonia  - Zosyn  - ID follow    DVT prophylaxis  - AC    d/w wife at bedside    Moshe Tamayo MD phone [phone]  77 m with    Pulmonary embolus and L DVT   - AC with Heparin  - Pulmonary follow  - Vascular cardiology follow     Thrombocytopenia  - follow Platelets  - HIT  - if worsening thrombocytopenia may need IVC filter    Congestive Heart Failure  - telemetry  - cardiac enzymes  - echo with no strain   - cardiology evaluation     HTN  - hold BP meds  - hold diuretic    Psoriasis  - stress dose steroid    Liver lesions  - s/p Bx at [facility] c/w adenocarcinoma  - Oncology follow    LIVIER  - follow lytes, Cr  - gentle IVF  - Nephrology evaluation Dr. Khan     CVA acute/ subacute with Confusion  - MRI brain noted  - Neurology evaluation noted     Esophageal mass  - Gastroenterology follow. await recommendations   - may need PEG    Sepsis/ Pneumonia  - Zosyn  - ID follow    DVT prophylaxis  - AC    d/w wife at bedside    Moshe Tamayo MD phone [phone]  77 m with    Pulmonary embolus and L DVT   - AC with Heparin  - Pulmonary follow  - Vascular cardiology follow     Thrombocytopenia  - follow Platelets  - HIT  - if worsening thrombocytopenia may need IVC filter    Congestive Heart Failure  - telemetry  - cardiac enzymes  - echo with no strain   - cardiology evaluation     HTN  - hold BP meds  - hold diuretic    Psoriasis  - stress dose steroid    Liver lesions  - s/p Bx at [facility] c/w adenocarcinoma  - Oncology follow    LIVIER  - follow lytes, Cr  - gentle IVF  - Nephrology evaluation Dr. Khan     CVA acute/ subacute with Confusion  - MRI brain noted  - Neurology evaluation noted     Esophageal mass  - Gastroenterology follow. await recommendations   - may need PEG    Sepsis/ Pneumonia  - Zosyn  - ID follow    DVT prophylaxis  - AC    d/w wife at bedside    Moshe Tamayo MD phone [phone]

## 2023-01-04 NOTE — PROGRESS NOTE ADULT - SUBJECTIVE AND OBJECTIVE BOX
CC: f/u for PNA and Hypotension    Patient reports no new c/o    REVIEW OF SYSTEMS: no fevers, no chills, no nausea, no vomiting, no abd pain, no resp symptoms  All other review of systems negative (Comprehensive ROS)    Antimicrobials Day #  5  piperacillin/tazobactam IVPB.. 3.375 Gram(s) IV Intermittent every 12 hours        Other Medications Reviewed  MEDICATIONS  (STANDING):  chlorhexidine 2% Cloths 1 Application(s) Topical daily  heparin  Infusion.  Unit(s)/Hr (16 mL/Hr) IV Continuous <Continuous>  hydrocortisone sodium succinate Injectable 25 milliGRAM(s) IV Push every 8 hours  influenza  Vaccine (HIGH DOSE) 0.7 milliLiter(s) IntraMuscular once  midodrine 10 milliGRAM(s) Oral three times a day  ondansetron Injectable 4 milliGRAM(s) IV Push four times a day  pantoprazole  Injectable 40 milliGRAM(s) IV Push two times a day  piperacillin/tazobactam IVPB.. 3.375 Gram(s) IV Intermittent every 12 hours  simvastatin 10 milliGRAM(s) Oral at bedtime  sodium bicarbonate  Infusion 0.043 mEq/kG/Hr (50 mL/Hr) IV Continuous <Continuous>  sodium chloride 0.45% 1000 milliLiter(s) (50 mL/Hr) IV Continuous <Continuous>  sucralfate suspension 1 Gram(s) Oral two times a day    Vital Signs Last 24 Hrs  T(C): 36.3 (2023 06:38), Max: 36.3 (2023 12:20)  T(F): 97.3 (2023 06:38), Max: 97.3 (2023 12:20)  HR: 72 (2023 06:38) (72 - 80)  BP: 94/57 (2023 06:38) (94/57 - 131/61)  BP(mean): --  RR: 18 (2023 06:38) (18 - 18)  SpO2: 90% (2023 06:38) (90% - 95%)    Parameters below as of 2023 06:38  Patient On (Oxygen Delivery Method): nasal cannula  O2 Flow (L/min): 2      PHYSICAL EXAM:  General: alert, no acute distress  Eyes:  anicteric, no conjunctival injection, no discharge  Oropharynx: no lesions or injection 	  Neck: supple, without adenopathy  Lungs: diminished  Heart: regular rate and rhythm; no murmur, rubs or gallops  Abdomen: soft, nondistended, nontender, without mass or organomegaly  Skin: no lesions  Extremities: no clubbing, cyanosis, or edema  Neurologic: alert, follows commands    LAB RESULTS:                                   13.5   22.73 )-----------( 98       ( 2023 07:10 )             41.9       133<L>  |  90<L>  |  134<H>  ----------------------------<  80  4.8   |  20<L>  |  6.02<H>    Ca    6.9<L>      2023 15:19            Urinalysis Basic - ( 31 Dec 2022 23:22 )    Color: Yellow / Appearance: Clear / S.038 / pH: x  Gluc: x / Ketone: Negative  / Bili: Negative / Urobili: Negative   Blood: x / Protein: Trace / Nitrite: Negative   Leuk Esterase: Negative / RBC: 6 /hpf / WBC 6 /HPF   Sq Epi: x / Non Sq Epi: 1 /hpf / Bacteria: Negative        MICROBIOLOGY REVIEWED:    Culture - Urine (22 @ 09:59)   Specimen Source: Clean Catch Clean Catch (Midstream)   Culture Results:   No growth     Culture - Blood (22 @ 09:30)   Specimen Source: .Blood Blood-Peripheral   Culture Results:   No growth to date.   RADIOLOGY REVIEWED:    < from: CT Abdomen and Pelvis No Cont (23 @ 20:14) >  IMPRESSION:  Diffuse bilobar hepatic metastases.    Focal thickening at the GE junction which may be related to reported   esophageal mass versus underdistention.    < end of copied text >    < from: [facility] Duplex Lower Ext Vein Scan, Bilat (23 @ 18:57) >  IMPRESSION:  Acute above and below the knee left lower extremity deep venous   thrombosis with extension into left external iliac vein.  No evidence of acute deep venous thrombosis in the right lower extremity.    Findings were discussed by ultrasound technologist to JAN Paulino on   2023 at 6:50 PM.    < end of copied text >    < from: CT Angio Chest PE Protocol w/ IV Cont (22 @ 10:33) >  IMPRESSION:.    Pulmonary embolism within the right interlobar pulmonary artery and   within multiple proximal segmental branches of the right lower lobe.    Small bilateral pleural effusions and interlobular septalthickening may   be on the basis of pulmonary edema.    Ill-defined consolidative and groundglass opacities involving all lung   lobes may be secondary to infection.    Few additional discrete subcentimeter nodular opacities; indeterminate   and metastatic disease cannot be excluded. Recommend CT chest follow-up   in 3 months to assess stability.    AP window and left hilar lymphadenopathy; indeterminate and metastatic   disease is a diagnostic consideration.    Hepatic metastatic disease, periportal lymphadenopathy, and ascites.    Findings discussed with Dr. Mercado on 2022 at 10:49 AM.    --- End of Report ---    < end of copied text >

## 2023-01-04 NOTE — PROGRESS NOTE ADULT - ATTENDING COMMENTS
Metastatic CA  PE and DVT  continue heparin alone  renal followup, would not transition to Lovenox given elevated creatinine    Nasreen [phone] Metastatic CA  PE and DVT  continue heparin alone  renal followup, would not transition to Lovenox given elevated creatinine    Nasreen [phone] Metastatic CA  PE and DVT  continue heparin alone  renal followup, would not transition to Lovenox given elevated creatinine    Nasreen [phone]

## 2023-01-04 NOTE — PROGRESS NOTE ADULT - SUBJECTIVE AND OBJECTIVE BOX
Patient is a 77y old  Male who presents with a chief complaint of Malignancy Workup (01 Jan 2023 11:21)      SUBJECTIVE / OVERNIGHT EVENTS: weak. Wife at bedside.   Review of Systems  chest pain no  palpitations no  sob no  nausea no  headache no    MEDICATIONS  (STANDING):  buMETAnide Injectable 2 milliGRAM(s) IV Push two times a day  chlorhexidine 2% Cloths 1 Application(s) Topical daily  heparin  Infusion.  Unit(s)/Hr (16 mL/Hr) IV Continuous <Continuous>  hydrocortisone sodium succinate Injectable 25 milliGRAM(s) IV Push every 8 hours  influenza  Vaccine (HIGH DOSE) 0.7 milliLiter(s) IntraMuscular once  midodrine 10 milliGRAM(s) Oral three times a day  ondansetron Injectable 4 milliGRAM(s) IV Push four times a day  pantoprazole  Injectable 40 milliGRAM(s) IV Push two times a day  piperacillin/tazobactam IVPB.. 3.375 Gram(s) IV Intermittent every 12 hours  simvastatin 10 milliGRAM(s) Oral at bedtime  sodium bicarbonate  Infusion 0.043 mEq/kG/Hr (50 mL/Hr) IV Continuous <Continuous>  sucralfate suspension 1 Gram(s) Oral two times a day    MEDICATIONS  (PRN):  acetaminophen     Tablet .. 650 milliGRAM(s) Oral every 6 hours PRN Temp greater or equal to 38.5C (101.3F), Mild Pain (1 - 3)  heparin   Injectable 7000 Unit(s) IV Push every 6 hours PRN For aPTT less than 40  heparin   Injectable 3500 Unit(s) IV Push every 6 hours PRN For aPTT between 40 - 57      Vital Signs Last 24 Hrs  T(C): 36.3 (04 Jan 2023 11:14), Max: 36.3 (03 Jan 2023 20:50)  T(F): 97.3 (04 Jan 2023 11:14), Max: 97.3 (03 Jan 2023 20:50)  HR: 77 (04 Jan 2023 15:38) (72 - 84)  BP: 105/68 (04 Jan 2023 15:38) (94/57 - 113/78)  BP(mean): --  RR: 19 (04 Jan 2023 11:14) (18 - 19)  SpO2: 91% (04 Jan 2023 11:14) (90% - 92%)    Parameters below as of 04 Jan 2023 11:14  Patient On (Oxygen Delivery Method): nasal cannula  O2 Flow (L/min): 2      PHYSICAL EXAM:  GENERAL: NAD, well-developed  HEAD:  Atraumatic, Normocephalic  EYES: EOMI, PERRLA, conjunctiva and sclera clear  NECK: Supple, No JVD  CHEST/LUNG: Clear to auscultation bilaterally; No wheeze  HEART: Regular rate and rhythm; No murmurs, rubs, or gallops  ABDOMEN: Soft, Nontender, Nondistended; Bowel sounds present  EXTREMITIES:  2+ Peripheral Pulses, No clubbing, cyanosis, or edema  PSYCH: confused  NEUROLOGY: non-focal  SKIN: No rashes or lesions    LABS:                        13.5   22.73 )-----------( 98       ( 04 Jan 2023 07:10 )             41.9     01-03    133<L>  |  90<L>  |  134<H>  ----------------------------<  80  4.8   |  20<L>  |  6.02<H>    Ca    6.9<L>      03 Jan 2023 15:19      PTT - ( 04 Jan 2023 07:11 )  PTT:62.1 sec            RADIOLOGY & ADDITIONAL TESTS:    Imaging Personally Reviewed:  < from: Xray Chest 1 View- PORTABLE-Routine (Xray Chest 1 View- PORTABLE-Routine .) (01.04.23 @ 12:21) >  IMPRESSION:  Unchanged bilateral lower lung patchy opacities.    < end of copied text >  < from: CT Abdomen and Pelvis No Cont (01.03.23 @ 20:14) >  IMPRESSION:  Diffuse bilobar hepatic metastases.    Focal thickening at the GE junction which may be related to reported   esophageal mass versus underdistention.    Periportal lymphadenopathy.    Persistent bilateral renal nephrograms, suggestive of renal dysfunction.    Partially visualized bilateral lung groundglass opacities and small   bilateral pleural effusions are redemonstrated    < end of copied text >    Consultant(s) Notes Reviewed:      Care Discussed with Consultants/Other Providers:

## 2023-01-04 NOTE — PROGRESS NOTE ADULT - ASSESSMENT
78 y/o M PMH HTN, HLD, psoriasis on prednisone, . Recent admission for pneumonia and dced two weeks ago also found to possible esophageal cancer (mass) with liver mets s/p IR biopsy 12/2 presenting with SOB and now  with LIVIER and pulmonary emboli and + DVT     Oliguric LIVIER likely pre renal in light of low BP;  Bladder scan was not very impressive   Gap acidosis due to LIVIER   Volume status- not overtly volume up   Esophageal cancer (mass) with liver mets s/p IR biopsy 12/22  BP soft but better on the steroids   Hyponatremia       RECOMMEND     Start hydrocortisone 25mg IVP tid x 5 doses total  and  PO prednisone to resume in am (?adrenal insufficiency)  dc Midodrine in am   Off the lasix   Cont the IVF for now.  EF is normal.  Order cxr due to rhonchi   Continue to bladder scan   Dose meds for  Cr CL 15-20 ml/min     DW PCP     Sayed [facility]   [facility]   [phone]    78 y/o M PMH HTN, HLD, psoriasis on prednisone, . Recent admission for pneumonia and dced two weeks ago also found to possible esophageal cancer (mass) with liver mets s/p IR biopsy 12/2 presenting with SOB and now  with LIVIER and pulmonary emboli and + DVT     Oliguric LIVIER likely pre renal in light of low BP;  Bladder scan was not very impressive   Gap acidosis due to LIVIER   Volume status- not overtly volume up   Esophageal cancer (mass) with liver mets s/p IR biopsy 12/22  BP soft but better on the steroids   Hyponatremia       RECOMMEND     Start hydrocortisone 25mg IVP tid x 5 doses total  and  PO prednisone to resume in am (?adrenal insufficiency)  dc Midodrine in am   Off the lasix   Cont the IVF for now.  EF is normal.  Order cxr due to rhonchi   Continue to bladder scan   Dose meds for  Cr CL 15-20 ml/min     DW PCP     Sayed [facility]   [facility]   [phone]    78 y/o M PMH HTN, HLD, psoriasis on prednisone, . Recent admission for pneumonia and dced two weeks ago also found to possible esophageal cancer (mass) with liver mets s/p IR biopsy 12/2 presenting with SOB and now  with LIVIER and pulmonary emboli and + DVT     Oliguric LIVIER likely pre renal in light of low BP;  Bladder scan was not very impressive   Gap acidosis due to LIVIER   Volume status- not overtly volume up   Esophageal cancer (mass) with liver mets s/p IR biopsy 12/22  BP soft but better on the steroids   Hyponatremia       RECOMMEND     Start hydrocortisone 25mg IVP tid x 5 doses total  and  PO prednisone to resume in am (?adrenal insufficiency)  dc Midodrine in am   Off the lasix   Cont the IVF for now.  EF is normal.  Order cxr due to rhonchi   Continue to bladder scan   Dose meds for  Cr CL 15-20 ml/min     DW PCP     Sayed [facility]   [facility]   [phone]

## 2023-01-04 NOTE — PROGRESS NOTE ADULT - SUBJECTIVE AND OBJECTIVE BOX
Vascular Cardiology Consult Note    DIRECT SERVICE NUMBER:  [phone]               Overnight Events: No acute events.     Review Of Systems: No chest pain, shortness of breath, or palpitations              Allergies  No Known Allergies      MEDICATIONS:  heparin   Injectable 7000 Unit(s) IV Push every 6 hours PRN  heparin   Injectable 3500 Unit(s) IV Push every 6 hours PRN  heparin  Infusion.  Unit(s)/Hr IV Continuous <Continuous>  midodrine 10 milliGRAM(s) Oral three times a day  piperacillin/tazobactam IVPB.. 3.375 Gram(s) IV Intermittent every 12 hours  acetaminophen     Tablet .. 650 milliGRAM(s) Oral every 6 hours PRN  ondansetron Injectable 4 milliGRAM(s) IV Push four times a day  pantoprazole  Injectable 40 milliGRAM(s) IV Push two times a day  sucralfate suspension 1 Gram(s) Oral two times a day  hydrocortisone sodium succinate Injectable 25 milliGRAM(s) IV Push every 8 hours  simvastatin 10 milliGRAM(s) Oral at bedtime  chlorhexidine 2% Cloths 1 Application(s) Topical daily  influenza  Vaccine (HIGH DOSE) 0.7 milliLiter(s) IntraMuscular once  sodium bicarbonate  Infusion 0.043 mEq/kG/Hr IV Continuous <Continuous>  sodium chloride 0.45% 1000 milliLiter(s) IV Continuous <Continuous>      PAST MEDICAL & SURGICAL HISTORY:  Psoriasis  Esophageal mass  Benign essential HTN      SOCIAL HISTORY:  unchanged      PHYSICAL EXAM:  T(C): 36.3 (01-04-23 @ 06:38), Max: 36.3 (01-03-23 @ 12:20)  HR: 72 (01-04-23 @ 06:38) (72 - 80)  BP: 94/57 (01-04-23 @ 06:38) (94/57 - 131/61)  RR: 18 (01-04-23 @ 06:38) (18 - 18)  SpO2: 90% (01-04-23 @ 06:38) (90% - 95%)  Wt(kg): --  I&O's Summary      Physical Exam:  Appearance: No acute distress; well appearing  Eyes: EOMI, pink conjunctiva  HEENT: Normal oral mucosa  Cardiovascular: RRR, S1, S2, no murmurs, rubs, or gallops; no JVD  Respiratory: Clear to auscultation bilaterally  Gastrointestinal: soft, non-tender, non-distended with normal bowel sounds  Musculoskeletal: No clubbing; no joint deformity   Ext: LLE 2+ edema, RLE 1+ edema   Neurologic: Non-focal  Psychiatry: AAOx3, mood & affect appropriate  Skin: No rashes, ecchymoses, or cyanosis    LABS:	 	  CBC Full  -  ( 04 Jan 2023 07:10 )  WBC Count : 22.73 K/uL  Hemoglobin : 13.5 g/dL  Hematocrit : 41.9 %  Platelet Count - Automated : 98 K/uL  Mean Cell Volume : 83.0 fl  Mean Cell Hemoglobin : 26.7 pg  Mean Cell Hemoglobin Concentration : 32.2 gm/dL  Auto Neutrophil # : x  Auto Lymphocyte # : x  Auto Monocyte # : x  Auto Eosinophil # : x  Auto Basophil # : x  Auto Neutrophil % : x  Auto Lymphocyte % : x  Auto Monocyte % : x  Auto Eosinophil % : x  Auto Basophil % : x    01-03    133<L>  |  90<L>  |  134<H>  ----------------------------<  80  4.8   |  20<L>  |  6.02<H>  01-02    134<L>  |  93<L>  |  129<H>  ----------------------------<  86  4.8   |  19<L>  |  5.27<H>    Ca    6.9<L>      03 Jan 2023 15:19  Ca    7.3<L>      02 Jan 2023 11:50      Echo:  TTE 12/31/22:  Conclusions:  1. Normal left ventricular internal dimensions and wall  thicknesses. Normal left ventricular systolic function. No  segmental wall motion abnormalities. Endocardial  visualization enhanced with intravenous injection of  Ultrasonic Enhancing Agent (Definity). LVEF calculated  using biplane Stroud's method is 70%. Unable to determine  diastolic function due to insufficient data.  2. Normal right atrium. The right ventricle is not well  visualized; grossly normal right ventricular systolic  function.  3. Normal tricuspid valve. Minimal tricuspid  regurgitation.Estimated pulmonary artery systolic pressure  equals 49  mm Hg, assuming right atrial pressure equals 8  mm Hg, consistent with mild pulmonary pressures.  4. No pericardial effusion seen.  *** No previous Echo exam.  There is no evidence of RV strain.      CT A/P:  IMPRESSION:  Diffuse bilobar hepatic metastases.    Focal thickening at the GE junction which may be related to reported   esophageal mass versus underdistention.    Periportal lymphadenopathy.    Persistent bilateral renal nephrograms, suggestive of renal dysfunction.    Partially visualized bilateral lung groundglass opacities and small   bilateral pleural effusions are redemonstrated.     Vascular Cardiology Consult Note    DIRECT SERVICE NUMBER:  [phone]               Overnight Events: No acute events.     Review Of Systems: No chest pain, shortness of breath, or palpitations              Allergies  No Known Allergies      MEDICATIONS:  heparin   Injectable 7000 Unit(s) IV Push every 6 hours PRN  heparin   Injectable 3500 Unit(s) IV Push every 6 hours PRN  heparin  Infusion.  Unit(s)/Hr IV Continuous <Continuous>  midodrine 10 milliGRAM(s) Oral three times a day  piperacillin/tazobactam IVPB.. 3.375 Gram(s) IV Intermittent every 12 hours  acetaminophen     Tablet .. 650 milliGRAM(s) Oral every 6 hours PRN  ondansetron Injectable 4 milliGRAM(s) IV Push four times a day  pantoprazole  Injectable 40 milliGRAM(s) IV Push two times a day  sucralfate suspension 1 Gram(s) Oral two times a day  hydrocortisone sodium succinate Injectable 25 milliGRAM(s) IV Push every 8 hours  simvastatin 10 milliGRAM(s) Oral at bedtime  chlorhexidine 2% Cloths 1 Application(s) Topical daily  influenza  Vaccine (HIGH DOSE) 0.7 milliLiter(s) IntraMuscular once  sodium bicarbonate  Infusion 0.043 mEq/kG/Hr IV Continuous <Continuous>  sodium chloride 0.45% 1000 milliLiter(s) IV Continuous <Continuous>      PAST MEDICAL & SURGICAL HISTORY:  Psoriasis  Esophageal mass  Benign essential HTN      SOCIAL HISTORY:  unchanged      PHYSICAL EXAM:  T(C): 36.3 (01-04-23 @ 06:38), Max: 36.3 (01-03-23 @ 12:20)  HR: 72 (01-04-23 @ 06:38) (72 - 80)  BP: 94/57 (01-04-23 @ 06:38) (94/57 - 131/61)  RR: 18 (01-04-23 @ 06:38) (18 - 18)  SpO2: 90% (01-04-23 @ 06:38) (90% - 95%)  Wt(kg): --  I&O's Summary      Physical Exam:  Appearance: No acute distress; well appearing  Eyes: EOMI, pink conjunctiva  HEENT: Normal oral mucosa  Cardiovascular: RRR, S1, S2, no murmurs, rubs, or gallops; no JVD  Respiratory: Clear to auscultation bilaterally  Gastrointestinal: soft, non-tender, non-distended with normal bowel sounds  Musculoskeletal: No clubbing; no joint deformity   Ext: LLE 2+ edema, RLE 1+ edema   Neurologic: Non-focal  Psychiatry: AAOx3, mood & affect appropriate  Skin: No rashes, ecchymoses, or cyanosis    LABS:	 	  CBC Full  -  ( 04 Jan 2023 07:10 )  WBC Count : 22.73 K/uL  Hemoglobin : 13.5 g/dL  Hematocrit : 41.9 %  Platelet Count - Automated : 98 K/uL  Mean Cell Volume : 83.0 fl  Mean Cell Hemoglobin : 26.7 pg  Mean Cell Hemoglobin Concentration : 32.2 gm/dL  Auto Neutrophil # : x  Auto Lymphocyte # : x  Auto Monocyte # : x  Auto Eosinophil # : x  Auto Basophil # : x  Auto Neutrophil % : x  Auto Lymphocyte % : x  Auto Monocyte % : x  Auto Eosinophil % : x  Auto Basophil % : x    01-03    133<L>  |  90<L>  |  134<H>  ----------------------------<  80  4.8   |  20<L>  |  6.02<H>  01-02    134<L>  |  93<L>  |  129<H>  ----------------------------<  86  4.8   |  19<L>  |  5.27<H>    Ca    6.9<L>      03 Jan 2023 15:19  Ca    7.3<L>      02 Jan 2023 11:50      Echo:  TTE 12/31/22:  Conclusions:  1. Normal left ventricular internal dimensions and wall  thicknesses. Normal left ventricular systolic function. No  segmental wall motion abnormalities. Endocardial  visualization enhanced with intravenous injection of  Ultrasonic Enhancing Agent (Definity). LVEF calculated  using biplane Stroud's method is 70%. Unable to determine  diastolic function due to insufficient data.  2. Normal right atrium. The right ventricle is not well  visualized; grossly normal right ventricular systolic  function.  3. Normal tricuspid valve. Minimal tricuspid  regurgitation.Estimated pulmonary artery systolic pressure  equals 49  mm Hg, assuming right atrial pressure equals 8  mm Hg, consistent with mild pulmonary pressures.  4. No pericardial effusion seen.  *** No previous Echo exam.  There is no evidence of RV strain.      CT A/P:  IMPRESSION:  Diffuse bilobar hepatic metastases.    Focal thickening at the GE junction which may be related to reported   esophageal mass versus underdistention.    Periportal lymphadenopathy.    Persistent bilateral renal nephrograms, suggestive of renal dysfunction.    Partially visualized bilateral lung groundglass opacities and small   bilateral pleural effusions are redemonstrated.     Vascular Cardiology Consult Note    DIRECT SERVICE NUMBER:  [phone]               Overnight Events: No acute events.     Review Of Systems: No chest pain, shortness of breath, or palpitations              Allergies  No Known Allergies      MEDICATIONS:  heparin   Injectable 7000 Unit(s) IV Push every 6 hours PRN  heparin   Injectable 3500 Unit(s) IV Push every 6 hours PRN  heparin  Infusion.  Unit(s)/Hr IV Continuous <Continuous>  midodrine 10 milliGRAM(s) Oral three times a day  piperacillin/tazobactam IVPB.. 3.375 Gram(s) IV Intermittent every 12 hours  acetaminophen     Tablet .. 650 milliGRAM(s) Oral every 6 hours PRN  ondansetron Injectable 4 milliGRAM(s) IV Push four times a day  pantoprazole  Injectable 40 milliGRAM(s) IV Push two times a day  sucralfate suspension 1 Gram(s) Oral two times a day  hydrocortisone sodium succinate Injectable 25 milliGRAM(s) IV Push every 8 hours  simvastatin 10 milliGRAM(s) Oral at bedtime  chlorhexidine 2% Cloths 1 Application(s) Topical daily  influenza  Vaccine (HIGH DOSE) 0.7 milliLiter(s) IntraMuscular once  sodium bicarbonate  Infusion 0.043 mEq/kG/Hr IV Continuous <Continuous>  sodium chloride 0.45% 1000 milliLiter(s) IV Continuous <Continuous>      PAST MEDICAL & SURGICAL HISTORY:  Psoriasis  Esophageal mass  Benign essential HTN      SOCIAL HISTORY:  unchanged      PHYSICAL EXAM:  T(C): 36.3 (01-04-23 @ 06:38), Max: 36.3 (01-03-23 @ 12:20)  HR: 72 (01-04-23 @ 06:38) (72 - 80)  BP: 94/57 (01-04-23 @ 06:38) (94/57 - 131/61)  RR: 18 (01-04-23 @ 06:38) (18 - 18)  SpO2: 90% (01-04-23 @ 06:38) (90% - 95%)  Wt(kg): --  I&O's Summary      Physical Exam:  Appearance: No acute distress; well appearing  Eyes: EOMI, pink conjunctiva  HEENT: Normal oral mucosa  Cardiovascular: RRR, S1, S2, no murmurs, rubs, or gallops; no JVD  Respiratory: Clear to auscultation bilaterally  Gastrointestinal: soft, non-tender, non-distended with normal bowel sounds  Musculoskeletal: No clubbing; no joint deformity   Ext: LLE 2+ edema, RLE 1+ edema   Neurologic: Non-focal  Psychiatry: AAOx3, mood & affect appropriate  Skin: No rashes, ecchymoses, or cyanosis    LABS:	 	  CBC Full  -  ( 04 Jan 2023 07:10 )  WBC Count : 22.73 K/uL  Hemoglobin : 13.5 g/dL  Hematocrit : 41.9 %  Platelet Count - Automated : 98 K/uL  Mean Cell Volume : 83.0 fl  Mean Cell Hemoglobin : 26.7 pg  Mean Cell Hemoglobin Concentration : 32.2 gm/dL  Auto Neutrophil # : x  Auto Lymphocyte # : x  Auto Monocyte # : x  Auto Eosinophil # : x  Auto Basophil # : x  Auto Neutrophil % : x  Auto Lymphocyte % : x  Auto Monocyte % : x  Auto Eosinophil % : x  Auto Basophil % : x    01-03    133<L>  |  90<L>  |  134<H>  ----------------------------<  80  4.8   |  20<L>  |  6.02<H>  01-02    134<L>  |  93<L>  |  129<H>  ----------------------------<  86  4.8   |  19<L>  |  5.27<H>    Ca    6.9<L>      03 Jan 2023 15:19  Ca    7.3<L>      02 Jan 2023 11:50      Echo:  TTE 12/31/22:  Conclusions:  1. Normal left ventricular internal dimensions and wall  thicknesses. Normal left ventricular systolic function. No  segmental wall motion abnormalities. Endocardial  visualization enhanced with intravenous injection of  Ultrasonic Enhancing Agent (Definity). LVEF calculated  using biplane Stroud's method is 70%. Unable to determine  diastolic function due to insufficient data.  2. Normal right atrium. The right ventricle is not well  visualized; grossly normal right ventricular systolic  function.  3. Normal tricuspid valve. Minimal tricuspid  regurgitation.Estimated pulmonary artery systolic pressure  equals 49  mm Hg, assuming right atrial pressure equals 8  mm Hg, consistent with mild pulmonary pressures.  4. No pericardial effusion seen.  *** No previous Echo exam.  There is no evidence of RV strain.      CT A/P:  IMPRESSION:  Diffuse bilobar hepatic metastases.    Focal thickening at the GE junction which may be related to reported   esophageal mass versus underdistention.    Periportal lymphadenopathy.    Persistent bilateral renal nephrograms, suggestive of renal dysfunction.    Partially visualized bilateral lung groundglass opacities and small   bilateral pleural effusions are redemonstrated.

## 2023-01-05 DIAGNOSIS — Z71.89 OTHER SPECIFIED COUNSELING: ICD-10-CM

## 2023-01-05 DIAGNOSIS — C15.9 MALIGNANT NEOPLASM OF ESOPHAGUS, UNSPECIFIED: ICD-10-CM

## 2023-01-05 DIAGNOSIS — R53.2 FUNCTIONAL QUADRIPLEGIA: ICD-10-CM

## 2023-01-05 DIAGNOSIS — Z51.5 ENCOUNTER FOR PALLIATIVE CARE: ICD-10-CM

## 2023-01-05 DIAGNOSIS — I46.9 CARDIAC ARREST, CAUSE UNSPECIFIED: ICD-10-CM

## 2023-01-05 LAB
ALBUMIN SERPL ELPH-MCNC: 0.9 G/DL — LOW (ref 3.3–5)
ALBUMIN SERPL ELPH-MCNC: 1 G/DL — LOW (ref 3.3–5)
ALBUMIN SERPL ELPH-MCNC: 1.6 G/DL — LOW (ref 3.3–5)
ALBUMIN SERPL ELPH-MCNC: 2.2 G/DL — LOW (ref 3.3–5)
ALBUMIN SERPL ELPH-MCNC: 2.5 G/DL — LOW (ref 3.3–5)
ALP SERPL-CCNC: 1224 U/L — HIGH (ref 40–120)
ALP SERPL-CCNC: 1294 U/L — HIGH (ref 40–120)
ALP SERPL-CCNC: 1326 U/L — HIGH (ref 40–120)
ALP SERPL-CCNC: 1341 U/L — HIGH (ref 40–120)
ALP SERPL-CCNC: 1441 U/L — HIGH (ref 40–120)
ALT FLD-CCNC: 1334 U/L — HIGH (ref 10–45)
ALT FLD-CCNC: 401 U/L — HIGH (ref 10–45)
ALT FLD-CCNC: 572 U/L — HIGH (ref 10–45)
ALT FLD-CCNC: 606 U/L — HIGH (ref 10–45)
ALT FLD-CCNC: 712 U/L — HIGH (ref 10–45)
ANION GAP SERPL CALC-SCNC: 33 MMOL/L — HIGH (ref 5–17)
ANION GAP SERPL CALC-SCNC: 36 MMOL/L — HIGH (ref 5–17)
ANION GAP SERPL CALC-SCNC: 38 MMOL/L — HIGH (ref 5–17)
ANION GAP SERPL CALC-SCNC: 39 MMOL/L — HIGH (ref 5–17)
ANION GAP SERPL CALC-SCNC: 41 MMOL/L — HIGH (ref 5–17)
ANISOCYTOSIS BLD QL: SLIGHT — SIGNIFICANT CHANGE UP
APTT BLD: 41.8 SEC — HIGH (ref 27.5–35.5)
APTT BLD: 69.5 SEC — HIGH (ref 27.5–35.5)
APTT BLD: >200 SEC — CRITICAL HIGH (ref 27.5–35.5)
AST SERPL-CCNC: 1156 U/L — HIGH (ref 10–40)
AST SERPL-CCNC: 394 U/L — HIGH (ref 10–40)
AST SERPL-CCNC: 4130 U/L — HIGH (ref 10–40)
AST SERPL-CCNC: 540 U/L — HIGH (ref 10–40)
AST SERPL-CCNC: 544 U/L — HIGH (ref 10–40)
BASE EXCESS BLDV CALC-SCNC: -17.6 MMOL/L — LOW (ref -2–3)
BASE EXCESS BLDV CALC-SCNC: -21.9 MMOL/L — LOW (ref -2–3)
BASOPHILS # BLD AUTO: 0 K/UL — SIGNIFICANT CHANGE UP (ref 0–0.2)
BASOPHILS # BLD AUTO: 0.11 K/UL — SIGNIFICANT CHANGE UP (ref 0–0.2)
BASOPHILS NFR BLD AUTO: 0 % — SIGNIFICANT CHANGE UP (ref 0–2)
BASOPHILS NFR BLD AUTO: 0.4 % — SIGNIFICANT CHANGE UP (ref 0–2)
BILIRUB SERPL-MCNC: 5.6 MG/DL — HIGH (ref 0.2–1.2)
BILIRUB SERPL-MCNC: 6.8 MG/DL — HIGH (ref 0.2–1.2)
BILIRUB SERPL-MCNC: 6.9 MG/DL — HIGH (ref 0.2–1.2)
BILIRUB SERPL-MCNC: 7 MG/DL — HIGH (ref 0.2–1.2)
BILIRUB SERPL-MCNC: 7.7 MG/DL — HIGH (ref 0.2–1.2)
BLD GP AB SCN SERPL QL: NEGATIVE — SIGNIFICANT CHANGE UP
BUN SERPL-MCNC: 144 MG/DL — HIGH (ref 7–23)
BUN SERPL-MCNC: 146 MG/DL — HIGH (ref 7–23)
BUN SERPL-MCNC: 148 MG/DL — HIGH (ref 7–23)
BUN SERPL-MCNC: 151 MG/DL — HIGH (ref 7–23)
BUN SERPL-MCNC: 155 MG/DL — HIGH (ref 7–23)
BURR CELLS BLD QL SMEAR: PRESENT — SIGNIFICANT CHANGE UP
CA-I BLD-SCNC: 0.77 MMOL/L — LOW (ref 1.15–1.33)
CALCIUM SERPL-MCNC: 6.2 MG/DL — CRITICAL LOW (ref 8.4–10.5)
CALCIUM SERPL-MCNC: 6.9 MG/DL — LOW (ref 8.4–10.5)
CALCIUM SERPL-MCNC: 7.1 MG/DL — LOW (ref 8.4–10.5)
CALCIUM SERPL-MCNC: 7.2 MG/DL — LOW (ref 8.4–10.5)
CALCIUM SERPL-MCNC: 7.3 MG/DL — LOW (ref 8.4–10.5)
CHLORIDE SERPL-SCNC: 84 MMOL/L — LOW (ref 96–108)
CHLORIDE SERPL-SCNC: 85 MMOL/L — LOW (ref 96–108)
CHLORIDE SERPL-SCNC: 87 MMOL/L — LOW (ref 96–108)
CHLORIDE SERPL-SCNC: 88 MMOL/L — LOW (ref 96–108)
CK MB BLD-MCNC: 10.6 % — HIGH (ref 0–3.5)
CK MB BLD-MCNC: 6.8 % — HIGH (ref 0–3.5)
CK MB BLD-MCNC: 9.9 % — HIGH (ref 0–3.5)
CK MB CFR SERPL CALC: 13.6 NG/ML — HIGH (ref 0–6.7)
CK MB CFR SERPL CALC: 13.8 NG/ML — HIGH (ref 0–6.7)
CK MB CFR SERPL CALC: 14.9 NG/ML — HIGH (ref 0–6.7)
CK MB CFR SERPL CALC: 8.5 NG/ML — HIGH (ref 0–6.7)
CK SERPL-CCNC: 125 U/L — SIGNIFICANT CHANGE UP (ref 30–200)
CK SERPL-CCNC: 139 U/L — SIGNIFICANT CHANGE UP (ref 30–200)
CK SERPL-CCNC: 140 U/L — SIGNIFICANT CHANGE UP (ref 30–200)
CK SERPL-CCNC: 201 U/L — HIGH (ref 30–200)
CO2 BLDV-SCNC: 10 MMOL/L — LOW (ref 22–26)
CO2 BLDV-SCNC: 16 MMOL/L — LOW (ref 22–26)
CO2 SERPL-SCNC: 10 MMOL/L — CRITICAL LOW (ref 22–31)
CO2 SERPL-SCNC: 14 MMOL/L — LOW (ref 22–31)
CO2 SERPL-SCNC: <10 MMOL/L — CRITICAL LOW (ref 22–31)
CREAT SERPL-MCNC: 7.38 MG/DL — HIGH (ref 0.5–1.3)
CREAT SERPL-MCNC: 7.47 MG/DL — HIGH (ref 0.5–1.3)
CREAT SERPL-MCNC: 7.48 MG/DL — HIGH (ref 0.5–1.3)
CREAT SERPL-MCNC: 7.53 MG/DL — HIGH (ref 0.5–1.3)
CREAT SERPL-MCNC: 7.56 MG/DL — HIGH (ref 0.5–1.3)
CREAT SERPL-MCNC: 7.61 MG/DL — HIGH (ref 0.5–1.3)
CULTURE RESULTS: SIGNIFICANT CHANGE UP
DACRYOCYTES BLD QL SMEAR: SLIGHT — SIGNIFICANT CHANGE UP
EGFR: 7 ML/MIN/1.73M2 — LOW
ELLIPTOCYTES BLD QL SMEAR: SLIGHT — SIGNIFICANT CHANGE UP
EOSINOPHIL # BLD AUTO: 0 K/UL — SIGNIFICANT CHANGE UP (ref 0–0.5)
EOSINOPHIL # BLD AUTO: 0.01 K/UL — SIGNIFICANT CHANGE UP (ref 0–0.5)
EOSINOPHIL NFR BLD AUTO: 0 % — SIGNIFICANT CHANGE UP (ref 0–6)
GAS PNL BLDA: SIGNIFICANT CHANGE UP
GLUCOSE SERPL-MCNC: 113 MG/DL — HIGH (ref 70–99)
GLUCOSE SERPL-MCNC: 145 MG/DL — HIGH (ref 70–99)
GLUCOSE SERPL-MCNC: 172 MG/DL — HIGH (ref 70–99)
GLUCOSE SERPL-MCNC: 236 MG/DL — HIGH (ref 70–99)
GLUCOSE SERPL-MCNC: 326 MG/DL — HIGH (ref 70–99)
HCO3 BLDV-SCNC: 14 MMOL/L — LOW (ref 22–29)
HCO3 BLDV-SCNC: 9 MMOL/L — CRITICAL LOW (ref 22–29)
HCT VFR BLD CALC: 34.4 % — LOW (ref 39–50)
HCT VFR BLD CALC: 38.9 % — LOW (ref 39–50)
HCT VFR BLD CALC: 41.4 % — SIGNIFICANT CHANGE UP (ref 39–50)
HCT VFR BLD CALC: 42.6 % — SIGNIFICANT CHANGE UP (ref 39–50)
HCT VFR BLD CALC: 43.7 % — SIGNIFICANT CHANGE UP (ref 39–50)
HCT VFR BLD CALC: 44.7 % — SIGNIFICANT CHANGE UP (ref 39–50)
HGB BLD-MCNC: 10.9 G/DL — LOW (ref 13–17)
HGB BLD-MCNC: 12.6 G/DL — LOW (ref 13–17)
HGB BLD-MCNC: 13 G/DL — SIGNIFICANT CHANGE UP (ref 13–17)
HGB BLD-MCNC: 13.5 G/DL — SIGNIFICANT CHANGE UP (ref 13–17)
HGB BLD-MCNC: 13.7 G/DL — SIGNIFICANT CHANGE UP (ref 13–17)
HGB BLD-MCNC: 14.4 G/DL — SIGNIFICANT CHANGE UP (ref 13–17)
HOROWITZ INDEX BLDV+IHG-RTO: 60 — SIGNIFICANT CHANGE UP
IMM GRANULOCYTES NFR BLD AUTO: 3.7 % — HIGH (ref 0–0.9)
INR BLD: 2.07 RATIO — HIGH (ref 0.88–1.16)
INR BLD: 2.99 RATIO — HIGH (ref 0.88–1.16)
LACTATE BLDV-MCNC: 12 MMOL/L — CRITICAL HIGH (ref 0.5–2)
LYMPHOCYTES # BLD AUTO: 0.92 K/UL — LOW (ref 1–3.3)
LYMPHOCYTES # BLD AUTO: 1.15 K/UL — SIGNIFICANT CHANGE UP (ref 1–3.3)
LYMPHOCYTES # BLD AUTO: 3.5 % — LOW (ref 13–44)
LYMPHOCYTES # BLD AUTO: 3.7 % — LOW (ref 13–44)
MACROCYTES BLD QL: SLIGHT — SIGNIFICANT CHANGE UP
MAGNESIUM SERPL-MCNC: 2.8 MG/DL — HIGH (ref 1.6–2.6)
MAGNESIUM SERPL-MCNC: 3 MG/DL — HIGH (ref 1.6–2.6)
MAGNESIUM SERPL-MCNC: 3.1 MG/DL — HIGH (ref 1.6–2.6)
MANUAL SMEAR VERIFICATION: SIGNIFICANT CHANGE UP
MCHC RBC-ENTMCNC: 26.9 PG — LOW (ref 27–34)
MCHC RBC-ENTMCNC: 27 PG — SIGNIFICANT CHANGE UP (ref 27–34)
MCHC RBC-ENTMCNC: 27.3 PG — SIGNIFICANT CHANGE UP (ref 27–34)
MCHC RBC-ENTMCNC: 28.7 PG — SIGNIFICANT CHANGE UP (ref 27–34)
MCHC RBC-ENTMCNC: 28.8 PG — SIGNIFICANT CHANGE UP (ref 27–34)
MCHC RBC-ENTMCNC: 30.9 GM/DL — LOW (ref 32–36)
MCHC RBC-ENTMCNC: 31.4 GM/DL — LOW (ref 32–36)
MCHC RBC-ENTMCNC: 31.7 GM/DL — LOW (ref 32–36)
MCHC RBC-ENTMCNC: 32.2 GM/DL — SIGNIFICANT CHANGE UP (ref 32–36)
MCHC RBC-ENTMCNC: 32.4 GM/DL — SIGNIFICANT CHANGE UP (ref 32–36)
MCV RBC AUTO: 83.9 FL — SIGNIFICANT CHANGE UP (ref 80–100)
MCV RBC AUTO: 84.9 FL — SIGNIFICANT CHANGE UP (ref 80–100)
MCV RBC AUTO: 87 FL — SIGNIFICANT CHANGE UP (ref 80–100)
MCV RBC AUTO: 87.2 FL — SIGNIFICANT CHANGE UP (ref 80–100)
MCV RBC AUTO: 89 FL — SIGNIFICANT CHANGE UP (ref 80–100)
MCV RBC AUTO: 90.5 FL — SIGNIFICANT CHANGE UP (ref 80–100)
MONOCYTES # BLD AUTO: 1.16 K/UL — HIGH (ref 0–0.9)
MONOCYTES # BLD AUTO: 1.28 K/UL — HIGH (ref 0–0.9)
MONOCYTES NFR BLD AUTO: 4.1 % — SIGNIFICANT CHANGE UP (ref 2–14)
MONOCYTES NFR BLD AUTO: 4.4 % — SIGNIFICANT CHANGE UP (ref 2–14)
NEUTROPHILS # BLD AUTO: 24.31 K/UL — HIGH (ref 1.8–7.4)
NEUTROPHILS # BLD AUTO: 27.68 K/UL — HIGH (ref 1.8–7.4)
NEUTROPHILS NFR BLD AUTO: 88.1 % — HIGH (ref 43–77)
NEUTROPHILS NFR BLD AUTO: 90.4 % — HIGH (ref 43–77)
NEUTS BAND # BLD: 1.7 % — SIGNIFICANT CHANGE UP (ref 0–8)
NRBC # BLD: 0 /100 WBCS — SIGNIFICANT CHANGE UP (ref 0–0)
NRBC # BLD: 1 /100 WBCS — HIGH (ref 0–0)
NRBC # BLD: 2 /100 WBCS — HIGH (ref 0–0)
NT-PROBNP SERPL-SCNC: HIGH PG/ML (ref 0–300)
OVALOCYTES BLD QL SMEAR: SLIGHT — SIGNIFICANT CHANGE UP
PCO2 BLDV: 35 MMHG — LOW (ref 42–55)
PCO2 BLDV: 59 MMHG — HIGH (ref 42–55)
PH BLDV: 6.99 — CRITICAL LOW (ref 7.32–7.43)
PH BLDV: 7 — CRITICAL LOW (ref 7.32–7.43)
PHOSPHATE SERPL-MCNC: 10.4 MG/DL — HIGH (ref 2.5–4.5)
PHOSPHATE SERPL-MCNC: 11.5 MG/DL — HIGH (ref 2.5–4.5)
PHOSPHATE SERPL-MCNC: 12.7 MG/DL — HIGH (ref 2.5–4.5)
PHOSPHATE SERPL-MCNC: 12.9 MG/DL — HIGH (ref 2.5–4.5)
PHOSPHATE SERPL-MCNC: 13.2 MG/DL — HIGH (ref 2.5–4.5)
PLAT MORPH BLD: NORMAL — SIGNIFICANT CHANGE UP
PLATELET # BLD AUTO: 116 K/UL — LOW (ref 150–400)
PLATELET # BLD AUTO: 119 K/UL — LOW (ref 150–400)
PLATELET # BLD AUTO: 126 K/UL — LOW (ref 150–400)
PLATELET # BLD AUTO: 131 K/UL — LOW (ref 150–400)
PLATELET # BLD AUTO: 140 K/UL — LOW (ref 150–400)
PLATELET # BLD AUTO: 149 K/UL — LOW (ref 150–400)
PO2 BLDV: 33 MMHG — SIGNIFICANT CHANGE UP (ref 25–45)
PO2 BLDV: 72 MMHG — HIGH (ref 25–45)
POIKILOCYTOSIS BLD QL AUTO: SLIGHT — SIGNIFICANT CHANGE UP
POLYCHROMASIA BLD QL SMEAR: SLIGHT — SIGNIFICANT CHANGE UP
POTASSIUM SERPL-MCNC: 5.5 MMOL/L — HIGH (ref 3.5–5.3)
POTASSIUM SERPL-MCNC: 5.8 MMOL/L — HIGH (ref 3.5–5.3)
POTASSIUM SERPL-MCNC: 6.6 MMOL/L — CRITICAL HIGH (ref 3.5–5.3)
POTASSIUM SERPL-MCNC: 7.8 MMOL/L — CRITICAL HIGH (ref 3.5–5.3)
POTASSIUM SERPL-MCNC: 8 MMOL/L — CRITICAL HIGH (ref 3.5–5.3)
POTASSIUM SERPL-SCNC: 5.5 MMOL/L — HIGH (ref 3.5–5.3)
POTASSIUM SERPL-SCNC: 5.8 MMOL/L — HIGH (ref 3.5–5.3)
POTASSIUM SERPL-SCNC: 6.6 MMOL/L — CRITICAL HIGH (ref 3.5–5.3)
POTASSIUM SERPL-SCNC: 7.8 MMOL/L — CRITICAL HIGH (ref 3.5–5.3)
POTASSIUM SERPL-SCNC: 8 MMOL/L — CRITICAL HIGH (ref 3.5–5.3)
PROT SERPL-MCNC: 3.5 G/DL — LOW (ref 6–8.3)
PROT SERPL-MCNC: 4.2 G/DL — LOW (ref 6–8.3)
PROT SERPL-MCNC: 4.6 G/DL — LOW (ref 6–8.3)
PROT SERPL-MCNC: 5 G/DL — LOW (ref 6–8.3)
PROT SERPL-MCNC: 5.1 G/DL — LOW (ref 6–8.3)
PROTHROM AB SERPL-ACNC: 24.2 SEC — HIGH (ref 10.5–13.4)
PROTHROM AB SERPL-ACNC: 35.1 SEC — HIGH (ref 10.5–13.4)
RBC # BLD: 3.8 M/UL — LOW (ref 4.2–5.8)
RBC # BLD: 4.37 M/UL — SIGNIFICANT CHANGE UP (ref 4.2–5.8)
RBC # BLD: 4.76 M/UL — SIGNIFICANT CHANGE UP (ref 4.2–5.8)
RBC # BLD: 5.01 M/UL — SIGNIFICANT CHANGE UP (ref 4.2–5.8)
RBC # BLD: 5.02 M/UL — SIGNIFICANT CHANGE UP (ref 4.2–5.8)
RBC # BLD: 5.33 M/UL — SIGNIFICANT CHANGE UP (ref 4.2–5.8)
RBC # FLD: 21.1 % — HIGH (ref 10.3–14.5)
RBC # FLD: 21.4 % — HIGH (ref 10.3–14.5)
RBC # FLD: 21.5 % — HIGH (ref 10.3–14.5)
RBC # FLD: 21.6 % — HIGH (ref 10.3–14.5)
RBC BLD AUTO: ABNORMAL
RH IG SCN BLD-IMP: POSITIVE — SIGNIFICANT CHANGE UP
SAO2 % BLDV: 27.1 % — LOW (ref 67–88)
SAO2 % BLDV: 89.3 % — HIGH (ref 67–88)
SODIUM SERPL-SCNC: 130 MMOL/L — LOW (ref 135–145)
SODIUM SERPL-SCNC: 132 MMOL/L — LOW (ref 135–145)
SODIUM SERPL-SCNC: 134 MMOL/L — LOW (ref 135–145)
SPECIMEN SOURCE: SIGNIFICANT CHANGE UP
SRA INTERP SER-IMP: SIGNIFICANT CHANGE UP
TROPONIN T, HIGH SENSITIVITY RESULT: 222 NG/L — HIGH (ref 0–51)
TROPONIN T, HIGH SENSITIVITY RESULT: 269 NG/L — HIGH (ref 0–51)
TROPONIN T, HIGH SENSITIVITY RESULT: 305 NG/L — HIGH (ref 0–51)
TROPONIN T, HIGH SENSITIVITY RESULT: 324 NG/L — HIGH (ref 0–51)
TROPONIN T, HIGH SENSITIVITY RESULT: 344 NG/L — HIGH (ref 0–51)
WBC # BLD: 26.18 K/UL — HIGH (ref 3.8–10.5)
WBC # BLD: 26.4 K/UL — HIGH (ref 3.8–10.5)
WBC # BLD: 27.57 K/UL — HIGH (ref 3.8–10.5)
WBC # BLD: 29.63 K/UL — HIGH (ref 3.8–10.5)
WBC # BLD: 31.39 K/UL — HIGH (ref 3.8–10.5)
WBC # BLD: 33.41 K/UL — HIGH (ref 3.8–10.5)
WBC # FLD AUTO: 26.18 K/UL — HIGH (ref 3.8–10.5)
WBC # FLD AUTO: 26.4 K/UL — HIGH (ref 3.8–10.5)
WBC # FLD AUTO: 27.57 K/UL — HIGH (ref 3.8–10.5)
WBC # FLD AUTO: 29.63 K/UL — HIGH (ref 3.8–10.5)
WBC # FLD AUTO: 31.39 K/UL — HIGH (ref 3.8–10.5)
WBC # FLD AUTO: 33.41 K/UL — HIGH (ref 3.8–10.5)

## 2023-01-05 PROCEDURE — 36620 INSERTION CATHETER ARTERY: CPT

## 2023-01-05 PROCEDURE — 99291 CRITICAL CARE FIRST HOUR: CPT | Mod: 25

## 2023-01-05 PROCEDURE — 76604 US EXAM CHEST: CPT | Mod: 26,GC

## 2023-01-05 PROCEDURE — 99223 1ST HOSP IP/OBS HIGH 75: CPT

## 2023-01-05 PROCEDURE — 93308 TTE F-UP OR LMTD: CPT | Mod: 26,GC

## 2023-01-05 PROCEDURE — 99292 CRITICAL CARE ADDL 30 MIN: CPT | Mod: 25

## 2023-01-05 PROCEDURE — 36556 INSERT NON-TUNNEL CV CATH: CPT

## 2023-01-05 PROCEDURE — 99233 SBSQ HOSP IP/OBS HIGH 50: CPT | Mod: GC

## 2023-01-05 PROCEDURE — 99233 SBSQ HOSP IP/OBS HIGH 50: CPT

## 2023-01-05 PROCEDURE — 76705 ECHO EXAM OF ABDOMEN: CPT | Mod: 26,GC

## 2023-01-05 PROCEDURE — 99497 ADVNCD CARE PLAN 30 MIN: CPT | Mod: 25

## 2023-01-05 PROCEDURE — 71045 X-RAY EXAM CHEST 1 VIEW: CPT | Mod: 26

## 2023-01-05 RX ORDER — FENTANYL CITRATE 50 UG/ML
0.5 INJECTION INTRAVENOUS
Qty: 2500 | Refills: 0 | Status: DISCONTINUED | OUTPATIENT
Start: 2023-01-05 | End: 2023-01-05

## 2023-01-05 RX ORDER — CALCIUM GLUCONATE 100 MG/ML
1 VIAL (ML) INTRAVENOUS ONCE
Refills: 0 | Status: COMPLETED | OUTPATIENT
Start: 2023-01-05 | End: 2023-01-05

## 2023-01-05 RX ORDER — ACETAMINOPHEN 500 MG
1000 TABLET ORAL ONCE
Refills: 0 | Status: DISCONTINUED | OUTPATIENT
Start: 2023-01-05 | End: 2023-01-06

## 2023-01-05 RX ORDER — NOREPINEPHRINE BITARTRATE/D5W 8 MG/250ML
0.05 PLASTIC BAG, INJECTION (ML) INTRAVENOUS
Qty: 8 | Refills: 0 | Status: DISCONTINUED | OUTPATIENT
Start: 2023-01-05 | End: 2023-01-05

## 2023-01-05 RX ORDER — VASOPRESSIN 20 [USP'U]/ML
0.04 INJECTION INTRAVENOUS
Qty: 40 | Refills: 0 | Status: DISCONTINUED | OUTPATIENT
Start: 2023-01-05 | End: 2023-01-06

## 2023-01-05 RX ORDER — INSULIN HUMAN 100 [IU]/ML
5 INJECTION, SOLUTION SUBCUTANEOUS ONCE
Refills: 0 | Status: COMPLETED | OUTPATIENT
Start: 2023-01-05 | End: 2023-01-05

## 2023-01-05 RX ORDER — CALCIUM GLUCONATE 100 MG/ML
2 VIAL (ML) INTRAVENOUS ONCE
Refills: 0 | Status: COMPLETED | OUTPATIENT
Start: 2023-01-05 | End: 2023-01-05

## 2023-01-05 RX ORDER — FENTANYL CITRATE 50 UG/ML
50 INJECTION INTRAVENOUS ONCE
Refills: 0 | Status: DISCONTINUED | OUTPATIENT
Start: 2023-01-05 | End: 2023-01-05

## 2023-01-05 RX ORDER — SODIUM BICARBONATE 1 MEQ/ML
50 SYRINGE (ML) INTRAVENOUS ONCE
Refills: 0 | Status: COMPLETED | OUTPATIENT
Start: 2023-01-05 | End: 2023-01-05

## 2023-01-05 RX ORDER — PHENYLEPHRINE HYDROCHLORIDE 10 MG/ML
4 INJECTION INTRAVENOUS
Qty: 160 | Refills: 0 | Status: DISCONTINUED | OUTPATIENT
Start: 2023-01-05 | End: 2023-01-06

## 2023-01-05 RX ORDER — SIMVASTATIN 20 MG/1
10 TABLET, FILM COATED ORAL AT BEDTIME
Refills: 0 | Status: DISCONTINUED | OUTPATIENT
Start: 2023-01-05 | End: 2023-01-05

## 2023-01-05 RX ORDER — CHLORHEXIDINE GLUCONATE 213 G/1000ML
15 SOLUTION TOPICAL EVERY 12 HOURS
Refills: 0 | Status: DISCONTINUED | OUTPATIENT
Start: 2023-01-05 | End: 2023-01-06

## 2023-01-05 RX ORDER — VANCOMYCIN HCL 1 G
1250 VIAL (EA) INTRAVENOUS ONCE
Refills: 0 | Status: COMPLETED | OUTPATIENT
Start: 2023-01-05 | End: 2023-01-06

## 2023-01-05 RX ORDER — SUCRALFATE 1 G
1 TABLET ORAL
Refills: 0 | Status: DISCONTINUED | OUTPATIENT
Start: 2023-01-05 | End: 2023-01-05

## 2023-01-05 RX ORDER — PANTOPRAZOLE SODIUM 20 MG/1
40 TABLET, DELAYED RELEASE ORAL
Refills: 0 | Status: DISCONTINUED | OUTPATIENT
Start: 2023-01-05 | End: 2023-01-06

## 2023-01-05 RX ORDER — DEXTROSE 50 % IN WATER 50 %
50 SYRINGE (ML) INTRAVENOUS ONCE
Refills: 0 | Status: COMPLETED | OUTPATIENT
Start: 2023-01-05 | End: 2023-01-05

## 2023-01-05 RX ORDER — CALCIUM CHLORIDE
1000 POWDER (GRAM) MISCELLANEOUS ONCE
Refills: 0 | Status: COMPLETED | OUTPATIENT
Start: 2023-01-05 | End: 2023-01-05

## 2023-01-05 RX ORDER — SODIUM BICARBONATE 1 MEQ/ML
0.43 SYRINGE (ML) INTRAVENOUS
Qty: 150 | Refills: 0 | Status: DISCONTINUED | OUTPATIENT
Start: 2023-01-05 | End: 2023-01-06

## 2023-01-05 RX ORDER — SODIUM BICARBONATE 1 MEQ/ML
0.07 SYRINGE (ML) INTRAVENOUS
Qty: 75 | Refills: 0 | Status: DISCONTINUED | OUTPATIENT
Start: 2023-01-05 | End: 2023-01-05

## 2023-01-05 RX ORDER — EPINEPHRINE 0.3 MG/.3ML
0.2 INJECTION INTRAMUSCULAR; SUBCUTANEOUS
Qty: 4 | Refills: 0 | Status: DISCONTINUED | OUTPATIENT
Start: 2023-01-05 | End: 2023-01-05

## 2023-01-05 RX ORDER — PROPOFOL 10 MG/ML
50 INJECTION, EMULSION INTRAVENOUS
Qty: 500 | Refills: 0 | Status: DISCONTINUED | OUTPATIENT
Start: 2023-01-05 | End: 2023-01-05

## 2023-01-05 RX ORDER — FENTANYL CITRATE 50 UG/ML
50 INJECTION INTRAVENOUS
Refills: 0 | Status: DISCONTINUED | OUTPATIENT
Start: 2023-01-05 | End: 2023-01-06

## 2023-01-05 RX ORDER — NOREPINEPHRINE BITARTRATE/D5W 8 MG/250ML
1 PLASTIC BAG, INJECTION (ML) INTRAVENOUS
Qty: 32 | Refills: 0 | Status: DISCONTINUED | OUTPATIENT
Start: 2023-01-05 | End: 2023-01-06

## 2023-01-05 RX ORDER — PHENYLEPHRINE HYDROCHLORIDE 10 MG/ML
4 INJECTION INTRAVENOUS
Qty: 40 | Refills: 0 | Status: DISCONTINUED | OUTPATIENT
Start: 2023-01-05 | End: 2023-01-05

## 2023-01-05 RX ORDER — CHLORHEXIDINE GLUCONATE 213 G/1000ML
1 SOLUTION TOPICAL
Refills: 0 | Status: DISCONTINUED | OUTPATIENT
Start: 2023-01-05 | End: 2023-01-06

## 2023-01-05 RX ORDER — FENTANYL CITRATE 50 UG/ML
0.5 INJECTION INTRAVENOUS
Qty: 5000 | Refills: 0 | Status: DISCONTINUED | OUTPATIENT
Start: 2023-01-05 | End: 2023-01-06

## 2023-01-05 RX ORDER — INSULIN HUMAN 100 [IU]/ML
10 INJECTION, SOLUTION SUBCUTANEOUS ONCE
Refills: 0 | Status: COMPLETED | OUTPATIENT
Start: 2023-01-05 | End: 2023-01-05

## 2023-01-05 RX ORDER — HYDROCORTISONE 20 MG
100 TABLET ORAL EVERY 8 HOURS
Refills: 0 | Status: DISCONTINUED | OUTPATIENT
Start: 2023-01-05 | End: 2023-01-06

## 2023-01-05 RX ADMIN — PIPERACILLIN AND TAZOBACTAM 25 GRAM(S): 4; .5 INJECTION, POWDER, LYOPHILIZED, FOR SOLUTION INTRAVENOUS at 04:03

## 2023-01-05 RX ADMIN — PIPERACILLIN AND TAZOBACTAM 25 GRAM(S): 4; .5 INJECTION, POWDER, LYOPHILIZED, FOR SOLUTION INTRAVENOUS at 23:33

## 2023-01-05 RX ADMIN — ONDANSETRON 4 MILLIGRAM(S): 8 TABLET, FILM COATED ORAL at 00:52

## 2023-01-05 RX ADMIN — PANTOPRAZOLE SODIUM 40 MILLIGRAM(S): 20 TABLET, DELAYED RELEASE ORAL at 18:23

## 2023-01-05 RX ADMIN — VASOPRESSIN 6 UNIT(S)/MIN: 20 INJECTION INTRAVENOUS at 09:32

## 2023-01-05 RX ADMIN — BUMETANIDE 2 MILLIGRAM(S): 0.25 INJECTION INTRAMUSCULAR; INTRAVENOUS at 04:00

## 2023-01-05 RX ADMIN — Medication 200 GRAM(S): at 19:41

## 2023-01-05 RX ADMIN — FENTANYL CITRATE 2.18 MICROGRAM(S)/KG/HR: 50 INJECTION INTRAVENOUS at 19:42

## 2023-01-05 RX ADMIN — INSULIN HUMAN 10 UNIT(S): 100 INJECTION, SOLUTION SUBCUTANEOUS at 23:28

## 2023-01-05 RX ADMIN — Medication 250 MEQ/KG/HR: at 19:42

## 2023-01-05 RX ADMIN — VASOPRESSIN 6 UNIT(S)/MIN: 20 INJECTION INTRAVENOUS at 19:43

## 2023-01-05 RX ADMIN — Medication 100 MILLIGRAM(S): at 23:13

## 2023-01-05 RX ADMIN — Medication 50 MILLILITER(S): at 23:27

## 2023-01-05 RX ADMIN — Medication 8.18 MICROGRAM(S)/KG/MIN: at 19:42

## 2023-01-05 RX ADMIN — Medication 50 MILLIEQUIVALENT(S): at 08:34

## 2023-01-05 RX ADMIN — CHLORHEXIDINE GLUCONATE 1 APPLICATION(S): 213 SOLUTION TOPICAL at 11:30

## 2023-01-05 RX ADMIN — Medication 50 MILLIEQUIVALENT(S): at 08:45

## 2023-01-05 RX ADMIN — Medication 200 GRAM(S): at 23:31

## 2023-01-05 RX ADMIN — HEPARIN SODIUM 3500 UNIT(S): 5000 INJECTION INTRAVENOUS; SUBCUTANEOUS at 05:22

## 2023-01-05 RX ADMIN — PIPERACILLIN AND TAZOBACTAM 25 GRAM(S): 4; .5 INJECTION, POWDER, LYOPHILIZED, FOR SOLUTION INTRAVENOUS at 11:30

## 2023-01-05 RX ADMIN — Medication 200 GRAM(S): at 17:15

## 2023-01-05 RX ADMIN — MIDODRINE HYDROCHLORIDE 10 MILLIGRAM(S): 2.5 TABLET ORAL at 04:03

## 2023-01-05 RX ADMIN — Medication 50 MILLIEQUIVALENT(S): at 23:33

## 2023-01-05 RX ADMIN — HEPARIN SODIUM 900 UNIT(S)/HR: 5000 INJECTION INTRAVENOUS; SUBCUTANEOUS at 05:03

## 2023-01-05 RX ADMIN — CHLORHEXIDINE GLUCONATE 1 APPLICATION(S): 213 SOLUTION TOPICAL at 09:39

## 2023-01-05 RX ADMIN — Medication 81.8 MICROGRAM(S)/KG/MIN: at 22:31

## 2023-01-05 RX ADMIN — Medication 1000 MILLIGRAM(S): at 23:00

## 2023-01-05 RX ADMIN — Medication 100 MEQ/KG/HR: at 09:32

## 2023-01-05 RX ADMIN — PHENYLEPHRINE HYDROCHLORIDE 65.4 MICROGRAM(S)/KG/MIN: 10 INJECTION INTRAVENOUS at 22:32

## 2023-01-05 RX ADMIN — PROPOFOL 26.2 MICROGRAM(S)/KG/MIN: 10 INJECTION, EMULSION INTRAVENOUS at 13:39

## 2023-01-05 RX ADMIN — Medication 50 MILLILITER(S): at 14:16

## 2023-01-05 RX ADMIN — Medication 50 MILLIEQUIVALENT(S): at 19:55

## 2023-01-05 RX ADMIN — Medication 50 MILLIEQUIVALENT(S): at 14:18

## 2023-01-05 RX ADMIN — Medication 100 GRAM(S): at 10:48

## 2023-01-05 RX ADMIN — Medication 100 GRAM(S): at 16:34

## 2023-01-05 RX ADMIN — Medication 50 MILLIEQUIVALENT(S): at 13:39

## 2023-01-05 RX ADMIN — Medication 200 MEQ/KG/HR: at 18:12

## 2023-01-05 RX ADMIN — CHLORHEXIDINE GLUCONATE 15 MILLILITER(S): 213 SOLUTION TOPICAL at 18:24

## 2023-01-05 RX ADMIN — Medication 100 MILLIGRAM(S): at 13:39

## 2023-01-05 RX ADMIN — Medication 100 GRAM(S): at 14:18

## 2023-01-05 RX ADMIN — INSULIN HUMAN 5 UNIT(S): 100 INJECTION, SOLUTION SUBCUTANEOUS at 14:16

## 2023-01-05 NOTE — PROCEDURE NOTE - NSPOSTPRCRAD_GEN_A_CORE
Guidewire ascertained in Rt IJ via POCUS, catheter filtered over guidewire, guidewire removed in entirety. Lung sliding ascertained via POCUS/central line located in the

## 2023-01-05 NOTE — CONSULT NOTE ADULT - CONSULT REQUESTED DATE/TIME
31-Dec-2022 13:00
31-Dec-2022 17:53
01-Jan-2023
05-Jan-2023
01-Jan-2023 06:54
01-Jan-2023 11:21
31-Dec-2022 12:38
03-Jan-2023 21:30
31-Dec-2022 20:08

## 2023-01-05 NOTE — PROGRESS NOTE ADULT - SUBJECTIVE AND OBJECTIVE BOX
[facility] [phone]        Patient seen and examined in bed.  Events updated  PT was intubated and placed on pressors    ros-unable         MEDICATIONS  (STANDING):  chlorhexidine 0.12% Liquid 15 milliLiter(s) Oral Mucosa every 12 hours  chlorhexidine 2% Cloths 1 Application(s) Topical daily  chlorhexidine 4% Liquid 1 Application(s) Topical <User Schedule>  fentaNYL   Infusion... 0.5 MICROgram(s)/kG/Hr (2.18 mL/Hr) IV Continuous <Continuous>  hydrocortisone sodium succinate Injectable 100 milliGRAM(s) IV Push every 8 hours  influenza  Vaccine (HIGH DOSE) 0.7 milliLiter(s) IntraMuscular once  pantoprazole  Injectable 40 milliGRAM(s) IV Push two times a day  piperacillin/tazobactam IVPB.. 3.375 Gram(s) IV Intermittent every 12 hours  sodium bicarbonate  Infusion 0.065 mEq/kG/Hr (75 mL/Hr) IV Continuous <Continuous>  vasopressin Infusion 0.04 Unit(s)/Min (6 mL/Hr) IV Continuous <Continuous>      VITAL:  T(C): , Max: 36.6 (01-04-23 @ 20:30)  T(F): , Max: 97.8 (01-04-23 @ 20:30)  HR: 99 (01-05-23 @ 06:45)  BP: 89/60 (01-05-23 @ 06:45)  BP(mean): 69 (01-05-23 @ 06:45)  RR: 23 (01-05-23 @ 06:45)  SpO2: 100% (01-05-23 @ 06:45)  Wt(kg): --    I and O's:        PHYSICAL EXAM:    Constitutional: intubated   Neck:  No JVD  Respiratory: transmitted   Cardiovascular: S1 and S2  Gastrointestinal: BS+, soft, NT/ND  Extremities: No peripheral edema  Neurological: unable   : +  Pop  Skin: No rashes  Access: Not applicable    LABS:                        13.7   29.63 )-----------( 131      ( 05 Jan 2023 06:48 )             42.6     01-05    134<L>  |  87<L>  |  151<H>  ----------------------------<  113<H>  5.5<H>   |  14<L>  |  7.56<H>    Ca    6.9<L>      05 Jan 2023 06:48  Phos  11.5     01-05  Mg     3.1     01-05    TPro  5.0<L>  /  Alb  2.2<L>  /  TBili  7.0<H>  /  DBili  x   /  AST  544<H>  /  ALT  572<H>  /  AlkPhos  1341<H>  01-05          Urine Studies:          RADIOLOGY & ADDITIONAL STUDIES:        < from: Xray Chest 1 View- PORTABLE-Routine (Xray Chest 1 View- PORTABLE-Routine .) (01.04.23 @ 12:21) >    ACC: [id number] EXAM:  XR CHEST PORTABLE ROUTINE 1V                          PROCEDURE DATE:  01/04/2023          INTERPRETATION:  CLINICAL INDICATION: sob    TECHNIQUE: Single frontal, portable view of the chest was obtained.    COMPARISON: Chest x-ray 12/31/2022.    FINDINGS:  The heart size is normal.  Unchanged bilateral lower lung patchy opacities.  There is no pneumothorax or pleural effusion.    IMPRESSION:  Unchanged bilateral lower lung patchy opacities.    --- End of Report ---           NOEL HAWKINS MD; Resident Radiologist  This document has been electronically signed.  PHUC HUBBARD MD; Attending Radiologist  This document ha    < end of copied text >  < from: CT Abdomen and Pelvis No Cont (01.03.23 @ 20:14) >    ACC: [id number] EXAM:  CT ABDOMEN AND PELVIS                          PROCEDURE DATE:  01/03/2023          INTERPRETATION:  CLINICAL INFORMATION: Altered mental status. Esophageal   mass, liver mass.    COMPARISON: CTA chest 12/31/2022    CONTRAST/COMPLICATIONS:  IV Contrast: NONE  Oral Contrast: NONE  Complications: None reported at time of study completion    PROCEDURE:  CT of the Abdomen and Pelvis was performed.  Sagittal and coronal reformats were performed.    FINDINGS:  Evaluation of the solid organs and vasculature is limited without   intravenous contrast.    LOWER CHEST: Partially visualized bilateral groundglass opacities and   small bilateral pleural effusions are redemonstrated.    LIVER: Nodular contour, suggestive of cirrhosis.Diffuse bilobar   hypoattenuating lesions, compatible with metastatic disease. For example   a 6.7 x 6.3 cm lesion in the right hepatic lobe (3, 34) and a 4.6 x 4.5   cm lesion left hepatic lobe (3, 21).  BILE DUCTS: Normal caliber.  GALLBLADDER: Within normal limits.  SPLEEN: Within normal limits.  PANCREAS: Within normal limits.  ADRENALS: Within normal limits.  KIDNEYS/URETERS: Bilateral renal cysts and additional hypodensities too   small characterize. No hydronephrosis. Persistent bilateral renal   nephrograms.    BLADDER: Within normal limits.  REPRODUCTIVE ORGANS: Prostate within normal limits.    BOWEL: No bowel obstruction. Colonic diverticulosis. Focal thickening at   the GE junction, which may be related to reported esophageal massversus   underdistention.  PERITONEUM: Small volume ascites.  VESSELS: Atherosclerotic changes.  RETROPERITONEUM/LYMPH NODES: Enlarged periportal nodes. For example, 3.8   x 2.1 cm portacaval lymph node (3, 52). Subcentimeter retroperitoneal   lymph nodes.  ABDOMINAL WALL: Subcutaneous edema.  BONES: Degenerative changes.    IMPRESSION:  Diffuse bilobar hepatic metastases.    Focal thickening at the GE junction which may be related to reported   esophageal mass versus underdistention.    Periportal lymphadenopathy.    Persistent bilateral renal nephrograms, suggestive of renal dysfunction.    Partially visualized bilateral lung groundglass opacities and small   bilateral pleural effusions are redemonstrated.      --- End of Report ---           MADELYN PEARCE MD; Resident Radiologist  This document has been electronically signed.  ANA MARIA LEVINE MD; Attending Radiologist  This d    < end of copied text >      < from: MR Head w/wo IV Cont (01.03.23 @ 11:57) >    ACC: [id number] EXAM:  MR BRAIN WAW IC                          PROCEDURE DATE:  01/03/2023          INTERPRETATION:  CLINICAL INFORMATION: Acute altered mental status with   new unknown primary cancer. Evaluate for brain metastases.    TECHNIQUE: MRI of the brain was performed with and without contrast.   Sagittal and axial T1, axial T2, FLAIR, SWI, diffusion-weighted images   and an ADC map were obtained.    7.5 cc of Gadavist was injected, with 0 cc discarded.    COMPARISON: CT head 12/31/2022    FINDINGS:    Tiny foci of restricted diffusion is seen in the right cerebellum and   right occipital lobe consistent with acute/subacute lacunar infarct. No   hemorrhagic conversion or mass effect.    There are foci of T2/FLAIR signal hyperintensity within the hemispheric   white matter, which are nonspecific but likely related to sequelae of   microvascular disease.    No abnormal extra-axial fluid collections are present. No abnormal   intraparenchymal enhancement.    Major flow-voids at thebase of the brain follow expected course and   contour.    The calvarium is intact. Right maxillary sinus mucosal thickening.    IMPRESSION:    Acute/subacute lacunar infarcts as described above. No intracranial   hemorrhage or mass effect.  No intracranial mass.    --- End of Report ---           ERICA JERNIGAN MD; Resident Radiologist  This document has been electronically signed.  YAN MEJIA MD; Attending Radiologist  This document has been electronically signed. Zhen  3    < end of copied text >   [facility] [phone]        Patient seen and examined in bed.  Events updated  PT was intubated and placed on pressors    ros-unable         MEDICATIONS  (STANDING):  chlorhexidine 0.12% Liquid 15 milliLiter(s) Oral Mucosa every 12 hours  chlorhexidine 2% Cloths 1 Application(s) Topical daily  chlorhexidine 4% Liquid 1 Application(s) Topical <User Schedule>  fentaNYL   Infusion... 0.5 MICROgram(s)/kG/Hr (2.18 mL/Hr) IV Continuous <Continuous>  hydrocortisone sodium succinate Injectable 100 milliGRAM(s) IV Push every 8 hours  influenza  Vaccine (HIGH DOSE) 0.7 milliLiter(s) IntraMuscular once  pantoprazole  Injectable 40 milliGRAM(s) IV Push two times a day  piperacillin/tazobactam IVPB.. 3.375 Gram(s) IV Intermittent every 12 hours  sodium bicarbonate  Infusion 0.065 mEq/kG/Hr (75 mL/Hr) IV Continuous <Continuous>  vasopressin Infusion 0.04 Unit(s)/Min (6 mL/Hr) IV Continuous <Continuous>      VITAL:  T(C): , Max: 36.6 (01-04-23 @ 20:30)  T(F): , Max: 97.8 (01-04-23 @ 20:30)  HR: 99 (01-05-23 @ 06:45)  BP: 89/60 (01-05-23 @ 06:45)  BP(mean): 69 (01-05-23 @ 06:45)  RR: 23 (01-05-23 @ 06:45)  SpO2: 100% (01-05-23 @ 06:45)  Wt(kg): --    I and O's:        PHYSICAL EXAM:    Constitutional: intubated   Neck:  No JVD  Respiratory: transmitted   Cardiovascular: S1 and S2  Gastrointestinal: BS+, soft, NT/ND  Extremities: No peripheral edema  Neurological: unable   : +  Pop  Skin: No rashes  Access: Not applicable    LABS:                        13.7   29.63 )-----------( 131      ( 05 Jan 2023 06:48 )             42.6     01-05    134<L>  |  87<L>  |  151<H>  ----------------------------<  113<H>  5.5<H>   |  14<L>  |  7.56<H>    Ca    6.9<L>      05 Jan 2023 06:48  Phos  11.5     01-05  Mg     3.1     01-05    TPro  5.0<L>  /  Alb  2.2<L>  /  TBili  7.0<H>  /  DBili  x   /  AST  544<H>  /  ALT  572<H>  /  AlkPhos  1341<H>  01-05          Urine Studies:          RADIOLOGY & ADDITIONAL STUDIES:        < from: Xray Chest 1 View- PORTABLE-Routine (Xray Chest 1 View- PORTABLE-Routine .) (01.04.23 @ 12:21) >    ACC: [id number] EXAM:  XR CHEST PORTABLE ROUTINE 1V                          PROCEDURE DATE:  01/04/2023          INTERPRETATION:  CLINICAL INDICATION: sob    TECHNIQUE: Single frontal, portable view of the chest was obtained.    COMPARISON: Chest x-ray 12/31/2022.    FINDINGS:  The heart size is normal.  Unchanged bilateral lower lung patchy opacities.  There is no pneumothorax or pleural effusion.    IMPRESSION:  Unchanged bilateral lower lung patchy opacities.    --- End of Report ---           NOEL HAWKINS MD; Resident Radiologist  This document has been electronically signed.  PHUC HUBBARD MD; Attending Radiologist  This document ha    < end of copied text >  < from: CT Abdomen and Pelvis No Cont (01.03.23 @ 20:14) >    ACC: [id number] EXAM:  CT ABDOMEN AND PELVIS                          PROCEDURE DATE:  01/03/2023          INTERPRETATION:  CLINICAL INFORMATION: Altered mental status. Esophageal   mass, liver mass.    COMPARISON: CTA chest 12/31/2022    CONTRAST/COMPLICATIONS:  IV Contrast: NONE  Oral Contrast: NONE  Complications: None reported at time of study completion    PROCEDURE:  CT of the Abdomen and Pelvis was performed.  Sagittal and coronal reformats were performed.    FINDINGS:  Evaluation of the solid organs and vasculature is limited without   intravenous contrast.    LOWER CHEST: Partially visualized bilateral groundglass opacities and   small bilateral pleural effusions are redemonstrated.    LIVER: Nodular contour, suggestive of cirrhosis.Diffuse bilobar   hypoattenuating lesions, compatible with metastatic disease. For example   a 6.7 x 6.3 cm lesion in the right hepatic lobe (3, 34) and a 4.6 x 4.5   cm lesion left hepatic lobe (3, 21).  BILE DUCTS: Normal caliber.  GALLBLADDER: Within normal limits.  SPLEEN: Within normal limits.  PANCREAS: Within normal limits.  ADRENALS: Within normal limits.  KIDNEYS/URETERS: Bilateral renal cysts and additional hypodensities too   small characterize. No hydronephrosis. Persistent bilateral renal   nephrograms.    BLADDER: Within normal limits.  REPRODUCTIVE ORGANS: Prostate within normal limits.    BOWEL: No bowel obstruction. Colonic diverticulosis. Focal thickening at   the GE junction, which may be related to reported esophageal massversus   underdistention.  PERITONEUM: Small volume ascites.  VESSELS: Atherosclerotic changes.  RETROPERITONEUM/LYMPH NODES: Enlarged periportal nodes. For example, 3.8   x 2.1 cm portacaval lymph node (3, 52). Subcentimeter retroperitoneal   lymph nodes.  ABDOMINAL WALL: Subcutaneous edema.  BONES: Degenerative changes.    IMPRESSION:  Diffuse bilobar hepatic metastases.    Focal thickening at the GE junction which may be related to reported   esophageal mass versus underdistention.    Periportal lymphadenopathy.    Persistent bilateral renal nephrograms, suggestive of renal dysfunction.    Partially visualized bilateral lung groundglass opacities and small   bilateral pleural effusions are redemonstrated.      --- End of Report ---           MADELYN PEARCE MD; Resident Radiologist  This document has been electronically signed.  ANA MARIA LEVINE MD; Attending Radiologist  This d    < end of copied text >      < from: MR Head w/wo IV Cont (01.03.23 @ 11:57) >    ACC: [id number] EXAM:  MR BRAIN WAW IC                          PROCEDURE DATE:  01/03/2023          INTERPRETATION:  CLINICAL INFORMATION: Acute altered mental status with   new unknown primary cancer. Evaluate for brain metastases.    TECHNIQUE: MRI of the brain was performed with and without contrast.   Sagittal and axial T1, axial T2, FLAIR, SWI, diffusion-weighted images   and an ADC map were obtained.    7.5 cc of Gadavist was injected, with 0 cc discarded.    COMPARISON: CT head 12/31/2022    FINDINGS:    Tiny foci of restricted diffusion is seen in the right cerebellum and   right occipital lobe consistent with acute/subacute lacunar infarct. No   hemorrhagic conversion or mass effect.    There are foci of T2/FLAIR signal hyperintensity within the hemispheric   white matter, which are nonspecific but likely related to sequelae of   microvascular disease.    No abnormal extra-axial fluid collections are present. No abnormal   intraparenchymal enhancement.    Major flow-voids at thebase of the brain follow expected course and   contour.    The calvarium is intact. Right maxillary sinus mucosal thickening.    IMPRESSION:    Acute/subacute lacunar infarcts as described above. No intracranial   hemorrhage or mass effect.  No intracranial mass.    --- End of Report ---           ERICA JERNIGAN MD; Resident Radiologist  This document has been electronically signed.  YAN MEJIA MD; Attending Radiologist  This document has been electronically signed. Zhen  3    < end of copied text >   [facility] [phone]        Patient seen and examined in bed.  Events updated  PT was intubated and placed on pressors    ros-unable         MEDICATIONS  (STANDING):  chlorhexidine 0.12% Liquid 15 milliLiter(s) Oral Mucosa every 12 hours  chlorhexidine 2% Cloths 1 Application(s) Topical daily  chlorhexidine 4% Liquid 1 Application(s) Topical <User Schedule>  fentaNYL   Infusion... 0.5 MICROgram(s)/kG/Hr (2.18 mL/Hr) IV Continuous <Continuous>  hydrocortisone sodium succinate Injectable 100 milliGRAM(s) IV Push every 8 hours  influenza  Vaccine (HIGH DOSE) 0.7 milliLiter(s) IntraMuscular once  pantoprazole  Injectable 40 milliGRAM(s) IV Push two times a day  piperacillin/tazobactam IVPB.. 3.375 Gram(s) IV Intermittent every 12 hours  sodium bicarbonate  Infusion 0.065 mEq/kG/Hr (75 mL/Hr) IV Continuous <Continuous>  vasopressin Infusion 0.04 Unit(s)/Min (6 mL/Hr) IV Continuous <Continuous>      VITAL:  T(C): , Max: 36.6 (01-04-23 @ 20:30)  T(F): , Max: 97.8 (01-04-23 @ 20:30)  HR: 99 (01-05-23 @ 06:45)  BP: 89/60 (01-05-23 @ 06:45)  BP(mean): 69 (01-05-23 @ 06:45)  RR: 23 (01-05-23 @ 06:45)  SpO2: 100% (01-05-23 @ 06:45)  Wt(kg): --    I and O's:        PHYSICAL EXAM:    Constitutional: intubated   Neck:  No JVD  Respiratory: transmitted   Cardiovascular: S1 and S2  Gastrointestinal: BS+, soft, NT/ND  Extremities: No peripheral edema  Neurological: unable   : +  Pop  Skin: No rashes  Access: Not applicable    LABS:                        13.7   29.63 )-----------( 131      ( 05 Jan 2023 06:48 )             42.6     01-05    134<L>  |  87<L>  |  151<H>  ----------------------------<  113<H>  5.5<H>   |  14<L>  |  7.56<H>    Ca    6.9<L>      05 Jan 2023 06:48  Phos  11.5     01-05  Mg     3.1     01-05    TPro  5.0<L>  /  Alb  2.2<L>  /  TBili  7.0<H>  /  DBili  x   /  AST  544<H>  /  ALT  572<H>  /  AlkPhos  1341<H>  01-05          Urine Studies:          RADIOLOGY & ADDITIONAL STUDIES:        < from: Xray Chest 1 View- PORTABLE-Routine (Xray Chest 1 View- PORTABLE-Routine .) (01.04.23 @ 12:21) >    ACC: [id number] EXAM:  XR CHEST PORTABLE ROUTINE 1V                          PROCEDURE DATE:  01/04/2023          INTERPRETATION:  CLINICAL INDICATION: sob    TECHNIQUE: Single frontal, portable view of the chest was obtained.    COMPARISON: Chest x-ray 12/31/2022.    FINDINGS:  The heart size is normal.  Unchanged bilateral lower lung patchy opacities.  There is no pneumothorax or pleural effusion.    IMPRESSION:  Unchanged bilateral lower lung patchy opacities.    --- End of Report ---           NOEL HAWKINS MD; Resident Radiologist  This document has been electronically signed.  PHUC HUBBARD MD; Attending Radiologist  This document ha    < end of copied text >  < from: CT Abdomen and Pelvis No Cont (01.03.23 @ 20:14) >    ACC: [id number] EXAM:  CT ABDOMEN AND PELVIS                          PROCEDURE DATE:  01/03/2023          INTERPRETATION:  CLINICAL INFORMATION: Altered mental status. Esophageal   mass, liver mass.    COMPARISON: CTA chest 12/31/2022    CONTRAST/COMPLICATIONS:  IV Contrast: NONE  Oral Contrast: NONE  Complications: None reported at time of study completion    PROCEDURE:  CT of the Abdomen and Pelvis was performed.  Sagittal and coronal reformats were performed.    FINDINGS:  Evaluation of the solid organs and vasculature is limited without   intravenous contrast.    LOWER CHEST: Partially visualized bilateral groundglass opacities and   small bilateral pleural effusions are redemonstrated.    LIVER: Nodular contour, suggestive of cirrhosis.Diffuse bilobar   hypoattenuating lesions, compatible with metastatic disease. For example   a 6.7 x 6.3 cm lesion in the right hepatic lobe (3, 34) and a 4.6 x 4.5   cm lesion left hepatic lobe (3, 21).  BILE DUCTS: Normal caliber.  GALLBLADDER: Within normal limits.  SPLEEN: Within normal limits.  PANCREAS: Within normal limits.  ADRENALS: Within normal limits.  KIDNEYS/URETERS: Bilateral renal cysts and additional hypodensities too   small characterize. No hydronephrosis. Persistent bilateral renal   nephrograms.    BLADDER: Within normal limits.  REPRODUCTIVE ORGANS: Prostate within normal limits.    BOWEL: No bowel obstruction. Colonic diverticulosis. Focal thickening at   the GE junction, which may be related to reported esophageal massversus   underdistention.  PERITONEUM: Small volume ascites.  VESSELS: Atherosclerotic changes.  RETROPERITONEUM/LYMPH NODES: Enlarged periportal nodes. For example, 3.8   x 2.1 cm portacaval lymph node (3, 52). Subcentimeter retroperitoneal   lymph nodes.  ABDOMINAL WALL: Subcutaneous edema.  BONES: Degenerative changes.    IMPRESSION:  Diffuse bilobar hepatic metastases.    Focal thickening at the GE junction which may be related to reported   esophageal mass versus underdistention.    Periportal lymphadenopathy.    Persistent bilateral renal nephrograms, suggestive of renal dysfunction.    Partially visualized bilateral lung groundglass opacities and small   bilateral pleural effusions are redemonstrated.      --- End of Report ---           MADELYN PEARCE MD; Resident Radiologist  This document has been electronically signed.  ANA MARIA LEVINE MD; Attending Radiologist  This d    < end of copied text >      < from: MR Head w/wo IV Cont (01.03.23 @ 11:57) >    ACC: [id number] EXAM:  MR BRAIN WAW IC                          PROCEDURE DATE:  01/03/2023          INTERPRETATION:  CLINICAL INFORMATION: Acute altered mental status with   new unknown primary cancer. Evaluate for brain metastases.    TECHNIQUE: MRI of the brain was performed with and without contrast.   Sagittal and axial T1, axial T2, FLAIR, SWI, diffusion-weighted images   and an ADC map were obtained.    7.5 cc of Gadavist was injected, with 0 cc discarded.    COMPARISON: CT head 12/31/2022    FINDINGS:    Tiny foci of restricted diffusion is seen in the right cerebellum and   right occipital lobe consistent with acute/subacute lacunar infarct. No   hemorrhagic conversion or mass effect.    There are foci of T2/FLAIR signal hyperintensity within the hemispheric   white matter, which are nonspecific but likely related to sequelae of   microvascular disease.    No abnormal extra-axial fluid collections are present. No abnormal   intraparenchymal enhancement.    Major flow-voids at thebase of the brain follow expected course and   contour.    The calvarium is intact. Right maxillary sinus mucosal thickening.    IMPRESSION:    Acute/subacute lacunar infarcts as described above. No intracranial   hemorrhage or mass effect.  No intracranial mass.    --- End of Report ---           ERICA JERNIGAN MD; Resident Radiologist  This document has been electronically signed.  YAN MEJIA MD; Attending Radiologist  This document has been electronically signed. Zhen  3    < end of copied text >

## 2023-01-05 NOTE — PROGRESS NOTE ADULT - SUBJECTIVE AND OBJECTIVE BOX
Patient is a 77y old  Male who presents with a chief complaint of SOB (05 Jan 2023 08:56)      SUBJECTIVE / OVERNIGHT EVENTS: Events noted. Intubated on vent support, BP support. Family at bedside.   Review of Systems  unobtainable     MEDICATIONS  (STANDING):  calcium gluconate IVPB 1 Gram(s) IV Intermittent once  chlorhexidine 0.12% Liquid 15 milliLiter(s) Oral Mucosa every 12 hours  chlorhexidine 2% Cloths 1 Application(s) Topical daily  chlorhexidine 4% Liquid 1 Application(s) Topical <User Schedule>  EPINEPHrine    Infusion 0.2 MICROgram(s)/kG/Min (65.4 mL/Hr) IV Continuous <Continuous>  fentaNYL   Infusion... 0.5 MICROgram(s)/kG/Hr (2.18 mL/Hr) IV Continuous <Continuous>  hydrocortisone sodium succinate Injectable 100 milliGRAM(s) IV Push every 8 hours  influenza  Vaccine (HIGH DOSE) 0.7 milliLiter(s) IntraMuscular once  norepinephrine Infusion 0.05 MICROgram(s)/kG/Min (8.18 mL/Hr) IV Continuous <Continuous>  pantoprazole  Injectable 40 milliGRAM(s) IV Push two times a day  piperacillin/tazobactam IVPB.. 3.375 Gram(s) IV Intermittent every 12 hours  propofol Infusion 50 MICROgram(s)/kG/Min (26.2 mL/Hr) IV Continuous <Continuous>  sodium bicarbonate  Infusion 0.258 mEq/kG/Hr (150 mL/Hr) IV Continuous <Continuous>  vasopressin Infusion 0.04 Unit(s)/Min (6 mL/Hr) IV Continuous <Continuous>    MEDICATIONS  (PRN):  fentaNYL    Injectable 50 MICROGram(s) IV Push every 2 hours PRN uptitration      Vital Signs Last 24 Hrs  T(C): 37.7 (05 Jan 2023 14:00), Max: 37.7 (05 Jan 2023 14:00)  T(F): 99.9 (05 Jan 2023 14:00), Max: 99.9 (05 Jan 2023 14:00)  HR: 111 (05 Jan 2023 14:45) (88 - 111)  BP: 102/63 (05 Jan 2023 14:00) (89/60 - 115/74)  BP(mean): 77 (05 Jan 2023 14:00) (69 - 86)  RR: 31 (05 Jan 2023 14:45) (17 - 32)  SpO2: 99% (05 Jan 2023 14:45) (90% - 100%)    Parameters below as of 05 Jan 2023 06:23  Patient On (Oxygen Delivery Method): ventilator        PHYSICAL EXAM:  GENERAL: on vent support   HEAD:  Atraumatic, Normocephalic  EYES: EOMI, PERRLA, conjunctiva and sclera clear  NECK: Supple, No JVD  CHEST/LUNG: Clear to auscultation bilaterally; No wheeze  HEART: Regular rate and rhythm; No murmurs, rubs, or gallops  ABDOMEN: Soft, Nontender, Nondistended; Bowel sounds present  EXTREMITIES:  2+ Peripheral Pulses, No clubbing, cyanosis, or edema  PSYCH: non verbal   NEUROLOGY: non-focal  SKIN: No rashes or lesions    LABS:                        13.7   29.63 )-----------( 131      ( 05 Jan 2023 06:48 )             42.6     01-05    130<L>  |  84<L>  |  148<H>  ----------------------------<  172<H>  6.6<HH>   |  <10<LL>  |  7.61<H>    Ca    7.2<L>      05 Jan 2023 13:00  Phos  12.9     01-05  Mg     3.1     01-05    TPro  4.6<L>  /  Alb  1.6<L>  /  TBili  6.9<H>  /  DBili  x   /  AST  394<H>  /  ALT  401<H>  /  AlkPhos  1294<H>  01-05    PT/INR - ( 05 Jan 2023 06:46 )   PT: 24.2 sec;   INR: 2.07 ratio         PTT - ( 05 Jan 2023 12:58 )  PTT:69.5 sec  CARDIAC MARKERS ( 05 Jan 2023 13:00 )  x     / x     / 140 U/L / x     / 14.9 ng/mL  CARDIAC MARKERS ( 05 Jan 2023 09:54 )  x     / x     / 139 U/L / x     / 13.8 ng/mL  CARDIAC MARKERS ( 05 Jan 2023 06:10 )  x     / x     / 125 U/L / x     / 8.5 ng/mL            RADIOLOGY & ADDITIONAL TESTS:    Imaging Personally Reviewed:  < from: MR Angio Head No Cont (01.04.23 @ 23:47) >  IMPRESSION: Suboptimal MRA head.    < end of copied text >  < from: Xray Chest 1 View- PORTABLE-Routine (Xray Chest 1 View- PORTABLE-Routine .) (01.04.23 @ 12:21) >  IMPRESSION:  Unchanged bilateral lower lung patchy opacities.    < end of copied text >    Consultant(s) Notes Reviewed:      Care Discussed with Consultants/Other Providers:

## 2023-01-05 NOTE — CONSULT NOTE ADULT - SUBJECTIVE AND OBJECTIVE BOX
HPI:  6 y/o M PMH HTN, HLD, psoriasis on prednisone 10mg daily presenting with SOB. Recent admission for pneumonia and dced two weeks ago also found to possible esophageal cancer (mass) with liver mets s/p IR biopsy 12/22. Today patient woke up with shortness of breath at rest, worse with exertion with no associated LOC, chest pain, palpitations, diaphoresis, focal weakness, numbness/tingling.  Reports intermittent nonbloody vomiting and diarrhea with no fever/chills, cough, rashes, dysuria, or hematuria. Reports decreased PO intake     Received 1L IVF, azithro, zosyn, vanc, and solumedrol 100mg IVP x1 in ED.    (31 Dec 2022 16:20)    PERTINENT PM/SXH:   Psoriasis    Esophageal mass    Benign essential HTN        FAMILY HISTORY:    Family Hx substance abuse [ ]yes [ ]no  ITEMS NOT CHECKED ARE NOT PRESENT    SOCIAL HISTORY:   Significant other/partner[ ]  Children[ ]  Pentecostal/Spirituality:  Substance hx:  [ ]   Tobacco hx:  [ ]   Alcohol hx: [ ]   Home Opioid hx:  [ ] I-Stop Reference No:  Living Situation: [ ]Home  [ ]Long term care  [ ]Rehab [ ]Other    ADVANCE DIRECTIVES:    DNR/MOLST  [ ]  Living Will  [ ]   DECISION MAKER(s):  [ ] Health Care Proxy(s)  [ ] Surrogate(s)  [ ] Guardian           Name(s): Phone Number(s):    BASELINE (I)ADL(s) (prior to admission):  [place]: [ ]Total  [ ] Moderate [ ]Dependent    Allergies    No Known Allergies    Intolerances    MEDICATIONS  (STANDING):  chlorhexidine 0.12% Liquid 15 milliLiter(s) Oral Mucosa every 12 hours  chlorhexidine 2% Cloths 1 Application(s) Topical daily  chlorhexidine 4% Liquid 1 Application(s) Topical <User Schedule>  EPINEPHrine    Infusion 0.2 MICROgram(s)/kG/Min (65.4 mL/Hr) IV Continuous <Continuous>  fentaNYL   Infusion... 0.5 MICROgram(s)/kG/Hr (2.18 mL/Hr) IV Continuous <Continuous>  hydrocortisone sodium succinate Injectable 100 milliGRAM(s) IV Push every 8 hours  influenza  Vaccine (HIGH DOSE) 0.7 milliLiter(s) IntraMuscular once  norepinephrine Infusion 0.05 MICROgram(s)/kG/Min (8.18 mL/Hr) IV Continuous <Continuous>  pantoprazole  Injectable 40 milliGRAM(s) IV Push two times a day  piperacillin/tazobactam IVPB.. 3.375 Gram(s) IV Intermittent every 12 hours  propofol Infusion 50 MICROgram(s)/kG/Min (26.2 mL/Hr) IV Continuous <Continuous>  sodium bicarbonate  Infusion 0.258 mEq/kG/Hr (150 mL/Hr) IV Continuous <Continuous>  sodium bicarbonate  Injectable 50 milliEquivalent(s) IV Push once  vasopressin Infusion 0.04 Unit(s)/Min (6 mL/Hr) IV Continuous <Continuous>    MEDICATIONS  (PRN):  fentaNYL    Injectable 50 MICROGram(s) IV Push every 2 hours PRN uptitration    PRESENT SYMPTOMS: [ ]Unable to self-report  [ ] CPOT [ ] PAINADs [ ] RDOS  Source if other than patient:  [ ]Family   [ ]Team     Pain: [ ]yes [ ]no  QOL impact -   Location -                    Aggravating factors -  Quality -  Radiation -  Timing-  Severity (0-10 scale):  Minimal acceptable level (0-10 scale):     CPOT:    https://www.sccm.org/getattachment/[email or web address]/Critical-Care-Pain-Observation-Tool-(CPOT)    PAIN AD Score:   [email or web address]://[email or web address]/cog/painad.pdf (press ctrl +  left click to view)    Dyspnea:                           [ ]Mild [ ]Moderate [ ]Severe      RDOS:  0 to 2  minimal or no respiratory distress   3  mild distress  4 to 6 moderate distress  >7 severe distress  [email or web address]_Distress_Observation_Scale.pdf (Ctrl +  left click to view)     Anxiety:                             [ ]Mild [ ]Moderate [ ]Severe  Fatigue:                             [ ]Mild [ ]Moderate [ ]Severe  Nausea:                             [ ]Mild [ ]Moderate [ ]Severe  Loss of appetite:              [ ]Mild [ ]Moderate [ ]Severe  Constipation:                    [ ]Mild [ ]Moderate [ ]Severe    PCSSQ[Palliative Care Spiritual Screening Question]   Severity (0-10):  Score of 4 or > indicate consideration of Chaplaincy referral.  Chaplaincy Referral: [ ] yes [ ] refused [ ] following [ ] Deferred     Caregiver [place]? : [ ] yes [ ] no [ ] Deferred [ ] Declined             Social work referral [ ] Patient & Family Centered Care Referral [ ]     Anticipatory Grief present?:  [ ] yes [ ] no  [ ] Deferred                  Social work referral [ ] Chaplaincy Referral[ ]      Other Symptoms:  [ ]All other review of systems negative     Palliative Performance Status Version 2:         %    [email or web address]  PHYSICAL EXAM:  Vital Signs Last 24 Hrs  T(C): 35.5 (05 Jan 2023 12:00), Max: 36.6 (04 Jan 2023 20:30)  T(F): 95.9 (05 Jan 2023 12:00), Max: 97.8 (04 Jan 2023 20:30)  HR: 108 (05 Jan 2023 12:45) (77 - 108)  BP: 105/62 (05 Jan 2023 12:00) (89/60 - 115/74)  BP(mean): 78 (05 Jan 2023 12:00) (69 - 86)  RR: 26 (05 Jan 2023 12:45) (17 - 30)  SpO2: 92% (05 Jan 2023 12:45) (90% - 100%)    Parameters below as of 05 Jan 2023 06:23  Patient On (Oxygen Delivery Method): ventilator     I&O's Summary    04 Jan 2023 07:01  -  05 Jan 2023 07:00  --------------------------------------------------------  IN: 0 mL / OUT: 15 mL / NET: -15 mL    05 Jan 2023 07:01  -  05 Jan 2023 13:36  --------------------------------------------------------  IN: 1184.9 mL / OUT: 40 mL / NET: 1144.9 mL      GENERAL: [ ]Cachexia    [ ]Alert  [ ]Oriented x   [ ]Lethargic  [ ]Unarousable  [ ]Verbal  [ ]Non-Verbal  Behavioral:   [ ] Anxiety  [ ] Delirium [ ] Agitation [ ] Other  HEENT:  [ ]Normal   [ ]Dry mouth   [ ]ET Tube/Trach  [ ]Oral lesions  PULMONARY:   [ ]Clear [ ]Tachypnea  [ ]Audible excessive secretions   [ ]Rhonchi        [ ]Right [ ]Left [ ]Bilateral  [ ]Crackles        [ ]Right [ ]Left [ ]Bilateral  [ ]Wheezing     [ ]Right [ ]Left [ ]Bilateral  [ ]Diminished breath sounds [ ]right [ ]left [ ]bilateral  CARDIOVASCULAR:    [ ]Regular [ ]Irregular [ ]Tachy  [ ]Carroll [ ]Murmur [ ]Other  GASTROINTESTINAL:  [ ]Soft  [ ]Distended   [ ]+BS  [ ]Non tender [ ]Tender  [ ]Other [ ]PEG [ ]OGT/ NGT  Last BM:  GENITOURINARY:  [ ]Normal [ ] Incontinent   [ ]Oliguria/Anuria   [ ]Pop  MUSCULOSKELETAL:   [ ]Normal   [ ]Weakness  [ ]Bed/Wheelchair bound [ ]Edema  NEUROLOGIC:   [ ]No focal deficits  [ ]Cognitive impairment  [ ]Dysphagia [ ]Dysarthria [ ]Paresis [ ]Other   SKIN:   [ ]Normal  [ ]Rash  [ ]Other  [ ]Pressure ulcer(s)       Present on admission [ ]y [ ]n    CRITICAL CARE:  [ ] Shock Present  [ ]Septic [ ]Cardiogenic [ ]Neurologic [ ]Hypovolemic  [ ]  Vasopressors [ ]  Inotropes   [ ]Respiratory failure present [ ]Mechanical ventilation [ ]Non-invasive ventilatory support [ ]High flow  Mode: AC/ CMV (Assist Control/ Continuous Mandatory Ventilation), RR (machine): 500, TV (machine): 26, FiO2: 50, PEEP: 5, ITime: 1, MAP: 13, PIP: 29  [ ]Acute  [ ]Chronic [ ]Hypoxic  [ ]Hypercarbic [ ]Other  [ ]Other organ failure     LABS:                        13.7   29.63 )-----------( 131      ( 05 Jan 2023 06:48 )             42.6   01-05    134<L>  |  87<L>  |  151<H>  ----------------------------<  113<H>  5.5<H>   |  14<L>  |  7.56<H>    Ca    6.9<L>      05 Jan 2023 06:48  Phos  11.5     01-05  Mg     3.1     01-05    TPro  5.0<L>  /  Alb  2.2<L>  /  TBili  7.0<H>  /  DBili  x   /  AST  544<H>  /  ALT  572<H>  /  AlkPhos  1341<H>  01-05  PT/INR - ( 05 Jan 2023 06:46 )   PT: 24.2 sec;   INR: 2.07 ratio         PTT - ( 05 Jan 2023 06:46 )  PTT:>200.0 sec      RADIOLOGY & ADDITIONAL STUDIES:    PROTEIN CALORIE MALNUTRITION PRESENT: [ ]mild [ ]moderate [ ]severe [ ]underweight [ ]morbid obesity  https://www.andeal.org/vault/2440/web/files/ONC/Table_Clinical%20Characteristics%20to%20Document%20Malnutrition-White%20JV%20et%20al%202012.pdf    Height (cm): 177.8 (12-31-22 @ 09:14)  Weight (kg): 87.2 (12-31-22 @ 11:09)  BMI (kg/m2): 27.6 (12-31-22 @ 11:09)    [ ]PPSV2 < or = to 30% [ ]significant weight loss  [ ]poor nutritional intake  [ ]anasarca[ ]Artificial Nutrition      Other REFERRALS:  [ ]Hospice  [ ]Child Life  [ ]Social Work  [ ]Case management [ ]Holistic Therapy     Goals of Care Document:  HPI:  6 y/o M PMH HTN, HLD, psoriasis on prednisone 10mg daily presenting with SOB. Recent admission for pneumonia and dced two weeks ago also found to possible esophageal cancer (mass) with liver mets s/p IR biopsy 12/22. Today patient woke up with shortness of breath at rest, worse with exertion with no associated LOC, chest pain, palpitations, diaphoresis, focal weakness, numbness/tingling.  Reports intermittent nonbloody vomiting and diarrhea with no fever/chills, cough, rashes, dysuria, or hematuria. Reports decreased PO intake     Received 1L IVF, azithro, zosyn, vanc, and solumedrol 100mg IVP x1 in ED.    (31 Dec 2022 16:20)    PERTINENT PM/SXH:   Psoriasis    Esophageal mass    Benign essential HTN        FAMILY HISTORY:    Family Hx substance abuse [ ]yes [ ]no  ITEMS NOT CHECKED ARE NOT PRESENT    SOCIAL HISTORY:   Significant other/partner[ ]  Children[ ]  Jewish/Spirituality:  Substance hx:  [ ]   Tobacco hx:  [ ]   Alcohol hx: [ ]   Home Opioid hx:  [ ] I-Stop Reference No:  Living Situation: [ ]Home  [ ]Long term care  [ ]Rehab [ ]Other    ADVANCE DIRECTIVES:    DNR/MOLST  [ ]  Living Will  [ ]   DECISION MAKER(s):  [ ] Health Care Proxy(s)  [ ] Surrogate(s)  [ ] Guardian           Name(s): Phone Number(s):    BASELINE (I)ADL(s) (prior to admission):  [place]: [ ]Total  [ ] Moderate [ ]Dependent    Allergies    No Known Allergies    Intolerances    MEDICATIONS  (STANDING):  chlorhexidine 0.12% Liquid 15 milliLiter(s) Oral Mucosa every 12 hours  chlorhexidine 2% Cloths 1 Application(s) Topical daily  chlorhexidine 4% Liquid 1 Application(s) Topical <User Schedule>  EPINEPHrine    Infusion 0.2 MICROgram(s)/kG/Min (65.4 mL/Hr) IV Continuous <Continuous>  fentaNYL   Infusion... 0.5 MICROgram(s)/kG/Hr (2.18 mL/Hr) IV Continuous <Continuous>  hydrocortisone sodium succinate Injectable 100 milliGRAM(s) IV Push every 8 hours  influenza  Vaccine (HIGH DOSE) 0.7 milliLiter(s) IntraMuscular once  norepinephrine Infusion 0.05 MICROgram(s)/kG/Min (8.18 mL/Hr) IV Continuous <Continuous>  pantoprazole  Injectable 40 milliGRAM(s) IV Push two times a day  piperacillin/tazobactam IVPB.. 3.375 Gram(s) IV Intermittent every 12 hours  propofol Infusion 50 MICROgram(s)/kG/Min (26.2 mL/Hr) IV Continuous <Continuous>  sodium bicarbonate  Infusion 0.258 mEq/kG/Hr (150 mL/Hr) IV Continuous <Continuous>  sodium bicarbonate  Injectable 50 milliEquivalent(s) IV Push once  vasopressin Infusion 0.04 Unit(s)/Min (6 mL/Hr) IV Continuous <Continuous>    MEDICATIONS  (PRN):  fentaNYL    Injectable 50 MICROGram(s) IV Push every 2 hours PRN uptitration    PRESENT SYMPTOMS: [ ]Unable to self-report  [ ] CPOT [ ] PAINADs [ ] RDOS  Source if other than patient:  [ ]Family   [ ]Team     Pain: [ ]yes [ ]no  QOL impact -   Location -                    Aggravating factors -  Quality -  Radiation -  Timing-  Severity (0-10 scale):  Minimal acceptable level (0-10 scale):     CPOT:    https://www.sccm.org/getattachment/[email or web address]-7240t2798s5j/Critical-Care-Pain-Observation-Tool-(CPOT)    PAIN AD Score:   [email or web address]://[email or web address].[email or web address]/cog/[email or web address] (press ctrl +  left click to view)    Dyspnea:                           [ ]Mild [ ]Moderate [ ]Severe      RDOS:  0 to 2  minimal or no respiratory distress   3  mild distress  4 to 6 moderate distress  >7 severe distress  [email or web address]_Distress_Observation_Scale.pdf (Ctrl +  left click to view)     Anxiety:                             [ ]Mild [ ]Moderate [ ]Severe  Fatigue:                             [ ]Mild [ ]Moderate [ ]Severe  Nausea:                             [ ]Mild [ ]Moderate [ ]Severe  Loss of appetite:              [ ]Mild [ ]Moderate [ ]Severe  Constipation:                    [ ]Mild [ ]Moderate [ ]Severe    PCSSQ[Palliative Care Spiritual Screening Question]   Severity (0-10):  Score of 4 or > indicate consideration of Chaplaincy referral.  Chaplaincy Referral: [ ] yes [ ] refused [ ] following [ ] Deferred     Caregiver [place]? : [ ] yes [ ] no [ ] Deferred [ ] Declined             Social work referral [ ] Patient & Family Centered Care Referral [ ]     Anticipatory Grief present?:  [ ] yes [ ] no  [ ] Deferred                  Social work referral [ ] Chaplaincy Referral[ ]      Other Symptoms:  [ ]All other review of systems negative     Palliative Performance Status Version 2:         %    [email or web address]  PHYSICAL EXAM:  Vital Signs Last 24 Hrs  T(C): 35.5 (05 Jan 2023 12:00), Max: 36.6 (04 Jan 2023 20:30)  T(F): 95.9 (05 Jan 2023 12:00), Max: 97.8 (04 Jan 2023 20:30)  HR: 108 (05 Jan 2023 12:45) (77 - 108)  BP: 105/62 (05 Jan 2023 12:00) (89/60 - 115/74)  BP(mean): 78 (05 Jan 2023 12:00) (69 - 86)  RR: 26 (05 Jan 2023 12:45) (17 - 30)  SpO2: 92% (05 Jan 2023 12:45) (90% - 100%)    Parameters below as of 05 Jan 2023 06:23  Patient On (Oxygen Delivery Method): ventilator     I&O's Summary    04 Jan 2023 07:01  -  05 Jan 2023 07:00  --------------------------------------------------------  IN: 0 mL / OUT: 15 mL / NET: -15 mL    05 Jan 2023 07:01  -  05 Jan 2023 13:36  --------------------------------------------------------  IN: 1184.9 mL / OUT: 40 mL / NET: 1144.9 mL      GENERAL: [ ]Cachexia    [ ]Alert  [ ]Oriented x   [ ]Lethargic  [ ]Unarousable  [ ]Verbal  [ ]Non-Verbal  Behavioral:   [ ] Anxiety  [ ] Delirium [ ] Agitation [ ] Other  HEENT:  [ ]Normal   [ ]Dry mouth   [ ]ET Tube/Trach  [ ]Oral lesions  PULMONARY:   [ ]Clear [ ]Tachypnea  [ ]Audible excessive secretions   [ ]Rhonchi        [ ]Right [ ]Left [ ]Bilateral  [ ]Crackles        [ ]Right [ ]Left [ ]Bilateral  [ ]Wheezing     [ ]Right [ ]Left [ ]Bilateral  [ ]Diminished breath sounds [ ]right [ ]left [ ]bilateral  CARDIOVASCULAR:    [ ]Regular [ ]Irregular [ ]Tachy  [ ]Carroll [ ]Murmur [ ]Other  GASTROINTESTINAL:  [ ]Soft  [ ]Distended   [ ]+BS  [ ]Non tender [ ]Tender  [ ]Other [ ]PEG [ ]OGT/ NGT  Last BM:  GENITOURINARY:  [ ]Normal [ ] Incontinent   [ ]Oliguria/Anuria   [ ]Pop  MUSCULOSKELETAL:   [ ]Normal   [ ]Weakness  [ ]Bed/Wheelchair bound [ ]Edema  NEUROLOGIC:   [ ]No focal deficits  [ ]Cognitive impairment  [ ]Dysphagia [ ]Dysarthria [ ]Paresis [ ]Other   SKIN:   [ ]Normal  [ ]Rash  [ ]Other  [ ]Pressure ulcer(s)       Present on admission [ ]y [ ]n    CRITICAL CARE:  [ ] Shock Present  [ ]Septic [ ]Cardiogenic [ ]Neurologic [ ]Hypovolemic  [ ]  Vasopressors [ ]  Inotropes   [ ]Respiratory failure present [ ]Mechanical ventilation [ ]Non-invasive ventilatory support [ ]High flow  Mode: AC/ CMV (Assist Control/ Continuous Mandatory Ventilation), RR (machine): 500, TV (machine): 26, FiO2: 50, PEEP: 5, ITime: 1, MAP: 13, PIP: 29  [ ]Acute  [ ]Chronic [ ]Hypoxic  [ ]Hypercarbic [ ]Other  [ ]Other organ failure     LABS:                        13.7   29.63 )-----------( 131      ( 05 Jan 2023 06:48 )             42.6   01-05    134<L>  |  87<L>  |  151<H>  ----------------------------<  113<H>  5.5<H>   |  14<L>  |  7.56<H>    Ca    6.9<L>      05 Jan 2023 06:48  Phos  11.5     01-05  Mg     3.1     01-05    TPro  5.0<L>  /  Alb  2.2<L>  /  TBili  7.0<H>  /  DBili  x   /  AST  544<H>  /  ALT  572<H>  /  AlkPhos  1341<H>  01-05  PT/INR - ( 05 Jan 2023 06:46 )   PT: 24.2 sec;   INR: 2.07 ratio         PTT - ( 05 Jan 2023 06:46 )  PTT:>200.0 sec      RADIOLOGY & ADDITIONAL STUDIES:    PROTEIN CALORIE MALNUTRITION PRESENT: [ ]mild [ ]moderate [ ]severe [ ]underweight [ ]morbid obesity  https://www.andeal.org/vault/2440/web/files/ONC/Table_Clinical%20Characteristics%20to%20Document%20Malnutrition-White%20JV%20et%20al%202012.pdf    Height (cm): 177.8 (12-31-22 @ 09:14)  Weight (kg): 87.2 (12-31-22 @ 11:09)  BMI (kg/m2): 27.6 (12-31-22 @ 11:09)    [ ]PPSV2 < or = to 30% [ ]significant weight loss  [ ]poor nutritional intake  [ ]anasarca[ ]Artificial Nutrition      Other REFERRALS:  [ ]Hospice  [ ]Child Life  [ ]Social Work  [ ]Case management [ ]Holistic Therapy     Goals of Care Document:  HPI:  8 y/o M PMH HTN, HLD, psoriasis on prednisone 10mg daily presenting with SOB. Recent admission for pneumonia and dced two weeks ago also found to possible esophageal cancer (mass) with liver mets s/p IR biopsy 12/22. Today patient woke up with shortness of breath at rest, worse with exertion with no associated LOC, chest pain, palpitations, diaphoresis, focal weakness, numbness/tingling.  Reports intermittent nonbloody vomiting and diarrhea with no fever/chills, cough, rashes, dysuria, or hematuria. Reports decreased PO intake     Received 1L IVF, azithro, zosyn, vanc, and solumedrol 100mg IVP x1 in ED.    (31 Dec 2022 16:20)    PERTINENT PM/SXH:   Psoriasis    Esophageal mass    Benign essential HTN        FAMILY HISTORY:    Family Hx substance abuse [ ]yes [ ]no  ITEMS NOT CHECKED ARE NOT PRESENT    SOCIAL HISTORY:   Significant other/partner[ ]  Children[ ]  Mosque/Spirituality:  Substance hx:  [ ]   Tobacco hx:  [ ]   Alcohol hx: [ ]   Home Opioid hx:  [ ] I-Stop Reference No:  Living Situation: [ ]Home  [ ]Long term care  [ ]Rehab [ ]Other    ADVANCE DIRECTIVES:    DNR/MOLST  [ ]  Living Will  [ ]   DECISION MAKER(s):  [ ] Health Care Proxy(s)  [ ] Surrogate(s)  [ ] Guardian           Name(s): Phone Number(s):    BASELINE (I)ADL(s) (prior to admission):  [place]: [ ]Total  [ ] Moderate [ ]Dependent    Allergies    No Known Allergies    Intolerances    MEDICATIONS  (STANDING):  chlorhexidine 0.12% Liquid 15 milliLiter(s) Oral Mucosa every 12 hours  chlorhexidine 2% Cloths 1 Application(s) Topical daily  chlorhexidine 4% Liquid 1 Application(s) Topical <User Schedule>  EPINEPHrine    Infusion 0.2 MICROgram(s)/kG/Min (65.4 mL/Hr) IV Continuous <Continuous>  fentaNYL   Infusion... 0.5 MICROgram(s)/kG/Hr (2.18 mL/Hr) IV Continuous <Continuous>  hydrocortisone sodium succinate Injectable 100 milliGRAM(s) IV Push every 8 hours  influenza  Vaccine (HIGH DOSE) 0.7 milliLiter(s) IntraMuscular once  norepinephrine Infusion 0.05 MICROgram(s)/kG/Min (8.18 mL/Hr) IV Continuous <Continuous>  pantoprazole  Injectable 40 milliGRAM(s) IV Push two times a day  piperacillin/tazobactam IVPB.. 3.375 Gram(s) IV Intermittent every 12 hours  propofol Infusion 50 MICROgram(s)/kG/Min (26.2 mL/Hr) IV Continuous <Continuous>  sodium bicarbonate  Infusion 0.258 mEq/kG/Hr (150 mL/Hr) IV Continuous <Continuous>  sodium bicarbonate  Injectable 50 milliEquivalent(s) IV Push once  vasopressin Infusion 0.04 Unit(s)/Min (6 mL/Hr) IV Continuous <Continuous>    MEDICATIONS  (PRN):  fentaNYL    Injectable 50 MICROGram(s) IV Push every 2 hours PRN uptitration    PRESENT SYMPTOMS: [ ]Unable to self-report  [ ] CPOT [ ] PAINADs [ ] RDOS  Source if other than patient:  [ ]Family   [ ]Team     Pain: [ ]yes [ ]no  QOL impact -   Location -                    Aggravating factors -  Quality -  Radiation -  Timing-  Severity (0-10 scale):  Minimal acceptable level (0-10 scale):     CPOT:    https://www.sccm.org/getattachment/[email or web address]-[email or web address]/Critical-Care-Pain-Observation-Tool-(CPOT)    PAIN AD Score:   http://[email or web address].[email or web address]/cog/painad.pdf (press ctrl +  left click to view)    Dyspnea:                           [ ]Mild [ ]Moderate [ ]Severe      RDOS:  0 to 2  minimal or no respiratory distress   3  mild distress  4 to 6 moderate distress  >7 severe distress  [email or web address]://[email or web address]/[email or web address]/hen/Respiratory_Distress_Observation_Scale.pdf (Ctrl +  left click to view)     Anxiety:                             [ ]Mild [ ]Moderate [ ]Severe  Fatigue:                             [ ]Mild [ ]Moderate [ ]Severe  Nausea:                             [ ]Mild [ ]Moderate [ ]Severe  Loss of appetite:              [ ]Mild [ ]Moderate [ ]Severe  Constipation:                    [ ]Mild [ ]Moderate [ ]Severe    PCSSQ[Palliative Care Spiritual Screening Question]   Severity (0-10):  Score of 4 or > indicate consideration of Chaplaincy referral.  Chaplaincy Referral: [ ] yes [ ] refused [ ] following [ ] Deferred     Caregiver [place]? : [ ] yes [ ] no [ ] Deferred [ ] Declined             Social work referral [ ] Patient & Family Centered Care Referral [ ]     Anticipatory Grief present?:  [ ] yes [ ] no  [ ] Deferred                  Social work referral [ ] Chaplaincy Referral[ ]      Other Symptoms:  [ ]All other review of systems negative     Palliative Performance Status Version 2:         %    [email or web address]_performance_[email or web address]  PHYSICAL EXAM:  Vital Signs Last 24 Hrs  T(C): 35.5 (05 Jan 2023 12:00), Max: 36.6 (04 Jan 2023 20:30)  T(F): 95.9 (05 Jan 2023 12:00), Max: 97.8 (04 Jan 2023 20:30)  HR: 108 (05 Jan 2023 12:45) (77 - 108)  BP: 105/62 (05 Jan 2023 12:00) (89/60 - 115/74)  BP(mean): 78 (05 Jan 2023 12:00) (69 - 86)  RR: 26 (05 Jan 2023 12:45) (17 - 30)  SpO2: 92% (05 Jan 2023 12:45) (90% - 100%)    Parameters below as of 05 Jan 2023 06:23  Patient On (Oxygen Delivery Method): ventilator     I&O's Summary    04 Jan 2023 07:01  -  05 Jan 2023 07:00  --------------------------------------------------------  IN: 0 mL / OUT: 15 mL / NET: -15 mL    05 Jan 2023 07:01  -  05 Jan 2023 13:36  --------------------------------------------------------  IN: 1184.9 mL / OUT: 40 mL / NET: 1144.9 mL      GENERAL: [ ]Cachexia    [ ]Alert  [ ]Oriented x   [ ]Lethargic  [ ]Unarousable  [ ]Verbal  [ ]Non-Verbal  Behavioral:   [ ] Anxiety  [ ] Delirium [ ] Agitation [ ] Other  HEENT:  [ ]Normal   [ ]Dry mouth   [ ]ET Tube/Trach  [ ]Oral lesions  PULMONARY:   [ ]Clear [ ]Tachypnea  [ ]Audible excessive secretions   [ ]Rhonchi        [ ]Right [ ]Left [ ]Bilateral  [ ]Crackles        [ ]Right [ ]Left [ ]Bilateral  [ ]Wheezing     [ ]Right [ ]Left [ ]Bilateral  [ ]Diminished breath sounds [ ]right [ ]left [ ]bilateral  CARDIOVASCULAR:    [ ]Regular [ ]Irregular [ ]Tachy  [ ]Carroll [ ]Murmur [ ]Other  GASTROINTESTINAL:  [ ]Soft  [ ]Distended   [ ]+BS  [ ]Non tender [ ]Tender  [ ]Other [ ]PEG [ ]OGT/ NGT  Last BM:  GENITOURINARY:  [ ]Normal [ ] Incontinent   [ ]Oliguria/Anuria   [ ]Pop  MUSCULOSKELETAL:   [ ]Normal   [ ]Weakness  [ ]Bed/Wheelchair bound [ ]Edema  NEUROLOGIC:   [ ]No focal deficits  [ ]Cognitive impairment  [ ]Dysphagia [ ]Dysarthria [ ]Paresis [ ]Other   SKIN:   [ ]Normal  [ ]Rash  [ ]Other  [ ]Pressure ulcer(s)       Present on admission [ ]y [ ]n    CRITICAL CARE:  [ ] Shock Present  [ ]Septic [ ]Cardiogenic [ ]Neurologic [ ]Hypovolemic  [ ]  Vasopressors [ ]  Inotropes   [ ]Respiratory failure present [ ]Mechanical ventilation [ ]Non-invasive ventilatory support [ ]High flow  Mode: AC/ CMV (Assist Control/ Continuous Mandatory Ventilation), RR (machine): 500, TV (machine): 26, FiO2: 50, PEEP: 5, ITime: 1, MAP: 13, PIP: 29  [ ]Acute  [ ]Chronic [ ]Hypoxic  [ ]Hypercarbic [ ]Other  [ ]Other organ failure     LABS:                        13.7   29.63 )-----------( 131      ( 05 Jan 2023 06:48 )             42.6   01-05    134<L>  |  87<L>  |  151<H>  ----------------------------<  113<H>  5.5<H>   |  14<L>  |  7.56<H>    Ca    6.9<L>      05 Jan 2023 06:48  Phos  11.5     01-05  Mg     3.1     01-05    TPro  5.0<L>  /  Alb  2.2<L>  /  TBili  7.0<H>  /  DBili  x   /  AST  544<H>  /  ALT  572<H>  /  AlkPhos  1341<H>  01-05  PT/INR - ( 05 Jan 2023 06:46 )   PT: 24.2 sec;   INR: 2.07 ratio         PTT - ( 05 Jan 2023 06:46 )  PTT:>200.0 sec      RADIOLOGY & ADDITIONAL STUDIES:    PROTEIN CALORIE MALNUTRITION PRESENT: [ ]mild [ ]moderate [ ]severe [ ]underweight [ ]morbid obesity  [email or web address]://[email or web address].org/vault/2440/web/files/ONC/Table_Clinical%20Characteristics%20to%20Document%[email or web address]-White%[email or web address]%20et%20al%202012.pdf    Height (cm): 177.8 (12-31-22 @ 09:14)  Weight (kg): 87.2 (12-31-22 @ 11:09)  BMI (kg/m2): 27.6 (12-31-22 @ 11:09)    [ ]PPSV2 < or = to 30% [ ]significant weight loss  [ ]poor nutritional intake  [ ]anasarca[ ]Artificial Nutrition      Other REFERRALS:  [ ]Hospice  [ ]Child Life  [ ]Social Work  [ ]Case management [ ]Holistic Therapy     Goals of Care Document:  HPI:  78 y/o M PMH HTN, HLD, psoriasis on prednisone 10mg daily presenting with SOB. Recent admission for pneumonia and dced two weeks ago also found to possible esophageal cancer (mass) with liver mets s/p IR biopsy 12/22. Today patient woke up with shortness of breath at rest, worse with exertion with no associated LOC, chest pain, palpitations, diaphoresis, focal weakness, numbness/tingling.  Reports intermittent nonbloody vomiting and diarrhea with no fever/chills, cough, rashes, dysuria, or hematuria. Reports decreased PO intake     Received 1L IVF, azithro, zosyn, vanc, and solumedrol 100mg IVP x1 in ED.    (31 Dec 2022 16:20)    PERTINENT PM/SXH:   Psoriasis    Esophageal mass    Benign essential HTN        FAMILY HISTORY:    Family Hx substance abuse [ ]yes [ ]no  ITEMS NOT CHECKED ARE NOT PRESENT    SOCIAL HISTORY:   Significant other/partner[x ]  Children[ x]  Yazidism/Spirituality:  Substance hx:  [ ]   Tobacco hx:  [ ]   Alcohol hx: [ ]   Home Opioid hx:  [ ] I-Stop Reference No:  Living Situation: [ x]Home  [ ]Long term care  [ ]Rehab [ ]Other    ADVANCE DIRECTIVES:    DNR/MOLST  [ ]  Living Will  [ ]   DECISION MAKER(s):  [ ] Health Care Proxy(s)  [ x] Surrogate(s)  [ ] Guardian           Name(s): Phone Number(s): Keila parmar [phone]     BASELINE (I)ADL(s) (prior to admission):  [place]: [ ]Total  [ x] Moderate [ ]Dependent    Allergies    No Known Allergies    Intolerances    MEDICATIONS  (STANDING):  chlorhexidine 0.12% Liquid 15 milliLiter(s) Oral Mucosa every 12 hours  chlorhexidine 2% Cloths 1 Application(s) Topical daily  chlorhexidine 4% Liquid 1 Application(s) Topical <User Schedule>  EPINEPHrine    Infusion 0.2 MICROgram(s)/kG/Min (65.4 mL/Hr) IV Continuous <Continuous>  fentaNYL   Infusion... 0.5 MICROgram(s)/kG/Hr (2.18 mL/Hr) IV Continuous <Continuous>  hydrocortisone sodium succinate Injectable 100 milliGRAM(s) IV Push every 8 hours  influenza  Vaccine (HIGH DOSE) 0.7 milliLiter(s) IntraMuscular once  norepinephrine Infusion 0.05 MICROgram(s)/kG/Min (8.18 mL/Hr) IV Continuous <Continuous>  pantoprazole  Injectable 40 milliGRAM(s) IV Push two times a day  piperacillin/tazobactam IVPB.. 3.375 Gram(s) IV Intermittent every 12 hours  propofol Infusion 50 MICROgram(s)/kG/Min (26.2 mL/Hr) IV Continuous <Continuous>  sodium bicarbonate  Infusion 0.258 mEq/kG/Hr (150 mL/Hr) IV Continuous <Continuous>  sodium bicarbonate  Injectable 50 milliEquivalent(s) IV Push once  vasopressin Infusion 0.04 Unit(s)/Min (6 mL/Hr) IV Continuous <Continuous>    MEDICATIONS  (PRN):  fentaNYL    Injectable 50 MICROGram(s) IV Push every 2 hours PRN uptitration    PRESENT SYMPTOMS: [ x]Unable to self-report  [ ] CPOT [ x] PAINADs [ x] RDOS  Source if other than patient:  [ ]Family   [ ]Team     Pain: [ ]yes [ ]no  QOL impact -   Location -                    Aggravating factors -  Quality -  Radiation -  Timing-  Severity (0-10 scale):  Minimal acceptable level (0-10 scale):     CPOT:    https://www.sccm.org/getattachment/[email or web address]-[email or web address]/Critical-Care-Pain-Observation-Tool-(CPOT)    PAIN AD Score:   http://geriatrictoolkit.[email or web address]/cog/painad.pdf (press ctrl +  left click to view)    Dyspnea:                           [ ]Mild [ ]Moderate [ ]Severe      RDOS:  0 to 2  minimal or no respiratory distress   3  mild distress  4 to 6 moderate distress  >7 severe distress  [email or web address]://[email or web address]/hen/Respiratory_Distress_Observation_Scale.pdf (Ctrl +  left click to view)     Anxiety:                             [ ]Mild [ ]Moderate [ ]Severe  Fatigue:                             [ ]Mild [ ]Moderate [ ]Severe  Nausea:                             [ ]Mild [ ]Moderate [ ]Severe  Loss of appetite:              [ ]Mild [ ]Moderate [ ]Severe  Constipation:                    [ ]Mild [ ]Moderate [ ]Severe    PCSSQ[Palliative Care Spiritual Screening Question]   Severity (0-10):  Score of 4 or > indicate consideration of Chaplaincy referral.  Chaplaincy Referral: [x ] yes [ ] refused [ ] following [ ] Deferred     Caregiver [place]? : [x ] yes [ ] no [ ] Deferred [ ] Declined             Social work referral [x ] Patient & Family Centered Care Referral [ ]     Anticipatory Grief present?:  [ x] yes [ ] no  [ ] Deferred                  Social work referral [ x] Chaplaincy Referral[ ]      Other Symptoms:  [x ]All other review of systems negative - unable to obtain 2/2 mental status    Palliative Performance Status Version 2:     10    %    http://[email or web address]/files/news/palliative_performance_scale_[email or web address].pdf  PHYSICAL EXAM:  Vital Signs Last 24 Hrs  T(C): 35.5 (05 Jan 2023 12:00), Max: 36.6 (04 Jan 2023 20:30)  T(F): 95.9 (05 Jan 2023 12:00), Max: 97.8 (04 Jan 2023 20:30)  HR: 108 (05 Jan 2023 12:45) (77 - 108)  BP: 105/62 (05 Jan 2023 12:00) (89/60 - 115/74)  BP(mean): 78 (05 Jan 2023 12:00) (69 - 86)  RR: 26 (05 Jan 2023 12:45) (17 - 30)  SpO2: 92% (05 Jan 2023 12:45) (90% - 100%)    Parameters below as of 05 Jan 2023 06:23  Patient On (Oxygen Delivery Method): ventilator     I&O's Summary    04 Jan 2023 07:01  -  05 Jan 2023 07:00  --------------------------------------------------------  IN: 0 mL / OUT: 15 mL / NET: -15 mL    05 Jan 2023 07:01  -  05 Jan 2023 13:36  --------------------------------------------------------  IN: 1184.9 mL / OUT: 40 mL / NET: 1144.9 mL      GENERAL: [ ]Cachexia    [ ]Alert  [ ]Oriented x   [ ]Lethargic  [ x]Unarousable  [ ]Verbal  [ ]Non-Verbal  Behavioral:   [ ] Anxiety  [ ] Delirium [ ] Agitation [ ] Other  HEENT:  [ ]Normal   [ ]Dry mouth   [x ]ET Tube/Trach  [ ]Oral lesions  PULMONARY: ventilator  [ ]Clear [ ]Tachypnea  [ ]Audible excessive secretions   [ ]Rhonchi        [ ]Right [ ]Left [ ]Bilateral  [ ]Crackles        [ ]Right [ ]Left [ ]Bilateral  [ ]Wheezing     [ ]Right [ ]Left [ ]Bilateral  [ ]Diminished breath sounds [ ]right [ ]left [ ]bilateral  CARDIOVASCULAR:  on telemetry  [ x]Regular [ ]Irregular [ ]Tachy  [ ]Carroll [ ]Murmur [x ]Other  GASTROINTESTINAL:  [ ]Soft  [ ]Distended   [ ]+BS  [ ]Non tender [ ]Tender  [ ]Other [ ]PEG [ ]OGT/ NGT  Last BM:  GENITOURINARY:  [ ]Normal [ ] Incontinent   [ ]Oliguria/Anuria   [ x]Pop  MUSCULOSKELETAL:   [ ]Normal   [ ]Weakness  [ x]Bed/Wheelchair bound [ ]Edema  NEUROLOGIC:   [ ]No focal deficits  [ ]Cognitive impairment  [ ]Dysphagia [ ]Dysarthria [ ]Paresis [ ]Other   SKIN:   [ ]Normal  [ ]Rash  [ ]Other  [ ]Pressure ulcer(s)       Present on admission [ ]y [ ]n    CRITICAL CARE:  [x ] Shock Present  [ ]Septic [x ]Cardiogenic [ ]Neurologic [ ]Hypovolemic  [x ]  Vasopressors [ ]  Inotropes   [x ]Respiratory failure present [x ]Mechanical ventilation [ ]Non-invasive ventilatory support [ ]High flow  Mode: AC/ CMV (Assist Control/ Continuous Mandatory Ventilation), RR (machine): 500, TV (machine): 26, FiO2: 50, PEEP: 5, ITime: 1, MAP: 13, PIP: 29  [ x]Acute  [ ]Chronic [x ]Hypoxic  [ ]Hypercarbic [ ]Other  [ ]Other organ failure     LABS:                        13.7   29.63 )-----------( 131      ( 05 Jan 2023 06:48 )             42.6   01-05    134<L>  |  87<L>  |  151<H>  ----------------------------<  113<H>  5.5<H>   |  14<L>  |  7.56<H>    Ca    6.9<L>      05 Jan 2023 06:48  Phos  11.5     01-05  Mg     3.1     01-05    TPro  5.0<L>  /  Alb  2.2<L>  /  TBili  7.0<H>  /  DBili  x   /  AST  544<H>  /  ALT  572<H>  /  AlkPhos  1341<H>  01-05  PT/INR - ( 05 Jan 2023 06:46 )   PT: 24.2 sec;   INR: 2.07 ratio         PTT - ( 05 Jan 2023 06:46 )  PTT:>200.0 sec      RADIOLOGY & ADDITIONAL STUDIES:  < from: CT Abdomen and Pelvis No Cont (01.03.23 @ 20:14) >    ACC: [id number] EXAM:  CT ABDOMEN AND PELVIS                          PROCEDURE DATE:  01/03/2023          INTERPRETATION:  CLINICAL INFORMATION: Altered mental status. Esophageal   mass, liver mass.    COMPARISON: CTA chest 12/31/2022    CONTRAST/COMPLICATIONS:  IV Contrast: NONE  Oral Contrast: NONE  Complications: None reported at time of study completion    PROCEDURE:  CT of the Abdomen and Pelvis was performed.  Sagittal and coronal reformats were performed.    FINDINGS:  Evaluation of the solid organs and vasculature is limited without   intravenous contrast.    LOWER CHEST: Partially visualized bilateral groundglass opacities and   small bilateral pleural effusions are redemonstrated.    LIVER: Nodular contour, suggestive of cirrhosis.Diffuse bilobar   hypoattenuating lesions, compatible with metastatic disease. For example   a 6.7 x 6.3 cm lesion in the right hepatic lobe (3, 34) and a 4.6 x 4.5   cm lesion left hepatic lobe (3, 21).  BILE DUCTS: Normal caliber.  GALLBLADDER: Within normal limits.  SPLEEN: Within normal limits.  PANCREAS: Within normal limits.  ADRENALS: Within normal limits.  KIDNEYS/URETERS: Bilateral renal cysts and additional hypodensities too   small characterize. No hydronephrosis. Persistent bilateral renal   nephrograms.    BLADDER: Within normal limits.  REPRODUCTIVE ORGANS: Prostate within normal limits.    BOWEL: No bowel obstruction. Colonic diverticulosis. Focal thickening at   the GE junction, which may be related to reported esophageal massversus   underdistention.  PERITONEUM: Small volume ascites.  VESSELS: Atherosclerotic changes.  RETROPERITONEUM/LYMPH NODES: Enlarged periportal nodes. For example, 3.8   x 2.1 cm portacaval lymph node (3, 52). Subcentimeter retroperitoneal   lymph nodes.  ABDOMINAL WALL: Subcutaneous edema.  BONES: Degenerative changes.    IMPRESSION:  Diffuse bilobar hepatic metastases.    Focal thickening at the GE junction which may be related to reported   esophageal mass versus underdistention.    Periportal lymphadenopathy.    Persistent bilateral renal nephrograms, suggestive of renal dysfunction.    Partially visualized bilateral lung groundglass opacities and small   bilateral pleural effusions are redemonstrated.      --- End of Report ---           MADELYN PEARCE MD; Resident Radiologist  This document has been electronically signed.  ANA MARIA LEVINE MD; Attending Radiologist  This document has been electronically signed. Jan 4 2023  9:17AM    < end of copied text >      PROTEIN CALORIE MALNUTRITION PRESENT: [ ]mild [ ]moderate [ ]severe [ ]underweight [ ]morbid obesity  https://www.andeal.org/vault/2440/web/files/ONC/Table_Clinical%20Characteristics%20to%20Document%20Malnutrition-White%20JV%20et%20al%841232.pdf    Height (cm): 177.8 (12-31-22 @ 09:14)  Weight (kg): 87.2 (12-31-22 @ 11:09)  BMI (kg/m2): 27.6 (12-31-22 @ 11:09)    [x ]PPSV2 < or = to 30% [ ]significant weight loss  [x ]poor nutritional intake  [ ]anasarca[ ]Artificial Nutrition      Other REFERRALS:  [ ]Hospice  [ ]Child Life  [x ]Social Work  [ ]Case management [ ]Holistic Therapy     Goals of Care Document:  HPI:  76 y/o M PMH HTN, HLD, psoriasis on prednisone 10mg daily presenting with SOB. Recent admission for pneumonia and dced two weeks ago also found to possible esophageal cancer (mass) with liver mets s/p IR biopsy 12/22. Today patient woke up with shortness of breath at rest, worse with exertion with no associated LOC, chest pain, palpitations, diaphoresis, focal weakness, numbness/tingling.  Reports intermittent nonbloody vomiting and diarrhea with no fever/chills, cough, rashes, dysuria, or hematuria. Reports decreased PO intake     Received 1L IVF, azithro, zosyn, vanc, and solumedrol 100mg IVP x1 in ED.    (31 Dec 2022 16:20)    PERTINENT PM/SXH:   Psoriasis    Esophageal mass    Benign essential HTN        FAMILY HISTORY:    Family Hx substance abuse [ ]yes [ ]no  ITEMS NOT CHECKED ARE NOT PRESENT    SOCIAL HISTORY:   Significant other/partner[x ]  Children[ x]  Voodoo/Spirituality:  Substance hx:  [ ]   Tobacco hx:  [ ]   Alcohol hx: [ ]   Home Opioid hx:  [ ] I-Stop Reference No:  Living Situation: [ x]Home  [ ]Long term care  [ ]Rehab [ ]Other    ADVANCE DIRECTIVES:    DNR/MOLST  [ ]  Living Will  [ ]   DECISION MAKER(s):  [ ] Health Care Proxy(s)  [ x] Surrogate(s)  [ ] Guardian           Name(s): Phone Number(s): Keila parmar [phone]     BASELINE (I)ADL(s) (prior to admission):  [place]: [ ]Total  [ x] Moderate [ ]Dependent    Allergies    No Known Allergies    Intolerances    MEDICATIONS  (STANDING):  chlorhexidine 0.12% Liquid 15 milliLiter(s) Oral Mucosa every 12 hours  chlorhexidine 2% Cloths 1 Application(s) Topical daily  chlorhexidine 4% Liquid 1 Application(s) Topical <User Schedule>  EPINEPHrine    Infusion 0.2 MICROgram(s)/kG/Min (65.4 mL/Hr) IV Continuous <Continuous>  fentaNYL   Infusion... 0.5 MICROgram(s)/kG/Hr (2.18 mL/Hr) IV Continuous <Continuous>  hydrocortisone sodium succinate Injectable 100 milliGRAM(s) IV Push every 8 hours  influenza  Vaccine (HIGH DOSE) 0.7 milliLiter(s) IntraMuscular once  norepinephrine Infusion 0.05 MICROgram(s)/kG/Min (8.18 mL/Hr) IV Continuous <Continuous>  pantoprazole  Injectable 40 milliGRAM(s) IV Push two times a day  piperacillin/tazobactam IVPB.. 3.375 Gram(s) IV Intermittent every 12 hours  propofol Infusion 50 MICROgram(s)/kG/Min (26.2 mL/Hr) IV Continuous <Continuous>  sodium bicarbonate  Infusion 0.258 mEq/kG/Hr (150 mL/Hr) IV Continuous <Continuous>  sodium bicarbonate  Injectable 50 milliEquivalent(s) IV Push once  vasopressin Infusion 0.04 Unit(s)/Min (6 mL/Hr) IV Continuous <Continuous>    MEDICATIONS  (PRN):  fentaNYL    Injectable 50 MICROGram(s) IV Push every 2 hours PRN uptitration    PRESENT SYMPTOMS: [ x]Unable to self-report  [ ] CPOT [ x] PAINADs [ x] RDOS  Source if other than patient:  [ ]Family   [ ]Team     Pain: [ ]yes [ ]no  QOL impact -   Location -                    Aggravating factors -  Quality -  Radiation -  Timing-  Severity (0-10 scale):  Minimal acceptable level (0-10 scale):     CPOT:    https://www.sccm.org/getattachment/[email or web address]-[email or web address]/Critical-Care-Pain-Observation-Tool-(CPOT)    PAIN AD Score:   http://geriatrictoolkit.[email or web address]/cog/painad.pdf (press ctrl +  left click to view)    Dyspnea:                           [ ]Mild [ ]Moderate [ ]Severe      RDOS:  0 to 2  minimal or no respiratory distress   3  mild distress  4 to 6 moderate distress  >7 severe distress  [email or web address]://[email or web address]/hen/Respiratory_Distress_Observation_Scale.pdf (Ctrl +  left click to view)     Anxiety:                             [ ]Mild [ ]Moderate [ ]Severe  Fatigue:                             [ ]Mild [ ]Moderate [ ]Severe  Nausea:                             [ ]Mild [ ]Moderate [ ]Severe  Loss of appetite:              [ ]Mild [ ]Moderate [ ]Severe  Constipation:                    [ ]Mild [ ]Moderate [ ]Severe    PCSSQ[Palliative Care Spiritual Screening Question]   Severity (0-10):  Score of 4 or > indicate consideration of Chaplaincy referral.  Chaplaincy Referral: [x ] yes [ ] refused [ ] following [ ] Deferred     Caregiver [place]? : [x ] yes [ ] no [ ] Deferred [ ] Declined             Social work referral [x ] Patient & Family Centered Care Referral [ ]     Anticipatory Grief present?:  [ x] yes [ ] no  [ ] Deferred                  Social work referral [ x] Chaplaincy Referral[ ]      Other Symptoms:  [x ]All other review of systems negative - unable to obtain 2/2 mental status    Palliative Performance Status Version 2:     10    %    http://[email or web address].org/files/news/palliative_performance_scale_[email or web address].pdf  PHYSICAL EXAM:  Vital Signs Last 24 Hrs  T(C): 35.5 (05 Jan 2023 12:00), Max: 36.6 (04 Jan 2023 20:30)  T(F): 95.9 (05 Jan 2023 12:00), Max: 97.8 (04 Jan 2023 20:30)  HR: 108 (05 Jan 2023 12:45) (77 - 108)  BP: 105/62 (05 Jan 2023 12:00) (89/60 - 115/74)  BP(mean): 78 (05 Jan 2023 12:00) (69 - 86)  RR: 26 (05 Jan 2023 12:45) (17 - 30)  SpO2: 92% (05 Jan 2023 12:45) (90% - 100%)    Parameters below as of 05 Jan 2023 06:23  Patient On (Oxygen Delivery Method): ventilator     I&O's Summary    04 Jan 2023 07:01  -  05 Jan 2023 07:00  --------------------------------------------------------  IN: 0 mL / OUT: 15 mL / NET: -15 mL    05 Jan 2023 07:01  -  05 Jan 2023 13:36  --------------------------------------------------------  IN: 1184.9 mL / OUT: 40 mL / NET: 1144.9 mL      GENERAL: [ ]Cachexia    [ ]Alert  [ ]Oriented x   [ ]Lethargic  [ x]Unarousable  [ ]Verbal  [ ]Non-Verbal  Behavioral:   [ ] Anxiety  [ ] Delirium [ ] Agitation [ ] Other  HEENT:  [ ]Normal   [ ]Dry mouth   [x ]ET Tube/Trach  [ ]Oral lesions  PULMONARY: ventilator  [ ]Clear [ ]Tachypnea  [ ]Audible excessive secretions   [ ]Rhonchi        [ ]Right [ ]Left [ ]Bilateral  [ ]Crackles        [ ]Right [ ]Left [ ]Bilateral  [ ]Wheezing     [ ]Right [ ]Left [ ]Bilateral  [ ]Diminished breath sounds [ ]right [ ]left [ ]bilateral  CARDIOVASCULAR:  on telemetry  [ x]Regular [ ]Irregular [ ]Tachy  [ ]Carroll [ ]Murmur [x ]Other  GASTROINTESTINAL:  [ ]Soft  [ ]Distended   [ ]+BS  [ ]Non tender [ ]Tender  [ ]Other [ ]PEG [ ]OGT/ NGT  Last BM:  GENITOURINARY:  [ ]Normal [ ] Incontinent   [ ]Oliguria/Anuria   [ x]Pop  MUSCULOSKELETAL:   [ ]Normal   [ ]Weakness  [ x]Bed/Wheelchair bound [ ]Edema  NEUROLOGIC:   [ ]No focal deficits  [ ]Cognitive impairment  [ ]Dysphagia [ ]Dysarthria [ ]Paresis [ ]Other   SKIN:   [ ]Normal  [ ]Rash  [ ]Other  [ ]Pressure ulcer(s)       Present on admission [ ]y [ ]n    CRITICAL CARE:  [x ] Shock Present  [ ]Septic [x ]Cardiogenic [ ]Neurologic [ ]Hypovolemic  [x ]  Vasopressors [ ]  Inotropes   [x ]Respiratory failure present [x ]Mechanical ventilation [ ]Non-invasive ventilatory support [ ]High flow  Mode: AC/ CMV (Assist Control/ Continuous Mandatory Ventilation), RR (machine): 500, TV (machine): 26, FiO2: 50, PEEP: 5, ITime: 1, MAP: 13, PIP: 29  [ x]Acute  [ ]Chronic [x ]Hypoxic  [ ]Hypercarbic [ ]Other  [ ]Other organ failure     LABS:                        13.7   29.63 )-----------( 131      ( 05 Jan 2023 06:48 )             42.6   01-05    134<L>  |  87<L>  |  151<H>  ----------------------------<  113<H>  5.5<H>   |  14<L>  |  7.56<H>    Ca    6.9<L>      05 Jan 2023 06:48  Phos  11.5     01-05  Mg     3.1     01-05    TPro  5.0<L>  /  Alb  2.2<L>  /  TBili  7.0<H>  /  DBili  x   /  AST  544<H>  /  ALT  572<H>  /  AlkPhos  1341<H>  01-05  PT/INR - ( 05 Jan 2023 06:46 )   PT: 24.2 sec;   INR: 2.07 ratio         PTT - ( 05 Jan 2023 06:46 )  PTT:>200.0 sec      RADIOLOGY & ADDITIONAL STUDIES:  < from: CT Abdomen and Pelvis No Cont (01.03.23 @ 20:14) >    ACC: [id number] EXAM:  CT ABDOMEN AND PELVIS                          PROCEDURE DATE:  01/03/2023          INTERPRETATION:  CLINICAL INFORMATION: Altered mental status. Esophageal   mass, liver mass.    COMPARISON: CTA chest 12/31/2022    CONTRAST/COMPLICATIONS:  IV Contrast: NONE  Oral Contrast: NONE  Complications: None reported at time of study completion    PROCEDURE:  CT of the Abdomen and Pelvis was performed.  Sagittal and coronal reformats were performed.    FINDINGS:  Evaluation of the solid organs and vasculature is limited without   intravenous contrast.    LOWER CHEST: Partially visualized bilateral groundglass opacities and   small bilateral pleural effusions are redemonstrated.    LIVER: Nodular contour, suggestive of cirrhosis.Diffuse bilobar   hypoattenuating lesions, compatible with metastatic disease. For example   a 6.7 x 6.3 cm lesion in the right hepatic lobe (3, 34) and a 4.6 x 4.5   cm lesion left hepatic lobe (3, 21).  BILE DUCTS: Normal caliber.  GALLBLADDER: Within normal limits.  SPLEEN: Within normal limits.  PANCREAS: Within normal limits.  ADRENALS: Within normal limits.  KIDNEYS/URETERS: Bilateral renal cysts and additional hypodensities too   small characterize. No hydronephrosis. Persistent bilateral renal   nephrograms.    BLADDER: Within normal limits.  REPRODUCTIVE ORGANS: Prostate within normal limits.    BOWEL: No bowel obstruction. Colonic diverticulosis. Focal thickening at   the GE junction, which may be related to reported esophageal massversus   underdistention.  PERITONEUM: Small volume ascites.  VESSELS: Atherosclerotic changes.  RETROPERITONEUM/LYMPH NODES: Enlarged periportal nodes. For example, 3.8   x 2.1 cm portacaval lymph node (3, 52). Subcentimeter retroperitoneal   lymph nodes.  ABDOMINAL WALL: Subcutaneous edema.  BONES: Degenerative changes.    IMPRESSION:  Diffuse bilobar hepatic metastases.    Focal thickening at the GE junction which may be related to reported   esophageal mass versus underdistention.    Periportal lymphadenopathy.    Persistent bilateral renal nephrograms, suggestive of renal dysfunction.    Partially visualized bilateral lung groundglass opacities and small   bilateral pleural effusions are redemonstrated.      --- End of Report ---           MADELYN PEARCE MD; Resident Radiologist  This document has been electronically signed.  ANA MARIA LEVINE MD; Attending Radiologist  This document has been electronically signed. Jan 4 2023  9:17AM    < end of copied text >      PROTEIN CALORIE MALNUTRITION PRESENT: [ ]mild [ ]moderate [ ]severe [ ]underweight [ ]morbid obesity  https://www.andeal.org/vault/2440/web/files/ONC/Table_Clinical%20Characteristics%20to%20Document%20Malnutrition-White%20JV%20et%20al%665037.pdf    Height (cm): 177.8 (12-31-22 @ 09:14)  Weight (kg): 87.2 (12-31-22 @ 11:09)  BMI (kg/m2): 27.6 (12-31-22 @ 11:09)    [x ]PPSV2 < or = to 30% [ ]significant weight loss  [x ]poor nutritional intake  [ ]anasarca[ ]Artificial Nutrition      Other REFERRALS:  [ ]Hospice  [ ]Child Life  [x ]Social Work  [ ]Case management [ ]Holistic Therapy     Goals of Care Document:  HPI:  78 y/o M PMH HTN, HLD, psoriasis on prednisone 10mg daily presenting with SOB. Recent admission for pneumonia and dced two weeks ago also found to possible esophageal cancer (mass) with liver mets s/p IR biopsy 12/22. Today patient woke up with shortness of breath at rest, worse with exertion with no associated LOC, chest pain, palpitations, diaphoresis, focal weakness, numbness/tingling.  Reports intermittent nonbloody vomiting and diarrhea with no fever/chills, cough, rashes, dysuria, or hematuria. Reports decreased PO intake     Received 1L IVF, azithro, zosyn, vanc, and solumedrol 100mg IVP x1 in ED.    (31 Dec 2022 16:20)    PERTINENT PM/SXH:   Psoriasis    Esophageal mass    Benign essential HTN        FAMILY HISTORY:    Family Hx substance abuse [ ]yes [ ]no  ITEMS NOT CHECKED ARE NOT PRESENT    SOCIAL HISTORY:   Significant other/partner[x ]  Children[ x]  Latter-day/Spirituality:  Substance hx:  [ ]   Tobacco hx:  [ ]   Alcohol hx: [ ]   Home Opioid hx:  [ ] I-Stop Reference No:  Living Situation: [ x]Home  [ ]Long term care  [ ]Rehab [ ]Other    ADVANCE DIRECTIVES:    DNR/MOLST  [ ]  Living Will  [ ]   DECISION MAKER(s):  [ ] Health Care Proxy(s)  [ x] Surrogate(s)  [ ] Guardian           Name(s): Phone Number(s): Keila parmar [phone]     BASELINE (I)ADL(s) (prior to admission):  [place]: [ ]Total  [ x] Moderate [ ]Dependent    Allergies    No Known Allergies    Intolerances    MEDICATIONS  (STANDING):  chlorhexidine 0.12% Liquid 15 milliLiter(s) Oral Mucosa every 12 hours  chlorhexidine 2% Cloths 1 Application(s) Topical daily  chlorhexidine 4% Liquid 1 Application(s) Topical <User Schedule>  EPINEPHrine    Infusion 0.2 MICROgram(s)/kG/Min (65.4 mL/Hr) IV Continuous <Continuous>  fentaNYL   Infusion... 0.5 MICROgram(s)/kG/Hr (2.18 mL/Hr) IV Continuous <Continuous>  hydrocortisone sodium succinate Injectable 100 milliGRAM(s) IV Push every 8 hours  influenza  Vaccine (HIGH DOSE) 0.7 milliLiter(s) IntraMuscular once  norepinephrine Infusion 0.05 MICROgram(s)/kG/Min (8.18 mL/Hr) IV Continuous <Continuous>  pantoprazole  Injectable 40 milliGRAM(s) IV Push two times a day  piperacillin/tazobactam IVPB.. 3.375 Gram(s) IV Intermittent every 12 hours  propofol Infusion 50 MICROgram(s)/kG/Min (26.2 mL/Hr) IV Continuous <Continuous>  sodium bicarbonate  Infusion 0.258 mEq/kG/Hr (150 mL/Hr) IV Continuous <Continuous>  sodium bicarbonate  Injectable 50 milliEquivalent(s) IV Push once  vasopressin Infusion 0.04 Unit(s)/Min (6 mL/Hr) IV Continuous <Continuous>    MEDICATIONS  (PRN):  fentaNYL    Injectable 50 MICROGram(s) IV Push every 2 hours PRN uptitration    PRESENT SYMPTOMS: [ x]Unable to self-report  [ ] CPOT [ x] PAINADs [ x] RDOS  Source if other than patient:  [ ]Family   [ ]Team     Pain: [ ]yes [ ]no  QOL impact -   Location -                    Aggravating factors -  Quality -  Radiation -  Timing-  Severity (0-10 scale):  Minimal acceptable level (0-10 scale):     CPOT:    https://www.[facility].org/getattachment/[email or web address]-2p5c-7154a7221o5c/Critical-Care-Pain-Observation-Tool-(CPOT)    PAIN AD Score:   http://[email or web address]/painad.pdf (press ctrl +  left click to view)    Dyspnea:                           [ ]Mild [ ]Moderate [ ]Severe      RDOS:  0 to 2  minimal or no respiratory distress   3  mild distress  4 to 6 moderate distress  >7 severe distress  https://[email or web address]/handouts/hen/Respiratory_Distress_Observation_Scale.pdf (Ctrl +  left click to view)     Anxiety:                             [ ]Mild [ ]Moderate [ ]Severe  Fatigue:                             [ ]Mild [ ]Moderate [ ]Severe  Nausea:                             [ ]Mild [ ]Moderate [ ]Severe  Loss of appetite:              [ ]Mild [ ]Moderate [ ]Severe  Constipation:                    [ ]Mild [ ]Moderate [ ]Severe    PCSSQ[Palliative Care Spiritual Screening Question]   Severity (0-10):  Score of 4 or > indicate consideration of Chaplaincy referral.  Chaplaincy Referral: [x ] yes [ ] refused [ ] following [ ] Deferred     Caregiver [place]? : [x ] yes [ ] no [ ] Deferred [ ] Declined             Social work referral [x ] Patient & Family Centered Care Referral [ ]     Anticipatory Grief present?:  [ x] yes [ ] no  [ ] Deferred                  Social work referral [ x] Chaplaincy Referral[ ]      Other Symptoms:  [x ]All other review of systems negative - unable to obtain 2/2 mental status    Palliative Performance Status Version 2:     10    %    [email or web address]  PHYSICAL EXAM:  Vital Signs Last 24 Hrs  T(C): 35.5 (05 Jan 2023 12:00), Max: 36.6 (04 Jan 2023 20:30)  T(F): 95.9 (05 Jan 2023 12:00), Max: 97.8 (04 Jan 2023 20:30)  HR: 108 (05 Jan 2023 12:45) (77 - 108)  BP: 105/62 (05 Jan 2023 12:00) (89/60 - 115/74)  BP(mean): 78 (05 Jan 2023 12:00) (69 - 86)  RR: 26 (05 Jan 2023 12:45) (17 - 30)  SpO2: 92% (05 Jan 2023 12:45) (90% - 100%)    Parameters below as of 05 Jan 2023 06:23  Patient On (Oxygen Delivery Method): ventilator     I&O's Summary    04 Jan 2023 07:01  -  05 Jan 2023 07:00  --------------------------------------------------------  IN: 0 mL / OUT: 15 mL / NET: -15 mL    05 Jan 2023 07:01  -  05 Jan 2023 13:36  --------------------------------------------------------  IN: 1184.9 mL / OUT: 40 mL / NET: 1144.9 mL      GENERAL: [ ]Cachexia    [ ]Alert  [ ]Oriented x   [ ]Lethargic  [ x]Unarousable  [ ]Verbal  [ ]Non-Verbal  Behavioral:   [ ] Anxiety  [ ] Delirium [ ] Agitation [ ] Other  HEENT:  [ ]Normal   [ ]Dry mouth   [x ]ET Tube/Trach  [ ]Oral lesions  PULMONARY: ventilator  [ ]Clear [ ]Tachypnea  [ ]Audible excessive secretions   [ ]Rhonchi        [ ]Right [ ]Left [ ]Bilateral  [ ]Crackles        [ ]Right [ ]Left [ ]Bilateral  [ ]Wheezing     [ ]Right [ ]Left [ ]Bilateral  [ ]Diminished breath sounds [ ]right [ ]left [ ]bilateral  CARDIOVASCULAR:  on telemetry  [ x]Regular [ ]Irregular [ ]Tachy  [ ]Carroll [ ]Murmur [x ]Other  GASTROINTESTINAL:  [ ]Soft  [ ]Distended   [ ]+BS  [ ]Non tender [ ]Tender  [ ]Other [ ]PEG [ ]OGT/ NGT  Last BM:  GENITOURINARY:  [ ]Normal [ ] Incontinent   [ ]Oliguria/Anuria   [ x]Pop  MUSCULOSKELETAL:   [ ]Normal   [ ]Weakness  [ x]Bed/Wheelchair bound [ ]Edema  NEUROLOGIC:   [ ]No focal deficits  [ ]Cognitive impairment  [ ]Dysphagia [ ]Dysarthria [ ]Paresis [ ]Other   SKIN:   [ ]Normal  [ ]Rash  [ ]Other  [ ]Pressure ulcer(s)       Present on admission [ ]y [ ]n    CRITICAL CARE:  [x ] Shock Present  [ ]Septic [x ]Cardiogenic [ ]Neurologic [ ]Hypovolemic  [x ]  Vasopressors [ ]  Inotropes   [x ]Respiratory failure present [x ]Mechanical ventilation [ ]Non-invasive ventilatory support [ ]High flow  Mode: AC/ CMV (Assist Control/ Continuous Mandatory Ventilation), RR (machine): 500, TV (machine): 26, FiO2: 50, PEEP: 5, ITime: 1, MAP: 13, PIP: 29  [ x]Acute  [ ]Chronic [x ]Hypoxic  [ ]Hypercarbic [ ]Other  [ ]Other organ failure     LABS:                        13.7   29.63 )-----------( 131      ( 05 Jan 2023 06:48 )             42.6   01-05    134<L>  |  87<L>  |  151<H>  ----------------------------<  113<H>  5.5<H>   |  14<L>  |  7.56<H>    Ca    6.9<L>      05 Jan 2023 06:48  Phos  11.5     01-05  Mg     3.1     01-05    TPro  5.0<L>  /  Alb  2.2<L>  /  TBili  7.0<H>  /  DBili  x   /  AST  544<H>  /  ALT  572<H>  /  AlkPhos  1341<H>  01-05  PT/INR - ( 05 Jan 2023 06:46 )   PT: 24.2 sec;   INR: 2.07 ratio         PTT - ( 05 Jan 2023 06:46 )  PTT:>200.0 sec      RADIOLOGY & ADDITIONAL STUDIES:  < from: CT Abdomen and Pelvis No Cont (01.03.23 @ 20:14) >    ACC: [id number] EXAM:  CT ABDOMEN AND PELVIS                          PROCEDURE DATE:  01/03/2023          INTERPRETATION:  CLINICAL INFORMATION: Altered mental status. Esophageal   mass, liver mass.    COMPARISON: CTA chest 12/31/2022    CONTRAST/COMPLICATIONS:  IV Contrast: NONE  Oral Contrast: NONE  Complications: None reported at time of study completion    PROCEDURE:  CT of the Abdomen and Pelvis was performed.  Sagittal and coronal reformats were performed.    FINDINGS:  Evaluation of the solid organs and vasculature is limited without   intravenous contrast.    LOWER CHEST: Partially visualized bilateral groundglass opacities and   small bilateral pleural effusions are redemonstrated.    LIVER: Nodular contour, suggestive of cirrhosis.Diffuse bilobar   hypoattenuating lesions, compatible with metastatic disease. For example   a 6.7 x 6.3 cm lesion in the right hepatic lobe (3, 34) and a 4.6 x 4.5   cm lesion left hepatic lobe (3, 21).  BILE DUCTS: Normal caliber.  GALLBLADDER: Within normal limits.  SPLEEN: Within normal limits.  PANCREAS: Within normal limits.  ADRENALS: Within normal limits.  KIDNEYS/URETERS: Bilateral renal cysts and additional hypodensities too   small characterize. No hydronephrosis. Persistent bilateral renal   nephrograms.    BLADDER: Within normal limits.  REPRODUCTIVE ORGANS: Prostate within normal limits.    BOWEL: No bowel obstruction. Colonic diverticulosis. Focal thickening at   the GE junction, which may be related to reported esophageal massversus   underdistention.  PERITONEUM: Small volume ascites.  VESSELS: Atherosclerotic changes.  RETROPERITONEUM/LYMPH NODES: Enlarged periportal nodes. For example, 3.8   x 2.1 cm portacaval lymph node (3, 52). Subcentimeter retroperitoneal   lymph nodes.  ABDOMINAL WALL: Subcutaneous edema.  BONES: Degenerative changes.    IMPRESSION:  Diffuse bilobar hepatic metastases.    Focal thickening at the GE junction which may be related to reported   esophageal mass versus underdistention.    Periportal lymphadenopathy.    Persistent bilateral renal nephrograms, suggestive of renal dysfunction.    Partially visualized bilateral lung groundglass opacities and small   bilateral pleural effusions are redemonstrated.      --- End of Report ---           MADELYN PEARCE MD; Resident Radiologist  This document has been electronically signed.  ANA MARIA LEVINE MD; Attending Radiologist  This document has been electronically signed. Jan 4 2023  9:17AM    < end of copied text >      PROTEIN CALORIE MALNUTRITION PRESENT: [ ]mild [ ]moderate [ ]severe [ ]underweight [ ]morbid obesity  https://www.andeal.org/vault/2440/web/files/ONC/Table_Clinical%20Characteristics%20to%20Document%20Malnutrition-White%20JV%20et%20al%534752.pdf    Height (cm): 177.8 (12-31-22 @ 09:14)  Weight (kg): 87.2 (12-31-22 @ 11:09)  BMI (kg/m2): 27.6 (12-31-22 @ 11:09)    [x ]PPSV2 < or = to 30% [ ]significant weight loss  [x ]poor nutritional intake  [ ]anasarca[ ]Artificial Nutrition      Other REFERRALS:  [ ]Hospice  [ ]Child Life  [x ]Social Work  [ ]Case management [ ]Holistic Therapy     Goals of Care Document:

## 2023-01-05 NOTE — PROGRESS NOTE ADULT - ASSESSMENT
77M hx HTN, HLD, psoriasis on prednisone 10mg daily admitted for dyspnea. 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 Oncology on board for esophageal cancer management.     #Metastatic Poorly Differentiated Adenocarcinoma  Patient recently admitted to [facility] and was found to have an esophageal mass and liver mass. IR biopsied the liver mass 12/22/22 and now pathology shows poorly differentiated adenocarcinoma favoring upper GI or pancreaticobiliary origin.  - CT chest non-con and MRI abdomen w/wo IV contrast were performed at [facility]. He was planned to get a PET/CT soon but unable to get one due to renal function  - CTA chest 12/31/22 showing pulmonary embolus (started on heparin gtt for this new finding already), indeterminate lung nodules which will require follow up CT chest w/ IV contrast in 3 months  - At this time, does not require any additional imaging based on the review of both charts  - Still awaiting results of NGS testing (HER2, PD-L1), which will determine if he is a candidate for immunotherapy  -Not a candidate for chemotherapy/ immunotherapy in the setting of post cardiac arrest as risks outweigh benefits.      Please page with questions or concerns. Will Follow with you.    Mejia Galindo M.D.  Hematology/Oncology Fellow PGY5  Pager [phone]  After 5pm, please contact on-call team.   77M hx HTN, HLD, psoriasis on prednisone 10mg daily admitted for dyspnea. 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 Oncology on board for esophageal cancer management.     #Metastatic Poorly Differentiated Adenocarcinoma  Patient recently admitted to [facility] and was found to have an esophageal mass and liver mass. IR biopsied the liver mass 12/22/22 and now pathology shows poorly differentiated adenocarcinoma favoring upper GI or pancreaticobiliary origin.  - CT chest non-con and MRI abdomen w/wo IV contrast were performed at [facility]. He was planned to get a PET/CT soon but unable to get one due to renal function  - CTA chest 12/31/22 showing pulmonary embolus (started on heparin gtt for this new finding already), indeterminate lung nodules which will require follow up CT chest w/ IV contrast in 3 months  - At this time, does not require any additional imaging based on the review of both charts  - Still awaiting results of NGS testing (HER2, PD-L1), which will determine if he is a candidate for immunotherapy  -Not a candidate for chemotherapy/ immunotherapy in the setting of post cardiac arrest as risks outweigh benefits.      Please page with questions or concerns. Will Follow with you.    Mejia Galindo M.D.  Hematology/Oncology Fellow PGY5  Pager [phone]  After 5pm, please contact on-call team.   77M hx HTN, HLD, psoriasis on prednisone 10mg daily admitted for dyspnea. 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 Oncology on board for esophageal cancer management.     #Metastatic Poorly Differentiated Adenocarcinoma  Patient recently admitted to [facility] and was found to have an esophageal mass and liver mass. IR biopsied the liver mass 12/22/22 and now pathology shows poorly differentiated adenocarcinoma favoring upper GI or pancreaticobiliary origin.  - CT chest non-con and MRI abdomen w/wo IV contrast were performed at [facility]. He was planned to get a PET/CT soon but unable to get one due to renal function  - CTA chest 12/31/22 showing pulmonary embolus (started on heparin gtt for this new finding already), indeterminate lung nodules which will require follow up CT chest w/ IV contrast in 3 months  - At this time, does not require any additional imaging based on the review of both charts  - Still awaiting results of NGS testing (HER2, PD-L1), which will determine if he is a candidate for immunotherapy  -Not a candidate for chemotherapy/ immunotherapy in the setting of post cardiac arrest as risks outweigh benefits.      Please page with questions or concerns. Will Follow with you.    Mejia Galindo M.D.  Hematology/Oncology Fellow PGY5  Pager [phone]  After 5pm, please contact on-call team.   77M hx HTN, HLD, psoriasis on prednisone 10mg daily admitted for dyspnea. 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 Oncology on board for esophageal cancer management.     #Metastatic Poorly Differentiated Adenocarcinoma  Patient recently admitted to [facility] and was found to have an esophageal mass and liver mass. IR biopsied the liver mass 12/22/22 and now pathology shows poorly differentiated adenocarcinoma favoring upper GI or pancreaticobiliary origin.  - CT chest non-con and MRI abdomen w/wo IV contrast were performed at [facility]. He was planned to get a PET/CT soon but unable to get one due to renal function  - CTA chest 12/31/22 showing pulmonary embolus (started on heparin gtt for this new finding already), indeterminate lung nodules which will require follow up CT chest w/ IV contrast in 3 months  - At this time, does not require any additional imaging based on the review of both charts  - Still awaiting results of NGS testing (HER2, PD-L1), which will determine if he is a candidate for immunotherapy  -Not a candidate for chemotherapy/ immunotherapy in the setting of post cardiac arrest as risks outweigh benefits, this was explained to family at bedside with an understanding, all questions answered.      Please page with questions or concerns. Will Follow with you.    Mejia Galindo M.D.  Hematology/Oncology Fellow PGY5  Pager [phone]  After 5pm, please contact on-call team.   77M hx HTN, HLD, psoriasis on prednisone 10mg daily admitted for dyspnea. 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 Oncology on board for esophageal cancer management.     #Metastatic Poorly Differentiated Adenocarcinoma  Patient recently admitted to [facility] and was found to have an esophageal mass and liver mass. IR biopsied the liver mass 12/22/22 and now pathology shows poorly differentiated adenocarcinoma favoring upper GI or pancreaticobiliary origin.  - CT chest non-con and MRI abdomen w/wo IV contrast were performed at [facility]. He was planned to get a PET/CT soon but unable to get one due to renal function  - CTA chest 12/31/22 showing pulmonary embolus (started on heparin gtt for this new finding already), indeterminate lung nodules which will require follow up CT chest w/ IV contrast in 3 months  - At this time, does not require any additional imaging based on the review of both charts  - Still awaiting results of NGS testing (HER2, PD-L1), which will determine if he is a candidate for immunotherapy  -Not a candidate for chemotherapy/ immunotherapy in the setting of post cardiac arrest as risks outweigh benefits, this was explained to family at bedside with an understanding, all questions answered.      Please page with questions or concerns. Will Follow with you.    Mejia Galindo M.D.  Hematology/Oncology Fellow PGY5  Pager [phone]  After 5pm, please contact on-call team.   77M hx HTN, HLD, psoriasis on prednisone 10mg daily admitted for dyspnea. 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 Oncology on board for esophageal cancer management.     #Metastatic Poorly Differentiated Adenocarcinoma  Patient recently admitted to [facility] and was found to have an esophageal mass and liver mass. IR biopsied the liver mass 12/22/22 and now pathology shows poorly differentiated adenocarcinoma favoring upper GI or pancreaticobiliary origin.  - CT chest non-con and MRI abdomen w/wo IV contrast were performed at [facility]. He was planned to get a PET/CT soon but unable to get one due to renal function  - CTA chest 12/31/22 showing pulmonary embolus (started on heparin gtt for this new finding already), indeterminate lung nodules which will require follow up CT chest w/ IV contrast in 3 months  - At this time, does not require any additional imaging based on the review of both charts  - Still awaiting results of NGS testing (HER2, PD-L1), which will determine if he is a candidate for immunotherapy  -Not a candidate for chemotherapy/ immunotherapy in the setting of post cardiac arrest as risks outweigh benefits, this was explained to family at bedside with an understanding, all questions answered.      Please page with questions or concerns. Will Follow with you.    Mejia Galindo M.D.  Hematology/Oncology Fellow PGY5  Pager [phone]  After 5pm, please contact on-call team.

## 2023-01-05 NOTE — CHART NOTE - NSCHARTNOTEFT_GEN_A_CORE
Medicine PA Episodic Note    Notified by RN: Pt pulled out his IV access and began bleeding for approximately five minutes before it stopped. Pt currently on a heparin gtt for acute PE with LE DVT. The pt subsequently verbalized that he didn't feel well.     HPI:   Patient is a 77y old male with PMH of esophageal adenocarcinoma with liver metastases    admitted for malignancy workup     Pt seen and evaluated at bedside.     Vital Signs Last 24 Hrs  T(C): 36.6 (04 Jan 2023 20:30), Max: 36.6 (04 Jan 2023 20:30)  T(F): 97.8 (04 Jan 2023 20:30), Max: 97.8 (04 Jan 2023 20:30)  HR: 88 (04 Jan 2023 20:30) (72 - 88)  BP: 111/63 (04 Jan 2023 20:30) (94/57 - 111/63)  BP(mean): --  RR: 18 (04 Jan 2023 20:30) (18 - 19)  SpO2: 92% (04 Jan 2023 20:30) (90% - 92%)    Parameters below as of 04 Jan 2023 20:30  Patient On (Oxygen Delivery Method): nasal cannula          Labs:                          13.5   22.73 )-----------( 98       ( 04 Jan 2023 07:10 )             41.9     01-03    133<L>  |  90<L>  |  134<H>  ----------------------------<  80  4.8   |  20<L>  |  6.02<H>    Ca    6.9<L>      03 Jan 2023 15:19      ABG - ( 03 Jan 2023 16:40 )  pH, Arterial: 7.33  pH, Blood: x     /  pCO2: 37    /  pO2: 42    / HCO3: 20    / Base Excess: -5.8  /  SaO2: 67.7                Radiology:    Physical Exam:  General: WN/WD NAD  Neurology: A&Ox3, nonfocal, SMALLS x 4  Head:  Normocephalic, atraumatic  Respiratory: CTA B/L  CV: RRR, S1S2, no murmur  Abdominal: Soft, NT, ND no palpable mass  MSK: No edema, + peripheral pulses, FROM all 4 extremity    Assessment & Plan:  HPI:  6 y/o M PMH HTN, HLD, psoriasis on prednisone 10mg daily presenting with SOB. Recent admission for pneumonia and dced two weeks ago also found to possible esophageal cancer (mass) with liver mets s/p IR biopsy 12/22. Today patient woke up with shortness of breath at rest, worse with exertion with no associated LOC, chest pain, palpitations, diaphoresis, focal weakness, numbness/tingling.  Reports intermittent nonbloody vomiting and diarrhea with no fever/chills, cough, rashes, dysuria, or hematuria. Reports decreased PO intake     Received 1L IVF, azithro, zosyn, vanc, and solumedrol 100mg IVP x1 in ED.    (31 Dec 2022 16:20)      Plan:        Follow up with Attending in AM.    Bill Wesley PA-C  Department of Medicine  [facility] PA Episodic Note    Notified by RN: Pt pulled out his IV access and began bleeding for approximately five minutes before it stopped. Pt currently on a heparin gtt for acute PE with LE DVT. The pt subsequently verbalized that he didn't feel well.     HPI:   76 y/o M PMH HTN, HLD, psoriasis on chronic prednisone presented with SOB. Recent admission for pneumonia and discharged approximately two weeks ago. Also found with an esophageal and liver mass s/p IR liver biopsy on 12/22/22 at [facility]. 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     Pt was admitted for acute right lower lobe PE with DVT of the LLE, pt currently on a heparin gtt.     Pt seen and evaluated at bedside. Initially, pt was lying in bed, visibly uncomfortable with labored breathing. Pt did not have oxygen on at the time. BP obtained was 97/62, spO2 93% on RA. Pt was able to answer questions and follow commands.     Vital Signs Last 24 Hrs  T(C): 36.6 (04 Jan 2023 20:30), Max: 36.6 (04 Jan 2023 20:30)  T(F): 97.8 (04 Jan 2023 20:30), Max: 97.8 (04 Jan 2023 20:30)  HR: 91 (05 Jan 2023 04:40) (72 - 91)  BP: 115/74 (05 Jan 2023 04:40) (94/57 - 115/74)  BP(mean): --  RR: 18 (05 Jan 2023 04:40) (18 - 19)  SpO2: 96% (05 Jan 2023 04:40) (90% - 96%)    Parameters below as of 05 Jan 2023 04:40  Patient On (Oxygen Delivery Method): nasal cannula  O2 Flow (L/min): 5    Labs:                          13.5   22.73 )-----------( 98       ( 04 Jan 2023 07:10 )             41.9     01-03    133<L>  |  90<L>  |  134<H>  ----------------------------<  80  4.8   |  20<L>  |  6.02<H>    Ca    6.9<L>      03 Jan 2023 15:19      ABG - ( 03 Jan 2023 16:40 )  pH, Arterial: 7.33  pH, Blood: x     /  pCO2: 37    /  pO2: 42    / HCO3: 20    / Base Excess: -5.8  /  SaO2: 67.7                Radiology:    Physical Exam:  General: WN/WD NAD  Neurology: A&Ox3, nonfocal, SMALLS x 4  Head:  Normocephalic, atraumatic  Respiratory: CTA B/L  CV: RRR, S1S2, no murmur  Abdominal: Soft, NT, ND no palpable mass  MSK: No edema, + peripheral pulses, FROM all 4 extremity    Assessment & Plan:  HPI:  8 y/o M PMH HTN, HLD, psoriasis on prednisone 10mg daily presenting with SOB. Recent admission for pneumonia and dced two weeks ago also found to possible esophageal cancer (mass) with liver mets s/p IR biopsy 12/22. Today patient woke up with shortness of breath at rest, worse with exertion with no associated LOC, chest pain, palpitations, diaphoresis, focal weakness, numbness/tingling.  Reports intermittent nonbloody vomiting and diarrhea with no fever/chills, cough, rashes, dysuria, or hematuria. Reports decreased PO intake     Received 1L IVF, azithro, zosyn, vanc, and solumedrol 100mg IVP x1 in ED.    (31 Dec 2022 16:20)      Plan:        Follow up with Attending in AM.    Bill Wesley PA-C  Department of Medicine  [facility] Medicine PA Episodic Note    Notified by RN: Pt pulled out his IV access and began bleeding for approximately five minutes before it stopped. Pt currently on a heparin gtt for acute PE with LE DVT. The pt subsequently verbalized that he didn't feel well.     HPI:   76 y/o M PMH HTN, HLD, psoriasis on chronic prednisone presented with SOB. Recent admission for pneumonia and discharged approximately two weeks ago. Also found with an esophageal and liver mass s/p IR liver biopsy on 12/22/22 at [facility]. 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     Pt was admitted for acute right lower lobe PE with DVT of the LLE, pt currently on a heparin gtt.     Pt seen and evaluated at bedside. Initially, pt was lying in bed, visibly uncomfortable with labored breathing. Pt did not have oxygen on at the time. BP obtained was 97/62, spO2 93% on RA. Pt was able to answer questions and follow commands.     Vital Signs Last 24 Hrs  T(C): 36.6 (04 Jan 2023 20:30), Max: 36.6 (04 Jan 2023 20:30)  T(F): 97.8 (04 Jan 2023 20:30), Max: 97.8 (04 Jan 2023 20:30)  HR: 91 (05 Jan 2023 04:40) (72 - 91)  BP: 115/74 (05 Jan 2023 04:40) (94/57 - 115/74)  BP(mean): --  RR: 18 (05 Jan 2023 04:40) (18 - 19)  SpO2: 96% (05 Jan 2023 04:40) (90% - 96%)    Parameters below as of 05 Jan 2023 04:40  Patient On (Oxygen Delivery Method): nasal cannula  O2 Flow (L/min): 5    Labs:                          13.5   22.73 )-----------( 98       ( 04 Jan 2023 07:10 )             41.9     01-03    133<L>  |  90<L>  |  134<H>  ----------------------------<  80  4.8   |  20<L>  |  6.02<H>    Ca    6.9<L>      03 Jan 2023 15:19      ABG - ( 03 Jan 2023 16:40 )  pH, Arterial: 7.33  pH, Blood: x     /  pCO2: 37    /  pO2: 42    / HCO3: 20    / Base Excess: -5.8  /  SaO2: 67.7                Radiology:    Physical Exam:  General: WN/WD NAD  Neurology: A&Ox3, nonfocal, SMALLS x 4  Head:  Normocephalic, atraumatic  Respiratory: CTA B/L  CV: RRR, S1S2, no murmur  Abdominal: Soft, NT, ND no palpable mass  MSK: No edema, + peripheral pulses, FROM all 4 extremity    Assessment & Plan:  HPI:  8 y/o M PMH HTN, HLD, psoriasis on prednisone 10mg daily presenting with SOB. Recent admission for pneumonia and dced two weeks ago also found to possible esophageal cancer (mass) with liver mets s/p IR biopsy 12/22. Today patient woke up with shortness of breath at rest, worse with exertion with no associated LOC, chest pain, palpitations, diaphoresis, focal weakness, numbness/tingling.  Reports intermittent nonbloody vomiting and diarrhea with no fever/chills, cough, rashes, dysuria, or hematuria. Reports decreased PO intake     Received 1L IVF, azithro, zosyn, vanc, and solumedrol 100mg IVP x1 in ED.    (31 Dec 2022 16:20)      Plan:        Follow up with Attending in AM.    Bill Wesley PA-C  Department of Medicine  [facility] Medicine PA Episodic Note    Notified by RN: Pt pulled out his IV access and began bleeding for approximately five minutes before it stopped. Pt currently on a heparin gtt for acute PE with LE DVT. The pt subsequently verbalized that he didn't feel well.     HPI:   78 y/o M PMH HTN, HLD, psoriasis on chronic prednisone presented with SOB. Recent admission for pneumonia and discharged approximately two weeks ago. Also found with an esophageal and liver mass s/p IR liver biopsy on 12/22/22 at [facility]. 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     Pt was admitted for acute right lower lobe PE with DVT of the LLE, pt currently on a heparin gtt.     Pt seen and evaluated at bedside. Initially, pt was lying in bed, visibly uncomfortable with labored breathing. Pt did not have oxygen on at the time. BP obtained was 97/62, spO2 93% on RA. Pt was able to answer questions and follow commands.     Vital Signs Last 24 Hrs  T(C): 36.6 (04 Jan 2023 20:30), Max: 36.6 (04 Jan 2023 20:30)  T(F): 97.8 (04 Jan 2023 20:30), Max: 97.8 (04 Jan 2023 20:30)  HR: 91 (05 Jan 2023 04:40) (72 - 91)  BP: 115/74 (05 Jan 2023 04:40) (94/57 - 115/74)  BP(mean): --  RR: 18 (05 Jan 2023 04:40) (18 - 19)  SpO2: 96% (05 Jan 2023 04:40) (90% - 96%)    Parameters below as of 05 Jan 2023 04:40  Patient On (Oxygen Delivery Method): nasal cannula  O2 Flow (L/min): 5    Labs:                          13.5   22.73 )-----------( 98       ( 04 Jan 2023 07:10 )             41.9     01-03    133<L>  |  90<L>  |  134<H>  ----------------------------<  80  4.8   |  20<L>  |  6.02<H>    Ca    6.9<L>      03 Jan 2023 15:19      ABG - ( 03 Jan 2023 16:40 )  pH, Arterial: 7.33  pH, Blood: x     /  pCO2: 37    /  pO2: 42    / HCO3: 20    / Base Excess: -5.8  /  SaO2: 67.7                Radiology:    Physical Exam:  General: WN/WD NAD  Neurology: A&Ox3, nonfocal, SMALLS x 4  Head:  Normocephalic, atraumatic  Respiratory: CTA B/L  CV: RRR, S1S2, no murmur  Abdominal: Soft, NT, ND no palpable mass  MSK: No edema, + peripheral pulses, FROM all 4 extremity    Assessment & Plan:  HPI:  6 y/o M PMH HTN, HLD, psoriasis on prednisone 10mg daily presenting with SOB. Recent admission for pneumonia and dced two weeks ago also found to possible esophageal cancer (mass) with liver mets s/p IR biopsy 12/22. Today patient woke up with shortness of breath at rest, worse with exertion with no associated LOC, chest pain, palpitations, diaphoresis, focal weakness, numbness/tingling.  Reports intermittent nonbloody vomiting and diarrhea with no fever/chills, cough, rashes, dysuria, or hematuria. Reports decreased PO intake     Received 1L IVF, azithro, zosyn, vanc, and solumedrol 100mg IVP x1 in ED.    (31 Dec 2022 16:20)      Plan:        Follow up with Attending in AM.    Bill Wesley PA-C  Department of Medicine  [facility] Medicine PA Episodic Note    Notified by RN: Pt pulled out his IV access and began bleeding for approximately five minutes before it stopped. Pt currently on a heparin gtt for acute PE with LE DVT. The pt subsequently verbalized that he didn't feel well.     HPI:   78 y/o M PMH HTN, HLD, psoriasis on chronic prednisone presented with SOB. Recent admission for pneumonia and discharged approximately two weeks ago. Also found with an esophageal and liver mass s/p IR liver biopsy on 12/22/22 at [facility]. 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     Pt was admitted for acute right lower lobe PE with DVT of the LLE, pt currently on a heparin gtt.     Pt seen and evaluated at bedside. Initially, pt was lying in bed, visibly uncomfortable with labored breathing. Pt did not have oxygen on at the time. BP obtained was 97/62, spO2 93% on RA. Pt was able to answer questions and follow commands. After shifting the patient to a sitting position, his SOB visibly improved.     Vital Signs Last 24 Hrs  T(C): 36.6 (04 Jan 2023 20:30), Max: 36.6 (04 Jan 2023 20:30)  T(F): 97.8 (04 Jan 2023 20:30), Max: 97.8 (04 Jan 2023 20:30)  HR: 91 (05 Jan 2023 04:40) (72 - 91)  BP: 115/74 (05 Jan 2023 04:40) (94/57 - 115/74)  BP(mean): --  RR: 18 (05 Jan 2023 04:40) (18 - 19)  SpO2: 96% (05 Jan 2023 04:40) (90% - 96%)    Parameters below as of 05 Jan 2023 04:40  Patient On (Oxygen Delivery Method): nasal cannula  O2 Flow (L/min): 5    Labs:             13.5   22.73 )-----------( 98       ( 04 Jan 2023 07:10 )             41.9     01-03    133<L>  |  90<L>  |  134<H>  ----------------------------<  80  4.8   |  20<L>  |  6.02<H>    Ca    6.9<L>      03 Jan 2023 15:19    ABG - ( 03 Jan 2023 16:40 )  pH, Arterial: 7.33  pH, Blood: x     /  pCO2: 37    /  pO2: 42    / HCO3: 20    / Base Excess: -5.8  /  SaO2: 67.7      Radiology:  < from: Xray Chest 1 View- PORTABLE-Routine (Xray Chest 1 View- PORTABLE-Routine .) (01.04.23 @ 12:21) >  ACC: [id number] EXAM:  XR CHEST PORTABLE ROUTINE 1V                          PROCEDURE DATE:  01/04/2023      INTERPRETATION:  CLINICAL INDICATION: sob    TECHNIQUE: Single frontal, portable view of the chest was obtained.    COMPARISON: Chest x-ray 12/31/2022.    FINDINGS:  The heart size is normal.  Unchanged bilateral lower lung patchy opacities.  There is no pneumothorax or pleural effusion.    IMPRESSION:  Unchanged bilateral lower lung patchy opacities.    --- End of Report ---      Physical Exam:  General: WN/WD NAD  Neurology: A&Ox2, nonfocal, SMALLS x 4  Head:  Normocephalic, atraumatic  Respiratory: mild rhonchi noted primarily noted on the right at the lower base   CV: RRR, S1S2, no murmur  Abdominal: Soft, NT, ND no palpable mass  MSK: No edema, + peripheral pulses, FROM all 4 extremity    Assessment & Plan:  HPI:  8 y/o M PMH HTN, HLD, psoriasis on prednisone 10mg daily presenting with SOB. Recent admission for pneumonia and dced two weeks ago also found to possible esophageal cancer (mass) with liver mets s/p IR biopsy 12/22. Today patient woke up with shortness of breath at rest, worse with exertion with no associated LOC, chest pain, palpitations, diaphoresis, focal weakness, numbness/tingling.  Reports intermittent nonbloody vomiting and diarrhea with no fever/chills, cough, rashes, dysuria, or hematuria. Reports decreased PO intake     Received 1L IVF, azithro, zosyn, vanc, and solumedrol 100mg IVP x1 in ED.    (31 Dec 2022 16:20)      Plan:        Follow up with Attending in AM.    Bill Wesley PA-C  Department of Medicine  [facility] Medicine PA Episodic Note    Notified by RN: Pt pulled out his IV access and began bleeding for approximately five minutes before it stopped. Pt currently on a heparin gtt for acute PE with LE DVT. The pt subsequently verbalized that he didn't feel well.     HPI:   76 y/o M PMH HTN, HLD, psoriasis on chronic prednisone presented with SOB. Recent admission for pneumonia and discharged approximately two weeks ago. Also found with an esophageal and liver mass s/p IR liver biopsy on 12/22/22 at [facility]. 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     Pt was admitted for acute right lower lobe PE with DVT of the LLE, pt currently on a heparin gtt.     Pt seen and evaluated at bedside. Initially, pt was lying in bed, visibly uncomfortable with labored breathing. Pt did not have oxygen on at the time. BP obtained was 97/62, spO2 93% on RA. Pt was able to answer questions and follow commands. After shifting the patient to a sitting position, his SOB visibly improved.     Vital Signs Last 24 Hrs  T(C): 36.6 (04 Jan 2023 20:30), Max: 36.6 (04 Jan 2023 20:30)  T(F): 97.8 (04 Jan 2023 20:30), Max: 97.8 (04 Jan 2023 20:30)  HR: 91 (05 Jan 2023 04:40) (72 - 91)  BP: 115/74 (05 Jan 2023 04:40) (94/57 - 115/74)  BP(mean): --  RR: 18 (05 Jan 2023 04:40) (18 - 19)  SpO2: 96% (05 Jan 2023 04:40) (90% - 96%)    Parameters below as of 05 Jan 2023 04:40  Patient On (Oxygen Delivery Method): nasal cannula  O2 Flow (L/min): 5    Labs:             13.5   22.73 )-----------( 98       ( 04 Jan 2023 07:10 )             41.9     01-03    133<L>  |  90<L>  |  134<H>  ----------------------------<  80  4.8   |  20<L>  |  6.02<H>    Ca    6.9<L>      03 Jan 2023 15:19    ABG - ( 03 Jan 2023 16:40 )  pH, Arterial: 7.33  pH, Blood: x     /  pCO2: 37    /  pO2: 42    / HCO3: 20    / Base Excess: -5.8  /  SaO2: 67.7      Radiology:  < from: Xray Chest 1 View- PORTABLE-Routine (Xray Chest 1 View- PORTABLE-Routine .) (01.04.23 @ 12:21) >  ACC: [id number] EXAM:  XR CHEST PORTABLE ROUTINE 1V                          PROCEDURE DATE:  01/04/2023      INTERPRETATION:  CLINICAL INDICATION: sob    TECHNIQUE: Single frontal, portable view of the chest was obtained.    COMPARISON: Chest x-ray 12/31/2022.    FINDINGS:  The heart size is normal.  Unchanged bilateral lower lung patchy opacities.  There is no pneumothorax or pleural effusion.    IMPRESSION:  Unchanged bilateral lower lung patchy opacities.    --- End of Report ---      Physical Exam:  General: WN/WD NAD  Neurology: A&Ox2, nonfocal, SMALLS x 4  Head:  Normocephalic, atraumatic  Respiratory: mild rhonchi noted primarily noted on the right at the lower base   CV: RRR, S1S2, no murmur  Abdominal: Soft, NT, ND no palpable mass  MSK: No edema, + peripheral pulses, FROM all 4 extremity    Assessment & Plan:  HPI:  8 y/o M PMH HTN, HLD, psoriasis on prednisone 10mg daily presenting with SOB. Recent admission for pneumonia and dced two weeks ago also found to possible esophageal cancer (mass) with liver mets s/p IR biopsy 12/22. Today patient woke up with shortness of breath at rest, worse with exertion with no associated LOC, chest pain, palpitations, diaphoresis, focal weakness, numbness/tingling.  Reports intermittent nonbloody vomiting and diarrhea with no fever/chills, cough, rashes, dysuria, or hematuria. Reports decreased PO intake     Received 1L IVF, azithro, zosyn, vanc, and solumedrol 100mg IVP x1 in ED.    (31 Dec 2022 16:20)      Plan:        Follow up with Attending in AM.    Bill Wesley PA-C  Department of Medicine  [facility] Medicine PA Episodic Note    Notified by RN: Pt pulled out his IV access and began bleeding for approximately five minutes before it stopped. Pt currently on a heparin gtt for acute PE with LE DVT. The pt subsequently verbalized that he didn't feel well.     HPI:   78 y/o M PMH HTN, HLD, psoriasis on chronic prednisone presented with SOB. Recent admission for pneumonia and discharged approximately two weeks ago. Also found with an esophageal and liver mass s/p IR liver biopsy on 12/22/22 at [facility]. 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     Pt was admitted for acute right lower lobe PE with DVT of the LLE, pt currently on a heparin gtt.     Pt seen and evaluated at bedside. Initially, pt was lying in bed, visibly uncomfortable with labored breathing. Pt did not have oxygen on at the time. BP obtained was 97/62, spO2 93% on RA. Pt was able to answer questions and follow commands. After shifting the patient to a sitting position, his SOB visibly improved.     Vital Signs Last 24 Hrs  T(C): 36.6 (04 Jan 2023 20:30), Max: 36.6 (04 Jan 2023 20:30)  T(F): 97.8 (04 Jan 2023 20:30), Max: 97.8 (04 Jan 2023 20:30)  HR: 91 (05 Jan 2023 04:40) (72 - 91)  BP: 115/74 (05 Jan 2023 04:40) (94/57 - 115/74)  BP(mean): --  RR: 18 (05 Jan 2023 04:40) (18 - 19)  SpO2: 96% (05 Jan 2023 04:40) (90% - 96%)    Parameters below as of 05 Jan 2023 04:40  Patient On (Oxygen Delivery Method): nasal cannula  O2 Flow (L/min): 5    Labs:             13.5   22.73 )-----------( 98       ( 04 Jan 2023 07:10 )             41.9     01-03    133<L>  |  90<L>  |  134<H>  ----------------------------<  80  4.8   |  20<L>  |  6.02<H>    Ca    6.9<L>      03 Jan 2023 15:19    ABG - ( 03 Jan 2023 16:40 )  pH, Arterial: 7.33  pH, Blood: x     /  pCO2: 37    /  pO2: 42    / HCO3: 20    / Base Excess: -5.8  /  SaO2: 67.7      Radiology:  < from: Xray Chest 1 View- PORTABLE-Routine (Xray Chest 1 View- PORTABLE-Routine .) (01.04.23 @ 12:21) >  ACC: [id number] EXAM:  XR CHEST PORTABLE ROUTINE 1V                          PROCEDURE DATE:  01/04/2023      INTERPRETATION:  CLINICAL INDICATION: sob    TECHNIQUE: Single frontal, portable view of the chest was obtained.    COMPARISON: Chest x-ray 12/31/2022.    FINDINGS:  The heart size is normal.  Unchanged bilateral lower lung patchy opacities.  There is no pneumothorax or pleural effusion.    IMPRESSION:  Unchanged bilateral lower lung patchy opacities.    --- End of Report ---      Physical Exam:  General: WN/WD NAD  Neurology: A&Ox2, nonfocal, SMALLS x 4  Head:  Normocephalic, atraumatic  Respiratory: mild rhonchi noted primarily noted on the right at the lower base   CV: RRR, S1S2, no murmur  Abdominal: Soft, NT, ND no palpable mass  MSK: No edema, + peripheral pulses, FROM all 4 extremity    Assessment & Plan:  HPI:  8 y/o M PMH HTN, HLD, psoriasis on prednisone 10mg daily presenting with SOB. Recent admission for pneumonia and dced two weeks ago also found to possible esophageal cancer (mass) with liver mets s/p IR biopsy 12/22. Today patient woke up with shortness of breath at rest, worse with exertion with no associated LOC, chest pain, palpitations, diaphoresis, focal weakness, numbness/tingling.  Reports intermittent nonbloody vomiting and diarrhea with no fever/chills, cough, rashes, dysuria, or hematuria. Reports decreased PO intake     Received 1L IVF, azithro, zosyn, vanc, and solumedrol 100mg IVP x1 in ED.    (31 Dec 2022 16:20)      Plan:        Follow up with Attending in AM.    Bill Wesley PA-C  Department of Medicine  [facility] Medicine PA Episodic Note    Notified by RN: Pt pulled out his IV access and began bleeding for approximately five minutes before it stopped. Pt currently on a heparin gtt for acute PE with LE DVT. The pt subsequently verbalized that he didn't feel well and felt SOB.    HPI:   76 y/o M PMH HTN, HLD, psoriasis on chronic prednisone presented with SOB. Recent admission for pneumonia and discharged approximately two weeks ago. Also found with an esophageal and liver mass s/p IR liver biopsy on 12/22/22 at [facility]. 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     Pt was admitted for acute right lower lobe PE with DVT of the LLE, pt currently on a heparin gtt.     Pt seen and evaluated at bedside. Initially, pt was lying in bed, visibly uncomfortable with labored breathing. Pt did not have oxygen on at the time. BP obtained was 97/62, spO2 93% on RA. Pt was able to answer questions and follow commands. After shifting the patient to a sitting position, his SOB visibly improved.     Vital Signs Last 24 Hrs  T(C): 36.6 (04 Jan 2023 20:30), Max: 36.6 (04 Jan 2023 20:30)  T(F): 97.8 (04 Jan 2023 20:30), Max: 97.8 (04 Jan 2023 20:30)  HR: 91 (05 Jan 2023 04:40) (72 - 91)  BP: 115/74 (05 Jan 2023 04:40) (94/57 - 115/74)  BP(mean): --  RR: 18 (05 Jan 2023 04:40) (18 - 19)  SpO2: 96% (05 Jan 2023 04:40) (90% - 96%)    Parameters below as of 05 Jan 2023 04:40  Patient On (Oxygen Delivery Method): nasal cannula  O2 Flow (L/min): 5    Labs:             13.5   22.73 )-----------( 98       ( 04 Jan 2023 07:10 )             41.9     01-03    133<L>  |  90<L>  |  134<H>  ----------------------------<  80  4.8   |  20<L>  |  6.02<H>    Ca    6.9<L>      03 Jan 2023 15:19    ABG - ( 03 Jan 2023 16:40 )  pH, Arterial: 7.33  pH, Blood: x     /  pCO2: 37    /  pO2: 42    / HCO3: 20    / Base Excess: -5.8  /  SaO2: 67.7      Radiology:  < from: Xray Chest 1 View- PORTABLE-Routine (Xray Chest 1 View- PORTABLE-Routine .) (01.04.23 @ 12:21) >  ACC: [id number] EXAM:  XR CHEST PORTABLE ROUTINE 1V                          PROCEDURE DATE:  01/04/2023      INTERPRETATION:  CLINICAL INDICATION: sob    TECHNIQUE: Single frontal, portable view of the chest was obtained.    COMPARISON: Chest x-ray 12/31/2022.    FINDINGS:  The heart size is normal.  Unchanged bilateral lower lung patchy opacities.  There is no pneumothorax or pleural effusion.    IMPRESSION:  Unchanged bilateral lower lung patchy opacities.    --- End of Report ---    Physical Exam:  General: WN/WD NAD  Neurology: A&Ox2, nonfocal, SMALLS x 4  Head:  Normocephalic, atraumatic  Respiratory: mild rhonchi noted primarily noted on the right at the lower base   CV: RRR, S1S2, no murmur  Abdominal: Soft, NT, ND no palpable mass  MSK: No edema, + peripheral pulses      Assessment & Plan: SOB likely 2/2 pulmonary edema/fluid overload,   - Administered scheduled dose of Midodrine early to maintain BP, and subsequently AM dose of Bumex   - Obtained STAT aPTT and CBC (aPTT resulted subtherapeutic at 41.8. H/H was stable)   - Before further action could have been taken, CODE ZULEYMA was called. Pleaser refer to note.    Will endorse to AM Medicine Team     Eryn Sampson PA-C  Department of Medicine  [facility] Episodic Note    Notified by RN: Pt pulled out his IV access and began bleeding for approximately five minutes before it stopped. Pt currently on a heparin gtt for acute PE with LE DVT. The pt subsequently verbalized that he didn't feel well and felt SOB.    HPI:   78 y/o M PMH HTN, HLD, psoriasis on chronic prednisone presented with SOB. Recent admission for pneumonia and discharged approximately two weeks ago. Also found with an esophageal and liver mass s/p IR liver biopsy on 12/22/22 at [facility]. 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     Pt was admitted for acute right lower lobe PE with DVT of the LLE, pt currently on a heparin gtt.     Pt seen and evaluated at bedside. Initially, pt was lying in bed, visibly uncomfortable with labored breathing. Pt did not have oxygen on at the time. BP obtained was 97/62, spO2 93% on RA. Pt was able to answer questions and follow commands. After shifting the patient to a sitting position, his SOB visibly improved.     Vital Signs Last 24 Hrs  T(C): 36.6 (04 Jan 2023 20:30), Max: 36.6 (04 Jan 2023 20:30)  T(F): 97.8 (04 Jan 2023 20:30), Max: 97.8 (04 Jan 2023 20:30)  HR: 91 (05 Jan 2023 04:40) (72 - 91)  BP: 115/74 (05 Jan 2023 04:40) (94/57 - 115/74)  BP(mean): --  RR: 18 (05 Jan 2023 04:40) (18 - 19)  SpO2: 96% (05 Jan 2023 04:40) (90% - 96%)    Parameters below as of 05 Jan 2023 04:40  Patient On (Oxygen Delivery Method): nasal cannula  O2 Flow (L/min): 5    Labs:             13.5   22.73 )-----------( 98       ( 04 Jan 2023 07:10 )             41.9     01-03    133<L>  |  90<L>  |  134<H>  ----------------------------<  80  4.8   |  20<L>  |  6.02<H>    Ca    6.9<L>      03 Jan 2023 15:19    ABG - ( 03 Jan 2023 16:40 )  pH, Arterial: 7.33  pH, Blood: x     /  pCO2: 37    /  pO2: 42    / HCO3: 20    / Base Excess: -5.8  /  SaO2: 67.7      Radiology:  < from: Xray Chest 1 View- PORTABLE-Routine (Xray Chest 1 View- PORTABLE-Routine .) (01.04.23 @ 12:21) >  ACC: [id number] EXAM:  XR CHEST PORTABLE ROUTINE 1V                          PROCEDURE DATE:  01/04/2023      INTERPRETATION:  CLINICAL INDICATION: sob    TECHNIQUE: Single frontal, portable view of the chest was obtained.    COMPARISON: Chest x-ray 12/31/2022.    FINDINGS:  The heart size is normal.  Unchanged bilateral lower lung patchy opacities.  There is no pneumothorax or pleural effusion.    IMPRESSION:  Unchanged bilateral lower lung patchy opacities.    --- End of Report ---    Physical Exam:  General: WN/WD NAD  Neurology: A&Ox2, nonfocal, SMALLS x 4  Head:  Normocephalic, atraumatic  Respiratory: mild rhonchi noted primarily noted on the right at the lower base   CV: RRR, S1S2, no murmur  Abdominal: Soft, NT, ND no palpable mass  MSK: No edema, + peripheral pulses      Assessment & Plan: SOB likely 2/2 pulmonary edema/fluid overload,   - Administered scheduled dose of Midodrine early to maintain BP, and subsequently AM dose of Bumex   - Obtained STAT aPTT and CBC (aPTT resulted subtherapeutic at 41.8. H/H was stable)   - Before further action could have been taken, CODE ZULEYMA was called. Pleaser refer to note.    Will endorse to AM Medicine Team     Eryn Sampson PA-C  Department of Medicine  [facility] Episodic Note    Notified by RN: Pt pulled out his IV access and began bleeding for approximately five minutes before it stopped. Pt currently on a heparin gtt for acute PE with LE DVT. The pt subsequently verbalized that he didn't feel well and felt SOB.    HPI:   76 y/o M PMH HTN, HLD, psoriasis on chronic prednisone presented with SOB. Recent admission for pneumonia and discharged approximately two weeks ago. Also found with an esophageal and liver mass s/p IR liver biopsy on 12/22/22 at [facility]. 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     Pt was admitted for acute right lower lobe PE with DVT of the LLE, pt currently on a heparin gtt.     Pt seen and evaluated at bedside. Initially, pt was lying in bed, visibly uncomfortable with labored breathing. Pt did not have oxygen on at the time. BP obtained was 97/62, spO2 93% on RA. Pt was able to answer questions and follow commands. After shifting the patient to a sitting position, his SOB visibly improved.     Vital Signs Last 24 Hrs  T(C): 36.6 (04 Jan 2023 20:30), Max: 36.6 (04 Jan 2023 20:30)  T(F): 97.8 (04 Jan 2023 20:30), Max: 97.8 (04 Jan 2023 20:30)  HR: 91 (05 Jan 2023 04:40) (72 - 91)  BP: 115/74 (05 Jan 2023 04:40) (94/57 - 115/74)  BP(mean): --  RR: 18 (05 Jan 2023 04:40) (18 - 19)  SpO2: 96% (05 Jan 2023 04:40) (90% - 96%)    Parameters below as of 05 Jan 2023 04:40  Patient On (Oxygen Delivery Method): nasal cannula  O2 Flow (L/min): 5    Labs:             13.5   22.73 )-----------( 98       ( 04 Jan 2023 07:10 )             41.9     01-03    133<L>  |  90<L>  |  134<H>  ----------------------------<  80  4.8   |  20<L>  |  6.02<H>    Ca    6.9<L>      03 Jan 2023 15:19    ABG - ( 03 Jan 2023 16:40 )  pH, Arterial: 7.33  pH, Blood: x     /  pCO2: 37    /  pO2: 42    / HCO3: 20    / Base Excess: -5.8  /  SaO2: 67.7      Radiology:  < from: Xray Chest 1 View- PORTABLE-Routine (Xray Chest 1 View- PORTABLE-Routine .) (01.04.23 @ 12:21) >  ACC: [id number] EXAM:  XR CHEST PORTABLE ROUTINE 1V                          PROCEDURE DATE:  01/04/2023      INTERPRETATION:  CLINICAL INDICATION: sob    TECHNIQUE: Single frontal, portable view of the chest was obtained.    COMPARISON: Chest x-ray 12/31/2022.    FINDINGS:  The heart size is normal.  Unchanged bilateral lower lung patchy opacities.  There is no pneumothorax or pleural effusion.    IMPRESSION:  Unchanged bilateral lower lung patchy opacities.    --- End of Report ---    Physical Exam:  General: WN/WD NAD  Neurology: A&Ox2, nonfocal, SMALLS x 4  Head:  Normocephalic, atraumatic  Respiratory: mild rhonchi noted primarily noted on the right at the lower base   CV: RRR, S1S2, no murmur  Abdominal: Soft, NT, ND no palpable mass  MSK: No edema, + peripheral pulses      Assessment & Plan: SOB likely 2/2 pulmonary edema/fluid overload,   - Administered scheduled dose of Midodrine early to maintain BP, and subsequently AM dose of Bumex   - Obtained STAT aPTT and CBC (aPTT resulted subtherapeutic at 41.8. H/H was stable)   - Before further action could have been taken, CODE ZULEYMA was called. Pleaser refer to note.    Will endorse to AM Medicine Team     Eryn Sampson PA-C  Department of Medicine  [facility] Episodic Note    Notified by RN: Pt pulled out his IV access and began bleeding for approximately five minutes before it stopped. prior to removal by patient, was on a heparin gtt for acute PE with LE DVT. The pt subsequently verbalized that he didn't feel well and felt SOB.    HPI:   76 y/o M PMH HTN, HLD, psoriasis on chronic prednisone presented with SOB. Recent admission for pneumonia and discharged approximately two weeks ago. Also found with an esophageal and liver mass s/p IR liver biopsy on 12/22/22 at [facility]. 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     Pt was admitted for acute right lower lobe PE with DVT of the LLE, placed on a hep gtt.     Pt seen and evaluated at bedside. Initially, pt was lying in bed, visibly uncomfortable with labored breathing. Pt did not have oxygen on at the time. BP obtained was 97/62, spO2 93% on RA. Pt was able to answer questions and follow commands. After shifting the patient to a sitting position, his SOB visibly improved.     Vital Signs Last 24 Hrs  T(C): 36.6 (04 Jan 2023 20:30), Max: 36.6 (04 Jan 2023 20:30)  T(F): 97.8 (04 Jan 2023 20:30), Max: 97.8 (04 Jan 2023 20:30)  HR: 91 (05 Jan 2023 04:40) (72 - 91)  BP: 115/74 (05 Jan 2023 04:40) (94/57 - 115/74)  BP(mean): --  RR: 18 (05 Jan 2023 04:40) (18 - 19)  SpO2: 96% (05 Jan 2023 04:40) (90% - 96%)    Parameters below as of 05 Jan 2023 04:40  Patient On (Oxygen Delivery Method): nasal cannula  O2 Flow (L/min): 5    Labs:             13.5   22.73 )-----------( 98       ( 04 Jan 2023 07:10 )             41.9     01-03    133<L>  |  90<L>  |  134<H>  ----------------------------<  80  4.8   |  20<L>  |  6.02<H>    Ca    6.9<L>      03 Jan 2023 15:19    ABG - ( 03 Jan 2023 16:40 )  pH, Arterial: 7.33  pH, Blood: x     /  pCO2: 37    /  pO2: 42    / HCO3: 20    / Base Excess: -5.8  /  SaO2: 67.7      Radiology:  < from: Xray Chest 1 View- PORTABLE-Routine (Xray Chest 1 View- PORTABLE-Routine .) (01.04.23 @ 12:21) >  ACC: [id number] EXAM:  XR CHEST PORTABLE ROUTINE 1V                          PROCEDURE DATE:  01/04/2023      INTERPRETATION:  CLINICAL INDICATION: sob    TECHNIQUE: Single frontal, portable view of the chest was obtained.    COMPARISON: Chest x-ray 12/31/2022.    FINDINGS:  The heart size is normal.  Unchanged bilateral lower lung patchy opacities.  There is no pneumothorax or pleural effusion.    IMPRESSION:  Unchanged bilateral lower lung patchy opacities.    --- End of Report ---    Physical Exam:  General: WN/WD NAD  Neurology: A&Ox2, nonfocal, SMALLS x 4  Head:  Normocephalic, atraumatic  Respiratory: mild rhonchi noted primarily noted on the right at the lower base   CV: RRR, S1S2, no murmur  Abdominal: Soft, NT, ND no palpable mass  MSK: No edema, + peripheral pulses      Assessment & Plan: SOB likely 2/2 pulmonary edema/fluid overload,   - Administered scheduled AM dose of Midodrine early to maintain BP, and subsequently scheduled AM dose of Bumex   - Obtained STAT aPTT and CBC (aPTT resulted subtherapeutic at 41.8. H/H was stable)   - Before further action could have been taken, CODE ZULEYMA was called. Pleaser refer to note.    Will endorse to AM Medicine Team     Eryn Sampson PA-C  Department of Medicine  [facility] Episodic Note    Notified by RN: Pt pulled out his IV access and began bleeding for approximately five minutes before it stopped. prior to removal by patient, was on a heparin gtt for acute PE with LE DVT. The pt subsequently verbalized that he didn't feel well and felt SOB.    HPI:   76 y/o M PMH HTN, HLD, psoriasis on chronic prednisone presented with SOB. Recent admission for pneumonia and discharged approximately two weeks ago. Also found with an esophageal and liver mass s/p IR liver biopsy on 12/22/22 at [facility]. 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     Pt was admitted for acute right lower lobe PE with DVT of the LLE, placed on a hep gtt.     Pt seen and evaluated at bedside. Initially, pt was lying in bed, visibly uncomfortable with labored breathing. Pt did not have oxygen on at the time. BP obtained was 97/62, spO2 93% on RA. Pt was able to answer questions and follow commands. After shifting the patient to a sitting position, his SOB visibly improved.     Vital Signs Last 24 Hrs  T(C): 36.6 (04 Jan 2023 20:30), Max: 36.6 (04 Jan 2023 20:30)  T(F): 97.8 (04 Jan 2023 20:30), Max: 97.8 (04 Jan 2023 20:30)  HR: 91 (05 Jan 2023 04:40) (72 - 91)  BP: 115/74 (05 Jan 2023 04:40) (94/57 - 115/74)  BP(mean): --  RR: 18 (05 Jan 2023 04:40) (18 - 19)  SpO2: 96% (05 Jan 2023 04:40) (90% - 96%)    Parameters below as of 05 Jan 2023 04:40  Patient On (Oxygen Delivery Method): nasal cannula  O2 Flow (L/min): 5    Labs:             13.5   22.73 )-----------( 98       ( 04 Jan 2023 07:10 )             41.9     01-03    133<L>  |  90<L>  |  134<H>  ----------------------------<  80  4.8   |  20<L>  |  6.02<H>    Ca    6.9<L>      03 Jan 2023 15:19    ABG - ( 03 Jan 2023 16:40 )  pH, Arterial: 7.33  pH, Blood: x     /  pCO2: 37    /  pO2: 42    / HCO3: 20    / Base Excess: -5.8  /  SaO2: 67.7      Radiology:  < from: Xray Chest 1 View- PORTABLE-Routine (Xray Chest 1 View- PORTABLE-Routine .) (01.04.23 @ 12:21) >  ACC: [id number] EXAM:  XR CHEST PORTABLE ROUTINE 1V                          PROCEDURE DATE:  01/04/2023      INTERPRETATION:  CLINICAL INDICATION: sob    TECHNIQUE: Single frontal, portable view of the chest was obtained.    COMPARISON: Chest x-ray 12/31/2022.    FINDINGS:  The heart size is normal.  Unchanged bilateral lower lung patchy opacities.  There is no pneumothorax or pleural effusion.    IMPRESSION:  Unchanged bilateral lower lung patchy opacities.    --- End of Report ---    Physical Exam:  General: WN/WD NAD  Neurology: A&Ox2, nonfocal, SMALLS x 4  Head:  Normocephalic, atraumatic  Respiratory: mild rhonchi noted primarily noted on the right at the lower base   CV: RRR, S1S2, no murmur  Abdominal: Soft, NT, ND no palpable mass  MSK: No edema, + peripheral pulses      Assessment & Plan: SOB likely 2/2 pulmonary edema/fluid overload,   - Administered scheduled AM dose of Midodrine early to maintain BP, and subsequently scheduled AM dose of Bumex   - Obtained STAT aPTT and CBC (aPTT resulted subtherapeutic at 41.8. H/H was stable)   - Before further action could have been taken, CODE ZULEYMA was called. Pleaser refer to note.    Will endorse to AM Medicine Team     Eryn Sampson PA-C  Department of Medicine  [facility] PA Episodic Note    Notified by RN: Pt pulled out his IV access and began bleeding for approximately five minutes before it stopped. prior to removal by patient, was on a heparin gtt for acute PE with LE DVT. The pt subsequently verbalized that he didn't feel well and felt SOB.    HPI:   76 y/o M PMH HTN, HLD, psoriasis on chronic prednisone presented with SOB. Recent admission for pneumonia and discharged approximately two weeks ago. Also found with an esophageal and liver mass s/p IR liver biopsy on 12/22/22 at [facility]. 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     Pt was admitted for acute right lower lobe PE with DVT of the LLE, placed on a hep gtt.     Pt seen and evaluated at bedside. Initially, pt was lying in bed, visibly uncomfortable with labored breathing. Pt did not have oxygen on at the time. BP obtained was 97/62, spO2 93% on RA. Pt was able to answer questions and follow commands. After shifting the patient to a sitting position, his SOB visibly improved.     Vital Signs Last 24 Hrs  T(C): 36.6 (04 Jan 2023 20:30), Max: 36.6 (04 Jan 2023 20:30)  T(F): 97.8 (04 Jan 2023 20:30), Max: 97.8 (04 Jan 2023 20:30)  HR: 91 (05 Jan 2023 04:40) (72 - 91)  BP: 115/74 (05 Jan 2023 04:40) (94/57 - 115/74)  BP(mean): --  RR: 18 (05 Jan 2023 04:40) (18 - 19)  SpO2: 96% (05 Jan 2023 04:40) (90% - 96%)    Parameters below as of 05 Jan 2023 04:40  Patient On (Oxygen Delivery Method): nasal cannula  O2 Flow (L/min): 5    Labs:             13.5   22.73 )-----------( 98       ( 04 Jan 2023 07:10 )             41.9     01-03    133<L>  |  90<L>  |  134<H>  ----------------------------<  80  4.8   |  20<L>  |  6.02<H>    Ca    6.9<L>      03 Jan 2023 15:19    ABG - ( 03 Jan 2023 16:40 )  pH, Arterial: 7.33  pH, Blood: x     /  pCO2: 37    /  pO2: 42    / HCO3: 20    / Base Excess: -5.8  /  SaO2: 67.7      Radiology:  < from: Xray Chest 1 View- PORTABLE-Routine (Xray Chest 1 View- PORTABLE-Routine .) (01.04.23 @ 12:21) >  ACC: [id number] EXAM:  XR CHEST PORTABLE ROUTINE 1V                          PROCEDURE DATE:  01/04/2023      INTERPRETATION:  CLINICAL INDICATION: sob    TECHNIQUE: Single frontal, portable view of the chest was obtained.    COMPARISON: Chest x-ray 12/31/2022.    FINDINGS:  The heart size is normal.  Unchanged bilateral lower lung patchy opacities.  There is no pneumothorax or pleural effusion.    IMPRESSION:  Unchanged bilateral lower lung patchy opacities.    --- End of Report ---    Physical Exam:  General: WN/WD NAD  Neurology: A&Ox2, nonfocal, SMALLS x 4  Head:  Normocephalic, atraumatic  Respiratory: mild rhonchi noted primarily noted on the right at the lower base   CV: RRR, S1S2, no murmur  Abdominal: Soft, NT, ND no palpable mass  MSK: No edema, + peripheral pulses      Assessment & Plan: SOB likely 2/2 pulmonary edema/fluid overload,   - Administered scheduled AM dose of Midodrine early to maintain BP, and subsequently scheduled AM dose of Bumex   - Obtained STAT aPTT and CBC (aPTT resulted subtherapeutic at 41.8. H/H was stable)   - Before further action could have been taken, CODE ZULEYMA was called. Pleaser refer to note.    Will endorse to AM Medicine Team     Eryn Sampson PA-C  Department of Medicine  [facility]

## 2023-01-05 NOTE — CONSULT NOTE ADULT - CONVERSATION DETAILS
Met with family today at bedside, introduced self and role of palliative care  Discussed current clinical situation- family describes how patient was doing at previously hospitalization earlier in december, followed by recommendation from their PCP for him to be evaluated here. Family aware of possible gastric/esophageal cancer, strokes and cardiac arrest; family remains hopeful for recovery. I explained that patient at this time is receiving full life support including ventilator and artificial blood pressure support. I explained that cardiac arrest means a period of time where other organs are not receiving good oxygen or blood flow, and may not represent a reversible situation. I explained that although the team is doing everything they can at this point to support him, that performing CPR is unlikely to have a positive outcome, and we would recommend consideration for DNR. Family appropriately sad. They state  has come by. Contact information for our team provided and assured ongoing availability during clinical course, especially if need for future family meetings needed.

## 2023-01-05 NOTE — PROGRESS NOTE ADULT - ASSESSMENT
76 y/o M PMH HTN, HLD, psoriasis on prednisone, . Recent admission for pneumonia and dced two weeks ago also found to possible esophageal cancer (mass) with liver mets s/p IR biopsy 12/2 presenting with SOB and now  with LIVIER and pulmonary emboli and + DVT     Oliguric LIVIER likely pre renal in light of low BP-distributive shock and now worse LIVIER;  Hyperphosphatemia   Metabolic acidosis   Respiratory shock and now mechanical intubation   Lacunar infarct - New   Esophageal cancer (mass) with liver mets s/p IR biopsy 12/22  Hypotension on IV steroids   Hepatitis - New       RECOMMEND     Renal-Unfortunately he has many comorbidities at present and in [facility];  Pressors at present are prohibitive to any form of renal replacement as can lead to worsening hemodynamic compromise.    Will re evaluate in am- This was explained to the wife   Off diuretics   Endo- hydrocortisone 100mg IVP tid     CVS-Pressors to maintain MAP    Pulm-Mechanical intubation    Sedation     DW ICU attd in detail     There is no hematuria or bubbles in the urine.  No history of NSAIDS or nephrolithisis.  The patient urinates once or twice in the night and there is no incontinence.  No family hx or renal disease or back pain.    No recent abx use.  No alleviating or aggravating factors with respect to the kidneys.     Sayed Futubra   [phone]    78 y/o M PMH HTN, HLD, psoriasis on prednisone, . Recent admission for pneumonia and dced two weeks ago also found to possible esophageal cancer (mass) with liver mets s/p IR biopsy 12/2 presenting with SOB and now  with LIVIER and pulmonary emboli and + DVT     Oliguric LIVIER likely pre renal in light of low BP-distributive shock and now worse LIVIER;  Hyperphosphatemia   Metabolic acidosis   Respiratory shock and now mechanical intubation   Lacunar infarct - New   Esophageal cancer (mass) with liver mets s/p IR biopsy 12/22  Hypotension on IV steroids   Hepatitis - New       RECOMMEND     Renal-Unfortunately he has many comorbidities at present and in [facility];  Pressors at present are prohibitive to any form of renal replacement as can lead to worsening hemodynamic compromise.    Will re evaluate in am- This was explained to the wife   Off diuretics   Endo- hydrocortisone 100mg IVP tid     CVS-Pressors to maintain MAP    Pulm-Mechanical intubation    Sedation     DW ICU attd in detail     There is no hematuria or bubbles in the urine.  No history of NSAIDS or nephrolithisis.  The patient urinates once or twice in the night and there is no incontinence.  No family hx or renal disease or back pain.    No recent abx use.  No alleviating or aggravating factors with respect to the kidneys.     Sayed Snapdeal   [phone]    76 y/o M PMH HTN, HLD, psoriasis on prednisone, . Recent admission for pneumonia and dced two weeks ago also found to possible esophageal cancer (mass) with liver mets s/p IR biopsy 12/2 presenting with SOB and now  with LIVIER and pulmonary emboli and + DVT     Oliguric LIVIER likely pre renal in light of low BP-distributive shock and now worse LIVIER;  Hyperphosphatemia   Metabolic acidosis   Respiratory shock and now mechanical intubation   Lacunar infarct - New   Esophageal cancer (mass) with liver mets s/p IR biopsy 12/22  Hypotension on IV steroids   Hepatitis - New       RECOMMEND     Renal-Unfortunately he has many comorbidities at present and in [facility];  Pressors at present are prohibitive to any form of renal replacement as can lead to worsening hemodynamic compromise.    Will re evaluate in am- This was explained to the wife   Off diuretics   Endo- hydrocortisone 100mg IVP tid     CVS-Pressors to maintain MAP    Pulm-Mechanical intubation    Sedation     DW ICU attd in detail     There is no hematuria or bubbles in the urine.  No history of NSAIDS or nephrolithisis.  The patient urinates once or twice in the night and there is no incontinence.  No family hx or renal disease or back pain.    No recent abx use.  No alleviating or aggravating factors with respect to the kidneys.     Sayed [employer]   [phone]

## 2023-01-05 NOTE — CONSULT NOTE ADULT - PROVIDER SPECIALTY LIST ADULT
Heme/Onc
Infectious Disease
Nephrology
Palliative Care
Vascular Cardiology
Neurology
Pulmonology
MICU
Gastroenterology

## 2023-01-05 NOTE — PROGRESS NOTE ADULT - SUBJECTIVE AND OBJECTIVE BOX
CC: f/u for PNA and Hypotension    Events noted    REVIEW OF SYSTEMS: unable  All other review of systems negative (Comprehensive ROS)    Antimicrobials Day # 6  piperacillin/tazobactam IVPB.. 3.375 Gram(s) IV Intermittent every 12 hours          Other Medications Reviewed  MEDICATIONS  (STANDING):  calcium gluconate IVPB 1 Gram(s) IV Intermittent once  chlorhexidine 0.12% Liquid 15 milliLiter(s) Oral Mucosa every 12 hours  chlorhexidine 2% Cloths 1 Application(s) Topical daily  chlorhexidine 4% Liquid 1 Application(s) Topical <User Schedule>  EPINEPHrine    Infusion 0.2 MICROgram(s)/kG/Min (65.4 mL/Hr) IV Continuous <Continuous>  fentaNYL   Infusion... 0.5 MICROgram(s)/kG/Hr (2.18 mL/Hr) IV Continuous <Continuous>  hydrocortisone sodium succinate Injectable 100 milliGRAM(s) IV Push every 8 hours  influenza  Vaccine (HIGH DOSE) 0.7 milliLiter(s) IntraMuscular once  norepinephrine Infusion 0.05 MICROgram(s)/kG/Min (8.18 mL/Hr) IV Continuous <Continuous>  pantoprazole  Injectable 40 milliGRAM(s) IV Push two times a day  piperacillin/tazobactam IVPB.. 3.375 Gram(s) IV Intermittent every 12 hours  sodium bicarbonate  Infusion 0.172 mEq/kG/Hr (100 mL/Hr) IV Continuous <Continuous>  vasopressin Infusion 0.04 Unit(s)/Min (6 mL/Hr) IV Continuous <Continuous>      ICU Vital Signs Last 24 Hrs  T(C): 34.2 (2023 10:00), Max: 36.6 (2023 20:30)  T(F): 93.6 (2023 10:00), Max: 97.8 (2023 20:30)  HR: 103 (2023 10:00) (77 - 105)  BP: 108/67 (2023 10:00) (89/60 - 115/74)  BP(mean): 83 (2023 10:00) (69 - 83)  ABP: 129/46 (2023 10:00) (129/46 - 129/46)  ABP(mean): 66 (2023 10:00) (66 - 66)  RR: 27 (2023 10:00) (17 - 30)  SpO2: 95% (2023 10:00) (91% - 100%)    O2 Parameters below as of 2023 06:23  Patient On (Oxygen Delivery Method): ventilator              PHYSICAL EXAM:  General: vented  Eyes:  anicteric, no conjunctival injection, no discharge  Oropharynx: no lesions or injection 	  Neck: supple, without adenopathy  Lungs: diminished  Heart: regular rate and rhythm; no murmur, rubs or gallops  Abdomen: soft, nondistended, nontender, without mass or organomegaly  Skin: no lesions  Extremities: no clubbing, cyanosis, or edema  Neurologic: sedated    LAB RESULTS:                                   13.7   29.63 )-----------( 131      ( 2023 06:48 )             42.6   01-    134<L>  |  87<L>  |  151<H>  ----------------------------<  113<H>  5.5<H>   |  14<L>  |  7.56<H>    Ca    6.9<L>      2023 06:48  Phos  11.5     -  Mg     3.1     -    TPro  5.0<L>  /  Alb  2.2<L>  /  TBili  7.0<H>  /  DBili  x   /  AST  544<H>  /  ALT  572<H>  /  AlkPhos  1341<H>              Urinalysis Basic - ( 31 Dec 2022 23:22 )    Color: Yellow / Appearance: Clear / S.038 / pH: x  Gluc: x / Ketone: Negative  / Bili: Negative / Urobili: Negative   Blood: x / Protein: Trace / Nitrite: Negative   Leuk Esterase: Negative / RBC: 6 /hpf / WBC 6 /HPF   Sq Epi: x / Non Sq Epi: 1 /hpf / Bacteria: Negative        MICROBIOLOGY REVIEWED:    Culture - Urine (22 @ 09:59)   Specimen Source: Clean Catch Clean Catch (Midstream)   Culture Results:   No growth     Culture - Blood (22 @ 09:30)   Specimen Source: .Blood Blood-Peripheral   Culture Results:   No growth to date.   RADIOLOGY REVIEWED:  < from: Xray Chest 1 View- PORTABLE-Routine (Xray Chest 1 View- PORTABLE-Routine .) (23 @ 12:21) >    IMPRESSION:  Unchanged bilateral lower lung patchy opacities.    < end of copied text >    < from: CT Abdomen and Pelvis No Cont (23 @ 20:14) >  IMPRESSION:  Diffuse bilobar hepatic metastases.    Focal thickening at the GE junction which may be related to reported   esophageal mass versus underdistention.    < end of copied text >    < from: [facility] Duplex Lower Ext Vein Scan, Bilat (23 @ 18:57) >  IMPRESSION:  Acute above and below the knee left lower extremity deep venous   thrombosis with extension into left external iliac vein.  No evidence of acute deep venous thrombosis in the right lower extremity.    Findings were discussed by ultrasound technologist to JAN Paulino on   2023 at 6:50 PM.    < end of copied text >    < from: CT Angio Chest PE Protocol w/ IV Cont (22 @ 10:33) >  IMPRESSION:.    Pulmonary embolism within the right interlobar pulmonary artery and   within multiple proximal segmental branches of the right lower lobe.    Small bilateral pleural effusions and interlobular septalthickening may   be on the basis of pulmonary edema.    Ill-defined consolidative and groundglass opacities involving all lung   lobes may be secondary to infection.    Few additional discrete subcentimeter nodular opacities; indeterminate   and metastatic disease cannot be excluded. Recommend CT chest follow-up   in 3 months to assess stability.    AP window and left hilar lymphadenopathy; indeterminate and metastatic   disease is a diagnostic consideration.    Hepatic metastatic disease, periportal lymphadenopathy, and ascites.    Findings discussed with Dr. Mercado on 2022 at 10:49 AM.    --- End of Report ---    < end of copied text >   CC: f/u for PNA and Hypotension    Events noted, moved to ICU intubated    REVIEW OF SYSTEMS: unable  All other review of systems negative (Comprehensive ROS)    Antimicrobials Day # 6  piperacillin/tazobactam IVPB.. 3.375 Gram(s) IV Intermittent every 12 hours          Other Medications Reviewed  MEDICATIONS  (STANDING):  calcium gluconate IVPB 1 Gram(s) IV Intermittent once  chlorhexidine 0.12% Liquid 15 milliLiter(s) Oral Mucosa every 12 hours  chlorhexidine 2% Cloths 1 Application(s) Topical daily  chlorhexidine 4% Liquid 1 Application(s) Topical <User Schedule>  EPINEPHrine    Infusion 0.2 MICROgram(s)/kG/Min (65.4 mL/Hr) IV Continuous <Continuous>  fentaNYL   Infusion... 0.5 MICROgram(s)/kG/Hr (2.18 mL/Hr) IV Continuous <Continuous>  hydrocortisone sodium succinate Injectable 100 milliGRAM(s) IV Push every 8 hours  influenza  Vaccine (HIGH DOSE) 0.7 milliLiter(s) IntraMuscular once  norepinephrine Infusion 0.05 MICROgram(s)/kG/Min (8.18 mL/Hr) IV Continuous <Continuous>  pantoprazole  Injectable 40 milliGRAM(s) IV Push two times a day  piperacillin/tazobactam IVPB.. 3.375 Gram(s) IV Intermittent every 12 hours  sodium bicarbonate  Infusion 0.172 mEq/kG/Hr (100 mL/Hr) IV Continuous <Continuous>  vasopressin Infusion 0.04 Unit(s)/Min (6 mL/Hr) IV Continuous <Continuous>      ICU Vital Signs Last 24 Hrs  T(C): 34.2 (2023 10:00), Max: 36.6 (2023 20:30)  T(F): 93.6 (2023 10:00), Max: 97.8 (2023 20:30)  HR: 103 (2023 10:00) (77 - 105)  BP: 108/67 (2023 10:00) (89/60 - 115/74)  BP(mean): 83 (2023 10:00) (69 - 83)  ABP: 129/46 (2023 10:00) (129/46 - 129/46)  ABP(mean): 66 (2023 10:00) (66 - 66)  RR: 27 (2023 10:00) (17 - 30)  SpO2: 95% (2023 10:00) (91% - 100%)    O2 Parameters below as of 2023 06:23  Patient On (Oxygen Delivery Method): ventilator              PHYSICAL EXAM:  General: vented, intubated  Eyes:  anicteric, no conjunctival injection, no discharge  Oropharynx: no lesions or injection 	  Neck: supple, without adenopathy  Lungs: diminished  Heart: regular rate and rhythm; no murmur, rubs or gallops  Abdomen: soft, nondistended, nontender, without mass or organomegaly  Skin: no lesions  Extremities: no clubbing, cyanosis, or edema  Neurologic: sedated    LAB RESULTS:                                   13.7   29.63 )-----------( 131      ( 2023 06:48 )             42.6   01-05    134<L>  |  87<L>  |  151<H>  ----------------------------<  113<H>  5.5<H>   |  14<L>  |  7.56<H>    Ca    6.9<L>      2023 06:48  Phos  11.5     01-05  Mg     3.1     -05    TPro  5.0<L>  /  Alb  2.2<L>  /  TBili  7.0<H>  /  DBili  x   /  AST  544<H>  /  ALT  572<H>  /  AlkPhos  1341<H>  -05            Urinalysis Basic - ( 31 Dec 2022 23:22 )    Color: Yellow / Appearance: Clear / S.038 / pH: x  Gluc: x / Ketone: Negative  / Bili: Negative / Urobili: Negative   Blood: x / Protein: Trace / Nitrite: Negative   Leuk Esterase: Negative / RBC: 6 /hpf / WBC 6 /HPF   Sq Epi: x / Non Sq Epi: 1 /hpf / Bacteria: Negative        MICROBIOLOGY REVIEWED:    Culture - Urine (22 @ 09:59)   Specimen Source: Clean Catch Clean Catch (Midstream)   Culture Results:   No growth     Culture - Blood (22 @ 09:30)   Specimen Source: .Blood Blood-Peripheral   Culture Results:   No growth to date.   RADIOLOGY REVIEWED:  < from: Xray Chest 1 View- PORTABLE-Routine (Xray Chest 1 View- PORTABLE-Routine .) (23 @ 12:21) >    IMPRESSION:  Unchanged bilateral lower lung patchy opacities.    < end of copied text >    < from: CT Abdomen and Pelvis No Cont (23 @ 20:14) >  IMPRESSION:  Diffuse bilobar hepatic metastases.    Focal thickening at the GE junction which may be related to reported   esophageal mass versus underdistention.    < end of copied text >    < from: [facility] Duplex Lower Ext Vein Scan, Bilat (23 @ 18:57) >  IMPRESSION:  Acute above and below the knee left lower extremity deep venous   thrombosis with extension into left external iliac vein.  No evidence of acute deep venous thrombosis in the right lower extremity.    Findings were discussed by ultrasound technologist to JAN Paulino on   2023 at 6:50 PM.    < end of copied text >    < from: CT Angio Chest PE Protocol w/ IV Cont (22 @ 10:33) >  IMPRESSION:.    Pulmonary embolism within the right interlobar pulmonary artery and   within multiple proximal segmental branches of the right lower lobe.    Small bilateral pleural effusions and interlobular septalthickening may   be on the basis of pulmonary edema.    Ill-defined consolidative and groundglass opacities involving all lung   lobes may be secondary to infection.    Few additional discrete subcentimeter nodular opacities; indeterminate   and metastatic disease cannot be excluded. Recommend CT chest follow-up   in 3 months to assess stability.    AP window and left hilar lymphadenopathy; indeterminate and metastatic   disease is a diagnostic consideration.    Hepatic metastatic disease, periportal lymphadenopathy, and ascites.    Findings discussed with Dr. Mercado on 2022 at 10:49 AM.    --- End of Report ---    < end of copied text >

## 2023-01-05 NOTE — PROGRESS NOTE ADULT - ASSESSMENT
77 m with     s/p PEA/ Resuscitation  - Vent support  - BP support    Pulmonary embolus and L DVT   - AC with Heparin  - Pulmonary follow  - Vascular cardiology follow     Thrombocytopenia  - follow Platelets  - HIT  - if worsening thrombocytopenia may need IVC filter    Congestive Heart Failure  - telemetry  - echo with no strain   - cardiology evaluation     HTN  - hold BP meds  - hold diuretic    Psoriasis  - stress dose steroid    Liver lesions  - s/p Bx at [facility] c/w adenocarcinoma  - Oncology follow    LIVIER/ Acidosis  - follow lytes, Cr  - gentle IVF and bicarb drip  - Nephrology follow     CVA acute/ subacute with Confusion  - MRI brain noted  - Neurology follow     Esophageal mass  - Gastroenterology follow.  - may need PEG (unstable now)    Sepsis/ Pneumonia  - Zosyn  - ID follow    DVT prophylaxis  - AC    Prognosis poor.     Palliative evaluation for GOC.     MICU care    d/w wife and daughter at bedside    Moshe Tamayo MD phone [phone]  77 m with     s/p PEA/ Resuscitation  - Vent support  - BP support    Pulmonary embolus and L DVT   - AC with Heparin  - Pulmonary follow  - Vascular cardiology follow     Thrombocytopenia  - follow Platelets  - HIT  - if worsening thrombocytopenia may need IVC filter    Congestive Heart Failure  - telemetry  - echo with no strain   - cardiology evaluation     HTN  - hold BP meds  - hold diuretic    Psoriasis  - stress dose steroid    Liver lesions  - s/p Bx at [facility] c/w adenocarcinoma  - Oncology follow    LIVIER/ Acidosis  - follow lytes, Cr  - gentle IVF and bicarb drip  - Nephrology follow     CVA acute/ subacute with Confusion  - MRI brain noted  - Neurology follow     Esophageal mass  - Gastroenterology follow.  - may need PEG (unstable now)    Sepsis/ Pneumonia  - Zosyn  - ID follow    DVT prophylaxis  - AC    Prognosis poor.     Palliative evaluation for GOC.     MICU care    d/w wife and daughter at bedside    Moshe Tamayo MD phone [phone]  77 m with     s/p PEA/ Resuscitation  - Vent support  - BP support    Pulmonary embolus and L DVT   - AC with Heparin  - Pulmonary follow  - Vascular cardiology follow     Thrombocytopenia  - follow Platelets  - HIT  - if worsening thrombocytopenia may need IVC filter    Congestive Heart Failure  - telemetry  - echo with no strain   - cardiology evaluation     HTN  - hold BP meds  - hold diuretic    Psoriasis  - stress dose steroid    Liver lesions  - s/p Bx at [facility] c/w adenocarcinoma  - Oncology follow    LIVIER/ Acidosis  - follow lytes, Cr  - gentle IVF and bicarb drip  - Nephrology follow     CVA acute/ subacute with Confusion  - MRI brain noted  - Neurology follow     Esophageal mass  - Gastroenterology follow.  - may need PEG (unstable now)    Sepsis/ Pneumonia  - Zosyn  - ID follow    DVT prophylaxis  - AC    Prognosis poor.     Palliative evaluation for GOC.     MICU care    d/w wife and daughter at bedside    Moshe Tamayo MD phone [phone]

## 2023-01-05 NOTE — PROGRESS NOTE ADULT - ASSESSMENT
nausea/vomiting   diarrhea     zofran  bacid one tab tid  PPI IV BID  monitor cbc  monitor stool color  intubated   care as per MICU  will follow    Advanced care planning was discussed with patient and family.  Advanced care planning forms were reviewed and discussed.  Risks, benefits and alternatives of gastroenterologic procedures were discussed in detail and all questions were answered.    30 minutes spent.

## 2023-01-05 NOTE — CONSULT NOTE ADULT - CONSULT REASON
Acute-subacute strokes on MRI
hypotension
Elevated creatinine
goals of care, cardiac arrest, DVT/PE, possible ca
PERT
PE and scattered opacities
Poorly differentiated adenocarcinoma
n/v/d
Pneumonia/hypotension

## 2023-01-05 NOTE — PROGRESS NOTE ADULT - ASSESSMENT
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 why  " check ABG today   Esophageal mass:  with metastatic disease: poorly differentiated adenocarcinoma  : hematology following:  he likely has metastatic disease also: to his lungs: He is being empirically treated for pneumonia to : with zosyn:  d3/7 TODAY : all cultures are negative so far:  id following  Pulmonary embolism: DVT: ON AC ; HEPARIN : PLTS ARE LOW:  INCREASED TODAY: echo seems reasonable:   Acute diastolic heart failure: : he is not on any diuretics and needs to be careful as hs he has diastolic dysfunction : watch for fluid overlaod:   CKD:   Psoriasis :  on chr steroids    HTN: controlled:    alisha acp     1/4:    Confusion ? 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  : hematology following:  he likely has metastatic disease also: to his lungs: He is being empirically treated for pneumonia to : with zosyn:  d4/7 TODAY : all cultures are negative so far:  id following : his repeat chest xray with persistent brina opacities:  he may have underlying metastatic disease to lungs also : he iso n [facility]3 infusion:  need to be careful for fluid overlaod: He has metabolic acidosis on vbg   Pulmonary embolism: DVT: ON AC ; HEPARIN : PLTS ARE LOW:  stable:    Acute diastolic heart failure: : he is not on any diuretics and needs to be careful as hs he has diastolic dysfunction : watch for fluid overlaod:   CKD: per renal    Psoriasis :  on chr steroids    HTN: controlled:    alisha acp  ; overall prognosis seems very guarded:     1/5:  S/p ACLS now : resp failure  acidotic: on vasopressors:  multiple:   resp failure  : intubated and catheter   Esophageal mass:  with metastatic disease: poorly differentiated adenocarcinoma  : hematology following: now intubated s/p code: overall prognosis seems very poor   Pulmonary embolism: DVT: off ac: per primary team   Acute diastolic heart failure: : he is not on any diuretics and needs to be careful as hs he has diastolic dysfunction : watch for fluid overlaod:   CKD: per renal    Psoriasis :  on stress dose streorids now:   HTN: controlled:    alisha micu   ; overall prognosis seems very poor now:  a/p code

## 2023-01-05 NOTE — PROGRESS NOTE ADULT - SUBJECTIVE AND OBJECTIVE BOX
Date of Service: 01-05-23 @ 13:08    Patient is a 77y old  Male who presents with a chief complaint of SOB (05 Jan 2023 08:56)      Any change in ROS: events noted: now intubated:  acidotic and is on vasopressors:     MEDICATIONS  (STANDING):  chlorhexidine 0.12% Liquid 15 milliLiter(s) Oral Mucosa every 12 hours  chlorhexidine 2% Cloths 1 Application(s) Topical daily  chlorhexidine 4% Liquid 1 Application(s) Topical <User Schedule>  EPINEPHrine    Infusion 0.2 MICROgram(s)/kG/Min (65.4 mL/Hr) IV Continuous <Continuous>  fentaNYL   Infusion... 0.5 MICROgram(s)/kG/Hr (2.18 mL/Hr) IV Continuous <Continuous>  hydrocortisone sodium succinate Injectable 100 milliGRAM(s) IV Push every 8 hours  influenza  Vaccine (HIGH DOSE) 0.7 milliLiter(s) IntraMuscular once  norepinephrine Infusion 0.05 MICROgram(s)/kG/Min (8.18 mL/Hr) IV Continuous <Continuous>  pantoprazole  Injectable 40 milliGRAM(s) IV Push two times a day  piperacillin/tazobactam IVPB.. 3.375 Gram(s) IV Intermittent every 12 hours  propofol Infusion 50 MICROgram(s)/kG/Min (26.2 mL/Hr) IV Continuous <Continuous>  sodium bicarbonate  Infusion 0.172 mEq/kG/Hr (100 mL/Hr) IV Continuous <Continuous>  vasopressin Infusion 0.04 Unit(s)/Min (6 mL/Hr) IV Continuous <Continuous>    MEDICATIONS  (PRN):  fentaNYL    Injectable 50 MICROGram(s) IV Push every 2 hours PRN uptitration    Vital Signs Last 24 Hrs  T(C): 35.5 (05 Jan 2023 12:00), Max: 36.6 (04 Jan 2023 20:30)  T(F): 95.9 (05 Jan 2023 12:00), Max: 97.8 (04 Jan 2023 20:30)  HR: 108 (05 Jan 2023 12:45) (77 - 108)  BP: 105/62 (05 Jan 2023 12:00) (89/60 - 115/74)  BP(mean): 78 (05 Jan 2023 12:00) (69 - 86)  RR: 26 (05 Jan 2023 12:45) (17 - 30)  SpO2: 92% (05 Jan 2023 12:45) (90% - 100%)    Parameters below as of 05 Jan 2023 06:23  Patient On (Oxygen Delivery Method): ventilator      Mode: AC/ CMV (Assist Control/ Continuous Mandatory Ventilation)  RR (machine): 500  TV (machine): 26  FiO2: 50  PEEP: 5  ITime: 1  MAP: 13  PIP: 29    I&O's Summary    04 Jan 2023 07:01  -  05 Jan 2023 07:00  --------------------------------------------------------  IN: 0 mL / OUT: 15 mL / NET: -15 mL    05 Jan 2023 07:01  -  05 Jan 2023 13:08  --------------------------------------------------------  IN: 1184.9 mL / OUT: 40 mL / NET: 1144.9 mL          Physical Exam:   GENERAL: NAD, well-groomed, well-developed  HEENT: YOANA/   Atraumatic, Normocephalic  ENMT: No tonsillar erythema, exudates, or enlargement; Moist mucous membranes, Good dentition, No lesions  NECK: Supple, No JVD, Normal thyroid  CHEST/LUNG: Clear to auscultaion- no wheezing  CVS: Regular rate and rhythm; No murmurs, rubs, or gallops  GI: : Soft, Nontender, Nondistended; Bowel sounds present  NERVOUS SYSTEM:  intubated anbd sedated  EXTREMITIES: + edema  LYMPH: No lymphadenopathy noted  SKIN: No rashes or lesions  ENDOCRINOLOGY: No Thyromegaly  PSYCH:intubated and sedated    Labs:  ABG - ( 05 Jan 2023 12:48 )  pH, Arterial: 7.08  pH, Blood: x     /  pCO2: 26    /  pO2: 122   / HCO3: 8     / Base Excess: -20.9 /  SaO2: 99.0            60, 14, 23, 22, 21  CARDIAC MARKERS ( 05 Jan 2023 09:54 )  x     / x     / 139 U/L / x     / 13.8 ng/mL  CARDIAC MARKERS ( 05 Jan 2023 06:10 )  x     / x     / 125 U/L / x     / 8.5 ng/mL                            13.7   29.63 )-----------( 131      ( 05 Jan 2023 06:48 )             42.6                         13.0   31.39 )-----------( 140      ( 05 Jan 2023 06:46 )             41.4                         13.5   26.40 )-----------( 116      ( 05 Jan 2023 06:10 )             43.7                         14.4   27.57 )-----------( 119      ( 05 Jan 2023 04:19 )             44.7                         13.5   22.73 )-----------( 98       ( 04 Jan 2023 07:10 )             41.9                         13.6   24.34 )-----------( 103      ( 03 Jan 2023 15:19 )             42.7                         13.3   16.85 )-----------( 90       ( 02 Jan 2023 11:50 )             40.8     01-05    134<L>  |  87<L>  |  151<H>  ----------------------------<  113<H>  5.5<H>   |  14<L>  |  7.56<H>  01-05    132<L>  |  87<L>  |  151<H>  ----------------------------<  172<H>  5.8<H>   |  <10<LL>  |  7.48<H>  01-05    134<L>  |  88<L>  |  155<H>  ----------------------------<  145<H>  5.8<H>   |  10<LL>  |  7.47<H>  01-03    133<L>  |  90<L>  |  134<H>  ----------------------------<  80  4.8   |  20<L>  |  6.02<H>  01-02    134<L>  |  93<L>  |  129<H>  ----------------------------<  86  4.8   |  19<L>  |  5.27<H>  01-01    133<L>  |  91<L>  |  124<H>  ----------------------------<  108<H>  5.0   |  20<L>  |  4.17<H>    Ca    6.9<L>      05 Jan 2023 06:48  Ca    7.1<L>      05 Jan 2023 06:46  Ca    7.3<L>      05 Jan 2023 06:10  Ca    6.9<L>      03 Jan 2023 15:19  Phos  11.5     01-05  Phos  10.4     01-05  Mg     3.1     01-05  Mg     3.0     01-05    TPro  5.0<L>  /  Alb  2.2<L>  /  TBili  7.0<H>  /  DBili  x   /  AST  544<H>  /  ALT  572<H>  /  AlkPhos  1341<H>  01-05  TPro  5.1<L>  /  Alb  2.5<L>  /  TBili  7.7<H>  /  DBili  x   /  AST  540<H>  /  ALT  606<H>  /  AlkPhos  1441<H>  01-05    CAPILLARY BLOOD GLUCOSE          LIVER FUNCTIONS - ( 05 Jan 2023 06:46 )  Alb: 2.2 g/dL / Pro: 5.0 g/dL / ALK PHOS: 1341 U/L / ALT: 572 U/L / AST: 544 U/L / GGT: x           PT/INR - ( 05 Jan 2023 06:46 )   PT: 24.2 sec;   INR: 2.07 ratio         PTT - ( 05 Jan 2023 06:46 )  PTT:>200.0 sec    Procalcitonin, Serum: 6.18 ng/mL (01-02 @ 11:50)  Serum Pro-Brain Natriuretic Peptide: 39895 pg/mL (01-05 @ 06:46)        RECENT CULTURES:  12-31 @ 09:59 Clean Catch Clean Catch (Midstream)         rad< from: MR Angio Head No Cont (01.04.23 @ 23:47) >    ACC: [id number] EXAM:  MR ANGIO BRAIN                          PROCEDURE DATE:  01/04/2023          INTERPRETATION:  Clinical indication: CVA, evaluate vasculature    MRA of the head was attempted using 3-D time-of-flight technique. The   patient wasconfused and could not tolerate the examination which is   suboptimal. Flow in the anterior, middle and posterior cerebral arteries   is visualized. MRA neck was are not performed.    IMPRESSION: Suboptimal MRA head.    --- End of Report ---            BHAVIN LOO MD; Attending Radiologist  This document has been electronically signed. Jan 5 2023  9:23AM    < end of copied text >  < from: Xray Chest 1 View- PORTABLE-Routine (Xray Chest 1 View- PORTABLE-Routine .) (01.04.23 @ 12:21) >    ACC: [id number] EXAM:  XR CHEST PORTABLE ROUTINE 1V                          PROCEDURE DATE:  01/04/2023          INTERPRETATION:  CLINICAL INDICATION: sob    TECHNIQUE: Single frontal, portable view of the chest was obtained.    COMPARISON: Chest x-ray 12/31/2022.    FINDINGS:  The heart size is normal.  Unchanged bilateral lower lung patchy opacities.  There is no pneumothorax or pleural effusion.    IMPRESSION:  Unchanged bilateral lower lung patchy opacities.    --- End of Report ---           NOEL HAWKINS MD; Resident Radiologist  This document has been electronically signed.  PHUC HUBBARD MD; Attending Radiologist  This document has been electronically signed. Jan 4 2023  1:18PM    < end of copied text >  < from: CT Abdomen and Pelvis No Cont (01.03.23 @ 20:14) >  LOWER CHEST: Partially visualized bilateral groundglass opacities and   small bilateral pleural effusions are redemonstrated.    LIVER: Nodular contour, suggestive of cirrhosis.Diffuse bilobar   hypoattenuating lesions, compatible with metastatic disease. For example   a 6.7 x 6.3 cm lesion in the right hepatic lobe (3, 34) and a 4.6 x 4.5   cm lesion left hepatic lobe (3, 21).  BILE DUCTS: Normal caliber.  GALLBLADDER: Within normal limits.  SPLEEN: Within normal limits.  PANCREAS: Within normal limits.  ADRENALS: Within normal limits.  KIDNEYS/URETERS: Bilateral renal cysts and additional hypodensities too   small characterize. No hydronephrosis. Persistent bilateral renal   nephrograms.    BLADDER: Within normal limits.  REPRODUCTIVE ORGANS: Prostate within normal limits.    BOWEL: No bowel obstruction. Colonic diverticulosis. Focal thickening at   the GE junction, which may be related to reported esophageal massversus   underdistention.  PERITONEUM: Small volume ascites.  VESSELS: Atherosclerotic changes.  RETROPERITONEUM/LYMPH NODES: Enlarged periportal nodes. For example, 3.8   x 2.1 cm portacaval lymph node (3, 52). Subcentimeter retroperitoneal   lymph nodes.  ABDOMINAL WALL: Subcutaneous edema.  BONES: Degenerative changes.    IMPRESSION:  Diffuse bilobar hepatic metastases.    Focal thickening at the GE junction which may be related to reported   esophageal mass versus underdistention.    Periportal lymphadenopathy.    Persistent bilateral renal nephrograms, suggestive of renal dysfunction.    Partially visualized bilateral lung groundglass opacities and small   bilateral pleural effusions are redemonstrated.      --- End of Report ---    < end of copied text >         No growth    12-31 @ 09:30 .Blood Blood-Peripheral                No growth to date.    12-31 @ 09:25 .Blood Blood-Peripheral                No growth to date.          RESPIRATORY CULTURES:          Studies  Chest X-RAY  CT SCAN Chest   Venous Dopplers: LE:   CT Abdomen  Others               Date of Service: 01-05-23 @ 13:08    Patient is a 77y old  Male who presents with a chief complaint of SOB (05 Jan 2023 08:56)      Any change in ROS: events noted: now intubated:  acidotic and is on vasopressors:     MEDICATIONS  (STANDING):  chlorhexidine 0.12% Liquid 15 milliLiter(s) Oral Mucosa every 12 hours  chlorhexidine 2% Cloths 1 Application(s) Topical daily  chlorhexidine 4% Liquid 1 Application(s) Topical <User Schedule>  EPINEPHrine    Infusion 0.2 MICROgram(s)/kG/Min (65.4 mL/Hr) IV Continuous <Continuous>  fentaNYL   Infusion... 0.5 MICROgram(s)/kG/Hr (2.18 mL/Hr) IV Continuous <Continuous>  hydrocortisone sodium succinate Injectable 100 milliGRAM(s) IV Push every 8 hours  influenza  Vaccine (HIGH DOSE) 0.7 milliLiter(s) IntraMuscular once  norepinephrine Infusion 0.05 MICROgram(s)/kG/Min (8.18 mL/Hr) IV Continuous <Continuous>  pantoprazole  Injectable 40 milliGRAM(s) IV Push two times a day  piperacillin/tazobactam IVPB.. 3.375 Gram(s) IV Intermittent every 12 hours  propofol Infusion 50 MICROgram(s)/kG/Min (26.2 mL/Hr) IV Continuous <Continuous>  sodium bicarbonate  Infusion 0.172 mEq/kG/Hr (100 mL/Hr) IV Continuous <Continuous>  vasopressin Infusion 0.04 Unit(s)/Min (6 mL/Hr) IV Continuous <Continuous>    MEDICATIONS  (PRN):  fentaNYL    Injectable 50 MICROGram(s) IV Push every 2 hours PRN uptitration    Vital Signs Last 24 Hrs  T(C): 35.5 (05 Jan 2023 12:00), Max: 36.6 (04 Jan 2023 20:30)  T(F): 95.9 (05 Jan 2023 12:00), Max: 97.8 (04 Jan 2023 20:30)  HR: 108 (05 Jan 2023 12:45) (77 - 108)  BP: 105/62 (05 Jan 2023 12:00) (89/60 - 115/74)  BP(mean): 78 (05 Jan 2023 12:00) (69 - 86)  RR: 26 (05 Jan 2023 12:45) (17 - 30)  SpO2: 92% (05 Jan 2023 12:45) (90% - 100%)    Parameters below as of 05 Jan 2023 06:23  Patient On (Oxygen Delivery Method): ventilator      Mode: AC/ CMV (Assist Control/ Continuous Mandatory Ventilation)  RR (machine): 500  TV (machine): 26  FiO2: 50  PEEP: 5  ITime: 1  MAP: 13  PIP: 29    I&O's Summary    04 Jan 2023 07:01  -  05 Jan 2023 07:00  --------------------------------------------------------  IN: 0 mL / OUT: 15 mL / NET: -15 mL    05 Jan 2023 07:01  -  05 Jan 2023 13:08  --------------------------------------------------------  IN: 1184.9 mL / OUT: 40 mL / NET: 1144.9 mL          Physical Exam:   GENERAL: NAD, well-groomed, well-developed  HEENT: YOANA/   Atraumatic, Normocephalic  ENMT: No tonsillar erythema, exudates, or enlargement; Moist mucous membranes, Good dentition, No lesions  NECK: Supple, No JVD, Normal thyroid  CHEST/LUNG: Clear to auscultaion- no wheezing  CVS: Regular rate and rhythm; No murmurs, rubs, or gallops  GI: : Soft, Nontender, Nondistended; Bowel sounds present  NERVOUS SYSTEM:  intubated anbd sedated  EXTREMITIES: + edema  LYMPH: No lymphadenopathy noted  SKIN: No rashes or lesions  ENDOCRINOLOGY: No Thyromegaly  PSYCH:intubated and sedated    Labs:  ABG - ( 05 Jan 2023 12:48 )  pH, Arterial: 7.08  pH, Blood: x     /  pCO2: 26    /  pO2: 122   / HCO3: 8     / Base Excess: -20.9 /  SaO2: 99.0            60, 14, 23, 22, 21  CARDIAC MARKERS ( 05 Jan 2023 09:54 )  x     / x     / 139 U/L / x     / 13.8 ng/mL  CARDIAC MARKERS ( 05 Jan 2023 06:10 )  x     / x     / 125 U/L / x     / 8.5 ng/mL                            13.7   29.63 )-----------( 131      ( 05 Jan 2023 06:48 )             42.6                         13.0   31.39 )-----------( 140      ( 05 Jan 2023 06:46 )             41.4                         13.5   26.40 )-----------( 116      ( 05 Jan 2023 06:10 )             43.7                         14.4   27.57 )-----------( 119      ( 05 Jan 2023 04:19 )             44.7                         13.5   22.73 )-----------( 98       ( 04 Jan 2023 07:10 )             41.9                         13.6   24.34 )-----------( 103      ( 03 Jan 2023 15:19 )             42.7                         13.3   16.85 )-----------( 90       ( 02 Jan 2023 11:50 )             40.8     01-05    134<L>  |  87<L>  |  151<H>  ----------------------------<  113<H>  5.5<H>   |  14<L>  |  7.56<H>  01-05    132<L>  |  87<L>  |  151<H>  ----------------------------<  172<H>  5.8<H>   |  <10<LL>  |  7.48<H>  01-05    134<L>  |  88<L>  |  155<H>  ----------------------------<  145<H>  5.8<H>   |  10<LL>  |  7.47<H>  01-03    133<L>  |  90<L>  |  134<H>  ----------------------------<  80  4.8   |  20<L>  |  6.02<H>  01-02    134<L>  |  93<L>  |  129<H>  ----------------------------<  86  4.8   |  19<L>  |  5.27<H>  01-01    133<L>  |  91<L>  |  124<H>  ----------------------------<  108<H>  5.0   |  20<L>  |  4.17<H>    Ca    6.9<L>      05 Jan 2023 06:48  Ca    7.1<L>      05 Jan 2023 06:46  Ca    7.3<L>      05 Jan 2023 06:10  Ca    6.9<L>      03 Jan 2023 15:19  Phos  11.5     01-05  Phos  10.4     01-05  Mg     3.1     01-05  Mg     3.0     01-05    TPro  5.0<L>  /  Alb  2.2<L>  /  TBili  7.0<H>  /  DBili  x   /  AST  544<H>  /  ALT  572<H>  /  AlkPhos  1341<H>  01-05  TPro  5.1<L>  /  Alb  2.5<L>  /  TBili  7.7<H>  /  DBili  x   /  AST  540<H>  /  ALT  606<H>  /  AlkPhos  1441<H>  01-05    CAPILLARY BLOOD GLUCOSE          LIVER FUNCTIONS - ( 05 Jan 2023 06:46 )  Alb: 2.2 g/dL / Pro: 5.0 g/dL / ALK PHOS: 1341 U/L / ALT: 572 U/L / AST: 544 U/L / GGT: x           PT/INR - ( 05 Jan 2023 06:46 )   PT: 24.2 sec;   INR: 2.07 ratio         PTT - ( 05 Jan 2023 06:46 )  PTT:>200.0 sec    Procalcitonin, Serum: 6.18 ng/mL (01-02 @ 11:50)  Serum Pro-Brain Natriuretic Peptide: 66414 pg/mL (01-05 @ 06:46)        RECENT CULTURES:  12-31 @ 09:59 Clean Catch Clean Catch (Midstream)         rad< from: MR Angio Head No Cont (01.04.23 @ 23:47) >    ACC: [id number] EXAM:  MR ANGIO BRAIN                          PROCEDURE DATE:  01/04/2023          INTERPRETATION:  Clinical indication: CVA, evaluate vasculature    MRA of the head was attempted using 3-D time-of-flight technique. The   patient wasconfused and could not tolerate the examination which is   suboptimal. Flow in the anterior, middle and posterior cerebral arteries   is visualized. MRA neck was are not performed.    IMPRESSION: Suboptimal MRA head.    --- End of Report ---            BHAVIN LOO MD; Attending Radiologist  This document has been electronically signed. Jan 5 2023  9:23AM    < end of copied text >  < from: Xray Chest 1 View- PORTABLE-Routine (Xray Chest 1 View- PORTABLE-Routine .) (01.04.23 @ 12:21) >    ACC: [id number] EXAM:  XR CHEST PORTABLE ROUTINE 1V                          PROCEDURE DATE:  01/04/2023          INTERPRETATION:  CLINICAL INDICATION: sob    TECHNIQUE: Single frontal, portable view of the chest was obtained.    COMPARISON: Chest x-ray 12/31/2022.    FINDINGS:  The heart size is normal.  Unchanged bilateral lower lung patchy opacities.  There is no pneumothorax or pleural effusion.    IMPRESSION:  Unchanged bilateral lower lung patchy opacities.    --- End of Report ---           NOEL HAWKINS MD; Resident Radiologist  This document has been electronically signed.  PHUC HUBBARD MD; Attending Radiologist  This document has been electronically signed. Jan 4 2023  1:18PM    < end of copied text >  < from: CT Abdomen and Pelvis No Cont (01.03.23 @ 20:14) >  LOWER CHEST: Partially visualized bilateral groundglass opacities and   small bilateral pleural effusions are redemonstrated.    LIVER: Nodular contour, suggestive of cirrhosis.Diffuse bilobar   hypoattenuating lesions, compatible with metastatic disease. For example   a 6.7 x 6.3 cm lesion in the right hepatic lobe (3, 34) and a 4.6 x 4.5   cm lesion left hepatic lobe (3, 21).  BILE DUCTS: Normal caliber.  GALLBLADDER: Within normal limits.  SPLEEN: Within normal limits.  PANCREAS: Within normal limits.  ADRENALS: Within normal limits.  KIDNEYS/URETERS: Bilateral renal cysts and additional hypodensities too   small characterize. No hydronephrosis. Persistent bilateral renal   nephrograms.    BLADDER: Within normal limits.  REPRODUCTIVE ORGANS: Prostate within normal limits.    BOWEL: No bowel obstruction. Colonic diverticulosis. Focal thickening at   the GE junction, which may be related to reported esophageal massversus   underdistention.  PERITONEUM: Small volume ascites.  VESSELS: Atherosclerotic changes.  RETROPERITONEUM/LYMPH NODES: Enlarged periportal nodes. For example, 3.8   x 2.1 cm portacaval lymph node (3, 52). Subcentimeter retroperitoneal   lymph nodes.  ABDOMINAL WALL: Subcutaneous edema.  BONES: Degenerative changes.    IMPRESSION:  Diffuse bilobar hepatic metastases.    Focal thickening at the GE junction which may be related to reported   esophageal mass versus underdistention.    Periportal lymphadenopathy.    Persistent bilateral renal nephrograms, suggestive of renal dysfunction.    Partially visualized bilateral lung groundglass opacities and small   bilateral pleural effusions are redemonstrated.      --- End of Report ---    < end of copied text >         No growth    12-31 @ 09:30 .Blood Blood-Peripheral                No growth to date.    12-31 @ 09:25 .Blood Blood-Peripheral                No growth to date.          RESPIRATORY CULTURES:          Studies  Chest X-RAY  CT SCAN Chest   Venous Dopplers: LE:   CT Abdomen  Others               Date of Service: 01-05-23 @ 13:08    Patient is a 77y old  Male who presents with a chief complaint of SOB (05 Jan 2023 08:56)      Any change in ROS: events noted: now intubated:  acidotic and is on vasopressors:     MEDICATIONS  (STANDING):  chlorhexidine 0.12% Liquid 15 milliLiter(s) Oral Mucosa every 12 hours  chlorhexidine 2% Cloths 1 Application(s) Topical daily  chlorhexidine 4% Liquid 1 Application(s) Topical <User Schedule>  EPINEPHrine    Infusion 0.2 MICROgram(s)/kG/Min (65.4 mL/Hr) IV Continuous <Continuous>  fentaNYL   Infusion... 0.5 MICROgram(s)/kG/Hr (2.18 mL/Hr) IV Continuous <Continuous>  hydrocortisone sodium succinate Injectable 100 milliGRAM(s) IV Push every 8 hours  influenza  Vaccine (HIGH DOSE) 0.7 milliLiter(s) IntraMuscular once  norepinephrine Infusion 0.05 MICROgram(s)/kG/Min (8.18 mL/Hr) IV Continuous <Continuous>  pantoprazole  Injectable 40 milliGRAM(s) IV Push two times a day  piperacillin/tazobactam IVPB.. 3.375 Gram(s) IV Intermittent every 12 hours  propofol Infusion 50 MICROgram(s)/kG/Min (26.2 mL/Hr) IV Continuous <Continuous>  sodium bicarbonate  Infusion 0.172 mEq/kG/Hr (100 mL/Hr) IV Continuous <Continuous>  vasopressin Infusion 0.04 Unit(s)/Min (6 mL/Hr) IV Continuous <Continuous>    MEDICATIONS  (PRN):  fentaNYL    Injectable 50 MICROGram(s) IV Push every 2 hours PRN uptitration    Vital Signs Last 24 Hrs  T(C): 35.5 (05 Jan 2023 12:00), Max: 36.6 (04 Jan 2023 20:30)  T(F): 95.9 (05 Jan 2023 12:00), Max: 97.8 (04 Jan 2023 20:30)  HR: 108 (05 Jan 2023 12:45) (77 - 108)  BP: 105/62 (05 Jan 2023 12:00) (89/60 - 115/74)  BP(mean): 78 (05 Jan 2023 12:00) (69 - 86)  RR: 26 (05 Jan 2023 12:45) (17 - 30)  SpO2: 92% (05 Jan 2023 12:45) (90% - 100%)    Parameters below as of 05 Jan 2023 06:23  Patient On (Oxygen Delivery Method): ventilator      Mode: AC/ CMV (Assist Control/ Continuous Mandatory Ventilation)  RR (machine): 500  TV (machine): 26  FiO2: 50  PEEP: 5  ITime: 1  MAP: 13  PIP: 29    I&O's Summary    04 Jan 2023 07:01  -  05 Jan 2023 07:00  --------------------------------------------------------  IN: 0 mL / OUT: 15 mL / NET: -15 mL    05 Jan 2023 07:01  -  05 Jan 2023 13:08  --------------------------------------------------------  IN: 1184.9 mL / OUT: 40 mL / NET: 1144.9 mL          Physical Exam:   GENERAL: NAD, well-groomed, well-developed  HEENT: YOANA/   Atraumatic, Normocephalic  ENMT: No tonsillar erythema, exudates, or enlargement; Moist mucous membranes, Good dentition, No lesions  NECK: Supple, No JVD, Normal thyroid  CHEST/LUNG: Clear to auscultaion- no wheezing  CVS: Regular rate and rhythm; No murmurs, rubs, or gallops  GI: : Soft, Nontender, Nondistended; Bowel sounds present  NERVOUS SYSTEM:  intubated anbd sedated  EXTREMITIES: + edema  LYMPH: No lymphadenopathy noted  SKIN: No rashes or lesions  ENDOCRINOLOGY: No Thyromegaly  PSYCH:intubated and sedated    Labs:  ABG - ( 05 Jan 2023 12:48 )  pH, Arterial: 7.08  pH, Blood: x     /  pCO2: 26    /  pO2: 122   / HCO3: 8     / Base Excess: -20.9 /  SaO2: 99.0            60, 14, 23, 22, 21  CARDIAC MARKERS ( 05 Jan 2023 09:54 )  x     / x     / 139 U/L / x     / 13.8 ng/mL  CARDIAC MARKERS ( 05 Jan 2023 06:10 )  x     / x     / 125 U/L / x     / 8.5 ng/mL                            13.7   29.63 )-----------( 131      ( 05 Jan 2023 06:48 )             42.6                         13.0   31.39 )-----------( 140      ( 05 Jan 2023 06:46 )             41.4                         13.5   26.40 )-----------( 116      ( 05 Jan 2023 06:10 )             43.7                         14.4   27.57 )-----------( 119      ( 05 Jan 2023 04:19 )             44.7                         13.5   22.73 )-----------( 98       ( 04 Jan 2023 07:10 )             41.9                         13.6   24.34 )-----------( 103      ( 03 Jan 2023 15:19 )             42.7                         13.3   16.85 )-----------( 90       ( 02 Jan 2023 11:50 )             40.8     01-05    134<L>  |  87<L>  |  151<H>  ----------------------------<  113<H>  5.5<H>   |  14<L>  |  7.56<H>  01-05    132<L>  |  87<L>  |  151<H>  ----------------------------<  172<H>  5.8<H>   |  <10<LL>  |  7.48<H>  01-05    134<L>  |  88<L>  |  155<H>  ----------------------------<  145<H>  5.8<H>   |  10<LL>  |  7.47<H>  01-03    133<L>  |  90<L>  |  134<H>  ----------------------------<  80  4.8   |  20<L>  |  6.02<H>  01-02    134<L>  |  93<L>  |  129<H>  ----------------------------<  86  4.8   |  19<L>  |  5.27<H>  01-01    133<L>  |  91<L>  |  124<H>  ----------------------------<  108<H>  5.0   |  20<L>  |  4.17<H>    Ca    6.9<L>      05 Jan 2023 06:48  Ca    7.1<L>      05 Jan 2023 06:46  Ca    7.3<L>      05 Jan 2023 06:10  Ca    6.9<L>      03 Jan 2023 15:19  Phos  11.5     01-05  Phos  10.4     01-05  Mg     3.1     01-05  Mg     3.0     01-05    TPro  5.0<L>  /  Alb  2.2<L>  /  TBili  7.0<H>  /  DBili  x   /  AST  544<H>  /  ALT  572<H>  /  AlkPhos  1341<H>  01-05  TPro  5.1<L>  /  Alb  2.5<L>  /  TBili  7.7<H>  /  DBili  x   /  AST  540<H>  /  ALT  606<H>  /  AlkPhos  1441<H>  01-05    CAPILLARY BLOOD GLUCOSE          LIVER FUNCTIONS - ( 05 Jan 2023 06:46 )  Alb: 2.2 g/dL / Pro: 5.0 g/dL / ALK PHOS: 1341 U/L / ALT: 572 U/L / AST: 544 U/L / GGT: x           PT/INR - ( 05 Jan 2023 06:46 )   PT: 24.2 sec;   INR: 2.07 ratio         PTT - ( 05 Jan 2023 06:46 )  PTT:>200.0 sec    Procalcitonin, Serum: 6.18 ng/mL (01-02 @ 11:50)  Serum Pro-Brain Natriuretic Peptide: 20195 pg/mL (01-05 @ 06:46)        RECENT CULTURES:  12-31 @ 09:59 Clean Catch Clean Catch (Midstream)         rad< from: MR Angio Head No Cont (01.04.23 @ 23:47) >    ACC: [id number] EXAM:  MR ANGIO BRAIN                          PROCEDURE DATE:  01/04/2023          INTERPRETATION:  Clinical indication: CVA, evaluate vasculature    MRA of the head was attempted using 3-D time-of-flight technique. The   patient wasconfused and could not tolerate the examination which is   suboptimal. Flow in the anterior, middle and posterior cerebral arteries   is visualized. MRA neck was are not performed.    IMPRESSION: Suboptimal MRA head.    --- End of Report ---            BHAVIN LOO MD; Attending Radiologist  This document has been electronically signed. Jan 5 2023  9:23AM    < end of copied text >  < from: Xray Chest 1 View- PORTABLE-Routine (Xray Chest 1 View- PORTABLE-Routine .) (01.04.23 @ 12:21) >    ACC: [id number] EXAM:  XR CHEST PORTABLE ROUTINE 1V                          PROCEDURE DATE:  01/04/2023          INTERPRETATION:  CLINICAL INDICATION: sob    TECHNIQUE: Single frontal, portable view of the chest was obtained.    COMPARISON: Chest x-ray 12/31/2022.    FINDINGS:  The heart size is normal.  Unchanged bilateral lower lung patchy opacities.  There is no pneumothorax or pleural effusion.    IMPRESSION:  Unchanged bilateral lower lung patchy opacities.    --- End of Report ---           NOEL HAWKINS MD; Resident Radiologist  This document has been electronically signed.  PHUC HUBBARD MD; Attending Radiologist  This document has been electronically signed. Jan 4 2023  1:18PM    < end of copied text >  < from: CT Abdomen and Pelvis No Cont (01.03.23 @ 20:14) >  LOWER CHEST: Partially visualized bilateral groundglass opacities and   small bilateral pleural effusions are redemonstrated.    LIVER: Nodular contour, suggestive of cirrhosis.Diffuse bilobar   hypoattenuating lesions, compatible with metastatic disease. For example   a 6.7 x 6.3 cm lesion in the right hepatic lobe (3, 34) and a 4.6 x 4.5   cm lesion left hepatic lobe (3, 21).  BILE DUCTS: Normal caliber.  GALLBLADDER: Within normal limits.  SPLEEN: Within normal limits.  PANCREAS: Within normal limits.  ADRENALS: Within normal limits.  KIDNEYS/URETERS: Bilateral renal cysts and additional hypodensities too   small characterize. No hydronephrosis. Persistent bilateral renal   nephrograms.    BLADDER: Within normal limits.  REPRODUCTIVE ORGANS: Prostate within normal limits.    BOWEL: No bowel obstruction. Colonic diverticulosis. Focal thickening at   the GE junction, which may be related to reported esophageal massversus   underdistention.  PERITONEUM: Small volume ascites.  VESSELS: Atherosclerotic changes.  RETROPERITONEUM/LYMPH NODES: Enlarged periportal nodes. For example, 3.8   x 2.1 cm portacaval lymph node (3, 52). Subcentimeter retroperitoneal   lymph nodes.  ABDOMINAL WALL: Subcutaneous edema.  BONES: Degenerative changes.    IMPRESSION:  Diffuse bilobar hepatic metastases.    Focal thickening at the GE junction which may be related to reported   esophageal mass versus underdistention.    Periportal lymphadenopathy.    Persistent bilateral renal nephrograms, suggestive of renal dysfunction.    Partially visualized bilateral lung groundglass opacities and small   bilateral pleural effusions are redemonstrated.      --- End of Report ---    < end of copied text >         No growth    12-31 @ 09:30 .Blood Blood-Peripheral                No growth to date.    12-31 @ 09:25 .Blood Blood-Peripheral                No growth to date.          RESPIRATORY CULTURES:          Studies  Chest X-RAY  CT SCAN Chest   Venous Dopplers: LE:   CT Abdomen  Others

## 2023-01-05 NOTE — CONSULT NOTE ADULT - PROBLEM SELECTOR RECOMMENDATION 5
will continue to follow.  has come by for spiritual support.  [phone] will continue to follow.  has come by for spiritual support.  [phone] will continue to follow.  has come by for spiritual support.  [phone]

## 2023-01-05 NOTE — PROGRESS NOTE ADULT - SUBJECTIVE AND OBJECTIVE BOX
INTERVAL HPI/OVERNIGHT EVENTS:  events noted  s/p Code blue, now intubated and on pressors in MICU  hgb stable     Allergies    No Known Allergies    Intolerances      ROS: unable to obtain       PHYSICAL EXAM:   Vital Signs Last 24 Hrs  T(C): 35.5 (2023 12:00), Max: 36.6 (2023 20:30)  T(F): 95.9 (2023 12:00), Max: 97.8 (2023 20:30)  HR: 107 (2023 12:15) (77 - 108)  BP: 105/62 (2023 12:00) (89/60 - 115/74)  BP(mean): 78 (2023 12:00) (69 - 86)  RR: 28 (2023 12:15) (17 - 30)  SpO2: 93% (2023 12:15) (90% - 100%)    Parameters below as of 2023 06:23  Patient On (Oxygen Delivery Method): ventilator    Daily     Daily I&O's Summary    2023 07:01  -  2023 07:00  --------------------------------------------------------  IN: 0 mL / OUT: 15 mL / NET: -15 mL    2023 07:01  -  2023 12:30  --------------------------------------------------------  IN: 923.2 mL / OUT: 35 mL / NET: 888.2 mL        GENERAL:  Appears stated age, well-groomed, well-nourished, no distress  HEENT:  NC/AT,  conjunctivae clear and pink, no thyromegaly, nodules, adenopathy, no JVD, sclera -anicteric  CHEST:  Full & symmetric excursion, no increased effort, breath sounds clear  HEART:  Regular rhythm, S1, S2, no murmur/rub/S3/S4, no abdominal bruit, no edema  ABDOMEN:  Soft, non-tender, non-distended, normoactive bowel sounds,  no masses ,no hepato-splenomegaly, no signs of chronic liver disease  EXTEREMITIES:  no cyanosis,clubbing or edema  SKIN:  No rash/erythema/ecchymoses/petechiae/wounds/abscess/warm/dry  NEURO: intubated      LABS:                                   13.7   29.63 )-----------( 131      ( 2023 06:48 )             42.6       134<L>  |  87<L>  |  151<H>  ----------------------------<  113<H>  5.5<H>   |  14<L>  |  7.56<H>    Ca    6.9<L>      2023 06:48  Phos  11.5       Mg     3.1         TPro  5.0<L>  /  Alb  2.2<L>  /  TBili  7.0<H>  /  DBili  x   /  AST  544<H>  /  ALT  572<H>  /  AlkPhos  1341<H>        Urinalysis Basic - ( 31 Dec 2022 23:22 )    Color: Yellow / Appearance: Clear / S.038 / pH: x  Gluc: x / Ketone: Negative  / Bili: Negative / Urobili: Negative   Blood: x / Protein: Trace / Nitrite: Negative   Leuk Esterase: Negative / RBC: 6 /hpf / WBC 6 /HPF   Sq Epi: x / Non Sq Epi: 1 /hpf / Bacteria: Negative      amylase   lipase  RADIOLOGY & ADDITIONAL TESTS:

## 2023-01-05 NOTE — PROCEDURE NOTE - ADDITIONAL PROCEDURE DETAILS
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 Pt in addition found to have acute RLL PE with LLE DVT. This course further c/b PEA arrest requiring intubation, norepi and epi gtt's. Rt IJ TLC placed for multiple pressor therapy
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 Pt in addition found to have acute RLL PE with LLE DVT. This course further c/b PEA arrest requiring intubation, norepi and epi gtt's. Lt Fem A-line placed for closer monitoring

## 2023-01-05 NOTE — AIRWAY PLACEMENT NOTE ADULT - AIRWAY COMMENTS:
Pt intubated emergently by Anesthesia during CODE BLUE with no complications noted. PT transported to ICU and placed on mechanical ventilation.

## 2023-01-05 NOTE — PROGRESS NOTE ADULT - ATTENDING COMMENTS
Esophageal mass, mediastinal nodes and left hilar node and liver lesions, s/p biopsy. It revealed metastatic adenocarcinoma.   cardiac arrest on intubation   will continue to follow up when medically stable

## 2023-01-05 NOTE — PROGRESS NOTE ADULT - SUBJECTIVE AND OBJECTIVE BOX
Hematology/Oncology Follow-up    INTERVAL HPI/OVERNIGHT EVENTS:  Patient S&E at bedside. s/p code blue this morning. Unable to obtain ROS.    VITAL SIGNS:  T(F): 94.8 (01-05-23 @ 11:00)  HR: 108 (01-05-23 @ 11:30)  BP: 111/61 (01-05-23 @ 11:30)  RR: 26 (01-05-23 @ 11:30)  SpO2: 95% (01-05-23 @ 11:30)  Wt(kg): --    PHYSICAL EXAM:    Constitutional: AAOx0, NAD,   Eyes: PERRL, EOMI, sclera non-icteric  Neck: supple, no masses, no JVD  Respiratory: CTA b/l, good air entry b/l, no wheezing, rhonchi, rales, with normal respiratory effort and no intercostal retractions  Cardiovascular: RRR, normal S1S2, no M/R/G  Gastrointestinal: soft, NTND, no masses palpable, BS normal in all four quadrants, no HSM  Extremities:  no c/c/e  Neurological: Grossly intact  Skin: No rash or lesion    MEDICATIONS  (STANDING):  chlorhexidine 0.12% Liquid 15 milliLiter(s) Oral Mucosa every 12 hours  chlorhexidine 2% Cloths 1 Application(s) Topical daily  chlorhexidine 4% Liquid 1 Application(s) Topical <User Schedule>  EPINEPHrine    Infusion 0.2 MICROgram(s)/kG/Min (65.4 mL/Hr) IV Continuous <Continuous>  fentaNYL   Infusion... 0.5 MICROgram(s)/kG/Hr (2.18 mL/Hr) IV Continuous <Continuous>  hydrocortisone sodium succinate Injectable 100 milliGRAM(s) IV Push every 8 hours  influenza  Vaccine (HIGH DOSE) 0.7 milliLiter(s) IntraMuscular once  norepinephrine Infusion 0.05 MICROgram(s)/kG/Min (8.18 mL/Hr) IV Continuous <Continuous>  pantoprazole  Injectable 40 milliGRAM(s) IV Push two times a day  piperacillin/tazobactam IVPB.. 3.375 Gram(s) IV Intermittent every 12 hours  propofol Infusion 50 MICROgram(s)/kG/Min (26.2 mL/Hr) IV Continuous <Continuous>  sodium bicarbonate  Infusion 0.172 mEq/kG/Hr (100 mL/Hr) IV Continuous <Continuous>  vasopressin Infusion 0.04 Unit(s)/Min (6 mL/Hr) IV Continuous <Continuous>    MEDICATIONS  (PRN):  fentaNYL    Injectable 50 MICROGram(s) IV Push every 2 hours PRN uptitration      No Known Allergies      LABS:                        13.7   29.63 )-----------( 131      ( 05 Jan 2023 06:48 )             42.6     01-05    134<L>  |  87<L>  |  151<H>  ----------------------------<  113<H>  5.5<H>   |  14<L>  |  7.56<H>    Ca    6.9<L>      05 Jan 2023 06:48  Phos  11.5     01-05  Mg     3.1     01-05    TPro  5.0<L>  /  Alb  2.2<L>  /  TBili  7.0<H>  /  DBili  x   /  AST  544<H>  /  ALT  572<H>  /  AlkPhos  1341<H>  01-05    PT/INR - ( 05 Jan 2023 06:46 )   PT: 24.2 sec;   INR: 2.07 ratio    PTT - ( 05 Jan 2023 06:46 )  PTT:>200.0 sec       RADIOLOGY & ADDITIONAL TESTS:  Studies reviewed.   Hematology/Oncology Follow-up    INTERVAL HPI/OVERNIGHT EVENTS:  Patient S&E at bedside. s/p code blue this morning. Unable to obtain ROS, intubated and sedated.    VITAL SIGNS:  T(F): 94.8 (01-05-23 @ 11:00)  HR: 108 (01-05-23 @ 11:30)  BP: 111/61 (01-05-23 @ 11:30)  RR: 26 (01-05-23 @ 11:30)  SpO2: 95% (01-05-23 @ 11:30)  Wt(kg): --    PHYSICAL EXAM:    Constitutional: AAOx0, NAD, ETT in place  Eyes: PERRL, EOMI, sclera non-icteric  Neck: supple, no masses, no JVD  Respiratory: CTA b/l  Cardiovascular: RRR, normal S1S2, no M/R/G  Gastrointestinal: soft, NTND, no masses palpable,  Extremities:  no c/c/e  Neurological: sedated  Skin: No rash or lesion    MEDICATIONS  (STANDING):  chlorhexidine 0.12% Liquid 15 milliLiter(s) Oral Mucosa every 12 hours  chlorhexidine 2% Cloths 1 Application(s) Topical daily  chlorhexidine 4% Liquid 1 Application(s) Topical <User Schedule>  EPINEPHrine    Infusion 0.2 MICROgram(s)/kG/Min (65.4 mL/Hr) IV Continuous <Continuous>  fentaNYL   Infusion... 0.5 MICROgram(s)/kG/Hr (2.18 mL/Hr) IV Continuous <Continuous>  hydrocortisone sodium succinate Injectable 100 milliGRAM(s) IV Push every 8 hours  influenza  Vaccine (HIGH DOSE) 0.7 milliLiter(s) IntraMuscular once  norepinephrine Infusion 0.05 MICROgram(s)/kG/Min (8.18 mL/Hr) IV Continuous <Continuous>  pantoprazole  Injectable 40 milliGRAM(s) IV Push two times a day  piperacillin/tazobactam IVPB.. 3.375 Gram(s) IV Intermittent every 12 hours  propofol Infusion 50 MICROgram(s)/kG/Min (26.2 mL/Hr) IV Continuous <Continuous>  sodium bicarbonate  Infusion 0.172 mEq/kG/Hr (100 mL/Hr) IV Continuous <Continuous>  vasopressin Infusion 0.04 Unit(s)/Min (6 mL/Hr) IV Continuous <Continuous>    MEDICATIONS  (PRN):  fentaNYL    Injectable 50 MICROGram(s) IV Push every 2 hours PRN uptitration      No Known Allergies      LABS:                        13.7   29.63 )-----------( 131      ( 05 Jan 2023 06:48 )             42.6     01-05    134<L>  |  87<L>  |  151<H>  ----------------------------<  113<H>  5.5<H>   |  14<L>  |  7.56<H>    Ca    6.9<L>      05 Jan 2023 06:48  Phos  11.5     01-05  Mg     3.1     01-05    TPro  5.0<L>  /  Alb  2.2<L>  /  TBili  7.0<H>  /  DBili  x   /  AST  544<H>  /  ALT  572<H>  /  AlkPhos  1341<H>  01-05    PT/INR - ( 05 Jan 2023 06:46 )   PT: 24.2 sec;   INR: 2.07 ratio    PTT - ( 05 Jan 2023 06:46 )  PTT:>200.0 sec       RADIOLOGY & ADDITIONAL TESTS:  Studies reviewed.

## 2023-01-05 NOTE — PROCEDURE NOTE - NSINDICATIONS_GEN_A_CORE
critical illness/emergency venous access/venous access
airway protection/critical patient/mental status change/respiratory distress
arterial puncture to obtain ABG's/blood sampling/cannulation purposes/critical patient/monitoring purposes

## 2023-01-05 NOTE — RAPID RESPONSE TEAM SUMMARY - NSSITUATIONBACKGROUNDRRT_GEN_ALL_CORE
77y male with a history of  HTN,HLD,psoriasis managed with 10 mg daily prednisone, recently treated for pneumonia and found to have esophageal lesion with liver mets, who developed shortness of breath and weakness and was brought to ER.   CTA with documented PE as well as multifocal densities throughout both lungs. 77y male with a history of  HTN,HLD,psoriasis managed with 10 mg daily prednisone, recently treated for pneumonia and found to have esophageal lesion with liver mets, who developed shortness of breath and weakness and was brought to ER.   CTA with documented PE as well as multifocal densities throughout both lungs, on heparin gtt. Pt was admitted for acute right lower lobe PE with DVT of the LLE, pt currently on a heparin gtt. Pt on zosyn for septic PNA, had an LIVIER w/anion gap acidosis s/p bicarb. Pt had AMS s/p MR head showing an acute lacunar infarct. MRA head pending. Pt's FOBT was positive and was started on PPIs.    Lexis Morgan called as patient was found unresponsive and no pulse. Prior to code blue, patient reportedly having difficulty breathing, was given scheduled Bumex IV. Was also given PO midodrine for soft BPs. Patient with PEA arrest, 2 rounds of CPR, Epinephrine x2 with ROSC achieved. Patient intubated by anesthesia and started on propofol for sedation. Levophed initially hung, but BP was systolic 140s, so Levophed not started. On arrival to MICU, systolic BP 120s, satting 100%. Full set of labs sent from code blue.

## 2023-01-05 NOTE — CHART NOTE - NSCHARTNOTEFT_GEN_A_CORE
: [place]    INDICATION: Shock state     PROCEDURE:  [x] LIMITED ECHO  [x] LIMITED CHEST  [ ] LIMITED RETROPERITONEAL  [ ] LIMITED ABDOMINAL  [ ] LIMITED DVT  [ ] NEEDLE GUIDANCE VASCULAR  [ ] NEEDLE GUIDANCE THORACENTESIS  [ ] NEEDLE GUIDANCE PARACENTESIS  [ ] NEEDLE GUIDANCE PERICARDIOCENTESIS  [ ] OTHER    FINDINGS:  -Likely mildly reduced LV function but appears improved with vasopressor support. LV > RV. No RV overload  -B-lines anterior lung fields b/l  -R base without pleural effusion; unable to visualize left base due to patient positioning   -Unable to visualize IVC well but does not appear collapsed in certain views.       INTERPRETATION:  -Shock likely distributive in nature.    Jayesh Murphy D.O.  PGY-6 EM/IM/CC : [place]    INDICATION: Shock state     PROCEDURE:  [x] LIMITED ECHO  [x] LIMITED CHEST  [ ] LIMITED RETROPERITONEAL  [ ] LIMITED ABDOMINAL  [ ] LIMITED DVT  [ ] NEEDLE GUIDANCE VASCULAR  [ ] NEEDLE GUIDANCE THORACENTESIS  [ ] NEEDLE GUIDANCE PARACENTESIS  [ ] NEEDLE GUIDANCE PERICARDIOCENTESIS  [ ] OTHER    FINDINGS:  -Likely mildly reduced LV function but appears improved with vasopressor support. LV > RV. No RV overload  -B-lines anterior lung fields b/l  -R base without pleural effusion; unable to visualize left base due to patient positioning   -Unable to visualize IVC well; hepatic lesions noted.       INTERPRETATION:  -Shock likely distributive in nature.  -Metastatic disease to live.     Jayesh Murphy D.O.  PGY-6 EM/IM/CC : [place]    INDICATION: Shock state     PROCEDURE:  [x] LIMITED ECHO  [x] LIMITED CHEST  [ ] LIMITED RETROPERITONEAL  [ ] LIMITED ABDOMINAL  [ ] LIMITED DVT  [ ] NEEDLE GUIDANCE VASCULAR  [ ] NEEDLE GUIDANCE THORACENTESIS  [ ] NEEDLE GUIDANCE PARACENTESIS  [ ] NEEDLE GUIDANCE PERICARDIOCENTESIS  [ ] OTHER    FINDINGS:  -Likely mildly reduced LV function but appears improved with vasopressor support. LV > RV. No RV overload  -B-lines anterior lung fields b/l  -R base without pleural effusion; unable to visualize left base due to patient positioning   -Unable to visualize IVC well; hepatic lesions noted.       INTERPRETATION:  -Shock likely distributive in nature.  -Metastatic disease to liver.     Jayesh Murphy D.O.  PGY-6 EM/IM/CC : [place]    INDICATION: Shock state     PROCEDURE:  [x] LIMITED ECHO  [x] LIMITED CHEST  [ ] LIMITED RETROPERITONEAL  [x] LIMITED ABDOMINAL  [ ] LIMITED DVT  [ ] NEEDLE GUIDANCE VASCULAR  [ ] NEEDLE GUIDANCE THORACENTESIS  [ ] NEEDLE GUIDANCE PARACENTESIS  [ ] NEEDLE GUIDANCE PERICARDIOCENTESIS  [ ] OTHER    FINDINGS:  -Likely mild-moderately reduced LV function but appears improved with vasopressor support. LV > RV. No RV overload  -B-lines anterior lung fields b/l  -R base without pleural effusion; unable to visualize left base due to patient positioning   -Unable to visualize IVC well; hepatic lesions noted.       INTERPRETATION:  -Shock likely distributive in nature.  -Metastatic disease to liver.     Jayesh Murphy D.O.  PGY-6 EM/IM/CC    Images saved to [employer]    Attending Attestation:  Agree with above. I was present for the entire procedure and have reviewed all images.

## 2023-01-05 NOTE — CHART NOTE - NSCHARTNOTEFT_GEN_A_CORE
Patient seen and examined. Patient critically ill requiring frequent bedside visits with therapy change. Patient is a 77M with extensive history including HTN, HLD, Psoriasis on Prednisone who was recently diagnosed with poorly differentiated adenocarcinoma of likely GI primary who is found to have a PE and DVT and concomitant pneumonia. He was on the medical floors and was found to have a lacunar infarct in setting encephalopathy. On 1/5/23 in the AM he had a PEA arrest (~ 5 minutes). ROSC was achieved and he was intubated and brought to MICU for further management.    Gen: sedated and unresponsive  HEENT: anicteric, MMM, nares clear, trachea midline  CVS: tachycardic, S1S2, systolic murmur  Lungs: coarse BS in all lung fields, no wheeze, on full vent support  Abd: soft, NT, ND, +BS - unable to pass NGT past GE junction  Ext: no clubbing or cyanosis    Labs: Reviewed    77M newly diagnosed poorly differentiated adenocarcinoma of GI origin who presents with PE/DVT , bacterial pneumonia, and developed lacunar infarcts now s/p cardiac arrest.    1. Cardiovascular - shock state requiring multiple vasopressors to maintain MAP > 65. Patient initially on Levo and Epi. Will try and maximize Levo and add Vaso as needed.  - POCUS for shock state  - mild reduction in LV function though improved from initial arrival to MICU when patient was more acidotic  - No reported events on telemetry prior to arrest  - Stress dose steroids given chronic Prednisone use  2. Pulmonary - acute on chronic hypoxemic respiratory failure in setting of cardiac arrest  - Continue mechanical ventilation. CXR and ABG to ensure appropriate position and ventilation.  - maintain O2 sat > 90%. Not medically ready for extubation at this time  - Continue Heparin for VTE. PERT team called overnight given shock state but given overall clinical condition not a candidate for invasive interventions  3. Renal - acute on chronic renal failure now with worsening acidosis  - Bicarb gtt started overnight - followup serial labs  - Patient presently too hemodynamically unstable to tolerate renal replacement therapy attempt - I discussed this with patients wife at bedside as well as Nephrologist. Given hemodynamic instability patient is at high risk for recurrent cardiac arrest in setting of renal failure  4. Gastro - oropharyngeal dysphagia - unable to pass NGT past GE  junction likely in setting of malignancy  - Presently not a candidate for chemotherapy and if he survives will likely need a G-tube as patient has had very poor PO intake prior to this  - Protonix  5. Neuro - patient with acute/subacute lacunar infarcts noted on MRI 1/3  - Underwent MRA 1/4/23 - await report but appears to have significant motion  - ASA and statin as able - though presently without PO access  6. Infectious Disease - repeat cultures  - Continue Zosyn  7. DVT proph -  on Hep gtt for known VTE    Prognosis poor. I have spoken with patient's wife at bedside and they wish to continue current level of care. Patient is at high risk of clinical deterioration and/or death given his severe shock and multiorgan failure. Patient appears to be actively dying.  - I have provided 60 minutes of critical care time independent of procedures and/or teaching services.

## 2023-01-05 NOTE — PROGRESS NOTE ADULT - ASSESSMENT
77y male with a history of  HTN,HLD,psoriasis managed with 10 mg daily prednisone, recently treated for pneumonia and found to have esophageal lesion with liver mets, who developed shortness of breath and weakness and was brought to ER.   CTA with documented PE as well as multifocal densities throughout both lungs.  He was hypotensive in ER with BP in 80's, given antibiotics.  He appears comfortable on nasal oxygen,  zosyn, one dose of vancomycoin,  and azithromycin given in ER  He also was given a dose of solumedrol.  Advanced malignancy(? type) , CKD vs LIVIER, thrombocytopenia. PE and possible pneumonia.  now s/p code intubated in ICU    Suggest:  1.Await blood cultures- r/o bacteremia, so far negative  2.Zosyn for possible multifocal pneumonia day 6 --but would favor dc shortly   3. Monitor WBC, possible steroids induced leukocytosis  4. D/w pt's family at bedside, oncology w/u for metastatic disease  5. goals of care to be addressed

## 2023-01-05 NOTE — PROGRESS NOTE ADULT - SUBJECTIVE AND OBJECTIVE BOX
Vascular Cardiology  Progress note  DIRECT SERVICE NUMBER: [phone]               CC:  PE    INTERVAL HISTORY:  unfortunately had RRT overnight, was intubated, now in MICU, requiring pressors  Wife at bedside  Children are coming to visit later today          Allergies    No Known Allergies    Intolerances    	    MEDICATIONS:  EPINEPHrine    Infusion 0.2 MICROgram(s)/kG/Min IV Continuous <Continuous>  norepinephrine Infusion 0.05 MICROgram(s)/kG/Min IV Continuous <Continuous>    piperacillin/tazobactam IVPB.. 3.375 Gram(s) IV Intermittent every 12 hours      fentaNYL    Injectable 50 MICROGram(s) IV Push every 2 hours PRN  fentaNYL   Infusion... 0.5 MICROgram(s)/kG/Hr IV Continuous <Continuous>    pantoprazole  Injectable 40 milliGRAM(s) IV Push two times a day    hydrocortisone sodium succinate Injectable 100 milliGRAM(s) IV Push every 8 hours  vasopressin Infusion 0.04 Unit(s)/Min IV Continuous <Continuous>    chlorhexidine 0.12% Liquid 15 milliLiter(s) Oral Mucosa every 12 hours  chlorhexidine 2% Cloths 1 Application(s) Topical daily  chlorhexidine 4% Liquid 1 Application(s) Topical <User Schedule>  influenza  Vaccine (HIGH DOSE) 0.7 milliLiter(s) IntraMuscular once  sodium bicarbonate  Infusion 0.172 mEq/kG/Hr IV Continuous <Continuous>      PAST MEDICAL & SURGICAL HISTORY:  Psoriasis      Esophageal mass      Benign essential HTN          FAMILY HISTORY:      SOCIAL HISTORY:  unchanged            [ x] Unable to obtain    PHYSICAL EXAM:  T(C): 34.1 (01-05-23 @ 09:00), Max: 36.6 (01-04-23 @ 20:30)  HR: 103 (01-05-23 @ 09:45) (77 - 105)  BP: 105/63 (01-05-23 @ 09:45) (89/60 - 115/74)  RR: 26 (01-05-23 @ 09:45) (17 - 30)  SpO2: 100% (01-05-23 @ 09:45) (91% - 100%)  Wt(kg): --  I&O's Summary      Appearance: elderly, frail	  HEENT:   Normal oral mucosa, PERRL, EOMI	  Carotid:  Right: No bruit    Left:  No bruit  Lymphatic: No lymphadenopathy  Cardiovascular:  S1S2  Respiratory: Mechanical BS  Psychiatry:  intubated            LABS:	 	    CBC Full  -  ( 05 Jan 2023 06:48 )  WBC Count : 29.63 K/uL  Hemoglobin : 13.7 g/dL  Hematocrit : 42.6 %  Platelet Count - Automated : 131 K/uL  Mean Cell Volume : 84.9 fl  Mean Cell Hemoglobin : 27.3 pg  Mean Cell Hemoglobin Concentration : 32.2 gm/dL  Auto Neutrophil # : x  Auto Lymphocyte # : x  Auto Monocyte # : x  Auto Eosinophil # : x  Auto Basophil # : x  Auto Neutrophil % : x  Auto Lymphocyte % : x  Auto Monocyte % : x  Auto Eosinophil % : x  Auto Basophil % : x    01-05    134<L>  |  87<L>  |  151<H>  ----------------------------<  113<H>  5.5<H>   |  14<L>  |  7.56<H>  01-05    132<L>  |  87<L>  |  151<H>  ----------------------------<  172<H>  5.8<H>   |  <10<LL>  |  7.48<H>    Ca    6.9<L>      05 Jan 2023 06:48  Ca    7.1<L>      05 Jan 2023 06:46  Phos  11.5     01-05  Phos  10.4     01-05  Mg     3.1     01-05  Mg     3.0     01-05    TPro  5.0<L>  /  Alb  2.2<L>  /  TBili  7.0<H>  /  DBili  x   /  AST  544<H>  /  ALT  572<H>  /  AlkPhos  1341<H>  01-05  TPro  5.1<L>  /  Alb  2.5<L>  /  TBili  7.7<H>  /  DBili  x   /  AST  540<H>  /  ALT  606<H>  /  AlkPhos  1441<H>  01-05          Assessment:  1. Metastatic CA  2.  PE, DVT  3.  Stroke  4.  LIVIER           Plan:  1. Overall prognosis is guarded, with multiple active comorbid conditions  2.  No role for advanced PE therapies- the PE is present, but not that large to cause such compromise - continue with anticoagulation.  If concern for HIT, then argatraban  3.  Continue supportive care  4.  Renal note appreciated  5.   GOC w Family     he is critically ill    Thank you    Bhavesh Downey   Vascular Cardiology Service  DIRECT SERVICE NUMBER [phone]       Vascular Cardiology  Progress note  DIRECT SERVICE NUMBER: [phone]               CC:  PE    INTERVAL HISTORY:  unfortunately had RRT overnight, was intubated, now in MICU, requiring pressors  Wife at bedside  Children are coming to visit later today          Allergies    No Known Allergies    Intolerances    	    MEDICATIONS:  EPINEPHrine    Infusion 0.2 MICROgram(s)/kG/Min IV Continuous <Continuous>  norepinephrine Infusion 0.05 MICROgram(s)/kG/Min IV Continuous <Continuous>    piperacillin/tazobactam IVPB.. 3.375 Gram(s) IV Intermittent every 12 hours      fentaNYL    Injectable 50 MICROGram(s) IV Push every 2 hours PRN  fentaNYL   Infusion... 0.5 MICROgram(s)/kG/Hr IV Continuous <Continuous>    pantoprazole  Injectable 40 milliGRAM(s) IV Push two times a day    hydrocortisone sodium succinate Injectable 100 milliGRAM(s) IV Push every 8 hours  vasopressin Infusion 0.04 Unit(s)/Min IV Continuous <Continuous>    chlorhexidine 0.12% Liquid 15 milliLiter(s) Oral Mucosa every 12 hours  chlorhexidine 2% Cloths 1 Application(s) Topical daily  chlorhexidine 4% Liquid 1 Application(s) Topical <User Schedule>  influenza  Vaccine (HIGH DOSE) 0.7 milliLiter(s) IntraMuscular once  sodium bicarbonate  Infusion 0.172 mEq/kG/Hr IV Continuous <Continuous>      PAST MEDICAL & SURGICAL HISTORY:  Psoriasis      Esophageal mass      Benign essential HTN          FAMILY HISTORY:      SOCIAL HISTORY:  unchanged            [ x] Unable to obtain    PHYSICAL EXAM:  T(C): 34.1 (01-05-23 @ 09:00), Max: 36.6 (01-04-23 @ 20:30)  HR: 103 (01-05-23 @ 09:45) (77 - 105)  BP: 105/63 (01-05-23 @ 09:45) (89/60 - 115/74)  RR: 26 (01-05-23 @ 09:45) (17 - 30)  SpO2: 100% (01-05-23 @ 09:45) (91% - 100%)  Wt(kg): --  I&O's Summary      Appearance: elderly, frail	  HEENT:   Normal oral mucosa, PERRL, EOMI	  Carotid:  Right: No bruit    Left:  No bruit  Lymphatic: No lymphadenopathy  Cardiovascular:  S1S2  Respiratory: Mechanical BS  Psychiatry:  intubated            LABS:	 	    CBC Full  -  ( 05 Jan 2023 06:48 )  WBC Count : 29.63 K/uL  Hemoglobin : 13.7 g/dL  Hematocrit : 42.6 %  Platelet Count - Automated : 131 K/uL  Mean Cell Volume : 84.9 fl  Mean Cell Hemoglobin : 27.3 pg  Mean Cell Hemoglobin Concentration : 32.2 gm/dL  Auto Neutrophil # : x  Auto Lymphocyte # : x  Auto Monocyte # : x  Auto Eosinophil # : x  Auto Basophil # : x  Auto Neutrophil % : x  Auto Lymphocyte % : x  Auto Monocyte % : x  Auto Eosinophil % : x  Auto Basophil % : x    01-05    134<L>  |  87<L>  |  151<H>  ----------------------------<  113<H>  5.5<H>   |  14<L>  |  7.56<H>  01-05    132<L>  |  87<L>  |  151<H>  ----------------------------<  172<H>  5.8<H>   |  <10<LL>  |  7.48<H>    Ca    6.9<L>      05 Jan 2023 06:48  Ca    7.1<L>      05 Jan 2023 06:46  Phos  11.5     01-05  Phos  10.4     01-05  Mg     3.1     01-05  Mg     3.0     01-05    TPro  5.0<L>  /  Alb  2.2<L>  /  TBili  7.0<H>  /  DBili  x   /  AST  544<H>  /  ALT  572<H>  /  AlkPhos  1341<H>  01-05  TPro  5.1<L>  /  Alb  2.5<L>  /  TBili  7.7<H>  /  DBili  x   /  AST  540<H>  /  ALT  606<H>  /  AlkPhos  1441<H>  01-05          Assessment:  1. Metastatic CA  2.  PE, DVT  3.  Stroke  4.  LIVIER           Plan:  1. Overall prognosis is guarded, with multiple active comorbid conditions  2.  No role for advanced PE therapies- the PE is present, but not that large to cause such compromise - continue with anticoagulation.  If concern for HIT, then argatraban  3.  Continue supportive care  4.  Renal note appreciated  5.   GOC w Family     he is critically ill    Thank you    Bhavesh Downey   Vascular Cardiology Service  DIRECT SERVICE NUMBER [phone]       Vascular Cardiology  Progress note  DIRECT SERVICE NUMBER: [phone]               CC:  PE    INTERVAL HISTORY:  unfortunately had RRT overnight, was intubated, now in MICU, requiring pressors  Wife at bedside  Children are coming to visit later today          Allergies    No Known Allergies    Intolerances    	    MEDICATIONS:  EPINEPHrine    Infusion 0.2 MICROgram(s)/kG/Min IV Continuous <Continuous>  norepinephrine Infusion 0.05 MICROgram(s)/kG/Min IV Continuous <Continuous>    piperacillin/tazobactam IVPB.. 3.375 Gram(s) IV Intermittent every 12 hours      fentaNYL    Injectable 50 MICROGram(s) IV Push every 2 hours PRN  fentaNYL   Infusion... 0.5 MICROgram(s)/kG/Hr IV Continuous <Continuous>    pantoprazole  Injectable 40 milliGRAM(s) IV Push two times a day    hydrocortisone sodium succinate Injectable 100 milliGRAM(s) IV Push every 8 hours  vasopressin Infusion 0.04 Unit(s)/Min IV Continuous <Continuous>    chlorhexidine 0.12% Liquid 15 milliLiter(s) Oral Mucosa every 12 hours  chlorhexidine 2% Cloths 1 Application(s) Topical daily  chlorhexidine 4% Liquid 1 Application(s) Topical <User Schedule>  influenza  Vaccine (HIGH DOSE) 0.7 milliLiter(s) IntraMuscular once  sodium bicarbonate  Infusion 0.172 mEq/kG/Hr IV Continuous <Continuous>      PAST MEDICAL & SURGICAL HISTORY:  Psoriasis      Esophageal mass      Benign essential HTN          FAMILY HISTORY:      SOCIAL HISTORY:  unchanged            [ x] Unable to obtain    PHYSICAL EXAM:  T(C): 34.1 (01-05-23 @ 09:00), Max: 36.6 (01-04-23 @ 20:30)  HR: 103 (01-05-23 @ 09:45) (77 - 105)  BP: 105/63 (01-05-23 @ 09:45) (89/60 - 115/74)  RR: 26 (01-05-23 @ 09:45) (17 - 30)  SpO2: 100% (01-05-23 @ 09:45) (91% - 100%)  Wt(kg): --  I&O's Summary      Appearance: elderly, frail	  HEENT:   Normal oral mucosa, PERRL, EOMI	  Carotid:  Right: No bruit    Left:  No bruit  Lymphatic: No lymphadenopathy  Cardiovascular:  S1S2  Respiratory: Mechanical BS  Psychiatry:  intubated            LABS:	 	    CBC Full  -  ( 05 Jan 2023 06:48 )  WBC Count : 29.63 K/uL  Hemoglobin : 13.7 g/dL  Hematocrit : 42.6 %  Platelet Count - Automated : 131 K/uL  Mean Cell Volume : 84.9 fl  Mean Cell Hemoglobin : 27.3 pg  Mean Cell Hemoglobin Concentration : 32.2 gm/dL  Auto Neutrophil # : x  Auto Lymphocyte # : x  Auto Monocyte # : x  Auto Eosinophil # : x  Auto Basophil # : x  Auto Neutrophil % : x  Auto Lymphocyte % : x  Auto Monocyte % : x  Auto Eosinophil % : x  Auto Basophil % : x    01-05    134<L>  |  87<L>  |  151<H>  ----------------------------<  113<H>  5.5<H>   |  14<L>  |  7.56<H>  01-05    132<L>  |  87<L>  |  151<H>  ----------------------------<  172<H>  5.8<H>   |  <10<LL>  |  7.48<H>    Ca    6.9<L>      05 Jan 2023 06:48  Ca    7.1<L>      05 Jan 2023 06:46  Phos  11.5     01-05  Phos  10.4     01-05  Mg     3.1     01-05  Mg     3.0     01-05    TPro  5.0<L>  /  Alb  2.2<L>  /  TBili  7.0<H>  /  DBili  x   /  AST  544<H>  /  ALT  572<H>  /  AlkPhos  1341<H>  01-05  TPro  5.1<L>  /  Alb  2.5<L>  /  TBili  7.7<H>  /  DBili  x   /  AST  540<H>  /  ALT  606<H>  /  AlkPhos  1441<H>  01-05          Assessment:  1. Metastatic CA  2.  PE, DVT  3.  Stroke  4.  LIVIER           Plan:  1. Overall prognosis is guarded, with multiple active comorbid conditions  2.  No role for advanced PE therapies- the PE is present, but not that large to cause such compromise - continue with anticoagulation.  If concern for HIT, then argatraban  3.  Continue supportive care  4.  Renal note appreciated  5.   GOC w Family     he is critically ill    Thank you    Bhavesh Downey   Vascular Cardiology Service  DIRECT SERVICE NUMBER [phone]

## 2023-01-05 NOTE — CHART NOTE - NSCHARTNOTEFT_GEN_A_CORE
Medicine PA Note    Notified overhead of CODE BLUE. RRT at bedside   2 rounds of chest compressions were performed at bedside, ROSC was achieved.   Pt was intubated and transferred to MICU.  Discussed with patient's wife, Cecilia, aware of above event, would like patient to remain full code.  Notified attending, Dr. Tamayo.   Please refer to full code blue note.    Eryn Sampson PA-C  Department of Medicine   [employer]

## 2023-01-05 NOTE — CONSULT NOTE ADULT - ASSESSMENT
76 y/o M PMH HTN, HLD, psoriasis on prednisone 10mg daily presenting with SOB. Recent admission for pneumonia and dced two weeks ago also found to possible esophageal cancer (mass) with liver mets s/p IR biopsy 12/22. Hospital course complicated by findings of PE, infarcts/CVA on CT and cardiac arrest. Palliative consulted to assist with goals of care 78 y/o M PMH HTN, HLD, psoriasis on prednisone 10mg daily presenting with SOB. Recent admission for pneumonia and dced two weeks ago also found to possible esophageal cancer (mass) with liver mets s/p IR biopsy 12/22. Hospital course complicated by findings of PE, infarcts/CVA on CT and cardiac arrest. Palliative consulted to assist with goals of care

## 2023-01-05 NOTE — CONSULT NOTE ADULT - PROBLEM SELECTOR RECOMMENDATION 4
introduced team to wife today, who would be patients surrogate decision maker  no HCP or previous advance directives. GOC narrative above    in the event of recurrent cardiac arrest, I do not feel as though further CPR would be medically beneficial given patient already on significant life support. I explained this to family at bedside. defer management to ICU team

## 2023-01-06 VITALS — RESPIRATION RATE: 12 BRPM

## 2023-01-06 LAB
ALBUMIN SERPL ELPH-MCNC: 1 G/DL — LOW (ref 3.3–5)
ALP SERPL-CCNC: 1475 U/L — HIGH (ref 40–120)
ALT FLD-CCNC: 2337 U/L — HIGH (ref 10–45)
ANION GAP SERPL CALC-SCNC: 40 MMOL/L — HIGH (ref 5–17)
AST SERPL-CCNC: <5 U/L — LOW (ref 10–40)
BILIRUB SERPL-MCNC: 4.4 MG/DL — HIGH (ref 0.2–1.2)
BUN SERPL-MCNC: 128 MG/DL — HIGH (ref 7–23)
CALCIUM SERPL-MCNC: 7.2 MG/DL — LOW (ref 8.4–10.5)
CHLORIDE SERPL-SCNC: 84 MMOL/L — LOW (ref 96–108)
CO2 SERPL-SCNC: <10 MMOL/L — CRITICAL LOW (ref 22–31)
CREAT SERPL-MCNC: 6.85 MG/DL — HIGH (ref 0.5–1.3)
EGFR: 8 ML/MIN/1.73M2 — LOW
GAS PNL BLDA: SIGNIFICANT CHANGE UP
GLUCOSE BLDC GLUCOMTR-MCNC: 401 MG/DL — HIGH (ref 70–99)
GLUCOSE BLDC GLUCOMTR-MCNC: 460 MG/DL — CRITICAL HIGH (ref 70–99)
GLUCOSE BLDC GLUCOMTR-MCNC: 463 MG/DL — CRITICAL HIGH (ref 70–99)
GLUCOSE BLDC GLUCOMTR-MCNC: 570 MG/DL — CRITICAL HIGH (ref 70–99)
GLUCOSE BLDC GLUCOMTR-MCNC: 596 MG/DL — CRITICAL HIGH (ref 70–99)
GLUCOSE SERPL-MCNC: 458 MG/DL — CRITICAL HIGH (ref 70–99)
GRAM STN FLD: SIGNIFICANT CHANGE UP
HCT VFR BLD CALC: 27.6 % — LOW (ref 39–50)
HGB BLD-MCNC: 8.4 G/DL — LOW (ref 13–17)
INR BLD: 3.92 RATIO — HIGH (ref 0.88–1.16)
MAGNESIUM SERPL-MCNC: 2.6 MG/DL — SIGNIFICANT CHANGE UP (ref 1.6–2.6)
MCHC RBC-ENTMCNC: 28.3 PG — SIGNIFICANT CHANGE UP (ref 27–34)
MCHC RBC-ENTMCNC: 30.4 GM/DL — LOW (ref 32–36)
MCV RBC AUTO: 92.9 FL — SIGNIFICANT CHANGE UP (ref 80–100)
MRSA PCR RESULT.: DETECTED
NRBC # BLD: 3 /100 WBCS — HIGH (ref 0–0)
PHOSPHATE SERPL-MCNC: 14 MG/DL — HIGH (ref 2.5–4.5)
PLATELET # BLD AUTO: 86 K/UL — LOW (ref 150–400)
POTASSIUM SERPL-MCNC: 7.7 MMOL/L — CRITICAL HIGH (ref 3.5–5.3)
POTASSIUM SERPL-SCNC: 7.7 MMOL/L — CRITICAL HIGH (ref 3.5–5.3)
PROT SERPL-MCNC: 2.8 G/DL — LOW (ref 6–8.3)
PROTHROM AB SERPL-ACNC: 46.1 SEC — HIGH (ref 10.5–13.4)
RAPID RVP RESULT: SIGNIFICANT CHANGE UP
RBC # BLD: 2.97 M/UL — LOW (ref 4.2–5.8)
RBC # FLD: 21.2 % — HIGH (ref 10.3–14.5)
S AUREUS DNA NOSE QL NAA+PROBE: DETECTED
SARS-COV-2 RNA SPEC QL NAA+PROBE: SIGNIFICANT CHANGE UP
SODIUM SERPL-SCNC: 134 MMOL/L — LOW (ref 135–145)
SPECIMEN SOURCE: SIGNIFICANT CHANGE UP
WBC # BLD: 25.18 K/UL — HIGH (ref 3.8–10.5)
WBC # FLD AUTO: 25.18 K/UL — HIGH (ref 3.8–10.5)

## 2023-01-06 PROCEDURE — 99291 CRITICAL CARE FIRST HOUR: CPT | Mod: 25

## 2023-01-06 PROCEDURE — 82553 CREATINE MB FRACTION: CPT

## 2023-01-06 PROCEDURE — 85610 PROTHROMBIN TIME: CPT

## 2023-01-06 PROCEDURE — 82435 ASSAY OF BLOOD CHLORIDE: CPT

## 2023-01-06 PROCEDURE — 70544 MR ANGIOGRAPHY HEAD W/O DYE: CPT

## 2023-01-06 PROCEDURE — 80202 ASSAY OF VANCOMYCIN: CPT

## 2023-01-06 PROCEDURE — 85027 COMPLETE CBC AUTOMATED: CPT

## 2023-01-06 PROCEDURE — 70450 CT HEAD/BRAIN W/O DYE: CPT | Mod: MA

## 2023-01-06 PROCEDURE — 85018 HEMOGLOBIN: CPT

## 2023-01-06 PROCEDURE — 82803 BLOOD GASES ANY COMBINATION: CPT

## 2023-01-06 PROCEDURE — 82140 ASSAY OF AMMONIA: CPT

## 2023-01-06 PROCEDURE — 70553 MRI BRAIN STEM W/O & W/DYE: CPT

## 2023-01-06 PROCEDURE — 84295 ASSAY OF SERUM SODIUM: CPT

## 2023-01-06 PROCEDURE — C8929: CPT

## 2023-01-06 PROCEDURE — 82962 GLUCOSE BLOOD TEST: CPT

## 2023-01-06 PROCEDURE — 87449 NOS EACH ORGANISM AG IA: CPT

## 2023-01-06 PROCEDURE — 93308 TTE F-UP OR LMTD: CPT

## 2023-01-06 PROCEDURE — 36415 COLL VENOUS BLD VENIPUNCTURE: CPT

## 2023-01-06 PROCEDURE — 71045 X-RAY EXAM CHEST 1 VIEW: CPT

## 2023-01-06 PROCEDURE — 82947 ASSAY GLUCOSE BLOOD QUANT: CPT

## 2023-01-06 PROCEDURE — 86022 PLATELET ANTIBODIES: CPT

## 2023-01-06 PROCEDURE — 94002 VENT MGMT INPAT INIT DAY: CPT

## 2023-01-06 PROCEDURE — 80048 BASIC METABOLIC PNL TOTAL CA: CPT

## 2023-01-06 PROCEDURE — 86901 BLOOD TYPING SEROLOGIC RH(D): CPT

## 2023-01-06 PROCEDURE — 82330 ASSAY OF CALCIUM: CPT

## 2023-01-06 PROCEDURE — 82272 OCCULT BLD FECES 1-3 TESTS: CPT

## 2023-01-06 PROCEDURE — 96374 THER/PROPH/DIAG INJ IV PUSH: CPT

## 2023-01-06 PROCEDURE — 71275 CT ANGIOGRAPHY CHEST: CPT | Mod: MB

## 2023-01-06 PROCEDURE — 82565 ASSAY OF CREATININE: CPT

## 2023-01-06 PROCEDURE — 87640 STAPH A DNA AMP PROBE: CPT

## 2023-01-06 PROCEDURE — 87086 URINE CULTURE/COLONY COUNT: CPT

## 2023-01-06 PROCEDURE — 85025 COMPLETE CBC W/AUTO DIFF WBC: CPT

## 2023-01-06 PROCEDURE — 92950 HEART/LUNG RESUSCITATION CPR: CPT

## 2023-01-06 PROCEDURE — 83605 ASSAY OF LACTIC ACID: CPT

## 2023-01-06 PROCEDURE — 96375 TX/PRO/DX INJ NEW DRUG ADDON: CPT

## 2023-01-06 PROCEDURE — 87040 BLOOD CULTURE FOR BACTERIA: CPT

## 2023-01-06 PROCEDURE — 85014 HEMATOCRIT: CPT

## 2023-01-06 PROCEDURE — 84132 ASSAY OF SERUM POTASSIUM: CPT

## 2023-01-06 PROCEDURE — 87070 CULTURE OTHR SPECIMN AEROBIC: CPT

## 2023-01-06 PROCEDURE — 84156 ASSAY OF PROTEIN URINE: CPT

## 2023-01-06 PROCEDURE — 74176 CT ABD & PELVIS W/O CONTRAST: CPT

## 2023-01-06 PROCEDURE — 97162 PT EVAL MOD COMPLEX 30 MIN: CPT

## 2023-01-06 PROCEDURE — 93970 EXTREMITY STUDY: CPT

## 2023-01-06 PROCEDURE — 83735 ASSAY OF MAGNESIUM: CPT

## 2023-01-06 PROCEDURE — 87641 MR-STAPH DNA AMP PROBE: CPT

## 2023-01-06 PROCEDURE — 86850 RBC ANTIBODY SCREEN: CPT

## 2023-01-06 PROCEDURE — 99285 EMERGENCY DEPT VISIT HI MDM: CPT

## 2023-01-06 PROCEDURE — 0225U NFCT DS DNA&RNA 21 SARSCOV2: CPT

## 2023-01-06 PROCEDURE — 84484 ASSAY OF TROPONIN QUANT: CPT

## 2023-01-06 PROCEDURE — 86803 HEPATITIS C AB TEST: CPT

## 2023-01-06 PROCEDURE — A9585: CPT

## 2023-01-06 PROCEDURE — 82550 ASSAY OF CK (CPK): CPT

## 2023-01-06 PROCEDURE — 85730 THROMBOPLASTIN TIME PARTIAL: CPT

## 2023-01-06 PROCEDURE — 82570 ASSAY OF URINE CREATININE: CPT

## 2023-01-06 PROCEDURE — 84145 PROCALCITONIN (PCT): CPT

## 2023-01-06 PROCEDURE — 86900 BLOOD TYPING SEROLOGIC ABO: CPT

## 2023-01-06 PROCEDURE — 83880 ASSAY OF NATRIURETIC PEPTIDE: CPT

## 2023-01-06 PROCEDURE — 84100 ASSAY OF PHOSPHORUS: CPT

## 2023-01-06 PROCEDURE — 80053 COMPREHEN METABOLIC PANEL: CPT

## 2023-01-06 PROCEDURE — 81001 URINALYSIS AUTO W/SCOPE: CPT

## 2023-01-06 RX ORDER — INSULIN HUMAN 100 [IU]/ML
5 INJECTION, SOLUTION SUBCUTANEOUS ONCE
Refills: 0 | Status: DISCONTINUED | OUTPATIENT
Start: 2023-01-06 | End: 2023-01-06

## 2023-01-06 RX ORDER — CALCIUM CHLORIDE
1000 POWDER (GRAM) MISCELLANEOUS ONCE
Refills: 0 | Status: DISCONTINUED | OUTPATIENT
Start: 2023-01-06 | End: 2023-01-06

## 2023-01-06 RX ORDER — SODIUM BICARBONATE 1 MEQ/ML
50 SYRINGE (ML) INTRAVENOUS ONCE
Refills: 0 | Status: COMPLETED | OUTPATIENT
Start: 2023-01-06 | End: 2023-01-06

## 2023-01-06 RX ORDER — INSULIN HUMAN 100 [IU]/ML
7 INJECTION, SOLUTION SUBCUTANEOUS
Qty: 100 | Refills: 0 | Status: DISCONTINUED | OUTPATIENT
Start: 2023-01-06 | End: 2023-01-06

## 2023-01-06 RX ORDER — LIDOCAINE HCL 20 MG/ML
5 VIAL (ML) INJECTION ONCE
Refills: 0 | Status: COMPLETED | OUTPATIENT
Start: 2023-01-06 | End: 2023-01-06

## 2023-01-06 RX ORDER — PHYTONADIONE (VIT K1) 5 MG
10 TABLET ORAL ONCE
Refills: 0 | Status: COMPLETED | OUTPATIENT
Start: 2023-01-06 | End: 2023-01-06

## 2023-01-06 RX ORDER — INSULIN HUMAN 100 [IU]/ML
5 INJECTION, SOLUTION SUBCUTANEOUS ONCE
Refills: 0 | Status: COMPLETED | OUTPATIENT
Start: 2023-01-06 | End: 2023-01-06

## 2023-01-06 RX ORDER — MAGNESIUM SULFATE 500 MG/ML
1 VIAL (ML) INJECTION ONCE
Refills: 0 | Status: COMPLETED | OUTPATIENT
Start: 2023-01-06 | End: 2023-01-06

## 2023-01-06 RX ORDER — CALCIUM GLUCONATE 100 MG/ML
2 VIAL (ML) INTRAVENOUS ONCE
Refills: 0 | Status: COMPLETED | OUTPATIENT
Start: 2023-01-06 | End: 2023-01-06

## 2023-01-06 RX ORDER — SODIUM ZIRCONIUM CYCLOSILICATE 10 G/10G
10 POWDER, FOR SUSPENSION ORAL ONCE
Refills: 0 | Status: DISCONTINUED | OUTPATIENT
Start: 2023-01-06 | End: 2023-01-06

## 2023-01-06 RX ORDER — SODIUM ZIRCONIUM CYCLOSILICATE 10 G/10G
10 POWDER, FOR SUSPENSION ORAL EVERY 8 HOURS
Refills: 0 | Status: DISCONTINUED | OUTPATIENT
Start: 2023-01-06 | End: 2023-01-06

## 2023-01-06 RX ORDER — SODIUM BICARBONATE 1 MEQ/ML
50 SYRINGE (ML) INTRAVENOUS ONCE
Refills: 0 | Status: DISCONTINUED | OUTPATIENT
Start: 2023-01-06 | End: 2023-01-06

## 2023-01-06 RX ORDER — SODIUM CHLORIDE 9 MG/ML
1000 INJECTION, SOLUTION INTRAVENOUS ONCE
Refills: 0 | Status: COMPLETED | OUTPATIENT
Start: 2023-01-06 | End: 2023-01-06

## 2023-01-06 RX ADMIN — INSULIN HUMAN 5 UNIT(S)/HR: 100 INJECTION, SOLUTION SUBCUTANEOUS at 05:10

## 2023-01-06 RX ADMIN — CHLORHEXIDINE GLUCONATE 15 MILLILITER(S): 213 SOLUTION TOPICAL at 05:10

## 2023-01-06 RX ADMIN — CHLORHEXIDINE GLUCONATE 1 APPLICATION(S): 213 SOLUTION TOPICAL at 05:10

## 2023-01-06 RX ADMIN — INSULIN HUMAN 7 UNIT(S)/HR: 100 INJECTION, SOLUTION SUBCUTANEOUS at 06:16

## 2023-01-06 RX ADMIN — Medication 100 MILLIGRAM(S): at 05:11

## 2023-01-06 RX ADMIN — PHENYLEPHRINE HYDROCHLORIDE 65.4 MICROGRAM(S)/KG/MIN: 10 INJECTION INTRAVENOUS at 05:09

## 2023-01-06 RX ADMIN — Medication 166.67 MILLIGRAM(S): at 00:54

## 2023-01-06 RX ADMIN — Medication 100 GRAM(S): at 06:46

## 2023-01-06 RX ADMIN — SODIUM CHLORIDE 6000 MILLILITER(S): 9 INJECTION, SOLUTION INTRAVENOUS at 04:33

## 2023-01-06 RX ADMIN — Medication 200 GRAM(S): at 07:08

## 2023-01-06 RX ADMIN — PANTOPRAZOLE SODIUM 40 MILLIGRAM(S): 20 TABLET, DELAYED RELEASE ORAL at 05:10

## 2023-01-06 RX ADMIN — Medication 5 MILLILITER(S): at 07:21

## 2023-01-06 RX ADMIN — Medication 102 MILLIGRAM(S): at 01:55

## 2023-01-06 RX ADMIN — INSULIN HUMAN 5 UNIT(S): 100 INJECTION, SOLUTION SUBCUTANEOUS at 04:37

## 2023-01-06 RX ADMIN — Medication 200 GRAM(S): at 02:01

## 2023-01-06 NOTE — PROGRESS NOTE ADULT - ASSESSMENT
76yo Male with PMHx of HTN, HLD, psoriasis on prednisone, and recent possible esophageal/hepatic malignancy/mass presented with acute onset SOB and found to have RLL segmental PE transferred to MICU after a code blue was called for PEA arrest i/s/o PE now intubated and sedated on pressors.    ==========NEURO==========  #AA&Ox2 at baseline  -Currently intubated and sedated with propofol and fentanyl    #AMS  -MRI brain w/wo contrast 1/3/23 showing acute/subacute lacunar infarcts at R cerebellum and R occipital  -Neuro following      ==========CARDIOVASCULAR==========  #PEA arrest  - i/s/o PE  - s/p 2 rounds of epinephrine  - now intubated and sedated  -  troponin 222 from 144 on   -  BNP pending  - on epinephrine 0.1 and norepinephrine 0.1 --> discontinue epinephrine gtt and titrate norepinephrine gtt to maintain MAP>65  - Cardiology/PERT team consulted  - Vascular following  - POCUS significant for normal RV function with LV systolic dysfunction suspicious for LV infarct vs less likely acute massive PE    #Congestive Heart Failure  - cardiac enzymes  - Trop at 165 on admission and proBNP at 56188 on admission, TTE showed no sign of RV strain, no clot in transit, and LVEF of 70%.  - cardiology recs appreciated      #HTN  - hold BP meds for now  - currently on levo and epi  - hold diuretic    ==========RESPIRATORY==========  #AHRF  - Pt intubated and mechanically ventilated  - Vent settings on AC/CMV mode  RR|TV|FiO2|PEEP --> 20|400|40|5  - Daily ABG's: 7.10|24|269|8    #Pulmonary embolus and L DVT   - AC with Heparin; level is supratherapeutic; PTT >200 --> will hold for now, restart as appropriate  - Pulmonary following  - Cardiology/PERT team consulted i/s/o PEA arrest  -[facility] Duplex BLE: acute above & below knee LLE DVT extending into L ext iliac vein; no acute DVT RLE.  -DVT in Right femoral vein (unclear acuity) seen on POCUS     =========GI/NUTRITION==========  #metastatic pancreatobiliary adenocarcinoma  -c/b PE/DVT on heparin gtt, holding for supratherapeutic PTT  -hem/onc following as below    -Monitor for signs of GI bleeding  -Transfuse to maintain Hgb >7  - NPO for now, plan to place OGT when possible    ==========/RENAL==========  #Anion Gap Metabolic acidosis  - Likely 2/2 LIVIER and lactic acidosis: Lactate 14  - AG 33 on   - AB.10|24|269|8  - Switching bicarb pushes to bicarb drip 75mEq at 75ml/hr  - Holding off on dialysis per Dr. Khan as pt is not a candidate at this time with multiple medical comorbidities and tenuous clinical course. Medical management for now and will reassess for dialysis benefit pending clinical course    #LIVIER  -Creatinine 7.56 s/p arrest, came in with LIVIER with presenting Cr 3.79 that has been uptrending  -s/p bicarb drip on floors, restarting bicarb gtt  -serrato in place  -strict monitoring of Is and Os    #?adrenal insufficiency  - Nephro following  -Started on hydrocortisone 100q8h ivp  -Continue to bladder scan   -Dose meds for  Cr CL 15-20 ml/min     =========SKIN==========  #Psoriasis  - stress dose steroid    ==========ID==========  #Sepsis/ Pneumonia  - s/p 5 day course of Zosyn completed on , will continue empirically in MICU  - Blood cultures NGTD  - MRSA not detected  -Urine legionella antigen not detected  - Repeat blood cultures, send sputum culture, UA+UC today   -RVP negative  - ID following      ==========ENDOCRINE==========  #?adrenal insufficiency  -see above    ==========HEM/ONC===========  Metastatic Poorly Differentiated Adenocarcinoma  -Hem/Onc consulted and following:  -Patient recently admitted to [facility] and was found to have an esophageal mass and liver mass.   -IR biopsied the liver mass 22 and now pathology shows poorly differentiated adenocarcinoma favoring upper GI or pancreaticobiliary origin.  - CT chest non-con and MRI abdomen w/wo IV contrast were performed at [facility].   - planned to get a PET/CT soon but unable to get one due to renal function.  - CTA chest 22 showing pulmonary embolus (started on heparin gtt for this new finding already), indeterminate lung nodules which will require follow up CT chest w/ IV contrast in 3 months  - At this time, does not require any additional imaging   - Still awaiting results of NGS testing (HER2, PD-L1), which will determine if he is a candidate for immunotherapy  - Recommend following up with GI for PEG or PEJ tube as patient has not been able to tolerate PO for weeks now.      Thrombocytopenia  - follow Platelets  - HIT  - if worsening thrombocytopenia may need IVC filter    ===========DVT prophylaxis===========  - heparin gtt, holding for supratherapeutic PTT    ===========Ethics==========  -Full code.     76yo Male with PMHx of HTN, HLD, psoriasis on prednisone, and recent possible esophageal/hepatic malignancy/mass presented with acute onset SOB and found to have RLL segmental PE transferred to MICU after a code blue was called for PEA arrest i/s/o PE now intubated and sedated on pressors.    ==========NEURO==========  #AA&Ox2 at baseline  -Currently intubated and sedated with propofol and fentanyl    #AMS  -MRI brain w/wo contrast 1/3/23 showing acute/subacute lacunar infarcts at R cerebellum and R occipital  -Neuro following      ==========CARDIOVASCULAR==========  #PEA arrest  - i/s/o PE  - s/p 2 rounds of epinephrine  - now intubated and sedated  -  troponin 222 from 144 on   -  BNP pending  - on epinephrine 0.1 and norepinephrine 0.1 --> discontinue epinephrine gtt and titrate norepinephrine gtt to maintain MAP>65  - Cardiology/PERT team consulted  - Vascular following  - POCUS significant for normal RV function with LV systolic dysfunction suspicious for LV infarct vs less likely acute massive PE    #Congestive Heart Failure  - cardiac enzymes  - Trop at 165 on admission and proBNP at 02016 on admission, TTE showed no sign of RV strain, no clot in transit, and LVEF of 70%.  - cardiology recs appreciated      #HTN  - hold BP meds for now  - currently on levo and epi  - hold diuretic    ==========RESPIRATORY==========  #AHRF  - Pt intubated and mechanically ventilated  - Vent settings on AC/CMV mode  RR|TV|FiO2|PEEP --> 20|400|40|5  - Daily ABG's: 7.10|24|269|8    #Pulmonary embolus and L DVT   - AC with Heparin; level is supratherapeutic; PTT >200 --> will hold for now, restart as appropriate  - Pulmonary following  - Cardiology/PERT team consulted i/s/o PEA arrest  -VA Duplex BLE: acute above & below knee LLE DVT extending into L ext iliac vein; no acute DVT RLE.  -DVT in Right femoral vein (unclear acuity) seen on POCUS     =========GI/NUTRITION==========  #metastatic pancreatobiliary adenocarcinoma  -c/b PE/DVT on heparin gtt, holding for supratherapeutic PTT  -hem/onc following as below    -Monitor for signs of GI bleeding  -Transfuse to maintain Hgb >7  - NPO for now, plan to place OGT when possible    ==========/RENAL==========  #Anion Gap Metabolic acidosis  - Likely 2/2 LIVIER and lactic acidosis: Lactate 14  - AG 33 on   - AB.10|24|269|8  - Switching bicarb pushes to bicarb drip 75mEq at 75ml/hr  - Holding off on dialysis per Dr. Khan as pt is not a candidate at this time with multiple medical comorbidities and tenuous clinical course. Medical management for now and will reassess for dialysis benefit pending clinical course    #LIVIER  -Creatinine 7.56 s/p arrest, came in with LIVIER with presenting Cr 3.79 that has been uptrending  -s/p bicarb drip on floors, restarting bicarb gtt  -serrato in place  -strict monitoring of Is and Os    #?adrenal insufficiency  - Nephro following  -Started on hydrocortisone 100q8h ivp  -Continue to bladder scan   -Dose meds for  Cr CL 15-20 ml/min     =========SKIN==========  #Psoriasis  - stress dose steroid    ==========ID==========  #Sepsis/ Pneumonia  - s/p 5 day course of Zosyn completed on , will continue empirically in MICU  - Blood cultures NGTD  - MRSA not detected  -Urine legionella antigen not detected  - Repeat blood cultures, send sputum culture, UA+UC today   -RVP negative  - ID following      ==========ENDOCRINE==========  #?adrenal insufficiency  -see above    ==========HEM/ONC===========  Metastatic Poorly Differentiated Adenocarcinoma  -Hem/Onc consulted and following:  -Patient recently admitted to [facility] and was found to have an esophageal mass and liver mass.   -IR biopsied the liver mass 22 and now pathology shows poorly differentiated adenocarcinoma favoring upper GI or pancreaticobiliary origin.  - CT chest non-con and MRI abdomen w/wo IV contrast were performed at [facility].   - planned to get a PET/CT soon but unable to get one due to renal function.  - CTA chest 22 showing pulmonary embolus (started on heparin gtt for this new finding already), indeterminate lung nodules which will require follow up CT chest w/ IV contrast in 3 months  - At this time, does not require any additional imaging   - Still awaiting results of NGS testing (HER2, PD-L1), which will determine if he is a candidate for immunotherapy  - Recommend following up with GI for PEG or PEJ tube as patient has not been able to tolerate PO for weeks now.      Thrombocytopenia  - follow Platelets  - HIT  - if worsening thrombocytopenia may need IVC filter    ===========DVT prophylaxis===========  - heparin gtt, holding for supratherapeutic PTT    ===========Ethics==========  -Full code.     78yo Male with PMHx of HTN, HLD, psoriasis on prednisone, and recent possible esophageal/hepatic malignancy/mass presented with acute onset SOB and found to have RLL segmental PE transferred to MICU after a code blue was called for PEA arrest i/s/o PE now intubated and sedated on pressors.    ==========NEURO==========  #AA&Ox2 at baseline  -Currently intubated and sedated with propofol and fentanyl    #AMS  -MRI brain w/wo contrast 1/3/23 showing acute/subacute lacunar infarcts at R cerebellum and R occipital  -Neuro following      ==========CARDIOVASCULAR==========  #PEA arrest  - i/s/o PE  - s/p 2 rounds of epinephrine  - now intubated and sedated  -  troponin 222 from 144 on   -  BNP pending  - on epinephrine 0.1 and norepinephrine 0.1 --> discontinue epinephrine gtt and titrate norepinephrine gtt to maintain MAP>65  - Cardiology/PERT team consulted  - Vascular following  - POCUS significant for normal RV function with LV systolic dysfunction suspicious for LV infarct vs less likely acute massive PE    #Congestive Heart Failure  - cardiac enzymes  - Trop at 165 on admission and proBNP [place] 13849 on admission, TTE showed no sign of RV strain, no clot in transit, and LVEF of 70%.  - cardiology recs appreciated      #HTN  - hold BP meds for now  - currently on levo and epi  - hold diuretic    ==========RESPIRATORY==========  #AHRF  - Pt intubated and mechanically ventilated  - Vent settings on AC/CMV mode  RR|TV|FiO2|PEEP --> 20|400|40|5  - Daily ABG's: 7.10|24|269|8    #Pulmonary embolus and L DVT   - AC with Heparin; level is supratherapeutic; PTT >200 --> will hold for now, restart as appropriate  - Pulmonary following  - Cardiology/PERT team consulted i/s/o PEA arrest  -VA Duplex BLE: acute above & below knee LLE DVT extending into L ext iliac vein; no acute DVT RLE.  -DVT in Right femoral vein (unclear acuity) seen on POCUS     =========GI/NUTRITION==========  #metastatic pancreatobiliary adenocarcinoma  -c/b PE/DVT on heparin gtt, holding for supratherapeutic PTT  -hem/onc following as below    -Monitor for signs of GI bleeding  -Transfuse to maintain Hgb >7  - NPO for now, plan to place OGT when possible    ==========/RENAL==========  #Anion Gap Metabolic acidosis  - Likely 2/2 LIVIER and lactic acidosis: Lactate 14  - AG 33 on   - AB.10|24|269|8  - Switching bicarb pushes to bicarb drip 75mEq at 75ml/hr  - Holding off on dialysis per Dr. Khan as pt is not a candidate at this time with multiple medical comorbidities and tenuous clinical course. Medical management for now and will reassess for dialysis benefit pending clinical course    #LIVIER  -Creatinine 7.56 s/p arrest, came in with LIVIER with presenting Cr 3.79 that has been uptrending  -s/p bicarb drip on floors, restarting bicarb gtt  -serrato in place  -strict monitoring of Is and Os    #?adrenal insufficiency  - Nephro following  -Started on hydrocortisone 100q8h ivp  -Continue to bladder scan   -Dose meds for  Cr CL 15-20 ml/min     =========SKIN==========  #Psoriasis  - stress dose steroid    ==========ID==========  #Sepsis/ Pneumonia  - s/p 5 day course of Zosyn completed on , will continue empirically in MICU  - Blood cultures NGTD  - MRSA not detected  -Urine legionella antigen not detected  - Repeat blood cultures, send sputum culture, UA+UC today   -RVP negative  - ID following      ==========ENDOCRINE==========  #?adrenal insufficiency  -see above    ==========HEM/ONC===========  Metastatic Poorly Differentiated Adenocarcinoma  -Hem/Onc consulted and following:  -Patient recently admitted to [facility] and was found to have an esophageal mass and liver mass.   -IR biopsied the liver mass 22 and now pathology shows poorly differentiated adenocarcinoma favoring upper GI or pancreaticobiliary origin.  - CT chest non-con and MRI abdomen w/wo IV contrast were performed at [facility].   - planned to get a PET/CT soon but unable to get one due to renal function.  - CTA chest 22 showing pulmonary embolus (started on heparin gtt for this new finding already), indeterminate lung nodules which will require follow up CT chest w/ IV contrast in 3 months  - At this time, does not require any additional imaging   - Still awaiting results of NGS testing (HER2, PD-L1), which will determine if he is a candidate for immunotherapy  - Recommend following up with GI for PEG or PEJ tube as patient has not been able to tolerate PO for weeks now.      Thrombocytopenia  - follow Platelets  - HIT  - if worsening thrombocytopenia may need IVC filter    ===========DVT prophylaxis===========  - heparin gtt, holding for supratherapeutic PTT    ===========Ethics==========  -Full code.

## 2023-01-06 NOTE — CHART NOTE - NSCHARTNOTEFT_GEN_A_CORE
called to bedside by RN at 0625  for cardiac monitor with runs of torsades, pt received MgSO 1 gm IV, 1 amp lidocaine, pt rhythm with deterioration to asystole 0628, CPR initiated, pt received 2 amps epi IVP, 2 amps NaHCO3 IVP, 5 units regular insulin IVP, 1/2 amp D50 IVP, 1 amp CaCl IVP with ROSC 0631 to afib with ventricular response of 140's, to wide complex SVT. 0635 developed Vfib to asystole, CPR initiated, received 1 amp NaHCO obtain ROSC at 0637 with wide complex SVT. Pt currently with wide complex SVT with SBP 90's, All gtts remain infusing. placed back on vent. called to bedside by RN at 0625  for cardiac monitor with runs of torsades, pt received MgSO 1 gm IV, 1 amp lidocaine, pt rhythm with deterioration to asystole 0628, CPR initiated, pt received 2 amps epi IVP, 2 amps NaHCO3 IVP, 5 units regular insulin IVP, 1/2 amp D50 IVP, 1 amp CaCl IVP with ROSC 0631 to afib with ventricular response of 140's, to wide complex SVT. 0635 developed Vfib to asystole, CPR initiated, received 1 amp NaHCO obtain ROSC at 0637 with wide complex SVT. Pt currently with wide complex SVT with SBP 90's, All gtts remain infusing. placed back on vent.      Attestation:  Patient with progressive arrhythmias during the AM. Multiple pushes of medications including Mg, Bicarb, and Calcium.  - Patient underwent 5-6 rounds of CPR with ROSC. He required shocks on the last 3 rounds.   - Patient's wife was called and ultimately came to bedside and asked that no further resuscitative attempts be made and patient be made comfortable. Vasopressors stopped and ETT removed and patient passed shortly thereafter.  Cardiac arrest time spent at bedside

## 2023-01-06 NOTE — AIRWAY REMOVAL NOTE  ADULT & PEDS - ARTIFICAL AIRWAY REMOVAL COMMENTS
Written order for extubation verified. The patient was identified by full name and birth date compared to the identification band. Present during the procedure was SARAH Gleason.

## 2023-01-06 NOTE — CHART NOTE - NSCHARTNOTEFT_GEN_A_CORE
Called to bedside by RN.      On physical exam, patient did not respond to verbal or noxious stimuli.  No spontaneous respirations.  Absent heart and breath sounds.  Absent radial and carotid pulses.   Pupils are fixed and dilated, no corneal reflex.  Telemetry shows asystole.   Patient pronounced dead at 0820.  Attending notified.  Family declines autopsy.    Wife and daughter at bedside.    Bhaevsh Clark MD  PGY-1 Called to bedside by RN.      On physical exam, patient did not respond to verbal or noxious stimuli.  No spontaneous respirations.  Absent heart and breath sounds.  Absent radial and carotid pulses.   Pupils are fixed and dilated, no corneal reflex.  Telemetry shows asystole.   Patient pronounced dead at 0820.  Attending notified.  Family declines autopsy.    Wife and daughter at bedside.    Bhavesh Clark MD  PGY-1 Called to bedside by RN.      On physical exam, patient did not respond to verbal or noxious stimuli.  No spontaneous respirations.  Absent heart and breath sounds.  Absent radial and carotid pulses.   Pupils are fixed and dilated, no corneal reflex.  Telemetry shows asystole.   Patient pronounced dead at 0820.  Attending notified.  Family declines autopsy.    Wife and daughter at bedside.    Bahvesh Clark MD  PGY-1 Called to bedside by RN.      On physical exam, patient did not respond to verbal or noxious stimuli.  No spontaneous respirations.  Absent heart and breath sounds.  Absent radial and carotid pulses.   Pupils are fixed and dilated.  Telemetry shows asystole.   Patient pronounced dead at 0820.  Attending notified.  Family declines autopsy.    Wife and daughter at bedside.    Bhavesh Clark MD  PGY-1

## 2023-01-06 NOTE — PROGRESS NOTE ADULT - ATTENDING COMMENTS
Patient with multiple cardiac arrests this morning treated with medications and shocks and ultimately requiring ROSC. Patient is actively dying and patient's wife is coming to ED this AM.

## 2023-01-06 NOTE — PROGRESS NOTE ADULT - SUBJECTIVE AND OBJECTIVE BOX
[facility] [phone]        Patient seen and examined in bed.  Torsades earlier   Remains on pressors     ros- unable         MEDICATIONS  (STANDING):  acetaminophen   IVPB .. 1000 milliGRAM(s) IV Intermittent once  chlorhexidine 0.12% Liquid 15 milliLiter(s) Oral Mucosa every 12 hours  chlorhexidine 2% Cloths 1 Application(s) Topical daily  chlorhexidine 4% Liquid 1 Application(s) Topical <User Schedule>  fentaNYL   Infusion... 0.5 MICROgram(s)/kG/Hr (2.18 mL/Hr) IV Continuous <Continuous>  hydrocortisone sodium succinate Injectable 100 milliGRAM(s) IV Push every 8 hours  influenza  Vaccine (HIGH DOSE) 0.7 milliLiter(s) IntraMuscular once  insulin regular Infusion 7 Unit(s)/Hr (7 mL/Hr) IV Continuous <Continuous>  norepinephrine Infusion 1 MICROgram(s)/kG/Min (81.8 mL/Hr) IV Continuous <Continuous>  pantoprazole  Injectable 40 milliGRAM(s) IV Push two times a day  phenylephrine    Infusion 4 MICROgram(s)/kG/Min (65.4 mL/Hr) IV Continuous <Continuous>  piperacillin/tazobactam IVPB.. 3.375 Gram(s) IV Intermittent every 12 hours  sodium bicarbonate  Infusion 0.43 mEq/kG/Hr (250 mL/Hr) IV Continuous <Continuous>  sodium bicarbonate  Injectable 50 milliEquivalent(s) IV Push once  vasopressin Infusion 0.04 Unit(s)/Min (6 mL/Hr) IV Continuous <Continuous>      VITAL:  T(C): , Max: 38.2 (01-05-23 @ 20:00)  T(F): , Max: 100.8 (01-05-23 @ 20:00)  HR:  (01-06-23 @ 07:22)  BP: 108/57 (01-05-23 @ 15:15)  BP(mean): 78 (01-05-23 @ 15:15)  RR: 96 (01-06-23 @ 07:22)  SpO2: 86% (01-06-23 @ 06:45)  Wt(kg): --    I and O's:    01-05 @ 07:01  -  01-06 @ 07:00  --------------------------------------------------------  IN: 99909.2 mL / OUT: 45 mL / NET: 87882.2 mL          PHYSICAL EXAM:    Constitutional: NAD  Neck:  No JVD  Respiratory: CTAB/L  Cardiovascular: S1 and S2  Gastrointestinal: BS+, soft, NT/ND  Extremities: No peripheral edema  Neurological: A/O x 3, no focal deficits  Psychiatric: Normal mood, normal affect  : No Pop  Skin: No rashes  Access: Not applicable    LABS:                        8.4    25.18 )-----------( 86       ( 06 Jan 2023 05:12 )             27.6     01-06    134<L>  |  84<L>  |  128<H>  ----------------------------<  458<HH>  7.7<HH>   |  <10<LL>  |  6.85<H>    Ca    7.2<L>      06 Jan 2023 05:12  Phos  14.0     01-06  Mg     2.6     01-06    TPro  2.8<L>  /  Alb  1.0<L>  /  TBili  4.4<H>  /  DBili  x   /  AST  <5<L>  /  ALT  2337<H>  /  AlkPhos  1475<H>  01-06          Urine Studies:          RADIOLOGY & ADDITIONAL STUDIES:             [facility] [phone]        Patient seen and examined in bed.  Torsades earlier   Remains on pressors     ros- unable         MEDICATIONS  (STANDING):  acetaminophen   IVPB .. 1000 milliGRAM(s) IV Intermittent once  chlorhexidine 0.12% Liquid 15 milliLiter(s) Oral Mucosa every 12 hours  chlorhexidine 2% Cloths 1 Application(s) Topical daily  chlorhexidine 4% Liquid 1 Application(s) Topical <User Schedule>  fentaNYL   Infusion... 0.5 MICROgram(s)/kG/Hr (2.18 mL/Hr) IV Continuous <Continuous>  hydrocortisone sodium succinate Injectable 100 milliGRAM(s) IV Push every 8 hours  influenza  Vaccine (HIGH DOSE) 0.7 milliLiter(s) IntraMuscular once  insulin regular Infusion 7 Unit(s)/Hr (7 mL/Hr) IV Continuous <Continuous>  norepinephrine Infusion 1 MICROgram(s)/kG/Min (81.8 mL/Hr) IV Continuous <Continuous>  pantoprazole  Injectable 40 milliGRAM(s) IV Push two times a day  phenylephrine    Infusion 4 MICROgram(s)/kG/Min (65.4 mL/Hr) IV Continuous <Continuous>  piperacillin/tazobactam IVPB.. 3.375 Gram(s) IV Intermittent every 12 hours  sodium bicarbonate  Infusion 0.43 mEq/kG/Hr (250 mL/Hr) IV Continuous <Continuous>  sodium bicarbonate  Injectable 50 milliEquivalent(s) IV Push once  vasopressin Infusion 0.04 Unit(s)/Min (6 mL/Hr) IV Continuous <Continuous>      VITAL:  T(C): , Max: 38.2 (01-05-23 @ 20:00)  T(F): , Max: 100.8 (01-05-23 @ 20:00)  HR:  (01-06-23 @ 07:22)  BP: 108/57 (01-05-23 @ 15:15)  BP(mean): 78 (01-05-23 @ 15:15)  RR: 96 (01-06-23 @ 07:22)  SpO2: 86% (01-06-23 @ 06:45)  Wt(kg): --    I and O's:    01-05 @ 07:01  -  01-06 @ 07:00  --------------------------------------------------------  IN: 66317.2 mL / OUT: 45 mL / NET: 79205.2 mL          PHYSICAL EXAM:    Constitutional: NAD  Neck:  No JVD  Respiratory: CTAB/L  Cardiovascular: S1 and S2  Gastrointestinal: BS+, soft, NT/ND  Extremities: No peripheral edema  Neurological: A/O x 3, no focal deficits  Psychiatric: Normal mood, normal affect  : No Pop  Skin: No rashes  Access: Not applicable    LABS:                        8.4    25.18 )-----------( 86       ( 06 Jan 2023 05:12 )             27.6     01-06    134<L>  |  84<L>  |  128<H>  ----------------------------<  458<HH>  7.7<HH>   |  <10<LL>  |  6.85<H>    Ca    7.2<L>      06 Jan 2023 05:12  Phos  14.0     01-06  Mg     2.6     01-06    TPro  2.8<L>  /  Alb  1.0<L>  /  TBili  4.4<H>  /  DBili  x   /  AST  <5<L>  /  ALT  2337<H>  /  AlkPhos  1475<H>  01-06          Urine Studies:          RADIOLOGY & ADDITIONAL STUDIES:             [facility] [phone]        Patient seen and examined in bed.  Torsades earlier   Remains on pressors     ros- unable         MEDICATIONS  (STANDING):  acetaminophen   IVPB .. 1000 milliGRAM(s) IV Intermittent once  chlorhexidine 0.12% Liquid 15 milliLiter(s) Oral Mucosa every 12 hours  chlorhexidine 2% Cloths 1 Application(s) Topical daily  chlorhexidine 4% Liquid 1 Application(s) Topical <User Schedule>  fentaNYL   Infusion... 0.5 MICROgram(s)/kG/Hr (2.18 mL/Hr) IV Continuous <Continuous>  hydrocortisone sodium succinate Injectable 100 milliGRAM(s) IV Push every 8 hours  influenza  Vaccine (HIGH DOSE) 0.7 milliLiter(s) IntraMuscular once  insulin regular Infusion 7 Unit(s)/Hr (7 mL/Hr) IV Continuous <Continuous>  norepinephrine Infusion 1 MICROgram(s)/kG/Min (81.8 mL/Hr) IV Continuous <Continuous>  pantoprazole  Injectable 40 milliGRAM(s) IV Push two times a day  phenylephrine    Infusion 4 MICROgram(s)/kG/Min (65.4 mL/Hr) IV Continuous <Continuous>  piperacillin/tazobactam IVPB.. 3.375 Gram(s) IV Intermittent every 12 hours  sodium bicarbonate  Infusion 0.43 mEq/kG/Hr (250 mL/Hr) IV Continuous <Continuous>  sodium bicarbonate  Injectable 50 milliEquivalent(s) IV Push once  vasopressin Infusion 0.04 Unit(s)/Min (6 mL/Hr) IV Continuous <Continuous>      VITAL:  T(C): , Max: 38.2 (01-05-23 @ 20:00)  T(F): , Max: 100.8 (01-05-23 @ 20:00)  HR:  (01-06-23 @ 07:22)  BP: 108/57 (01-05-23 @ 15:15)  BP(mean): 78 (01-05-23 @ 15:15)  RR: 96 (01-06-23 @ 07:22)  SpO2: 86% (01-06-23 @ 06:45)  Wt(kg): --    I and O's:    01-05 @ 07:01  -  01-06 @ 07:00  --------------------------------------------------------  IN: 81979.2 mL / OUT: 45 mL / NET: 91216.2 mL          PHYSICAL EXAM:    Constitutional: NAD  Neck:  No JVD  Respiratory: CTAB/L  Cardiovascular: S1 and S2  Gastrointestinal: BS+, soft, NT/ND  Extremities: No peripheral edema  Neurological: A/O x 3, no focal deficits  Psychiatric: Normal mood, normal affect  : No Pop  Skin: No rashes  Access: Not applicable    LABS:                        8.4    25.18 )-----------( 86       ( 06 Jan 2023 05:12 )             27.6     01-06    134<L>  |  84<L>  |  128<H>  ----------------------------<  458<HH>  7.7<HH>   |  <10<LL>  |  6.85<H>    Ca    7.2<L>      06 Jan 2023 05:12  Phos  14.0     01-06  Mg     2.6     01-06    TPro  2.8<L>  /  Alb  1.0<L>  /  TBili  4.4<H>  /  DBili  x   /  AST  <5<L>  /  ALT  2337<H>  /  AlkPhos  1475<H>  01-06          Urine Studies:          RADIOLOGY & ADDITIONAL STUDIES:             [facility] [phone]        Patient seen and examined in bed.  Torsades earlier   Remains on pressors     ros- unable         MEDICATIONS  (STANDING):  acetaminophen   IVPB .. 1000 milliGRAM(s) IV Intermittent once  chlorhexidine 0.12% Liquid 15 milliLiter(s) Oral Mucosa every 12 hours  chlorhexidine 2% Cloths 1 Application(s) Topical daily  chlorhexidine 4% Liquid 1 Application(s) Topical <User Schedule>  fentaNYL   Infusion... 0.5 MICROgram(s)/kG/Hr (2.18 mL/Hr) IV Continuous <Continuous>  hydrocortisone sodium succinate Injectable 100 milliGRAM(s) IV Push every 8 hours  influenza  Vaccine (HIGH DOSE) 0.7 milliLiter(s) IntraMuscular once  insulin regular Infusion 7 Unit(s)/Hr (7 mL/Hr) IV Continuous <Continuous>  norepinephrine Infusion 1 MICROgram(s)/kG/Min (81.8 mL/Hr) IV Continuous <Continuous>  pantoprazole  Injectable 40 milliGRAM(s) IV Push two times a day  phenylephrine    Infusion 4 MICROgram(s)/kG/Min (65.4 mL/Hr) IV Continuous <Continuous>  piperacillin/tazobactam IVPB.. 3.375 Gram(s) IV Intermittent every 12 hours  sodium bicarbonate  Infusion 0.43 mEq/kG/Hr (250 mL/Hr) IV Continuous <Continuous>  sodium bicarbonate  Injectable 50 milliEquivalent(s) IV Push once  vasopressin Infusion 0.04 Unit(s)/Min (6 mL/Hr) IV Continuous <Continuous>      VITAL:  T(C): , Max: 38.2 (01-05-23 @ 20:00)  T(F): , Max: 100.8 (01-05-23 @ 20:00)  HR:  (01-06-23 @ 07:22)  BP: 108/57 (01-05-23 @ 15:15)  BP(mean): 78 (01-05-23 @ 15:15)  RR: 96 (01-06-23 @ 07:22)  SpO2: 86% (01-06-23 @ 06:45)  Wt(kg): --    I and O's:    01-05 @ 07:01  -  01-06 @ 07:00  --------------------------------------------------------  IN: 71928.2 mL / OUT: 45 mL / NET: 26549.2 mL          PHYSICAL EXAM:    Constitutional: intubated   Neck:  No JVD  Respiratory: CTAB/L  Cardiovascular: S1 and S2  Gastrointestinal: BS+, soft, NT/ND  Extremities: No peripheral edema  Neurological:    : No Pop  Skin: No rashes  Access: Not applicable    LABS:                        8.4    25.18 )-----------( 86       ( 06 Jan 2023 05:12 )             27.6     01-06    134<L>  |  84<L>  |  128<H>  ----------------------------<  458<HH>  7.7<HH>   |  <10<LL>  |  6.85<H>    Ca    7.2<L>      06 Jan 2023 05:12  Phos  14.0     01-06  Mg     2.6     01-06    TPro  2.8<L>  /  Alb  1.0<L>  /  TBili  4.4<H>  /  DBili  x   /  AST  <5<L>  /  ALT  2337<H>  /  AlkPhos  1475<H>  01-06          Urine Studies:          RADIOLOGY & ADDITIONAL STUDIES:             [facility] [phone]        Patient seen and examined in bed.  Torsades earlier   Remains on pressors     ros- unable         MEDICATIONS  (STANDING):  acetaminophen   IVPB .. 1000 milliGRAM(s) IV Intermittent once  chlorhexidine 0.12% Liquid 15 milliLiter(s) Oral Mucosa every 12 hours  chlorhexidine 2% Cloths 1 Application(s) Topical daily  chlorhexidine 4% Liquid 1 Application(s) Topical <User Schedule>  fentaNYL   Infusion... 0.5 MICROgram(s)/kG/Hr (2.18 mL/Hr) IV Continuous <Continuous>  hydrocortisone sodium succinate Injectable 100 milliGRAM(s) IV Push every 8 hours  influenza  Vaccine (HIGH DOSE) 0.7 milliLiter(s) IntraMuscular once  insulin regular Infusion 7 Unit(s)/Hr (7 mL/Hr) IV Continuous <Continuous>  norepinephrine Infusion 1 MICROgram(s)/kG/Min (81.8 mL/Hr) IV Continuous <Continuous>  pantoprazole  Injectable 40 milliGRAM(s) IV Push two times a day  phenylephrine    Infusion 4 MICROgram(s)/kG/Min (65.4 mL/Hr) IV Continuous <Continuous>  piperacillin/tazobactam IVPB.. 3.375 Gram(s) IV Intermittent every 12 hours  sodium bicarbonate  Infusion 0.43 mEq/kG/Hr (250 mL/Hr) IV Continuous <Continuous>  sodium bicarbonate  Injectable 50 milliEquivalent(s) IV Push once  vasopressin Infusion 0.04 Unit(s)/Min (6 mL/Hr) IV Continuous <Continuous>      VITAL:  T(C): , Max: 38.2 (01-05-23 @ 20:00)  T(F): , Max: 100.8 (01-05-23 @ 20:00)  HR:  (01-06-23 @ 07:22)  BP: 108/57 (01-05-23 @ 15:15)  BP(mean): 78 (01-05-23 @ 15:15)  RR: 96 (01-06-23 @ 07:22)  SpO2: 86% (01-06-23 @ 06:45)  Wt(kg): --    I and O's:    01-05 @ 07:01  -  01-06 @ 07:00  --------------------------------------------------------  IN: 24321.2 mL / OUT: 45 mL / NET: 62676.2 mL          PHYSICAL EXAM:    Constitutional: intubated   Neck:  No JVD  Respiratory: CTAB/L  Cardiovascular: S1 and S2  Gastrointestinal: BS+, soft, NT/ND  Extremities: No peripheral edema  Neurological:    : No Pop  Skin: No rashes  Access: Not applicable    LABS:                        8.4    25.18 )-----------( 86       ( 06 Jan 2023 05:12 )             27.6     01-06    134<L>  |  84<L>  |  128<H>  ----------------------------<  458<HH>  7.7<HH>   |  <10<LL>  |  6.85<H>    Ca    7.2<L>      06 Jan 2023 05:12  Phos  14.0     01-06  Mg     2.6     01-06    TPro  2.8<L>  /  Alb  1.0<L>  /  TBili  4.4<H>  /  DBili  x   /  AST  <5<L>  /  ALT  2337<H>  /  AlkPhos  1475<H>  01-06          Urine Studies:          RADIOLOGY & ADDITIONAL STUDIES:             [facility] [phone]        Patient seen and examined in bed.  Torsades earlier   Remains on pressors     ros- unable         MEDICATIONS  (STANDING):  acetaminophen   IVPB .. 1000 milliGRAM(s) IV Intermittent once  chlorhexidine 0.12% Liquid 15 milliLiter(s) Oral Mucosa every 12 hours  chlorhexidine 2% Cloths 1 Application(s) Topical daily  chlorhexidine 4% Liquid 1 Application(s) Topical <User Schedule>  fentaNYL   Infusion... 0.5 MICROgram(s)/kG/Hr (2.18 mL/Hr) IV Continuous <Continuous>  hydrocortisone sodium succinate Injectable 100 milliGRAM(s) IV Push every 8 hours  influenza  Vaccine (HIGH DOSE) 0.7 milliLiter(s) IntraMuscular once  insulin regular Infusion 7 Unit(s)/Hr (7 mL/Hr) IV Continuous <Continuous>  norepinephrine Infusion 1 MICROgram(s)/kG/Min (81.8 mL/Hr) IV Continuous <Continuous>  pantoprazole  Injectable 40 milliGRAM(s) IV Push two times a day  phenylephrine    Infusion 4 MICROgram(s)/kG/Min (65.4 mL/Hr) IV Continuous <Continuous>  piperacillin/tazobactam IVPB.. 3.375 Gram(s) IV Intermittent every 12 hours  sodium bicarbonate  Infusion 0.43 mEq/kG/Hr (250 mL/Hr) IV Continuous <Continuous>  sodium bicarbonate  Injectable 50 milliEquivalent(s) IV Push once  vasopressin Infusion 0.04 Unit(s)/Min (6 mL/Hr) IV Continuous <Continuous>      VITAL:  T(C): , Max: 38.2 (01-05-23 @ 20:00)  T(F): , Max: 100.8 (01-05-23 @ 20:00)  HR:  (01-06-23 @ 07:22)  BP: 108/57 (01-05-23 @ 15:15)  BP(mean): 78 (01-05-23 @ 15:15)  RR: 96 (01-06-23 @ 07:22)  SpO2: 86% (01-06-23 @ 06:45)  Wt(kg): --    I and O's:    01-05 @ 07:01  -  01-06 @ 07:00  --------------------------------------------------------  IN: 21350.2 mL / OUT: 45 mL / NET: 23902.2 mL          PHYSICAL EXAM:    Constitutional: intubated   Neck:  No JVD  Respiratory: CTAB/L  Cardiovascular: S1 and S2  Gastrointestinal: BS+, soft, NT/ND  Extremities: No peripheral edema  Neurological:    : No Pop  Skin: No rashes  Access: Not applicable    LABS:                        8.4    25.18 )-----------( 86       ( 06 Jan 2023 05:12 )             27.6     01-06    134<L>  |  84<L>  |  128<H>  ----------------------------<  458<HH>  7.7<HH>   |  <10<LL>  |  6.85<H>    Ca    7.2<L>      06 Jan 2023 05:12  Phos  14.0     01-06  Mg     2.6     01-06    TPro  2.8<L>  /  Alb  1.0<L>  /  TBili  4.4<H>  /  DBili  x   /  AST  <5<L>  /  ALT  2337<H>  /  AlkPhos  1475<H>  01-06          Urine Studies:          RADIOLOGY & ADDITIONAL STUDIES:

## 2023-01-06 NOTE — PROGRESS NOTE ADULT - PROVIDER SPECIALTY LIST ADULT
Cardiology
Infectious Disease
Internal Medicine
Internal Medicine
Nephrology
Pulmonology
Vascular Cardiology
Vascular Cardiology
Gastroenterology
Gastroenterology
Nephrology
Pulmonology
Cardiology
Infectious Disease
Internal Medicine
Gastroenterology
Heme/Onc
Infectious Disease
Internal Medicine
Nephrology
Internal Medicine
Nephrology
Infectious Disease
MICU
Nephrology
Nephrology
Pulmonology
Pulmonology
Vascular Cardiology

## 2023-01-06 NOTE — PROGRESS NOTE ADULT - SUBJECTIVE AND OBJECTIVE BOX
PROGRESS NOTE:   Authored by Bhavesh Clark MD   Patient is a 77y old  Male who presents with a chief complaint of SOB (05 Jan 2023 08:56)      SUBJECTIVE / OVERNIGHT EVENTS:    ADDITIONAL REVIEW OF SYSTEMS:    MEDICATIONS  (STANDING):  acetaminophen   IVPB .. 1000 milliGRAM(s) IV Intermittent once  chlorhexidine 0.12% Liquid 15 milliLiter(s) Oral Mucosa every 12 hours  chlorhexidine 2% Cloths 1 Application(s) Topical daily  chlorhexidine 4% Liquid 1 Application(s) Topical <User Schedule>  fentaNYL   Infusion... 0.5 MICROgram(s)/kG/Hr (2.18 mL/Hr) IV Continuous <Continuous>  hydrocortisone sodium succinate Injectable 100 milliGRAM(s) IV Push every 8 hours  influenza  Vaccine (HIGH DOSE) 0.7 milliLiter(s) IntraMuscular once  insulin regular Infusion 7 Unit(s)/Hr (7 mL/Hr) IV Continuous <Continuous>  lidocaine 2% (Preservative-free) Injectable 5 milliLiter(s) Local Injection once  norepinephrine Infusion 1 MICROgram(s)/kG/Min (81.8 mL/Hr) IV Continuous <Continuous>  pantoprazole  Injectable 40 milliGRAM(s) IV Push two times a day  phenylephrine    Infusion 4 MICROgram(s)/kG/Min (65.4 mL/Hr) IV Continuous <Continuous>  piperacillin/tazobactam IVPB.. 3.375 Gram(s) IV Intermittent every 12 hours  sodium bicarbonate  Infusion 0.43 mEq/kG/Hr (250 mL/Hr) IV Continuous <Continuous>  vasopressin Infusion 0.04 Unit(s)/Min (6 mL/Hr) IV Continuous <Continuous>    MEDICATIONS  (PRN):  fentaNYL    Injectable 50 MICROGram(s) IV Push every 2 hours PRN uptitration      CAPILLARY BLOOD GLUCOSE      POCT Blood Glucose.: 596 mg/dL (06 Jan 2023 06:42)  POCT Blood Glucose.: 463 mg/dL (06 Jan 2023 06:00)  POCT Blood Glucose.: 460 mg/dL (06 Jan 2023 05:00)  POCT Blood Glucose.: 401 mg/dL (05 Jan 2023 23:59)    I&O's Summary    05 Jan 2023 07:01  -  06 Jan 2023 07:00  --------------------------------------------------------  IN: 35052.6 mL / OUT: 45 mL / NET: 99560.6 mL        PHYSICAL EXAM:  Vital Signs Last 24 Hrs  T(C): 36 (06 Jan 2023 05:00), Max: 38.2 (05 Jan 2023 20:00)  T(F): 96.8 (06 Jan 2023 05:00), Max: 100.8 (05 Jan 2023 20:00)  HR: 104 (06 Jan 2023 05:15) (91 - 114)  BP: 108/57 (05 Jan 2023 15:15) (93/56 - 113/66)  BP(mean): 78 (05 Jan 2023 15:15) (70 - 86)  RR: 30 (06 Jan 2023 05:15) (26 - 32)  SpO2: 94% (06 Jan 2023 02:30) (78% - 100%)    Parameters below as of 06 Jan 2023 02:00      O2 Concentration (%): 100    GENERAL: intubated and sedated  HEAD:  Atraumatic, Normocephalic  EYES: Pupils round and sluggish b/l, conjunctiva and sclera clear  NECK: Supple, No JVD  CHEST/LUNG: Clear to auscultation bilaterally; No wheeze  HEART: Regular rate and rhythm; No murmurs, rubs, or gallops  ABDOMEN: Soft, Nontender, Nondistended; Bowel sounds present  EXTREMITIES:  2+ pitting edema in LE b/l  NEUROLOGY: intubated and sedated, was SMALLS prior to sedation  SKIN: Diffuse purpura in b/l UE      LABS:                        8.4    25.18 )-----------( 86       ( 06 Jan 2023 05:12 )             27.6     01-06    134<L>  |  84<L>  |  128<H>  ----------------------------<  458<HH>  7.7<HH>   |  <10<LL>  |  6.85<H>    Ca    7.2<L>      06 Jan 2023 05:12  Phos  14.0     01-06  Mg     2.6     01-06    TPro  2.8<L>  /  Alb  1.0<L>  /  TBili  4.4<H>  /  DBili  x   /  AST  <5<L>  /  ALT  2337<H>  /  AlkPhos  1475<H>  01-06    PT/INR - ( 06 Jan 2023 05:12 )   PT: 46.1 sec;   INR: 3.92 ratio         PTT - ( 05 Jan 2023 12:58 )  PTT:69.5 sec  CARDIAC MARKERS ( 05 Jan 2023 17:13 )  x     / x     / 201 U/L / x     / 13.6 ng/mL  CARDIAC MARKERS ( 05 Jan 2023 13:00 )  x     / x     / 140 U/L / x     / 14.9 ng/mL  CARDIAC MARKERS ( 05 Jan 2023 09:54 )  x     / x     / 139 U/L / x     / 13.8 ng/mL  CARDIAC MARKERS ( 05 Jan 2023 06:10 )  x     / x     / 125 U/L / x     / 8.5 ng/mL            RADIOLOGY & ADDITIONAL TESTS:  Lab Results Reviewed   Imaging Reviewed  Electrocardiogram Reviewed   PROGRESS NOTE:   Authored by Bhavesh Clark MD   Patient is a 77y old  Male who presents with a chief complaint of SOB (05 Jan 2023 08:56)      SUBJECTIVE / OVERNIGHT EVENTS:    ADDITIONAL REVIEW OF SYSTEMS:    MEDICATIONS  (STANDING):  acetaminophen   IVPB .. 1000 milliGRAM(s) IV Intermittent once  chlorhexidine 0.12% Liquid 15 milliLiter(s) Oral Mucosa every 12 hours  chlorhexidine 2% Cloths 1 Application(s) Topical daily  chlorhexidine 4% Liquid 1 Application(s) Topical <User Schedule>  fentaNYL   Infusion... 0.5 MICROgram(s)/kG/Hr (2.18 mL/Hr) IV Continuous <Continuous>  hydrocortisone sodium succinate Injectable 100 milliGRAM(s) IV Push every 8 hours  influenza  Vaccine (HIGH DOSE) 0.7 milliLiter(s) IntraMuscular once  insulin regular Infusion 7 Unit(s)/Hr (7 mL/Hr) IV Continuous <Continuous>  lidocaine 2% (Preservative-free) Injectable 5 milliLiter(s) Local Injection once  norepinephrine Infusion 1 MICROgram(s)/kG/Min (81.8 mL/Hr) IV Continuous <Continuous>  pantoprazole  Injectable 40 milliGRAM(s) IV Push two times a day  phenylephrine    Infusion 4 MICROgram(s)/kG/Min (65.4 mL/Hr) IV Continuous <Continuous>  piperacillin/tazobactam IVPB.. 3.375 Gram(s) IV Intermittent every 12 hours  sodium bicarbonate  Infusion 0.43 mEq/kG/Hr (250 mL/Hr) IV Continuous <Continuous>  vasopressin Infusion 0.04 Unit(s)/Min (6 mL/Hr) IV Continuous <Continuous>    MEDICATIONS  (PRN):  fentaNYL    Injectable 50 MICROGram(s) IV Push every 2 hours PRN uptitration      CAPILLARY BLOOD GLUCOSE      POCT Blood Glucose.: 596 mg/dL (06 Jan 2023 06:42)  POCT Blood Glucose.: 463 mg/dL (06 Jan 2023 06:00)  POCT Blood Glucose.: 460 mg/dL (06 Jan 2023 05:00)  POCT Blood Glucose.: 401 mg/dL (05 Jan 2023 23:59)    I&O's Summary    05 Jan 2023 07:01  -  06 Jan 2023 07:00  --------------------------------------------------------  IN: 22178.6 mL / OUT: 45 mL / NET: 93145.6 mL        PHYSICAL EXAM:  Vital Signs Last 24 Hrs  T(C): 36 (06 Jan 2023 05:00), Max: 38.2 (05 Jan 2023 20:00)  T(F): 96.8 (06 Jan 2023 05:00), Max: 100.8 (05 Jan 2023 20:00)  HR: 104 (06 Jan 2023 05:15) (91 - 114)  BP: 108/57 (05 Jan 2023 15:15) (93/56 - 113/66)  BP(mean): 78 (05 Jan 2023 15:15) (70 - 86)  RR: 30 (06 Jan 2023 05:15) (26 - 32)  SpO2: 94% (06 Jan 2023 02:30) (78% - 100%)    Parameters below as of 06 Jan 2023 02:00      O2 Concentration (%): 100    GENERAL: intubated and sedated  HEAD:  Atraumatic, Normocephalic  EYES: Pupils round and sluggish b/l, conjunctiva and sclera clear  NECK: Supple, No JVD  CHEST/LUNG: Clear to auscultation bilaterally; No wheeze  HEART: Regular rate and rhythm; No murmurs, rubs, or gallops  ABDOMEN: Soft, Nontender, Nondistended; Bowel sounds present  EXTREMITIES:  2+ pitting edema in LE b/l  NEUROLOGY: intubated and sedated, was SMALLS prior to sedation  SKIN: Diffuse purpura in b/l UE      LABS:                        8.4    25.18 )-----------( 86       ( 06 Jan 2023 05:12 )             27.6     01-06    134<L>  |  84<L>  |  128<H>  ----------------------------<  458<HH>  7.7<HH>   |  <10<LL>  |  6.85<H>    Ca    7.2<L>      06 Jan 2023 05:12  Phos  14.0     01-06  Mg     2.6     01-06    TPro  2.8<L>  /  Alb  1.0<L>  /  TBili  4.4<H>  /  DBili  x   /  AST  <5<L>  /  ALT  2337<H>  /  AlkPhos  1475<H>  01-06    PT/INR - ( 06 Jan 2023 05:12 )   PT: 46.1 sec;   INR: 3.92 ratio         PTT - ( 05 Jan 2023 12:58 )  PTT:69.5 sec  CARDIAC MARKERS ( 05 Jan 2023 17:13 )  x     / x     / 201 U/L / x     / 13.6 ng/mL  CARDIAC MARKERS ( 05 Jan 2023 13:00 )  x     / x     / 140 U/L / x     / 14.9 ng/mL  CARDIAC MARKERS ( 05 Jan 2023 09:54 )  x     / x     / 139 U/L / x     / 13.8 ng/mL  CARDIAC MARKERS ( 05 Jan 2023 06:10 )  x     / x     / 125 U/L / x     / 8.5 ng/mL            RADIOLOGY & ADDITIONAL TESTS:  Lab Results Reviewed   Imaging Reviewed  Electrocardiogram Reviewed   PROGRESS NOTE:   Authored by Bhavesh Clark MD   Patient is a 77y old  Male who presents with a chief complaint of SOB (05 Jan 2023 08:56)      SUBJECTIVE / OVERNIGHT EVENTS:    ADDITIONAL REVIEW OF SYSTEMS:    MEDICATIONS  (STANDING):  acetaminophen   IVPB .. 1000 milliGRAM(s) IV Intermittent once  chlorhexidine 0.12% Liquid 15 milliLiter(s) Oral Mucosa every 12 hours  chlorhexidine 2% Cloths 1 Application(s) Topical daily  chlorhexidine 4% Liquid 1 Application(s) Topical <User Schedule>  fentaNYL   Infusion... 0.5 MICROgram(s)/kG/Hr (2.18 mL/Hr) IV Continuous <Continuous>  hydrocortisone sodium succinate Injectable 100 milliGRAM(s) IV Push every 8 hours  influenza  Vaccine (HIGH DOSE) 0.7 milliLiter(s) IntraMuscular once  insulin regular Infusion 7 Unit(s)/Hr (7 mL/Hr) IV Continuous <Continuous>  lidocaine 2% (Preservative-free) Injectable 5 milliLiter(s) Local Injection once  norepinephrine Infusion 1 MICROgram(s)/kG/Min (81.8 mL/Hr) IV Continuous <Continuous>  pantoprazole  Injectable 40 milliGRAM(s) IV Push two times a day  phenylephrine    Infusion 4 MICROgram(s)/kG/Min (65.4 mL/Hr) IV Continuous <Continuous>  piperacillin/tazobactam IVPB.. 3.375 Gram(s) IV Intermittent every 12 hours  sodium bicarbonate  Infusion 0.43 mEq/kG/Hr (250 mL/Hr) IV Continuous <Continuous>  vasopressin Infusion 0.04 Unit(s)/Min (6 mL/Hr) IV Continuous <Continuous>    MEDICATIONS  (PRN):  fentaNYL    Injectable 50 MICROGram(s) IV Push every 2 hours PRN uptitration      CAPILLARY BLOOD GLUCOSE      POCT Blood Glucose.: 596 mg/dL (06 Jan 2023 06:42)  POCT Blood Glucose.: 463 mg/dL (06 Jan 2023 06:00)  POCT Blood Glucose.: 460 mg/dL (06 Jan 2023 05:00)  POCT Blood Glucose.: 401 mg/dL (05 Jan 2023 23:59)    I&O's Summary    05 Jan 2023 07:01  -  06 Jan 2023 07:00  --------------------------------------------------------  IN: 95633.6 mL / OUT: 45 mL / NET: 70330.6 mL        PHYSICAL EXAM:  Vital Signs Last 24 Hrs  T(C): 36 (06 Jan 2023 05:00), Max: 38.2 (05 Jan 2023 20:00)  T(F): 96.8 (06 Jan 2023 05:00), Max: 100.8 (05 Jan 2023 20:00)  HR: 104 (06 Jan 2023 05:15) (91 - 114)  BP: 108/57 (05 Jan 2023 15:15) (93/56 - 113/66)  BP(mean): 78 (05 Jan 2023 15:15) (70 - 86)  RR: 30 (06 Jan 2023 05:15) (26 - 32)  SpO2: 94% (06 Jan 2023 02:30) (78% - 100%)    Parameters below as of 06 Jan 2023 02:00      O2 Concentration (%): 100    GENERAL: intubated and sedated  HEAD:  Atraumatic, Normocephalic  EYES: Pupils round and sluggish b/l, conjunctiva and sclera clear  NECK: Supple, No JVD  CHEST/LUNG: Clear to auscultation bilaterally; No wheeze  HEART: Regular rate and rhythm; No murmurs, rubs, or gallops  ABDOMEN: Soft, Nontender, Nondistended; Bowel sounds present  EXTREMITIES:  2+ pitting edema in LE b/l  NEUROLOGY: intubated and sedated, was SMALLS prior to sedation  SKIN: Diffuse purpura in b/l UE      LABS:                        8.4    25.18 )-----------( 86       ( 06 Jan 2023 05:12 )             27.6     01-06    134<L>  |  84<L>  |  128<H>  ----------------------------<  458<HH>  7.7<HH>   |  <10<LL>  |  6.85<H>    Ca    7.2<L>      06 Jan 2023 05:12  Phos  14.0     01-06  Mg     2.6     01-06    TPro  2.8<L>  /  Alb  1.0<L>  /  TBili  4.4<H>  /  DBili  x   /  AST  <5<L>  /  ALT  2337<H>  /  AlkPhos  1475<H>  01-06    PT/INR - ( 06 Jan 2023 05:12 )   PT: 46.1 sec;   INR: 3.92 ratio         PTT - ( 05 Jan 2023 12:58 )  PTT:69.5 sec  CARDIAC MARKERS ( 05 Jan 2023 17:13 )  x     / x     / 201 U/L / x     / 13.6 ng/mL  CARDIAC MARKERS ( 05 Jan 2023 13:00 )  x     / x     / 140 U/L / x     / 14.9 ng/mL  CARDIAC MARKERS ( 05 Jan 2023 09:54 )  x     / x     / 139 U/L / x     / 13.8 ng/mL  CARDIAC MARKERS ( 05 Jan 2023 06:10 )  x     / x     / 125 U/L / x     / 8.5 ng/mL            RADIOLOGY & ADDITIONAL TESTS:  Lab Results Reviewed   Imaging Reviewed  Electrocardiogram Reviewed   PROGRESS NOTE:   Authored by Bhavesh Clark MD   Patient is a 77y old  Male who presents with a chief complaint of SOB (05 Jan 2023 08:56)      SUBJECTIVE / OVERNIGHT EVENTS:  - Multiple cardiac arrests this AM  - Wife coming    ADDITIONAL REVIEW OF SYSTEMS:    MEDICATIONS  (STANDING):  acetaminophen   IVPB .. 1000 milliGRAM(s) IV Intermittent once  chlorhexidine 0.12% Liquid 15 milliLiter(s) Oral Mucosa every 12 hours  chlorhexidine 2% Cloths 1 Application(s) Topical daily  chlorhexidine 4% Liquid 1 Application(s) Topical <User Schedule>  fentaNYL   Infusion... 0.5 MICROgram(s)/kG/Hr (2.18 mL/Hr) IV Continuous <Continuous>  hydrocortisone sodium succinate Injectable 100 milliGRAM(s) IV Push every 8 hours  influenza  Vaccine (HIGH DOSE) 0.7 milliLiter(s) IntraMuscular once  insulin regular Infusion 7 Unit(s)/Hr (7 mL/Hr) IV Continuous <Continuous>  lidocaine 2% (Preservative-free) Injectable 5 milliLiter(s) Local Injection once  norepinephrine Infusion 1 MICROgram(s)/kG/Min (81.8 mL/Hr) IV Continuous <Continuous>  pantoprazole  Injectable 40 milliGRAM(s) IV Push two times a day  phenylephrine    Infusion 4 MICROgram(s)/kG/Min (65.4 mL/Hr) IV Continuous <Continuous>  piperacillin/tazobactam IVPB.. 3.375 Gram(s) IV Intermittent every 12 hours  sodium bicarbonate  Infusion 0.43 mEq/kG/Hr (250 mL/Hr) IV Continuous <Continuous>  vasopressin Infusion 0.04 Unit(s)/Min (6 mL/Hr) IV Continuous <Continuous>    MEDICATIONS  (PRN):  fentaNYL    Injectable 50 MICROGram(s) IV Push every 2 hours PRN uptitration      CAPILLARY BLOOD GLUCOSE      POCT Blood Glucose.: 596 mg/dL (06 Jan 2023 06:42)  POCT Blood Glucose.: 463 mg/dL (06 Jan 2023 06:00)  POCT Blood Glucose.: 460 mg/dL (06 Jan 2023 05:00)  POCT Blood Glucose.: 401 mg/dL (05 Jan 2023 23:59)    I&O's Summary    05 Jan 2023 07:01  -  06 Jan 2023 07:00  --------------------------------------------------------  IN: 44287.6 mL / OUT: 45 mL / NET: 00776.6 mL        PHYSICAL EXAM:  Vital Signs Last 24 Hrs  T(C): 36 (06 Jan 2023 05:00), Max: 38.2 (05 Jan 2023 20:00)  T(F): 96.8 (06 Jan 2023 05:00), Max: 100.8 (05 Jan 2023 20:00)  HR: 104 (06 Jan 2023 05:15) (91 - 114)  BP: 108/57 (05 Jan 2023 15:15) (93/56 - 113/66)  BP(mean): 78 (05 Jan 2023 15:15) (70 - 86)  RR: 30 (06 Jan 2023 05:15) (26 - 32)  SpO2: 94% (06 Jan 2023 02:30) (78% - 100%)    Parameters below as of 06 Jan 2023 02:00      O2 Concentration (%): 100    GENERAL: intubated and sedated  HEAD:  Atraumatic, Normocephalic  EYES: Pupils round and sluggish b/l, conjunctiva and sclera clear  NECK: Supple, No JVD  CHEST/LUNG: Clear to auscultation bilaterally; No wheeze  HEART: RRR  ABDOMEN: Soft, Nontender, Nondistended; Bowel sounds present  EXTREMITIES:  2+ pitting edema in LE b/l  NEUROLOGY: intubated and sedated  SKIN: Diffuse purpura in b/l UE      LABS:                        8.4    25.18 )-----------( 86       ( 06 Jan 2023 05:12 )             27.6     01-06    134<L>  |  84<L>  |  128<H>  ----------------------------<  458<HH>  7.7<HH>   |  <10<LL>  |  6.85<H>    Ca    7.2<L>      06 Jan 2023 05:12  Phos  14.0     01-06  Mg     2.6     01-06    TPro  2.8<L>  /  Alb  1.0<L>  /  TBili  4.4<H>  /  DBili  x   /  AST  <5<L>  /  ALT  2337<H>  /  AlkPhos  1475<H>  01-06    PT/INR - ( 06 Jan 2023 05:12 )   PT: 46.1 sec;   INR: 3.92 ratio         PTT - ( 05 Jan 2023 12:58 )  PTT:69.5 sec  CARDIAC MARKERS ( 05 Jan 2023 17:13 )  x     / x     / 201 U/L / x     / 13.6 ng/mL  CARDIAC MARKERS ( 05 Jan 2023 13:00 )  x     / x     / 140 U/L / x     / 14.9 ng/mL  CARDIAC MARKERS ( 05 Jan 2023 09:54 )  x     / x     / 139 U/L / x     / 13.8 ng/mL  CARDIAC MARKERS ( 05 Jan 2023 06:10 )  x     / x     / 125 U/L / x     / 8.5 ng/mL            RADIOLOGY & ADDITIONAL TESTS:  Lab Results Reviewed   Imaging Reviewed  Electrocardiogram Reviewed   PROGRESS NOTE:   Authored by Bhavesh Clark MD   Patient is a 77y old  Male who presents with a chief complaint of SOB (05 Jan 2023 08:56)      SUBJECTIVE / OVERNIGHT EVENTS:  - Multiple cardiac arrests this AM  - Wife coming    ADDITIONAL REVIEW OF SYSTEMS:    MEDICATIONS  (STANDING):  acetaminophen   IVPB .. 1000 milliGRAM(s) IV Intermittent once  chlorhexidine 0.12% Liquid 15 milliLiter(s) Oral Mucosa every 12 hours  chlorhexidine 2% Cloths 1 Application(s) Topical daily  chlorhexidine 4% Liquid 1 Application(s) Topical <User Schedule>  fentaNYL   Infusion... 0.5 MICROgram(s)/kG/Hr (2.18 mL/Hr) IV Continuous <Continuous>  hydrocortisone sodium succinate Injectable 100 milliGRAM(s) IV Push every 8 hours  influenza  Vaccine (HIGH DOSE) 0.7 milliLiter(s) IntraMuscular once  insulin regular Infusion 7 Unit(s)/Hr (7 mL/Hr) IV Continuous <Continuous>  lidocaine 2% (Preservative-free) Injectable 5 milliLiter(s) Local Injection once  norepinephrine Infusion 1 MICROgram(s)/kG/Min (81.8 mL/Hr) IV Continuous <Continuous>  pantoprazole  Injectable 40 milliGRAM(s) IV Push two times a day  phenylephrine    Infusion 4 MICROgram(s)/kG/Min (65.4 mL/Hr) IV Continuous <Continuous>  piperacillin/tazobactam IVPB.. 3.375 Gram(s) IV Intermittent every 12 hours  sodium bicarbonate  Infusion 0.43 mEq/kG/Hr (250 mL/Hr) IV Continuous <Continuous>  vasopressin Infusion 0.04 Unit(s)/Min (6 mL/Hr) IV Continuous <Continuous>    MEDICATIONS  (PRN):  fentaNYL    Injectable 50 MICROGram(s) IV Push every 2 hours PRN uptitration      CAPILLARY BLOOD GLUCOSE      POCT Blood Glucose.: 596 mg/dL (06 Jan 2023 06:42)  POCT Blood Glucose.: 463 mg/dL (06 Jan 2023 06:00)  POCT Blood Glucose.: 460 mg/dL (06 Jan 2023 05:00)  POCT Blood Glucose.: 401 mg/dL (05 Jan 2023 23:59)    I&O's Summary    05 Jan 2023 07:01  -  06 Jan 2023 07:00  --------------------------------------------------------  IN: 60269.6 mL / OUT: 45 mL / NET: 97190.6 mL        PHYSICAL EXAM:  Vital Signs Last 24 Hrs  T(C): 36 (06 Jan 2023 05:00), Max: 38.2 (05 Jan 2023 20:00)  T(F): 96.8 (06 Jan 2023 05:00), Max: 100.8 (05 Jan 2023 20:00)  HR: 104 (06 Jan 2023 05:15) (91 - 114)  BP: 108/57 (05 Jan 2023 15:15) (93/56 - 113/66)  BP(mean): 78 (05 Jan 2023 15:15) (70 - 86)  RR: 30 (06 Jan 2023 05:15) (26 - 32)  SpO2: 94% (06 Jan 2023 02:30) (78% - 100%)    Parameters below as of 06 Jan 2023 02:00      O2 Concentration (%): 100    GENERAL: intubated and sedated  HEAD:  Atraumatic, Normocephalic  EYES: Pupils round and sluggish b/l, conjunctiva and sclera clear  NECK: Supple, No JVD  CHEST/LUNG: Clear to auscultation bilaterally; No wheeze  HEART: RRR  ABDOMEN: Soft, Nontender, Nondistended; Bowel sounds present  EXTREMITIES:  2+ pitting edema in LE b/l  NEUROLOGY: intubated and sedated  SKIN: Diffuse purpura in b/l UE      LABS:                        8.4    25.18 )-----------( 86       ( 06 Jan 2023 05:12 )             27.6     01-06    134<L>  |  84<L>  |  128<H>  ----------------------------<  458<HH>  7.7<HH>   |  <10<LL>  |  6.85<H>    Ca    7.2<L>      06 Jan 2023 05:12  Phos  14.0     01-06  Mg     2.6     01-06    TPro  2.8<L>  /  Alb  1.0<L>  /  TBili  4.4<H>  /  DBili  x   /  AST  <5<L>  /  ALT  2337<H>  /  AlkPhos  1475<H>  01-06    PT/INR - ( 06 Jan 2023 05:12 )   PT: 46.1 sec;   INR: 3.92 ratio         PTT - ( 05 Jan 2023 12:58 )  PTT:69.5 sec  CARDIAC MARKERS ( 05 Jan 2023 17:13 )  x     / x     / 201 U/L / x     / 13.6 ng/mL  CARDIAC MARKERS ( 05 Jan 2023 13:00 )  x     / x     / 140 U/L / x     / 14.9 ng/mL  CARDIAC MARKERS ( 05 Jan 2023 09:54 )  x     / x     / 139 U/L / x     / 13.8 ng/mL  CARDIAC MARKERS ( 05 Jan 2023 06:10 )  x     / x     / 125 U/L / x     / 8.5 ng/mL            RADIOLOGY & ADDITIONAL TESTS:  Lab Results Reviewed   Imaging Reviewed  Electrocardiogram Reviewed   PROGRESS NOTE:   Authored by Bhavesh Clark MD   Patient is a 77y old  Male who presents with a chief complaint of SOB (05 Jan 2023 08:56)      SUBJECTIVE / OVERNIGHT EVENTS:  - Multiple cardiac arrests this AM  - Wife coming    ADDITIONAL REVIEW OF SYSTEMS:    MEDICATIONS  (STANDING):  acetaminophen   IVPB .. 1000 milliGRAM(s) IV Intermittent once  chlorhexidine 0.12% Liquid 15 milliLiter(s) Oral Mucosa every 12 hours  chlorhexidine 2% Cloths 1 Application(s) Topical daily  chlorhexidine 4% Liquid 1 Application(s) Topical <User Schedule>  fentaNYL   Infusion... 0.5 MICROgram(s)/kG/Hr (2.18 mL/Hr) IV Continuous <Continuous>  hydrocortisone sodium succinate Injectable 100 milliGRAM(s) IV Push every 8 hours  influenza  Vaccine (HIGH DOSE) 0.7 milliLiter(s) IntraMuscular once  insulin regular Infusion 7 Unit(s)/Hr (7 mL/Hr) IV Continuous <Continuous>  lidocaine 2% (Preservative-free) Injectable 5 milliLiter(s) Local Injection once  norepinephrine Infusion 1 MICROgram(s)/kG/Min (81.8 mL/Hr) IV Continuous <Continuous>  pantoprazole  Injectable 40 milliGRAM(s) IV Push two times a day  phenylephrine    Infusion 4 MICROgram(s)/kG/Min (65.4 mL/Hr) IV Continuous <Continuous>  piperacillin/tazobactam IVPB.. 3.375 Gram(s) IV Intermittent every 12 hours  sodium bicarbonate  Infusion 0.43 mEq/kG/Hr (250 mL/Hr) IV Continuous <Continuous>  vasopressin Infusion 0.04 Unit(s)/Min (6 mL/Hr) IV Continuous <Continuous>    MEDICATIONS  (PRN):  fentaNYL    Injectable 50 MICROGram(s) IV Push every 2 hours PRN uptitration      CAPILLARY BLOOD GLUCOSE      POCT Blood Glucose.: 596 mg/dL (06 Jan 2023 06:42)  POCT Blood Glucose.: 463 mg/dL (06 Jan 2023 06:00)  POCT Blood Glucose.: 460 mg/dL (06 Jan 2023 05:00)  POCT Blood Glucose.: 401 mg/dL (05 Jan 2023 23:59)    I&O's Summary    05 Jan 2023 07:01  -  06 Jan 2023 07:00  --------------------------------------------------------  IN: 66307.6 mL / OUT: 45 mL / NET: 91880.6 mL        PHYSICAL EXAM:  Vital Signs Last 24 Hrs  T(C): 36 (06 Jan 2023 05:00), Max: 38.2 (05 Jan 2023 20:00)  T(F): 96.8 (06 Jan 2023 05:00), Max: 100.8 (05 Jan 2023 20:00)  HR: 104 (06 Jan 2023 05:15) (91 - 114)  BP: 108/57 (05 Jan 2023 15:15) (93/56 - 113/66)  BP(mean): 78 (05 Jan 2023 15:15) (70 - 86)  RR: 30 (06 Jan 2023 05:15) (26 - 32)  SpO2: 94% (06 Jan 2023 02:30) (78% - 100%)    Parameters below as of 06 Jan 2023 02:00      O2 Concentration (%): 100    GENERAL: intubated and sedated  HEAD:  Atraumatic, Normocephalic  EYES: Pupils round and sluggish b/l, conjunctiva and sclera clear  NECK: Supple, No JVD  CHEST/LUNG: Clear to auscultation bilaterally; No wheeze  HEART: RRR  ABDOMEN: Soft, Nontender, Nondistended; Bowel sounds present  EXTREMITIES:  2+ pitting edema in LE b/l  NEUROLOGY: intubated and sedated  SKIN: Diffuse purpura in b/l UE      LABS:                        8.4    25.18 )-----------( 86       ( 06 Jan 2023 05:12 )             27.6     01-06    134<L>  |  84<L>  |  128<H>  ----------------------------<  458<HH>  7.7<HH>   |  <10<LL>  |  6.85<H>    Ca    7.2<L>      06 Jan 2023 05:12  Phos  14.0     01-06  Mg     2.6     01-06    TPro  2.8<L>  /  Alb  1.0<L>  /  TBili  4.4<H>  /  DBili  x   /  AST  <5<L>  /  ALT  2337<H>  /  AlkPhos  1475<H>  01-06    PT/INR - ( 06 Jan 2023 05:12 )   PT: 46.1 sec;   INR: 3.92 ratio         PTT - ( 05 Jan 2023 12:58 )  PTT:69.5 sec  CARDIAC MARKERS ( 05 Jan 2023 17:13 )  x     / x     / 201 U/L / x     / 13.6 ng/mL  CARDIAC MARKERS ( 05 Jan 2023 13:00 )  x     / x     / 140 U/L / x     / 14.9 ng/mL  CARDIAC MARKERS ( 05 Jan 2023 09:54 )  x     / x     / 139 U/L / x     / 13.8 ng/mL  CARDIAC MARKERS ( 05 Jan 2023 06:10 )  x     / x     / 125 U/L / x     / 8.5 ng/mL            RADIOLOGY & ADDITIONAL TESTS:  Lab Results Reviewed   Imaging Reviewed  Electrocardiogram Reviewed

## 2023-01-06 NOTE — PROGRESS NOTE ADULT - ASSESSMENT
76 y/o M PMH HTN, HLD, psoriasis on prednisone, . Recent admission for pneumonia and dced two weeks ago also found to possible esophageal cancer (mass) with liver mets s/p IR biopsy 12/2 presenting with SOB and now  with LIVIER and pulmonary emboli and + DVT     Oliguric LIVIER likely pre renal in light of low BP-distributive shock and now worse LIVIER;  Hyperphosphatemia   Metabolic acidosis   Respiratory shock and now mechanical intubation   Lacunar infarct - New   Esophageal cancer (mass) with liver mets s/p IR biopsy 12/22  Hypotension on IV steroids   Hepatitis - New       RECOMMEND     Renal-Unfortunately he has many comorbidities at present and in [facility];  Pressors at present are prohibitive to any form of renal replacement as can lead to worsening hemodynamic compromise.    Will re evaluate in am- This was explained to the wife   Off diuretics   Endo- hydrocortisone 100mg IVP tid     CVS-Pressors to maintain MAP    Pulm-Mechanical intubation    Sedation     DW ICU attd in detail     There is no hematuria or bubbles in the urine.  No history of NSAIDS or nephrolithisis.  The patient urinates once or twice in the night and there is no incontinence.  No family hx or renal disease or back pain.    No recent abx use.  No alleviating or aggravating factors with respect to the kidneys.     Sayed Qewz   [id number]    76 y/o M PMH HTN, HLD, psoriasis on prednisone, . Recent admission for pneumonia and dced two weeks ago also found to possible esophageal cancer (mass) with liver mets s/p IR biopsy 12/2 presenting with SOB and now  with LIVIER and pulmonary emboli and + DVT     Oliguric LIVIER likely pre renal in light of low BP-distributive shock and now worse LIVIER;  Hyperphosphatemia   Metabolic acidosis   Respiratory shock and now mechanical intubation   Lacunar infarct - New   Esophageal cancer (mass) with liver mets s/p IR biopsy 12/22  Hypotension on IV steroids   Hepatitis - New       RECOMMEND     Renal-Unfortunately he has many comorbidities at present and in [facility];  Pressors at present are prohibitive to any form of renal replacement as can lead to worsening hemodynamic compromise.    Will re evaluate in am- This was explained to the wife   Off diuretics   Endo- hydrocortisone 100mg IVP tid     CVS-Pressors to maintain MAP    Pulm-Mechanical intubation    Sedation     DW ICU attd in detail     There is no hematuria or bubbles in the urine.  No history of NSAIDS or nephrolithisis.  The patient urinates once or twice in the night and there is no incontinence.  No family hx or renal disease or back pain.    No recent abx use.  No alleviating or aggravating factors with respect to the kidneys.     Sayed Mosoro   [phone]    76 y/o M PMH HTN, HLD, psoriasis on prednisone, . Recent admission for pneumonia and dced two weeks ago also found to possible esophageal cancer (mass) with liver mets s/p IR biopsy 12/2 presenting with SOB and now  with LIVIER and pulmonary emboli and + DVT     Oliguric LIVIER likely pre renal in light of low BP-distributive shock and now worse LIVIER;  Hyperphosphatemia   Metabolic acidosis   Respiratory shock and now mechanical intubation   Lacunar infarct - New   Esophageal cancer (mass) with liver mets s/p IR biopsy 12/22  Hypotension on IV steroids   Hepatitis - New       RECOMMEND     Renal-Unfortunately he has many comorbidities at present and in [facility];  Pressors at present are prohibitive to any form of renal replacement as can lead to worsening hemodynamic compromise.    Will re evaluate in am- This was explained to the wife   Off diuretics   Endo- hydrocortisone 100mg IVP tid     CVS-Pressors to maintain MAP    Pulm-Mechanical intubation    Sedation     DW ICU attd in detail     There is no hematuria or bubbles in the urine.  No history of NSAIDS or nephrolithisis.  The patient urinates once or twice in the night and there is no incontinence.  No family hx or renal disease or back pain.    No recent abx use.  No alleviating or aggravating factors with respect to the kidneys.     Sayed appening   [phone]    76 y/o M PMH HTN, HLD, psoriasis on prednisone, . Recent admission for pneumonia and dced two weeks ago also found to possible esophageal cancer (mass) with liver mets s/p IR biopsy 12/2 presenting with SOB and now  with LIVIER and pulmonary emboli and + DVT     Oliguric LIVIER likely pre renal in light of low BP-distributive shock and now worse LIVIER;  Hyperphosphatemia   Metabolic acidosis   Respiratory shock and now mechanical intubation   Lacunar infarct - New   Esophageal cancer (mass) with liver mets s/p IR biopsy 12/22  Hypotension on IV steroids   Hepatitis - New   V tach/ torsades       RECOMMEND     Renal-Unfortunately he has many comorbidities at present and in [facility];  Pressors at present are prohibitive to any form of renal replacement as can lead to worsening hemodynamic compromise.          Endo- hydrocortisone 100mg IVP tid     CVS-SP cardiac shock     Pulm-Mechanical intubation    Sedation     DW ICU attd in detail   Wife is likely to withdraw care  Emotional support given      >60 minutes spent on total encounter and more then 50% of the visit was spent counseling and/or coordinating care by the attending physician     Sayed [facility]   [phone]    76 y/o M PMH HTN, HLD, psoriasis on prednisone, . Recent admission for pneumonia and dced two weeks ago also found to possible esophageal cancer (mass) with liver mets s/p IR biopsy 12/2 presenting with SOB and now  with LIVIER and pulmonary emboli and + DVT     Oliguric LIVIER likely pre renal in light of low BP-distributive shock and now worse LIVIER;  Hyperphosphatemia   Metabolic acidosis   Respiratory shock and now mechanical intubation   Lacunar infarct - New   Esophageal cancer (mass) with liver mets s/p IR biopsy 12/22  Hypotension on IV steroids   Hepatitis - New   V tach/ torsades       RECOMMEND     Renal-Unfortunately he has many comorbidities at present and in [facility];  Pressors at present are prohibitive to any form of renal replacement as can lead to worsening hemodynamic compromise.          Endo- hydrocortisone 100mg IVP tid     CVS-SP cardiac shock     Pulm-Mechanical intubation    Sedation     DW ICU attd in detail   Wife is likely to withdraw care  Emotional support given      >60 minutes spent on total encounter and more then 50% of the visit was spent counseling and/or coordinating care by the attending physician     Sayed [facility]   [phone]    76 y/o M PMH HTN, HLD, psoriasis on prednisone, . Recent admission for pneumonia and dced two weeks ago also found to possible esophageal cancer (mass) with liver mets s/p IR biopsy 12/2 presenting with SOB and now  with LIVIER and pulmonary emboli and + DVT     Oliguric LIVIER likely pre renal in light of low BP-distributive shock and now worse LIVIER;  Hyperphosphatemia   Metabolic acidosis   Respiratory shock and now mechanical intubation   Lacunar infarct - New   Esophageal cancer (mass) with liver mets s/p IR biopsy 12/22  Hypotension on IV steroids   Hepatitis - New   V tach/ torsades       RECOMMEND     Renal-Unfortunately he has many comorbidities at present and in [facility];  Pressors at present are prohibitive to any form of renal replacement as can lead to worsening hemodynamic compromise.          Endo- hydrocortisone 100mg IVP tid     CVS-SP cardiac shock     Pulm-Mechanical intubation    Sedation     DW ICU attd in detail   Wife is likely to withdraw care  Emotional support given      >60 minutes spent on total encounter and more then 50% of the visit was spent counseling and/or coordinating care by the attending physician     Sayed [facility]   [phone]

## 2023-01-06 NOTE — DISCHARGE NOTE FOR THE EXPIRED PATIENT - HOSPITAL COURSE
78yo Male with PMHx of HTN, HLD, psoriasis on prednisone, and recent possible esophageal/hepatic malignancy/mass presented with acute onset SOB and found to have RLL segmental PE transferred to MICU after a code blue was called for PEA arrest in the setting of PE now intubated and sedated on pressors. Vascular cardiology consulted for PE, and determined patient to not be a candidate for advanced PE therapy. Patient initially on epinephrine and levophed. Epinephrine was downtitrated and patient started on phenylephrine with MAP goal > 65. Patient persistently elevated lactate, severe acidosis and worsening renal failure. Hyperkalemia treated with multiple IV runs of calcium gluconate, insulin and d50 shifts. Patient hyperglycemic, and started on insulin gtt. Acidosis treated with sodium bicarbonate gtt. Patient sedated on fentanyl gtt and weaned off propofol gtt. At 625 patient had v tach and was treated with MgSO4, 1 amp lidocaine with deterioration to asystole. CPR initiated and was treated with 2 mg epi, 2 amps bicarb, 5 units insulin, 1/2 amp d50, and 1 amp IVP with ROSC at 631. 635 patient developed Vfib, CPR initiated and patient received 1 amp bicarb. Patient with 4 more episodes of PEA. Additional runs of epi, bicarb, calcium and CPR given for patient. During runs of v tach, shocks were delivered to patient. Family notified and at bedside at 810. Family requested  withdrawal of ET tube. Patient without breath sounds, heart sounds, pupils fixed and dilated, no response to pain, no electrical activity on telemetry, patient pronounced dead at 820 AM.     Family refused autopsy.    76yo Male with PMHx of HTN, HLD, psoriasis on prednisone, and recent possible esophageal/hepatic malignancy/mass presented with acute onset SOB and found to have RLL segmental PE transferred to MICU after a code blue was called for PEA arrest in the setting of PE now intubated and sedated on pressors. Vascular cardiology consulted for PE, and determined patient to not be a candidate for advanced PE therapy. Patient initially on epinephrine and levophed. Epinephrine was downtitrated and patient started on phenylephrine with MAP goal > 65. Patient persistently elevated lactate, severe acidosis and worsening renal failure. Hyperkalemia treated with multiple IV runs of calcium gluconate, insulin and d50 shifts. Patient hyperglycemic, and started on insulin gtt. Acidosis treated with sodium bicarbonate gtt. Patient sedated on fentanyl gtt and weaned off propofol gtt. At 625 patient had v tach and was treated with MgSO4, 1 amp lidocaine with deterioration to asystole. CPR initiated and was treated with 2 mg epi, 2 amps bicarb, 5 units insulin, 1/2 amp d50, and 1 amp IVP with ROSC at 631. 635 patient developed Vfib, CPR initiated and patient received 1 amp bicarb. Patient with 4 more episodes of PEA. Additional runs of epi, bicarb, calcium and CPR given for patient. During runs of v tach, shocks were delivered to patient. Family notified and at bedside at 810. Family requested  withdrawal of ET tube. Patient without breath sounds, heart sounds, pupils fixed and dilated, no response to pain, no electrical activity on telemetry, patient pronounced dead at 820 AM.     Family refused autopsy.

## 2023-01-07 LAB
CULTURE RESULTS: SIGNIFICANT CHANGE UP
FUNGITELL: 168 PG/ML — HIGH
SPECIMEN SOURCE: SIGNIFICANT CHANGE UP

## 2023-01-10 LAB
CULTURE RESULTS: SIGNIFICANT CHANGE UP
SPECIMEN SOURCE: SIGNIFICANT CHANGE UP

## 2023-01-11 LAB
CULTURE RESULTS: SIGNIFICANT CHANGE UP
SPECIMEN SOURCE: SIGNIFICANT CHANGE UP

## 2023-06-27 NOTE — PROVIDER CONTACT NOTE (OTHER) - REASON
tarry semi-formed stools x2, pt on heparin gtt
unable to get 12:30 AM aPTT, pt hardstick
pt refusing to be on continuous SPO2 monitor
Double O-Z Flap Text: The defect edges were debeveled with a #15 scalpel blade.  Given the location of the defect, shape of the defect and the proximity to free margins a Double O-Z flap was deemed most appropriate.  Using a sterile surgical marker, an appropriate transposition flap was drawn incorporating the defect and placing the expected incisions within the relaxed skin tension lines where possible. The area thus outlined was incised deep to adipose tissue with a #15 scalpel blade.  The skin margins were undermined to an appropriate distance in all directions utilizing iris scissors.

## 2023-08-29 NOTE — ED PROVIDER NOTE - QRS
[facility] ([facility]) Physicians  Neurosurgery and Pain [facility]  [address] , [address] [address] [facility], [address] [place], [place] [address]vard: [phone]  F: [phone]      [address]      Provider: Jessica Bravo DO          Patient Name: Hazle Severin : 1949        Date: 2023       Hazle Severin is here today for interventional pain management. Standard ASIPP guidelines were followed and sterile technique used. Area was cleaned with Betadine x3. Informed consent was obtained. Fluoroscopic guidance was used for this procedure. Junction of superior articular process and transverse process was identified. For L5 dorsal primary ramus groove of sacral ala and SAP of S1 was identified. Appropriate length spinal needle was used and advanced to correct anatomic location. Negative aspiration was achieved. At each site approximately 1/2cc of 1% preservative free Lidocaine was injected without difficulty. Patient tolerated the procedure well, no obvious complications occurred during the procedure. Patient was appropriately monitored and discharged home in stable condition with their usual motor strength. The patient will keep close track of pain over the next several hours and call our office tomorrow and let us know what percentage of pain relief is experienced. Post op Instructions were given to patient. Relevant and recent imaging reviewed with patient today. [x] Bilateral [] T12 [] S1      [] L1 [] S2     [] Right [x] L2 [] S3      [x] L3      [] Left [] L4         [] L5 [] KATY Hurley DO
82

## 2023-12-03 RX ORDER — CHLORTHALIDONE 50 MG
1 TABLET ORAL
Qty: 0 | Refills: 0 | DISCHARGE

## 2023-12-03 RX ORDER — FAMOTIDINE 10 MG/ML
1 INJECTION INTRAVENOUS
Qty: 0 | Refills: 0 | DISCHARGE

## 2023-12-03 RX ORDER — BISOPROLOL FUMARATE 10 MG/1
1 TABLET, FILM COATED ORAL
Qty: 0 | Refills: 0 | DISCHARGE

## 2023-12-03 RX ORDER — SIMVASTATIN 20 MG/1
1 TABLET, FILM COATED ORAL
Qty: 0 | Refills: 0 | DISCHARGE

## 2023-12-03 RX ORDER — AMLODIPINE BESYLATE 2.5 MG/1
1 TABLET ORAL
Qty: 0 | Refills: 0 | DISCHARGE

## 2024-11-15 NOTE — CHART NOTE - NSCHARTNOTEFT_GEN_A_CORE
MICU Accept Note    CHIEF COMPLAINT: PEA arrest    HPI / INTERVAL HISTORY:   78 y/o M PMH HTN, HLD, psoriasis on chronic prednisone presented with SOB. Recent admission for pneumonia and discharged approximately two weeks ago. Also found with an esophageal and liver mass s/p IR liver biopsy on 12/22/22 at [facility]. 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     Pt was admitted for acute right lower lobe PE with DVT of the LLE, pt currently on a heparin gtt.   Pt on zosyn for septic PNA, had an LIVIER w/anion gap acidosis s/p bicarb.  Pt had AMS s/p MR head showing an acute lacunar infarct. MRA head pending. Pt's FOBT was positive and was started on PPIs.    An RRT was called at 0400 today 1/5: Pt pulled out his IV access and began bleeding for approximately five minutes before it stopped. Pt currently on a heparin gtt for acute PE with LE DVT. BP obtained was 97/62, spO2 93% on RA. Stat CBC was stable with hgb 14.4 and hct 44.7. PTT was elevated at 41.8. Pt was able to answer questions and follow commands at the time. After shifting the patient to a sitting position, his SOB visibly improved. Pt was then scheduled for bumex as this seemed 2/2 overload.    A code was then called...      PAST MEDICAL & SURGICAL HISTORY:  Psoriasis      Esophageal mass      Benign essential HTN          FAMILY HISTORY:      SOCIAL HISTORY:  Smoking:   Substance Use:   EtOH Use:   Marital Status:   Sexual History:   Occupation:  Recent Travel:  Country of Birth:   Advance Directives:     HOME MEDICATIONS:      Allergies    No Known Allergies    Intolerances          REVIEW OF SYSTEMS:  Constitutional: No fevers, chills, weight loss, weight gain  HEENT: No vision problems, eye pain, nasal congestion, rhinorrhea, sore throat, dysphagia  CV: No chest pain, orthopnea, palpitations  Resp: No cough, dyspnea, wheezing, hemoptysis  GI: No nausea, vomiting, diarrhea, constipation, abdominal pain  : [ ] dysuria [ ] nocturia [ ] hematuria [ ] increased urinary frequency  Musculoskeletal: [ ] back pain [ ] myalgias [ ] arthralgias [ ] fracture  Skin: [ ] rash [ ] itch  Neurological: [ ] headache [ ] dizziness [ ] syncope [ ] weakness [ ] numbness  Psychiatric: [ ] anxiety [ ] depression  Endocrine: [ ] diabetes [ ] thyroid problem  Hematologic/Lymphatic: [ ] anemia [ ] bleeding problem  Allergic/Immunologic: [ ] itchy eyes [ ] nasal discharge [ ] hives [ ] angioedema  [ ] All other systems negative  [ ] Unable to assess ROS because ________    OBJECTIVE:  ICU Vital Signs Last 24 Hrs  T(C): 36.6 (04 Jan 2023 20:30), Max: 36.6 (04 Jan 2023 20:30)  T(F): 97.8 (04 Jan 2023 20:30), Max: 97.8 (04 Jan 2023 20:30)  HR: 91 (05 Jan 2023 04:40) (72 - 91)  BP: 115/74 (05 Jan 2023 04:40) (94/57 - 115/74)  BP(mean): --  ABP: --  ABP(mean): --  RR: 18 (05 Jan 2023 04:40) (18 - 19)  SpO2: 96% (05 Jan 2023 04:40) (90% - 96%)    O2 Parameters below as of 05 Jan 2023 04:40  Patient On (Oxygen Delivery Method): nasal cannula  O2 Flow (L/min): 5            CAPILLARY BLOOD GLUCOSE          PHYSICAL EXAM:  GENERAL: NAD, well-developed  HEAD:  Atraumatic, Normocephalic  EYES: EOMI, PERRLA, conjunctiva and sclera clear  NECK: Supple, No JVD  CHEST/LUNG: Clear to auscultation bilaterally; No wheeze  HEART: Regular rate and rhythm; No murmurs, rubs, or gallops  ABDOMEN: Soft, Nontender, Nondistended; Bowel sounds present  EXTREMITIES:  2+ Peripheral Pulses, No clubbing, cyanosis, or edema  PSYCH: confused  NEUROLOGY: non-focal  SKIN: No rashes or lesions    LINES:     HOSPITAL MEDICATIONS:  MEDICATIONS  (STANDING):  buMETAnide Injectable 2 milliGRAM(s) IV Push two times a day  chlorhexidine 2% Cloths 1 Application(s) Topical daily  heparin  Infusion.  Unit(s)/Hr (16 mL/Hr) IV Continuous <Continuous>  influenza  Vaccine (HIGH DOSE) 0.7 milliLiter(s) IntraMuscular once  midodrine 10 milliGRAM(s) Oral three times a day  ondansetron Injectable 4 milliGRAM(s) IV Push four times a day  pantoprazole  Injectable 40 milliGRAM(s) IV Push two times a day  piperacillin/tazobactam IVPB.. 3.375 Gram(s) IV Intermittent every 12 hours  predniSONE   Tablet 10 milliGRAM(s) Oral daily  simvastatin 10 milliGRAM(s) Oral at bedtime  sodium bicarbonate  Infusion 0.043 mEq/kG/Hr (50 mL/Hr) IV Continuous <Continuous>  sucralfate suspension 1 Gram(s) Oral two times a day    MEDICATIONS  (PRN):  acetaminophen     Tablet .. 650 milliGRAM(s) Oral every 6 hours PRN Temp greater or equal to 38.5C (101.3F), Mild Pain (1 - 3)  heparin   Injectable 7000 Unit(s) IV Push every 6 hours PRN For aPTT less than 40  heparin   Injectable 3500 Unit(s) IV Push every 6 hours PRN For aPTT between 40 - 57      LABS:                        14.4   27.57 )-----------( 119      ( 05 Jan 2023 04:19 )             44.7     Hgb Trend: 14.4<--, 13.5<--, 13.6<--, 13.3<--, 15.8<--  01-03    133<L>  |  90<L>  |  134<H>  ----------------------------<  80  4.8   |  20<L>  |  6.02<H>    Ca    6.9<L>      03 Jan 2023 15:19      Creatinine Trend: 6.02<--, 5.27<--, 4.17<--, 3.79<--  PTT - ( 05 Jan 2023 04:19 )  PTT:41.8 sec    Arterial Blood Gas:  01-03 @ 16:40  7.33/37/42/20/67.7/-5.8  ABG lactate: --        MICROBIOLOGY:     RADIOLOGY & ADDITIONAL TESTS:      ASSESSMENT AND PLAN:  //Ms. is a ___    #Neuro    #Cardiovascular  Congestive Heart Failure  - telemetry  - cardiac enzymes  - echo with no strain   - cardiology evaluation     HTN  - hold BP meds  - hold diuretic    #Respiratory  Pulmonary embolus and L DVT   - AC with Heparin  - Pulmonary follow  - Vascular cardiology follow     #GI/Nutrition    #/Renal    #Skin  Psoriasis  - stress dose steroid      Thrombocytopenia  - follow Platelets  - HIT  - if worsening thrombocytopenia may need IVC filter                Liver lesions  - s/p Bx at [facility] c/w adenocarcinoma  - Oncology follow    LIVIER  - follow lytes, Cr  - gentle IVF  - Nephrology evaluation Dr. Bill SAWYERA acute/ subacute with Confusion  - MRI brain noted  - Neurology evaluation noted     Esophageal mass  - Gastroenterology follow. await recommendations   - may need PEG    Sepsis/ Pneumonia  - Zosyn  - ID follow    DVT prophylaxis  - AC  \    #ID    #Endocrine    #Hematologic/DVT ppx    #Ethics MICU Accept Note    CHIEF COMPLAINT: PEA arrest    HPI / INTERVAL HISTORY:   78 y/o M PMH HTN, HLD, psoriasis on chronic prednisone presented with SOB. Recent admission for pneumonia and discharged approximately two weeks ago. Also found with an esophageal and liver mass s/p IR liver biopsy on 12/22/22 at [facility]. 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     Pt was admitted for acute right lower lobe PE with DVT of the LLE, pt currently on a heparin gtt.   Pt on zosyn for septic PNA, had an LIVIER w/anion gap acidosis s/p bicarb.  Pt had AMS s/p MR head showing an acute lacunar infarct. MRA head pending. Pt's FOBT was positive and was started on PPIs.    An RRT was called at 0400 today 1/5: Pt pulled out his IV access and began bleeding for approximately five minutes before it stopped. Pt currently on a heparin gtt for acute PE with LE DVT. BP obtained was 97/62, spO2 93% on RA. Stat CBC was stable with hgb 14.4 and hct 44.7. PTT was elevated at 41.8. Pt was able to answer questions and follow commands at the time. After shifting the patient to a sitting position, his SOB visibly improved. Pt was then scheduled for bumex as this seemed 2/2 overload.    A code was then called...      PAST MEDICAL & SURGICAL HISTORY:  Psoriasis      Esophageal mass      Benign essential HTN          FAMILY HISTORY:      SOCIAL HISTORY:  Smoking:   Substance Use:   EtOH Use:   Marital Status:   Sexual History:   Occupation:  Recent Travel:  Country of Birth:   Advance Directives:     HOME MEDICATIONS:      Allergies    No Known Allergies    Intolerances          REVIEW OF SYSTEMS:  Constitutional: No fevers, chills, weight loss, weight gain  HEENT: No vision problems, eye pain, nasal congestion, rhinorrhea, sore throat, dysphagia  CV: No chest pain, orthopnea, palpitations  Resp: No cough, dyspnea, wheezing, hemoptysis  GI: No nausea, vomiting, diarrhea, constipation, abdominal pain  : [ ] dysuria [ ] nocturia [ ] hematuria [ ] increased urinary frequency  Musculoskeletal: [ ] back pain [ ] myalgias [ ] arthralgias [ ] fracture  Skin: [ ] rash [ ] itch  Neurological: [ ] headache [ ] dizziness [ ] syncope [ ] weakness [ ] numbness  Psychiatric: [ ] anxiety [ ] depression  Endocrine: [ ] diabetes [ ] thyroid problem  Hematologic/Lymphatic: [ ] anemia [ ] bleeding problem  Allergic/Immunologic: [ ] itchy eyes [ ] nasal discharge [ ] hives [ ] angioedema  [ ] All other systems negative  [ ] Unable to assess ROS because ________    OBJECTIVE:  ICU Vital Signs Last 24 Hrs  T(C): 36.6 (04 Jan 2023 20:30), Max: 36.6 (04 Jan 2023 20:30)  T(F): 97.8 (04 Jan 2023 20:30), Max: 97.8 (04 Jan 2023 20:30)  HR: 91 (05 Jan 2023 04:40) (72 - 91)  BP: 115/74 (05 Jan 2023 04:40) (94/57 - 115/74)  BP(mean): --  ABP: --  ABP(mean): --  RR: 18 (05 Jan 2023 04:40) (18 - 19)  SpO2: 96% (05 Jan 2023 04:40) (90% - 96%)    O2 Parameters below as of 05 Jan 2023 04:40  Patient On (Oxygen Delivery Method): nasal cannula  O2 Flow (L/min): 5            CAPILLARY BLOOD GLUCOSE          PHYSICAL EXAM:  GENERAL: NAD, well-developed  HEAD:  Atraumatic, Normocephalic  EYES: EOMI, PERRLA, conjunctiva and sclera clear  NECK: Supple, No JVD  CHEST/LUNG: Clear to auscultation bilaterally; No wheeze  HEART: Regular rate and rhythm; No murmurs, rubs, or gallops  ABDOMEN: Soft, Nontender, Nondistended; Bowel sounds present  EXTREMITIES:  2+ Peripheral Pulses, No clubbing, cyanosis, or edema  PSYCH: confused  NEUROLOGY: non-focal  SKIN: No rashes or lesions    LINES:     HOSPITAL MEDICATIONS:  MEDICATIONS  (STANDING):  buMETAnide Injectable 2 milliGRAM(s) IV Push two times a day  chlorhexidine 2% Cloths 1 Application(s) Topical daily  heparin  Infusion.  Unit(s)/Hr (16 mL/Hr) IV Continuous <Continuous>  influenza  Vaccine (HIGH DOSE) 0.7 milliLiter(s) IntraMuscular once  midodrine 10 milliGRAM(s) Oral three times a day  ondansetron Injectable 4 milliGRAM(s) IV Push four times a day  pantoprazole  Injectable 40 milliGRAM(s) IV Push two times a day  piperacillin/tazobactam IVPB.. 3.375 Gram(s) IV Intermittent every 12 hours  predniSONE   Tablet 10 milliGRAM(s) Oral daily  simvastatin 10 milliGRAM(s) Oral at bedtime  sodium bicarbonate  Infusion 0.043 mEq/kG/Hr (50 mL/Hr) IV Continuous <Continuous>  sucralfate suspension 1 Gram(s) Oral two times a day    MEDICATIONS  (PRN):  acetaminophen     Tablet .. 650 milliGRAM(s) Oral every 6 hours PRN Temp greater or equal to 38.5C (101.3F), Mild Pain (1 - 3)  heparin   Injectable 7000 Unit(s) IV Push every 6 hours PRN For aPTT less than 40  heparin   Injectable 3500 Unit(s) IV Push every 6 hours PRN For aPTT between 40 - 57      LABS:                        14.4   27.57 )-----------( 119      ( 05 Jan 2023 04:19 )             44.7     Hgb Trend: 14.4<--, 13.5<--, 13.6<--, 13.3<--, 15.8<--  01-03    133<L>  |  90<L>  |  134<H>  ----------------------------<  80  4.8   |  20<L>  |  6.02<H>    Ca    6.9<L>      03 Jan 2023 15:19      Creatinine Trend: 6.02<--, 5.27<--, 4.17<--, 3.79<--  PTT - ( 05 Jan 2023 04:19 )  PTT:41.8 sec    Arterial Blood Gas:  01-03 @ 16:40  7.33/37/42/20/67.7/-5.8  ABG lactate: --        MICROBIOLOGY:     RADIOLOGY & ADDITIONAL TESTS:      ASSESSMENT AND PLAN:  //Ms. is a ___    #Neuro    #Cardiovascular  Congestive Heart Failure  - telemetry  - cardiac enzymes  - echo with no strain   - cardiology evaluation     HTN  - hold BP meds  - hold diuretic    #Respiratory  Pulmonary embolus and L DVT   - AC with Heparin  - Pulmonary follow  - Vascular cardiology follow     #GI/Nutrition    #/Renal    #Skin  Psoriasis  - stress dose steroid      Thrombocytopenia  - follow Platelets  - HIT  - if worsening thrombocytopenia may need IVC filter                Liver lesions  - s/p Bx at [facility] c/w adenocarcinoma  - Oncology follow    LIVIER  - follow lytes, Cr  - gentle IVF  - Nephrology evaluation Dr. Bill SAWYERA acute/ subacute with Confusion  - MRI brain noted  - Neurology evaluation noted     Esophageal mass  - Gastroenterology follow. await recommendations   - may need PEG    Sepsis/ Pneumonia  - Zosyn  - ID follow    DVT prophylaxis  - AC  \    #ID    #Endocrine    #Hematologic/DVT ppx    #Ethics aspirin or aspirin-containing products) as instructed by your physician.    DO NOT take any diabetic pills or insulin morning of your surgery.     Leave all jewelry at home and wear loose, comfortable clothing that is easy to put on and take off.     If you will be returning home the same day as your surgery, you will need to have a responsible adult (18 years of age or older) present to drive you home. You will need someone stay with you at home for the first 24 hours following your surgery. This is due to the anesthesia and the medication given to you during surgery and recovery.             MICU Accept Note    CHIEF COMPLAINT: PEA arrest    HPI / INTERVAL HISTORY:   78 y/o M PMH HTN, HLD, psoriasis on chronic prednisone presented with SOB. Recent admission for pneumonia and discharged approximately two weeks ago. Also found with an esophageal and liver mass s/p IR liver biopsy on 12/22/22 at [facility]. 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     Pt was admitted for acute right lower lobe PE with DVT of the LLE, pt currently on a heparin gtt.   Pt on zosyn for septic PNA, had an LIVIER w/anion gap acidosis s/p bicarb.  Pt had AMS s/p MR head showing an acute lacunar infarct. MRA head pending. Pt's FOBT was positive and was started on PPIs.    An RRT was called at 0400 today 1/5: Pt pulled out his IV access and began bleeding for approximately five minutes before it stopped. Pt currently on a heparin gtt for acute PE with LE DVT. BP obtained was 97/62, spO2 93% on RA. Stat CBC was stable with hgb 14.4 and hct 44.7. PTT was elevated at 41.8. Pt was able to answer questions and follow commands at the time. After shifting the patient to a sitting position, his SOB visibly improved. Pt was then scheduled for bumex as this seemed 2/2 overload.    A code was then called...      PAST MEDICAL & SURGICAL HISTORY:  Psoriasis      Esophageal mass      Benign essential HTN          FAMILY HISTORY:      SOCIAL HISTORY:  Smoking:   Substance Use:   EtOH Use:   Marital Status:   Sexual History:   Occupation:  Recent Travel:  Country of Birth:   Advance Directives:     HOME MEDICATIONS:      Allergies    No Known Allergies    Intolerances          REVIEW OF SYSTEMS:  Constitutional: No fevers, chills, weight loss, weight gain  HEENT: No vision problems, eye pain, nasal congestion, rhinorrhea, sore throat, dysphagia  CV: No chest pain, orthopnea, palpitations  Resp: No cough, dyspnea, wheezing, hemoptysis  GI: No nausea, vomiting, diarrhea, constipation, abdominal pain  : [ ] dysuria [ ] nocturia [ ] hematuria [ ] increased urinary frequency  Musculoskeletal: [ ] back pain [ ] myalgias [ ] arthralgias [ ] fracture  Skin: [ ] rash [ ] itch  Neurological: [ ] headache [ ] dizziness [ ] syncope [ ] weakness [ ] numbness  Psychiatric: [ ] anxiety [ ] depression  Endocrine: [ ] diabetes [ ] thyroid problem  Hematologic/Lymphatic: [ ] anemia [ ] bleeding problem  Allergic/Immunologic: [ ] itchy eyes [ ] nasal discharge [ ] hives [ ] angioedema  [ ] All other systems negative  [ ] Unable to assess ROS because ________    OBJECTIVE:  ICU Vital Signs Last 24 Hrs  T(C): 36.6 (04 Jan 2023 20:30), Max: 36.6 (04 Jan 2023 20:30)  T(F): 97.8 (04 Jan 2023 20:30), Max: 97.8 (04 Jan 2023 20:30)  HR: 91 (05 Jan 2023 04:40) (72 - 91)  BP: 115/74 (05 Jan 2023 04:40) (94/57 - 115/74)  BP(mean): --  ABP: --  ABP(mean): --  RR: 18 (05 Jan 2023 04:40) (18 - 19)  SpO2: 96% (05 Jan 2023 04:40) (90% - 96%)    O2 Parameters below as of 05 Jan 2023 04:40  Patient On (Oxygen Delivery Method): nasal cannula  O2 Flow (L/min): 5            CAPILLARY BLOOD GLUCOSE          PHYSICAL EXAM:  GENERAL: NAD, well-developed  HEAD:  Atraumatic, Normocephalic  EYES: EOMI, PERRLA, conjunctiva and sclera clear  NECK: Supple, No JVD  CHEST/LUNG: Clear to auscultation bilaterally; No wheeze  HEART: Regular rate and rhythm; No murmurs, rubs, or gallops  ABDOMEN: Soft, Nontender, Nondistended; Bowel sounds present  EXTREMITIES:  2+ Peripheral Pulses, No clubbing, cyanosis, or edema  PSYCH: confused  NEUROLOGY: non-focal  SKIN: No rashes or lesions    LINES:     HOSPITAL MEDICATIONS:  MEDICATIONS  (STANDING):  buMETAnide Injectable 2 milliGRAM(s) IV Push two times a day  chlorhexidine 2% Cloths 1 Application(s) Topical daily  heparin  Infusion.  Unit(s)/Hr (16 mL/Hr) IV Continuous <Continuous>  influenza  Vaccine (HIGH DOSE) 0.7 milliLiter(s) IntraMuscular once  midodrine 10 milliGRAM(s) Oral three times a day  ondansetron Injectable 4 milliGRAM(s) IV Push four times a day  pantoprazole  Injectable 40 milliGRAM(s) IV Push two times a day  piperacillin/tazobactam IVPB.. 3.375 Gram(s) IV Intermittent every 12 hours  predniSONE   Tablet 10 milliGRAM(s) Oral daily  simvastatin 10 milliGRAM(s) Oral at bedtime  sodium bicarbonate  Infusion 0.043 mEq/kG/Hr (50 mL/Hr) IV Continuous <Continuous>  sucralfate suspension 1 Gram(s) Oral two times a day    MEDICATIONS  (PRN):  acetaminophen     Tablet .. 650 milliGRAM(s) Oral every 6 hours PRN Temp greater or equal to 38.5C (101.3F), Mild Pain (1 - 3)  heparin   Injectable 7000 Unit(s) IV Push every 6 hours PRN For aPTT less than 40  heparin   Injectable 3500 Unit(s) IV Push every 6 hours PRN For aPTT between 40 - 57      LABS:                        14.4   27.57 )-----------( 119      ( 05 Jan 2023 04:19 )             44.7     Hgb Trend: 14.4<--, 13.5<--, 13.6<--, 13.3<--, 15.8<--  01-03    133<L>  |  90<L>  |  134<H>  ----------------------------<  80  4.8   |  20<L>  |  6.02<H>    Ca    6.9<L>      03 Jan 2023 15:19      Creatinine Trend: 6.02<--, 5.27<--, 4.17<--, 3.79<--  PTT - ( 05 Jan 2023 04:19 )  PTT:41.8 sec    Arterial Blood Gas:  01-03 @ 16:40  7.33/37/42/20/67.7/-5.8  ABG lactate: --        MICROBIOLOGY:     RADIOLOGY & ADDITIONAL TESTS:      ASSESSMENT AND PLAN:  //Ms. is a ___    #Neuro    #Cardiovascular  Congestive Heart Failure  - telemetry  - cardiac enzymes  - echo with no strain   - cardiology evaluation     HTN  - hold BP meds  - hold diuretic    #Respiratory  Pulmonary embolus and L DVT   - AC with Heparin  - Pulmonary follow  - Vascular cardiology follow     #GI/Nutrition    #/Renal    #Skin  Psoriasis  - stress dose steroid      Thrombocytopenia  - follow Platelets  - HIT  - if worsening thrombocytopenia may need IVC filter                Liver lesions  - s/p Bx at [facility] c/w adenocarcinoma  - Oncology follow    LIVIER  - follow lytes, Cr  - gentle IVF  - Nephrology evaluation Dr. Bill SAWYERA acute/ subacute with Confusion  - MRI brain noted  - Neurology evaluation noted     Esophageal mass  - Gastroenterology follow. await recommendations   - may need PEG    Sepsis/ Pneumonia  - Zosyn  - ID follow    DVT prophylaxis  - AC  \    #ID    #Endocrine    #Hematologic/DVT ppx    #Ethics MICU Accept Note    CHIEF COMPLAINT: PEA arrest    HPI / INTERVAL HISTORY:   76 y/o M PMH HTN, HLD, psoriasis on chronic prednisone presented with SOB. Recent admission for pneumonia and discharged approximately two weeks ago. Also found with an esophageal and liver mass s/p IR liver biopsy on 12/22/22 at [facility]. 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     Pt was admitted for acute right lower lobe PE with DVT of the LLE, pt currently on a heparin gtt.   Pt on zosyn for septic PNA, had an LIVIER w/anion gap acidosis s/p bicarb.  Pt had AMS s/p MR head showing an acute lacunar infarct. MRA head pending. Pt's FOBT was positive and was started on PPIs.    An RRT was called at 0400 today 1/5: Pt pulled out his IV access and began bleeding for approximately five minutes before it stopped. Pt currently on a heparin gtt for acute PE with LE DVT. BP obtained was 97/62, spO2 93% on RA. Stat CBC was stable with hgb 14.4 and hct 44.7. PTT was elevated at 41.8. Pt was able to answer questions and follow commands at the time. After shifting the patient to a sitting position, his SOB visibly improved. Pt was then scheduled for bumex as this seemed 2/2 overload.    A code was then called for PEA arrest. Anesthesia stat was called and the patient was intubated. Pt was started on pressors norepinephrine at 0.1 and epinephrine at 0.1.      PAST MEDICAL & SURGICAL HISTORY:  Psoriasis      Esophageal mass      Benign essential HTN          FAMILY HISTORY:      SOCIAL HISTORY:  Smoking:   Substance Use:   EtOH Use:   Marital Status:   Sexual History:   Occupation:  Recent Travel:  Country of Birth:   Advance Directives:     HOME MEDICATIONS:      Allergies    No Known Allergies    Intolerances          REVIEW OF SYSTEMS:  Constitutional: No fevers, chills, weight loss, weight gain  HEENT: No vision problems, eye pain, nasal congestion, rhinorrhea, sore throat, dysphagia  CV: No chest pain, orthopnea, palpitations  Resp: No cough, dyspnea, wheezing, hemoptysis  GI: No nausea, vomiting, diarrhea, constipation, abdominal pain  : [ ] dysuria [ ] nocturia [ ] hematuria [ ] increased urinary frequency  Musculoskeletal: [ ] back pain [ ] myalgias [ ] arthralgias [ ] fracture  Skin: [ ] rash [ ] itch  Neurological: [ ] headache [ ] dizziness [ ] syncope [ ] weakness [ ] numbness  Psychiatric: [ ] anxiety [ ] depression  Endocrine: [ ] diabetes [ ] thyroid problem  Hematologic/Lymphatic: [ ] anemia [ ] bleeding problem  Allergic/Immunologic: [ ] itchy eyes [ ] nasal discharge [ ] hives [ ] angioedema  [ ] All other systems negative  [ ] Unable to assess ROS because ________    OBJECTIVE:  ICU Vital Signs Last 24 Hrs  T(C): 36.6 (04 Jan 2023 20:30), Max: 36.6 (04 Jan 2023 20:30)  T(F): 97.8 (04 Jan 2023 20:30), Max: 97.8 (04 Jan 2023 20:30)  HR: 91 (05 Jan 2023 04:40) (72 - 91)  BP: 115/74 (05 Jan 2023 04:40) (94/57 - 115/74)  BP(mean): --  ABP: --  ABP(mean): --  RR: 18 (05 Jan 2023 04:40) (18 - 19)  SpO2: 96% (05 Jan 2023 04:40) (90% - 96%)    O2 Parameters below as of 05 Jan 2023 04:40  Patient On (Oxygen Delivery Method): nasal cannula  O2 Flow (L/min): 5            CAPILLARY BLOOD GLUCOSE          PHYSICAL EXAM:  GENERAL: NAD, well-developed  HEAD:  Atraumatic, Normocephalic  EYES: EOMI, PERRLA, conjunctiva and sclera clear  NECK: Supple, No JVD  CHEST/LUNG: Clear to auscultation bilaterally; No wheeze  HEART: Regular rate and rhythm; No murmurs, rubs, or gallops  ABDOMEN: Soft, Nontender, Nondistended; Bowel sounds present  EXTREMITIES:  2+ Peripheral Pulses, No clubbing, cyanosis, or edema  PSYCH: confused  NEUROLOGY: non-focal  SKIN: No rashes or lesions    LINES:     HOSPITAL MEDICATIONS:  MEDICATIONS  (STANDING):  buMETAnide Injectable 2 milliGRAM(s) IV Push two times a day  chlorhexidine 2% Cloths 1 Application(s) Topical daily  heparin  Infusion.  Unit(s)/Hr (16 mL/Hr) IV Continuous <Continuous>  influenza  Vaccine (HIGH DOSE) 0.7 milliLiter(s) IntraMuscular once  midodrine 10 milliGRAM(s) Oral three times a day  ondansetron Injectable 4 milliGRAM(s) IV Push four times a day  pantoprazole  Injectable 40 milliGRAM(s) IV Push two times a day  piperacillin/tazobactam IVPB.. 3.375 Gram(s) IV Intermittent every 12 hours  predniSONE   Tablet 10 milliGRAM(s) Oral daily  simvastatin 10 milliGRAM(s) Oral at bedtime  sodium bicarbonate  Infusion 0.043 mEq/kG/Hr (50 mL/Hr) IV Continuous <Continuous>  sucralfate suspension 1 Gram(s) Oral two times a day    MEDICATIONS  (PRN):  acetaminophen     Tablet .. 650 milliGRAM(s) Oral every 6 hours PRN Temp greater or equal to 38.5C (101.3F), Mild Pain (1 - 3)  heparin   Injectable 7000 Unit(s) IV Push every 6 hours PRN For aPTT less than 40  heparin   Injectable 3500 Unit(s) IV Push every 6 hours PRN For aPTT between 40 - 57      LABS:                        14.4   27.57 )-----------( 119      ( 05 Jan 2023 04:19 )             44.7     Hgb Trend: 14.4<--, 13.5<--, 13.6<--, 13.3<--, 15.8<--  01-03    133<L>  |  90<L>  |  134<H>  ----------------------------<  80  4.8   |  20<L>  |  6.02<H>    Ca    6.9<L>      03 Jan 2023 15:19      Creatinine Trend: 6.02<--, 5.27<--, 4.17<--, 3.79<--  PTT - ( 05 Jan 2023 04:19 )  PTT:41.8 sec    Arterial Blood Gas:  01-03 @ 16:40  7.33/37/42/20/67.7/-5.8  ABG lactate: --        MICROBIOLOGY:     RADIOLOGY & ADDITIONAL TESTS:      ASSESSMENT AND PLAN:  //Ms. is a ___    #Neuro    #Cardiovascular  Congestive Heart Failure  - telemetry  - cardiac enzymes  - echo with no strain   - cardiology evaluation     HTN  - hold BP meds  - hold diuretic    #Respiratory  Pulmonary embolus and L DVT   - AC with Heparin  - Pulmonary follow  - Vascular cardiology follow     #GI/Nutrition    #/Renal    #Skin  Psoriasis  - stress dose steroid      Thrombocytopenia  - follow Platelets  - HIT  - if worsening thrombocytopenia may need IVC filter                Liver lesions  - s/p Bx at [facility] c/w adenocarcinoma  - Oncology follow    LIVIER  - follow lytes, Cr  - gentle IVF  - Nephrology evaluation Dr. Khan     CVA acute/ subacute with Confusion  - MRI brain noted  - Neurology evaluation noted     Esophageal mass  - Gastroenterology follow. await recommendations   - may need PEG    Sepsis/ Pneumonia  - Zosyn  - ID follow    DVT prophylaxis  - AC  \    #ID    #Endocrine    #Hematologic/DVT ppx    #Ethics MICU Accept Note    CHIEF COMPLAINT: PEA arrest    HPI / INTERVAL HISTORY:   78 y/o M PMH HTN, HLD, psoriasis on chronic prednisone presented with SOB. Recent admission for pneumonia and discharged approximately two weeks ago. Also found with an esophageal and liver mass s/p IR liver biopsy on 12/22/22 at [facility]. 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     Pt was admitted for acute right lower lobe PE with DVT of the LLE, pt currently on a heparin gtt.   Pt on zosyn for septic PNA, had an LIVIER w/anion gap acidosis s/p bicarb.  Pt had AMS s/p MR head showing an acute lacunar infarct. MRA head pending. Pt's FOBT was positive and was started on PPIs.    An RRT was called at 0400 today 1/5: Pt pulled out his IV access and began bleeding for approximately five minutes before it stopped. Pt currently on a heparin gtt for acute PE with LE DVT. BP obtained was 97/62, spO2 93% on RA. Stat CBC was stable with hgb 14.4 and hct 44.7. PTT was elevated at 41.8. Pt was able to answer questions and follow commands at the time. After shifting the patient to a sitting position, his SOB visibly improved. Pt was then scheduled for bumex as this seemed 2/2 overload.    A code was then called for PEA arrest. Anesthesia stat was called and the patient was intubated. Pt was started on pressors norepinephrine at 0.1 and epinephrine at 0.1.      PAST MEDICAL & SURGICAL HISTORY:  Psoriasis      Esophageal mass      Benign essential HTN          FAMILY HISTORY:      SOCIAL HISTORY:  Smoking:   Substance Use:   EtOH Use:   Marital Status:   Sexual History:   Occupation:  Recent Travel:  Country of Birth:   Advance Directives:     HOME MEDICATIONS:      Allergies    No Known Allergies    Intolerances          REVIEW OF SYSTEMS:  Constitutional: No fevers, chills, weight loss, weight gain  HEENT: No vision problems, eye pain, nasal congestion, rhinorrhea, sore throat, dysphagia  CV: No chest pain, orthopnea, palpitations  Resp: No cough, dyspnea, wheezing, hemoptysis  GI: No nausea, vomiting, diarrhea, constipation, abdominal pain  : [ ] dysuria [ ] nocturia [ ] hematuria [ ] increased urinary frequency  Musculoskeletal: [ ] back pain [ ] myalgias [ ] arthralgias [ ] fracture  Skin: [ ] rash [ ] itch  Neurological: [ ] headache [ ] dizziness [ ] syncope [ ] weakness [ ] numbness  Psychiatric: [ ] anxiety [ ] depression  Endocrine: [ ] diabetes [ ] thyroid problem  Hematologic/Lymphatic: [ ] anemia [ ] bleeding problem  Allergic/Immunologic: [ ] itchy eyes [ ] nasal discharge [ ] hives [ ] angioedema  [ ] All other systems negative  [ ] Unable to assess ROS because ________    OBJECTIVE:  ICU Vital Signs Last 24 Hrs  T(C): 36.6 (04 Jan 2023 20:30), Max: 36.6 (04 Jan 2023 20:30)  T(F): 97.8 (04 Jan 2023 20:30), Max: 97.8 (04 Jan 2023 20:30)  HR: 91 (05 Jan 2023 04:40) (72 - 91)  BP: 115/74 (05 Jan 2023 04:40) (94/57 - 115/74)  BP(mean): --  ABP: --  ABP(mean): --  RR: 18 (05 Jan 2023 04:40) (18 - 19)  SpO2: 96% (05 Jan 2023 04:40) (90% - 96%)    O2 Parameters below as of 05 Jan 2023 04:40  Patient On (Oxygen Delivery Method): nasal cannula  O2 Flow (L/min): 5            CAPILLARY BLOOD GLUCOSE          PHYSICAL EXAM:  GENERAL: NAD, well-developed  HEAD:  Atraumatic, Normocephalic  EYES: EOMI, PERRLA, conjunctiva and sclera clear  NECK: Supple, No JVD  CHEST/LUNG: Clear to auscultation bilaterally; No wheeze  HEART: Regular rate and rhythm; No murmurs, rubs, or gallops  ABDOMEN: Soft, Nontender, Nondistended; Bowel sounds present  EXTREMITIES:  2+ Peripheral Pulses, No clubbing, cyanosis, or edema  PSYCH: confused  NEUROLOGY: non-focal  SKIN: No rashes or lesions    LINES:     HOSPITAL MEDICATIONS:  MEDICATIONS  (STANDING):  buMETAnide Injectable 2 milliGRAM(s) IV Push two times a day  chlorhexidine 2% Cloths 1 Application(s) Topical daily  heparin  Infusion.  Unit(s)/Hr (16 mL/Hr) IV Continuous <Continuous>  influenza  Vaccine (HIGH DOSE) 0.7 milliLiter(s) IntraMuscular once  midodrine 10 milliGRAM(s) Oral three times a day  ondansetron Injectable 4 milliGRAM(s) IV Push four times a day  pantoprazole  Injectable 40 milliGRAM(s) IV Push two times a day  piperacillin/tazobactam IVPB.. 3.375 Gram(s) IV Intermittent every 12 hours  predniSONE   Tablet 10 milliGRAM(s) Oral daily  simvastatin 10 milliGRAM(s) Oral at bedtime  sodium bicarbonate  Infusion 0.043 mEq/kG/Hr (50 mL/Hr) IV Continuous <Continuous>  sucralfate suspension 1 Gram(s) Oral two times a day    MEDICATIONS  (PRN):  acetaminophen     Tablet .. 650 milliGRAM(s) Oral every 6 hours PRN Temp greater or equal to 38.5C (101.3F), Mild Pain (1 - 3)  heparin   Injectable 7000 Unit(s) IV Push every 6 hours PRN For aPTT less than 40  heparin   Injectable 3500 Unit(s) IV Push every 6 hours PRN For aPTT between 40 - 57      LABS:                        14.4   27.57 )-----------( 119      ( 05 Jan 2023 04:19 )             44.7     Hgb Trend: 14.4<--, 13.5<--, 13.6<--, 13.3<--, 15.8<--  01-03    133<L>  |  90<L>  |  134<H>  ----------------------------<  80  4.8   |  20<L>  |  6.02<H>    Ca    6.9<L>      03 Jan 2023 15:19      Creatinine Trend: 6.02<--, 5.27<--, 4.17<--, 3.79<--  PTT - ( 05 Jan 2023 04:19 )  PTT:41.8 sec    Arterial Blood Gas:  01-03 @ 16:40  7.33/37/42/20/67.7/-5.8  ABG lactate: --        MICROBIOLOGY:     RADIOLOGY & ADDITIONAL TESTS:      ASSESSMENT AND PLAN:  //Ms. is a ___    #Neuro    #Cardiovascular  Congestive Heart Failure  - telemetry  - cardiac enzymes  - echo with no strain   - cardiology evaluation     HTN  - hold BP meds  - hold diuretic    #Respiratory  Pulmonary embolus and L DVT   - AC with Heparin  - Pulmonary follow  - Vascular cardiology follow     #GI/Nutrition    #/Renal    #Skin  Psoriasis  - stress dose steroid      Thrombocytopenia  - follow Platelets  - HIT  - if worsening thrombocytopenia may need IVC filter                Liver lesions  - s/p Bx at [facility] c/w adenocarcinoma  - Oncology follow    LIVIER  - follow lytes, Cr  - gentle IVF  - Nephrology evaluation Dr. Khan     CVA acute/ subacute with Confusion  - MRI brain noted  - Neurology evaluation noted     Esophageal mass  - Gastroenterology follow. await recommendations   - may need PEG    Sepsis/ Pneumonia  - Zosyn  - ID follow    DVT prophylaxis  - AC  \    #ID    #Endocrine    #Hematologic/DVT ppx    #Ethics MICU Accept Note    CHIEF COMPLAINT: PEA arrest    HPI / INTERVAL HISTORY:   76 y/o M PMH HTN, HLD, psoriasis on chronic prednisone presented with SOB. Recent admission for pneumonia and discharged approximately two weeks ago. Also found with an esophageal and liver mass s/p IR liver biopsy on 12/22/22 at [facility]. 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     Pt was admitted for acute right lower lobe PE with DVT of the LLE, pt currently on a heparin gtt.   Pt on zosyn for septic PNA, had an LIVIER w/anion gap acidosis s/p bicarb.  Pt had AMS s/p MR head showing an acute lacunar infarct. MRA head pending. Pt's FOBT was positive and was started on PPIs.    An RRT was called at 0400 today 1/5: Pt pulled out his IV access and began bleeding for approximately five minutes before it stopped. Pt currently on a heparin gtt for acute PE with LE DVT. BP obtained was 97/62, spO2 93% on RA. Stat CBC was stable with hgb 14.4 and hct 44.7. PTT was elevated at 41.8. Pt was able to answer questions and follow commands at the time. After shifting the patient to a sitting position, his SOB visibly improved. Pt was then scheduled for bumex as this seemed 2/2 overload.    A code was then called for PEA arrest. Anesthesia stat was called and the patient was intubated. Pt was started on pressors norepinephrine at 0.1 and epinephrine at 0.1.      PAST MEDICAL & SURGICAL HISTORY:  Psoriasis      Esophageal mass      Benign essential HTN          FAMILY HISTORY:      SOCIAL HISTORY:  Smoking:   Substance Use:   EtOH Use:   Marital Status:   Sexual History:   Occupation:  Recent Travel:  Country of Birth:   Advance Directives:     HOME MEDICATIONS:      Allergies    No Known Allergies    Intolerances          REVIEW OF SYSTEMS:  Constitutional: No fevers, chills, weight loss, weight gain  HEENT: No vision problems, eye pain, nasal congestion, rhinorrhea, sore throat, dysphagia  CV: No chest pain, orthopnea, palpitations  Resp: No cough, dyspnea, wheezing, hemoptysis  GI: No nausea, vomiting, diarrhea, constipation, abdominal pain  : [ ] dysuria [ ] nocturia [ ] hematuria [ ] increased urinary frequency  Musculoskeletal: [ ] back pain [ ] myalgias [ ] arthralgias [ ] fracture  Skin: [ ] rash [ ] itch  Neurological: [ ] headache [ ] dizziness [ ] syncope [ ] weakness [ ] numbness  Psychiatric: [ ] anxiety [ ] depression  Endocrine: [ ] diabetes [ ] thyroid problem  Hematologic/Lymphatic: [ ] anemia [ ] bleeding problem  Allergic/Immunologic: [ ] itchy eyes [ ] nasal discharge [ ] hives [ ] angioedema  [ ] All other systems negative  [ ] Unable to assess ROS because ________    OBJECTIVE:  ICU Vital Signs Last 24 Hrs  T(C): 36.6 (04 Jan 2023 20:30), Max: 36.6 (04 Jan 2023 20:30)  T(F): 97.8 (04 Jan 2023 20:30), Max: 97.8 (04 Jan 2023 20:30)  HR: 91 (05 Jan 2023 04:40) (72 - 91)  BP: 115/74 (05 Jan 2023 04:40) (94/57 - 115/74)  BP(mean): --  ABP: --  ABP(mean): --  RR: 18 (05 Jan 2023 04:40) (18 - 19)  SpO2: 96% (05 Jan 2023 04:40) (90% - 96%)    O2 Parameters below as of 05 Jan 2023 04:40  Patient On (Oxygen Delivery Method): nasal cannula  O2 Flow (L/min): 5            CAPILLARY BLOOD GLUCOSE          PHYSICAL EXAM:  GENERAL: NAD, well-developed  HEAD:  Atraumatic, Normocephalic  EYES: EOMI, PERRLA, conjunctiva and sclera clear  NECK: Supple, No JVD  CHEST/LUNG: Clear to auscultation bilaterally; No wheeze  HEART: Regular rate and rhythm; No murmurs, rubs, or gallops  ABDOMEN: Soft, Nontender, Nondistended; Bowel sounds present  EXTREMITIES:  2+ Peripheral Pulses, No clubbing, cyanosis, or edema  PSYCH: confused  NEUROLOGY: non-focal  SKIN: No rashes or lesions    LINES:     HOSPITAL MEDICATIONS:  MEDICATIONS  (STANDING):  buMETAnide Injectable 2 milliGRAM(s) IV Push two times a day  chlorhexidine 2% Cloths 1 Application(s) Topical daily  heparin  Infusion.  Unit(s)/Hr (16 mL/Hr) IV Continuous <Continuous>  influenza  Vaccine (HIGH DOSE) 0.7 milliLiter(s) IntraMuscular once  midodrine 10 milliGRAM(s) Oral three times a day  ondansetron Injectable 4 milliGRAM(s) IV Push four times a day  pantoprazole  Injectable 40 milliGRAM(s) IV Push two times a day  piperacillin/tazobactam IVPB.. 3.375 Gram(s) IV Intermittent every 12 hours  predniSONE   Tablet 10 milliGRAM(s) Oral daily  simvastatin 10 milliGRAM(s) Oral at bedtime  sodium bicarbonate  Infusion 0.043 mEq/kG/Hr (50 mL/Hr) IV Continuous <Continuous>  sucralfate suspension 1 Gram(s) Oral two times a day    MEDICATIONS  (PRN):  acetaminophen     Tablet .. 650 milliGRAM(s) Oral every 6 hours PRN Temp greater or equal to 38.5C (101.3F), Mild Pain (1 - 3)  heparin   Injectable 7000 Unit(s) IV Push every 6 hours PRN For aPTT less than 40  heparin   Injectable 3500 Unit(s) IV Push every 6 hours PRN For aPTT between 40 - 57      LABS:                        14.4   27.57 )-----------( 119      ( 05 Jan 2023 04:19 )             44.7     Hgb Trend: 14.4<--, 13.5<--, 13.6<--, 13.3<--, 15.8<--  01-03    133<L>  |  90<L>  |  134<H>  ----------------------------<  80  4.8   |  20<L>  |  6.02<H>    Ca    6.9<L>      03 Jan 2023 15:19      Creatinine Trend: 6.02<--, 5.27<--, 4.17<--, 3.79<--  PTT - ( 05 Jan 2023 04:19 )  PTT:41.8 sec    Arterial Blood Gas:  01-03 @ 16:40  7.33/37/42/20/67.7/-5.8  ABG lactate: --        MICROBIOLOGY:     RADIOLOGY & ADDITIONAL TESTS:      ASSESSMENT AND PLAN:  //Ms. is a ___    #Neuro    #Cardiovascular  Congestive Heart Failure  - telemetry  - cardiac enzymes  - echo with no strain   - cardiology evaluation     HTN  - hold BP meds  - hold diuretic    #Respiratory  Pulmonary embolus and L DVT   - AC with Heparin  - Pulmonary follow  - Vascular cardiology follow     #GI/Nutrition    #/Renal    #Skin  Psoriasis  - stress dose steroid      Thrombocytopenia  - follow Platelets  - HIT  - if worsening thrombocytopenia may need IVC filter                Liver lesions  - s/p Bx at [facility] c/w adenocarcinoma  - Oncology follow    LIVIER  - follow lytes, Cr  - gentle IVF  - Nephrology evaluation Dr. Khan     CVA acute/ subacute with Confusion  - MRI brain noted  - Neurology evaluation noted     Esophageal mass  - Gastroenterology follow. await recommendations   - may need PEG    Sepsis/ Pneumonia  - Zosyn  - ID follow    DVT prophylaxis  - AC  \    #ID    #Endocrine    #Hematologic/DVT ppx    #Ethics MICU Accept Note    CHIEF COMPLAINT: PEA arrest    HPI / INTERVAL HISTORY:   78 y/o M PMH HTN, HLD, psoriasis on chronic prednisone presented with SOB. Recent admission for pneumonia and discharged approximately two weeks ago. Also found with an esophageal and liver mass s/p IR liver biopsy on 22 at [facility]. 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     Pt was admitted for acute right lower lobe PE with DVT of the LLE, pt currently on a heparin gtt.   Pt on zosyn for septic PNA, had an LIVIER w/anion gap acidosis s/p bicarb.  Pt had AMS s/p MR head showing an acute lacunar infarct. MRA head pending. Pt's FOBT was positive and was started on PPIs.    An RRT was called at 0400 today : Pt pulled out his IV access and began bleeding for approximately five minutes before it stopped. Pt currently on a heparin gtt for acute PE with LE DVT. BP obtained was 97/62, spO2 93% on RA. Stat CBC was stable with hgb 14.4 and hct 44.7. PTT was elevated at 41.8. Pt was able to answer questions and follow commands at the time. After shifting the patient to a sitting position, his SOB visibly improved. Pt was then scheduled for bumex as this seemed 2/2 overload.    A code was then called for PEA arrest. Anesthesia stat was called and the patient was intubated. Pt was started on pressors norepinephrine at 0.1 and epinephrine at 0.1.      PAST MEDICAL & SURGICAL HISTORY:  Psoriasis      Esophageal mass      Benign essential HTN          FAMILY HISTORY:      SOCIAL HISTORY:  Smoking:   Substance Use:   EtOH Use:   Marital Status:   Sexual History:   Occupation:  Recent Travel:  Country of Birth:   Advance Directives:     HOME MEDICATIONS:      Allergies    No Known Allergies    Intolerances          REVIEW OF SYSTEMS:  Constitutional: No fevers, chills, weight loss, weight gain  HEENT: No vision problems, eye pain, nasal congestion, rhinorrhea, sore throat, dysphagia  CV: No chest pain, orthopnea, palpitations  Resp: No cough, dyspnea, wheezing, hemoptysis  GI: No nausea, vomiting, diarrhea, constipation, abdominal pain  : [ ] dysuria [ ] nocturia [ ] hematuria [ ] increased urinary frequency  Musculoskeletal: [ ] back pain [ ] myalgias [ ] arthralgias [ ] fracture  Skin: [ ] rash [ ] itch  Neurological: [ ] headache [ ] dizziness [ ] syncope [ ] weakness [ ] numbness  Psychiatric: [ ] anxiety [ ] depression  Endocrine: [ ] diabetes [ ] thyroid problem  Hematologic/Lymphatic: [ ] anemia [ ] bleeding problem  Allergic/Immunologic: [ ] itchy eyes [ ] nasal discharge [ ] hives [ ] angioedema  [ ] All other systems negative  [ ] Unable to assess ROS because ________    OBJECTIVE:  ICU Vital Signs Last 24 Hrs  T(C): 36.6 (2023 20:30), Max: 36.6 (2023 20:30)  T(F): 97.8 (2023 20:30), Max: 97.8 (2023 20:30)  HR: 91 (2023 04:40) (72 - 91)  BP: 115/74 (2023 04:40) (94/57 - 115/74)  BP(mean): --  ABP: --  ABP(mean): --  RR: 18 (2023 04:40) (18 - 19)  SpO2: 96% (2023 04:40) (90% - 96%)    O2 Parameters below as of 2023 04:40  Patient On (Oxygen Delivery Method): nasal cannula  O2 Flow (L/min): 5            CAPILLARY BLOOD GLUCOSE          PHYSICAL EXAM:  GENERAL: NAD, well-developed  HEAD:  Atraumatic, Normocephalic  EYES: EOMI, PERRLA, conjunctiva and sclera clear  NECK: Supple, No JVD  CHEST/LUNG: Clear to auscultation bilaterally; No wheeze  HEART: Regular rate and rhythm; No murmurs, rubs, or gallops  ABDOMEN: Soft, Nontender, Nondistended; Bowel sounds present  EXTREMITIES:  2+ Peripheral Pulses, No clubbing, cyanosis, or edema  PSYCH: confused  NEUROLOGY: non-focal  SKIN: No rashes or lesions    LINES:     HOSPITAL MEDICATIONS:  MEDICATIONS  (STANDING):  buMETAnide Injectable 2 milliGRAM(s) IV Push two times a day  chlorhexidine 2% Cloths 1 Application(s) Topical daily  heparin  Infusion.  Unit(s)/Hr (16 mL/Hr) IV Continuous <Continuous>  influenza  Vaccine (HIGH DOSE) 0.7 milliLiter(s) IntraMuscular once  midodrine 10 milliGRAM(s) Oral three times a day  ondansetron Injectable 4 milliGRAM(s) IV Push four times a day  pantoprazole  Injectable 40 milliGRAM(s) IV Push two times a day  piperacillin/tazobactam IVPB.. 3.375 Gram(s) IV Intermittent every 12 hours  predniSONE   Tablet 10 milliGRAM(s) Oral daily  simvastatin 10 milliGRAM(s) Oral at bedtime  sodium bicarbonate  Infusion 0.043 mEq/kG/Hr (50 mL/Hr) IV Continuous <Continuous>  sucralfate suspension 1 Gram(s) Oral two times a day    MEDICATIONS  (PRN):  acetaminophen     Tablet .. 650 milliGRAM(s) Oral every 6 hours PRN Temp greater or equal to 38.5C (101.3F), Mild Pain (1 - 3)  heparin   Injectable 7000 Unit(s) IV Push every 6 hours PRN For aPTT less than 40  heparin   Injectable 3500 Unit(s) IV Push every 6 hours PRN For aPTT between 40 - 57      LABS:                        14.4   27.57 )-----------( 119      ( 2023 04:19 )             44.7     Hgb Trend: 14.4<--, 13.5<--, 13.6<--, 13.3<--, 15.8<--  01-03    133<L>  |  90<L>  |  134<H>  ----------------------------<  80  4.8   |  20<L>  |  6.02<H>    Ca    6.9<L>      2023 15:19      Creatinine Trend: 6.02<--, 5.27<--, 4.17<--, 3.79<--  PTT - ( 2023 04:19 )  PTT:41.8 sec    Arterial Blood Gas:   @ 16:40  7.33/37/42/20/67.7/-5.8  ABG lactate: --        MICROBIOLOGY:     RADIOLOGY & ADDITIONAL TESTS:      ASSESSMENT AND PLAN:  //Ms. is a ___    #Neuro    #Cardiovascular  Congestive Heart Failure  - telemetry  - cardiac enzymes  - echo with no strain   - cardiology evaluation     HTN  - hold BP meds  - hold diuretic    #Respiratory  Acute hypoxic respiratory failure  - Pt intubated  - AB.10|24|269|8    Pulmonary embolus and L DVT   - AC with Heparin  - Pulmonary follow  - Vascular cardiology follow     #GI/Nutrition    #/Renal    #Skin  Psoriasis  - stress dose steroid      Thrombocytopenia  - follow Platelets  - HIT  - if worsening thrombocytopenia may need IVC filter                Liver lesions  - s/p Bx at [facility] c/w adenocarcinoma  - Oncology follow    LIVIER  - follow lytes, Cr  - gentle IVF  - Nephrology evaluation Dr. Khan     CVA acute/ subacute with Confusion  - MRI brain noted  - Neurology evaluation noted     Esophageal mass  - Gastroenterology follow. await recommendations   - may need PEG    Sepsis/ Pneumonia  - Zosyn  - ID follow    DVT prophylaxis  - AC  \    #ID    #Endocrine    #Hematologic/DVT ppx    #Ethics MICU Accept Note    CHIEF COMPLAINT: PEA arrest    HPI / INTERVAL HISTORY:   78 y/o M PMH HTN, HLD, psoriasis on chronic prednisone presented with SOB. Recent admission for pneumonia and discharged approximately two weeks ago. Also found with an esophageal and liver mass s/p IR liver biopsy on 22 at [facility]. 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     Pt was admitted for acute right lower lobe PE with DVT of the LLE, pt currently on a heparin gtt.   Pt on zosyn for septic PNA, had an LIVIER w/anion gap acidosis s/p bicarb.  Pt had AMS s/p MR head showing an acute lacunar infarct. MRA head pending. Pt's FOBT was positive and was started on PPIs.    An RRT was called at 0400 today : Pt pulled out his IV access and began bleeding for approximately five minutes before it stopped. Pt currently on a heparin gtt for acute PE with LE DVT. BP obtained was 97/62, spO2 93% on RA. Stat CBC was stable with hgb 14.4 and hct 44.7. PTT was elevated at 41.8. Pt was able to answer questions and follow commands at the time. After shifting the patient to a sitting position, his SOB visibly improved. Pt was then scheduled for bumex as this seemed 2/2 overload.    A code was then called for PEA arrest. Anesthesia stat was called and the patient was intubated. Pt was started on pressors norepinephrine at 0.1 and epinephrine at 0.1.      PAST MEDICAL & SURGICAL HISTORY:  Psoriasis      Esophageal mass      Benign essential HTN          FAMILY HISTORY:      SOCIAL HISTORY:  Smoking:   Substance Use:   EtOH Use:   Marital Status:   Sexual History:   Occupation:  Recent Travel:  Country of Birth:   Advance Directives:     HOME MEDICATIONS:      Allergies    No Known Allergies    Intolerances          REVIEW OF SYSTEMS:  Constitutional: No fevers, chills, weight loss, weight gain  HEENT: No vision problems, eye pain, nasal congestion, rhinorrhea, sore throat, dysphagia  CV: No chest pain, orthopnea, palpitations  Resp: No cough, dyspnea, wheezing, hemoptysis  GI: No nausea, vomiting, diarrhea, constipation, abdominal pain  : [ ] dysuria [ ] nocturia [ ] hematuria [ ] increased urinary frequency  Musculoskeletal: [ ] back pain [ ] myalgias [ ] arthralgias [ ] fracture  Skin: [ ] rash [ ] itch  Neurological: [ ] headache [ ] dizziness [ ] syncope [ ] weakness [ ] numbness  Psychiatric: [ ] anxiety [ ] depression  Endocrine: [ ] diabetes [ ] thyroid problem  Hematologic/Lymphatic: [ ] anemia [ ] bleeding problem  Allergic/Immunologic: [ ] itchy eyes [ ] nasal discharge [ ] hives [ ] angioedema  [ ] All other systems negative  [ ] Unable to assess ROS because ________    OBJECTIVE:  ICU Vital Signs Last 24 Hrs  T(C): 36.6 (2023 20:30), Max: 36.6 (2023 20:30)  T(F): 97.8 (2023 20:30), Max: 97.8 (2023 20:30)  HR: 91 (2023 04:40) (72 - 91)  BP: 115/74 (2023 04:40) (94/57 - 115/74)  BP(mean): --  ABP: --  ABP(mean): --  RR: 18 (2023 04:40) (18 - 19)  SpO2: 96% (2023 04:40) (90% - 96%)    O2 Parameters below as of 2023 04:40  Patient On (Oxygen Delivery Method): nasal cannula  O2 Flow (L/min): 5            CAPILLARY BLOOD GLUCOSE          PHYSICAL EXAM:  GENERAL: NAD, well-developed  HEAD:  Atraumatic, Normocephalic  EYES: EOMI, PERRLA, conjunctiva and sclera clear  NECK: Supple, No JVD  CHEST/LUNG: Clear to auscultation bilaterally; No wheeze  HEART: Regular rate and rhythm; No murmurs, rubs, or gallops  ABDOMEN: Soft, Nontender, Nondistended; Bowel sounds present  EXTREMITIES:  2+ Peripheral Pulses, No clubbing, cyanosis, or edema  PSYCH: confused  NEUROLOGY: non-focal  SKIN: No rashes or lesions    LINES:     HOSPITAL MEDICATIONS:  MEDICATIONS  (STANDING):  buMETAnide Injectable 2 milliGRAM(s) IV Push two times a day  chlorhexidine 2% Cloths 1 Application(s) Topical daily  heparin  Infusion.  Unit(s)/Hr (16 mL/Hr) IV Continuous <Continuous>  influenza  Vaccine (HIGH DOSE) 0.7 milliLiter(s) IntraMuscular once  midodrine 10 milliGRAM(s) Oral three times a day  ondansetron Injectable 4 milliGRAM(s) IV Push four times a day  pantoprazole  Injectable 40 milliGRAM(s) IV Push two times a day  piperacillin/tazobactam IVPB.. 3.375 Gram(s) IV Intermittent every 12 hours  predniSONE   Tablet 10 milliGRAM(s) Oral daily  simvastatin 10 milliGRAM(s) Oral at bedtime  sodium bicarbonate  Infusion 0.043 mEq/kG/Hr (50 mL/Hr) IV Continuous <Continuous>  sucralfate suspension 1 Gram(s) Oral two times a day    MEDICATIONS  (PRN):  acetaminophen     Tablet .. 650 milliGRAM(s) Oral every 6 hours PRN Temp greater or equal to 38.5C (101.3F), Mild Pain (1 - 3)  heparin   Injectable 7000 Unit(s) IV Push every 6 hours PRN For aPTT less than 40  heparin   Injectable 3500 Unit(s) IV Push every 6 hours PRN For aPTT between 40 - 57      LABS:                        14.4   27.57 )-----------( 119      ( 2023 04:19 )             44.7     Hgb Trend: 14.4<--, 13.5<--, 13.6<--, 13.3<--, 15.8<--  01-03    133<L>  |  90<L>  |  134<H>  ----------------------------<  80  4.8   |  20<L>  |  6.02<H>    Ca    6.9<L>      2023 15:19      Creatinine Trend: 6.02<--, 5.27<--, 4.17<--, 3.79<--  PTT - ( 2023 04:19 )  PTT:41.8 sec    Arterial Blood Gas:   @ 16:40  7.33/37/42/20/67.7/-5.8  ABG lactate: --        MICROBIOLOGY:     RADIOLOGY & ADDITIONAL TESTS:      ASSESSMENT AND PLAN:  //Ms. is a ___    #Neuro    #Cardiovascular  Congestive Heart Failure  - telemetry  - cardiac enzymes  - echo with no strain   - cardiology evaluation     HTN  - hold BP meds  - hold diuretic    #Respiratory  Acute hypoxic respiratory failure  - Pt intubated  - AB.10|24|269|8    Pulmonary embolus and L DVT   - AC with Heparin  - Pulmonary follow  - Vascular cardiology follow     #GI/Nutrition    #/Renal    #Skin  Psoriasis  - stress dose steroid      Thrombocytopenia  - follow Platelets  - HIT  - if worsening thrombocytopenia may need IVC filter                Liver lesions  - s/p Bx at [facility] c/w adenocarcinoma  - Oncology follow    LIVIER  - follow lytes, Cr  - gentle IVF  - Nephrology evaluation Dr. Khan     CVA acute/ subacute with Confusion  - MRI brain noted  - Neurology evaluation noted     Esophageal mass  - Gastroenterology follow. await recommendations   - may need PEG    Sepsis/ Pneumonia  - Zosyn  - ID follow    DVT prophylaxis  - AC  \    #ID    #Endocrine    #Hematologic/DVT ppx    #Ethics MICU Accept Note    CHIEF COMPLAINT: PEA arrest    HPI / INTERVAL HISTORY:   76 y/o M PMH HTN, HLD, psoriasis on chronic prednisone presented with SOB. Recent admission for pneumonia and discharged approximately two weeks ago. Also found with an esophageal and liver mass s/p IR liver biopsy on 22 at [facility]. 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     Pt was admitted for acute right lower lobe PE with DVT of the LLE, pt currently on a heparin gtt.   Pt on zosyn for septic PNA, had an LIVIER w/anion gap acidosis s/p bicarb.  Pt had AMS s/p MR head showing an acute lacunar infarct. MRA head pending. Pt's FOBT was positive and was started on PPIs.    An RRT was called at 0400 today : Pt pulled out his IV access and began bleeding for approximately five minutes before it stopped. Pt currently on a heparin gtt for acute PE with LE DVT. BP obtained was 97/62, spO2 93% on RA. Stat CBC was stable with hgb 14.4 and hct 44.7. PTT was elevated at 41.8. Pt was able to answer questions and follow commands at the time. After shifting the patient to a sitting position, his SOB visibly improved. Pt was then scheduled for bumex as this seemed 2/2 overload.    A code was then called for PEA arrest. Anesthesia stat was called and the patient was intubated. Pt was started on pressors norepinephrine at 0.1 and epinephrine at 0.1.      PAST MEDICAL & SURGICAL HISTORY:  Psoriasis      Esophageal mass      Benign essential HTN          FAMILY HISTORY:      SOCIAL HISTORY:  Smoking:   Substance Use:   EtOH Use:   Marital Status:   Sexual History:   Occupation:  Recent Travel:  Country of Birth:   Advance Directives:     HOME MEDICATIONS:      Allergies    No Known Allergies    Intolerances          REVIEW OF SYSTEMS:  Constitutional: No fevers, chills, weight loss, weight gain  HEENT: No vision problems, eye pain, nasal congestion, rhinorrhea, sore throat, dysphagia  CV: No chest pain, orthopnea, palpitations  Resp: No cough, dyspnea, wheezing, hemoptysis  GI: No nausea, vomiting, diarrhea, constipation, abdominal pain  : [ ] dysuria [ ] nocturia [ ] hematuria [ ] increased urinary frequency  Musculoskeletal: [ ] back pain [ ] myalgias [ ] arthralgias [ ] fracture  Skin: [ ] rash [ ] itch  Neurological: [ ] headache [ ] dizziness [ ] syncope [ ] weakness [ ] numbness  Psychiatric: [ ] anxiety [ ] depression  Endocrine: [ ] diabetes [ ] thyroid problem  Hematologic/Lymphatic: [ ] anemia [ ] bleeding problem  Allergic/Immunologic: [ ] itchy eyes [ ] nasal discharge [ ] hives [ ] angioedema  [ ] All other systems negative  [ ] Unable to assess ROS because ________    OBJECTIVE:  ICU Vital Signs Last 24 Hrs  T(C): 36.6 (2023 20:30), Max: 36.6 (2023 20:30)  T(F): 97.8 (2023 20:30), Max: 97.8 (2023 20:30)  HR: 91 (2023 04:40) (72 - 91)  BP: 115/74 (2023 04:40) (94/57 - 115/74)  BP(mean): --  ABP: --  ABP(mean): --  RR: 18 (2023 04:40) (18 - 19)  SpO2: 96% (2023 04:40) (90% - 96%)    O2 Parameters below as of 2023 04:40  Patient On (Oxygen Delivery Method): nasal cannula  O2 Flow (L/min): 5            CAPILLARY BLOOD GLUCOSE          PHYSICAL EXAM:  GENERAL: NAD, well-developed  HEAD:  Atraumatic, Normocephalic  EYES: EOMI, PERRLA, conjunctiva and sclera clear  NECK: Supple, No JVD  CHEST/LUNG: Clear to auscultation bilaterally; No wheeze  HEART: Regular rate and rhythm; No murmurs, rubs, or gallops  ABDOMEN: Soft, Nontender, Nondistended; Bowel sounds present  EXTREMITIES:  2+ Peripheral Pulses, No clubbing, cyanosis, or edema  PSYCH: confused  NEUROLOGY: non-focal  SKIN: No rashes or lesions    LINES:     HOSPITAL MEDICATIONS:  MEDICATIONS  (STANDING):  buMETAnide Injectable 2 milliGRAM(s) IV Push two times a day  chlorhexidine 2% Cloths 1 Application(s) Topical daily  heparin  Infusion.  Unit(s)/Hr (16 mL/Hr) IV Continuous <Continuous>  influenza  Vaccine (HIGH DOSE) 0.7 milliLiter(s) IntraMuscular once  midodrine 10 milliGRAM(s) Oral three times a day  ondansetron Injectable 4 milliGRAM(s) IV Push four times a day  pantoprazole  Injectable 40 milliGRAM(s) IV Push two times a day  piperacillin/tazobactam IVPB.. 3.375 Gram(s) IV Intermittent every 12 hours  predniSONE   Tablet 10 milliGRAM(s) Oral daily  simvastatin 10 milliGRAM(s) Oral at bedtime  sodium bicarbonate  Infusion 0.043 mEq/kG/Hr (50 mL/Hr) IV Continuous <Continuous>  sucralfate suspension 1 Gram(s) Oral two times a day    MEDICATIONS  (PRN):  acetaminophen     Tablet .. 650 milliGRAM(s) Oral every 6 hours PRN Temp greater or equal to 38.5C (101.3F), Mild Pain (1 - 3)  heparin   Injectable 7000 Unit(s) IV Push every 6 hours PRN For aPTT less than 40  heparin   Injectable 3500 Unit(s) IV Push every 6 hours PRN For aPTT between 40 - 57      LABS:                        14.4   27.57 )-----------( 119      ( 2023 04:19 )             44.7     Hgb Trend: 14.4<--, 13.5<--, 13.6<--, 13.3<--, 15.8<--  01-03    133<L>  |  90<L>  |  134<H>  ----------------------------<  80  4.8   |  20<L>  |  6.02<H>    Ca    6.9<L>      2023 15:19      Creatinine Trend: 6.02<--, 5.27<--, 4.17<--, 3.79<--  PTT - ( 2023 04:19 )  PTT:41.8 sec    Arterial Blood Gas:   @ 16:40  7.33/37/42/20/67.7/-5.8  ABG lactate: --        MICROBIOLOGY:     RADIOLOGY & ADDITIONAL TESTS:      ASSESSMENT AND PLAN:  //Ms. is a ___    #Neuro    #Cardiovascular  Congestive Heart Failure  - telemetry  - cardiac enzymes  - echo with no strain   - cardiology evaluation     HTN  - hold BP meds  - hold diuretic    #Respiratory  Acute hypoxic respiratory failure  - Pt intubated  - AB.10|24|269|8    Pulmonary embolus and L DVT   - AC with Heparin  - Pulmonary follow  - Vascular cardiology follow     #GI/Nutrition    #/Renal    #Skin  Psoriasis  - stress dose steroid      Thrombocytopenia  - follow Platelets  - HIT  - if worsening thrombocytopenia may need IVC filter                Liver lesions  - s/p Bx at [facility] c/w adenocarcinoma  - Oncology follow    LIVIER  - follow lytes, Cr  - gentle IVF  - Nephrology evaluation Dr. Khan     CVA acute/ subacute with Confusion  - MRI brain noted  - Neurology evaluation noted     Esophageal mass  - Gastroenterology follow. await recommendations   - may need PEG    Sepsis/ Pneumonia  - Zosyn  - ID follow    DVT prophylaxis  - AC  \    #ID    #Endocrine    #Hematologic/DVT ppx    #Ethics MICU Accept Note    CHIEF COMPLAINT: PEA arrest    HPI / INTERVAL HISTORY:   76 y/o M PMH HTN, HLD, psoriasis on chronic prednisone presented with SOB. Recent admission for pneumonia and discharged approximately two weeks ago. Also found with an esophageal and liver mass s/p IR liver biopsy on 22 at [facility]. 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     Pt was admitted for acute right lower lobe PE with DVT of the LLE, pt currently on a heparin gtt.   Pt on zosyn for septic PNA, had an LIVIER w/anion gap acidosis s/p bicarb.  Pt had AMS s/p MR head showing an acute lacunar infarct. MRA head pending. Pt's FOBT was positive and was started on PPIs.    An RRT was called at 0400 today : Pt pulled out his IV access and began bleeding for approximately five minutes before it stopped. Pt currently on a heparin gtt for acute PE with LE DVT. BP obtained was 97/62, spO2 93% on RA. Stat CBC was stable with hgb 14.4 and hct 44.7. PTT was elevated at 41.8. Pt was able to answer questions and follow commands at the time. After shifting the patient to a sitting position, his SOB visibly improved. Pt was then scheduled for bumex as this seemed 2/2 overload.    A code was then called for PEA arrest i/s/o PE. 2 rounds of chest compressions were performed at bedside, ROSC was achieved. Anesthesia stat was called and the patient was intubated and then transferred to MICU now on pressors norepinephrine at 0.1 and epinephrine at 0.1.      PAST MEDICAL & SURGICAL HISTORY:  Psoriasis      Esophageal mass      Benign essential HTN          FAMILY HISTORY:      SOCIAL HISTORY:  Smoking:   Substance Use:   EtOH Use:   Marital Status:   Sexual History:   Occupation:  Recent Travel:  Country of Birth:   Advance Directives:     HOME MEDICATIONS:  * Outpatient Medication Status not yet specified    Allergies    No Known Allergies    Intolerances          REVIEW OF SYSTEMS:  [X] Unable to assess ROS because pt intubated and sedated    OBJECTIVE:  ICU Vital Signs Last 24 Hrs  T(C): 36.6 (2023 20:30), Max: 36.6 (2023 20:30)  T(F): 97.8 (2023 20:30), Max: 97.8 (2023 20:30)  HR: 91 (2023 04:40) (72 - 91)  BP: 115/74 (2023 04:40) (94/57 - 115/74)  BP(mean): --  ABP: --  ABP(mean): --  RR: 18 (2023 04:40) (18 - 19)  SpO2: 96% (2023 04:40) (90% - 96%)    O2 Parameters below as of 2023 04:40  Patient On (Oxygen Delivery Method): nasal cannula  O2 Flow (L/min): 5        PHYSICAL EXAM:  GENERAL: NAD, well-developed  HEAD:  Atraumatic, Normocephalic  EYES: EOMI, PERRLA, conjunctiva and sclera clear  NECK: Supple, No JVD  CHEST/LUNG: Clear to auscultation bilaterally; No wheeze  HEART: Regular rate and rhythm; No murmurs, rubs, or gallops  ABDOMEN: Soft, Nontender, Nondistended; Bowel sounds present  EXTREMITIES:  2+ Peripheral Pulses, No clubbing, cyanosis, or edema  PSYCH: confused  NEUROLOGY: non-focal  SKIN: No rashes or lesions    LINES:     HOSPITAL MEDICATIONS:  MEDICATIONS  (STANDING):  buMETAnide Injectable 2 milliGRAM(s) IV Push two times a day  chlorhexidine 2% Cloths 1 Application(s) Topical daily  heparin  Infusion.  Unit(s)/Hr (16 mL/Hr) IV Continuous <Continuous>  influenza  Vaccine (HIGH DOSE) 0.7 milliLiter(s) IntraMuscular once  midodrine 10 milliGRAM(s) Oral three times a day  ondansetron Injectable 4 milliGRAM(s) IV Push four times a day  pantoprazole  Injectable 40 milliGRAM(s) IV Push two times a day  piperacillin/tazobactam IVPB.. 3.375 Gram(s) IV Intermittent every 12 hours  predniSONE   Tablet 10 milliGRAM(s) Oral daily  simvastatin 10 milliGRAM(s) Oral at bedtime  sodium bicarbonate  Infusion 0.043 mEq/kG/Hr (50 mL/Hr) IV Continuous <Continuous>  sucralfate suspension 1 Gram(s) Oral two times a day    MEDICATIONS  (PRN):  acetaminophen     Tablet .. 650 milliGRAM(s) Oral every 6 hours PRN Temp greater or equal to 38.5C (101.3F), Mild Pain (1 - 3)  heparin   Injectable 7000 Unit(s) IV Push every 6 hours PRN For aPTT less than 40  heparin   Injectable 3500 Unit(s) IV Push every 6 hours PRN For aPTT between 40 - 57      LABS:                        14.4   27.57 )-----------( 119      ( 2023 04:19 )             44.7     Hgb Trend: 14.4<--, 13.5<--, 13.6<--, 13.3<--, 15.8<--  01-03    133<L>  |  90<L>  |  134<H>  ----------------------------<  80  4.8   |  20<L>  |  6.02<H>    Ca    6.9<L>      2023 15:19      Creatinine Trend: 6.02<--, 5.27<--, 4.17<--, 3.79<--  PTT - ( 2023 04:19 )  PTT:41.8 sec    Arterial Blood Gas:   @ 16:40  7.33/37/42/20/67.7/-5.8  ABG lactate: --        MICROBIOLOGY:     RADIOLOGY & ADDITIONAL TESTS:      ASSESSMENT AND PLAN:  77y male with a history of  HTN, HLD, psoriasis found to have esophageal mass, b/l pleural effusions, pulmonary embolism and DVT on heparin gtt, hepatic lesions, acute diastolic failure, chronic renal failure transferred to the MICU after a code blue was called for PEA arrest i/s/o PE now intubated and sedated on pressors.    #Neuro  AA&Ox3 baseline  Currently intubated and sedated    #Cardiovascular  PEA arrest  - i/s/o PE  - now intubated and sedated  - on epinephrine 0.1 and norepinephrine 0.1    Congestive Heart Failure  - telemetry  - cardiac enzymes  - echo with no strain   - cardiology evaluation     HTN  - hold BP meds for now  - currently on levo and epi  - hold diuretic    #Respiratory  Acute hypoxic respiratory failure  - Pt intubated  - AB.10|24|269|8    Pulmonary embolus and L DVT   - AC with Heparin  - Pulmonary follow  - Vascular cardiology follow     #GI/Nutrition  -pancreatobiliary adenocarcinoma  - NPO for now    #/Renal  -serrato in place  -    #Skin  Psoriasis  - stress dose steroid      Thrombocytopenia  - follow Platelets  - HIT  - if worsening thrombocytopenia may need IVC filter                Liver lesions  - s/p Bx at [facility] c/w adenocarcinoma  - Oncology follow    LIVIER  - follow lytes, Cr  - gentle IVF  - Nephrology evaluation Dr. Khan     CVA acute/ subacute with Confusion  - MRI brain noted  - Neurology evaluation noted     Esophageal mass  - Gastroenterology follow. await recommendations   - may need PEG    Sepsis/ Pneumonia  - Zosyn  - ID follow    DVT prophylaxis  - AC  \    #ID    #Endocrine    #Hematologic/DVT ppx    #Ethics MICU Accept Note    CHIEF COMPLAINT: PEA arrest    HPI / INTERVAL HISTORY:   76 y/o M PMH HTN, HLD, psoriasis on chronic prednisone presented with SOB. Recent admission for pneumonia and discharged approximately two weeks ago. Also found with an esophageal and liver mass s/p IR liver biopsy on 22 at [facility]. 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     Pt was admitted for acute right lower lobe PE with DVT of the LLE, pt currently on a heparin gtt.   Pt on zosyn for septic PNA, had an LIVIER w/anion gap acidosis s/p bicarb.  Pt had AMS s/p MR head showing an acute lacunar infarct. MRA head pending. Pt's FOBT was positive and was started on PPIs.    An RRT was called at 0400 today : Pt pulled out his IV access and began bleeding for approximately five minutes before it stopped. Pt currently on a heparin gtt for acute PE with LE DVT. BP obtained was 97/62, spO2 93% on RA. Stat CBC was stable with hgb 14.4 and hct 44.7. PTT was elevated at 41.8. Pt was able to answer questions and follow commands at the time. After shifting the patient to a sitting position, his SOB visibly improved. Pt was then scheduled for bumex as this seemed 2/2 overload.    A code was then called for PEA arrest i/s/o PE. 2 rounds of chest compressions were performed at bedside, ROSC was achieved. Anesthesia stat was called and the patient was intubated and then transferred to MICU now on pressors norepinephrine at 0.1 and epinephrine at 0.1.      PAST MEDICAL & SURGICAL HISTORY:  Psoriasis      Esophageal mass      Benign essential HTN          FAMILY HISTORY:      SOCIAL HISTORY:  Smoking:   Substance Use:   EtOH Use:   Marital Status:   Sexual History:   Occupation:  Recent Travel:  Country of Birth:   Advance Directives:     HOME MEDICATIONS:  * Outpatient Medication Status not yet specified    Allergies    No Known Allergies    Intolerances          REVIEW OF SYSTEMS:  [X] Unable to assess ROS because pt intubated and sedated    OBJECTIVE:  ICU Vital Signs Last 24 Hrs  T(C): 36.6 (2023 20:30), Max: 36.6 (2023 20:30)  T(F): 97.8 (2023 20:30), Max: 97.8 (2023 20:30)  HR: 91 (2023 04:40) (72 - 91)  BP: 115/74 (2023 04:40) (94/57 - 115/74)  BP(mean): --  ABP: --  ABP(mean): --  RR: 18 (2023 04:40) (18 - 19)  SpO2: 96% (2023 04:40) (90% - 96%)    O2 Parameters below as of 2023 04:40  Patient On (Oxygen Delivery Method): nasal cannula  O2 Flow (L/min): 5        PHYSICAL EXAM:  GENERAL: NAD, well-developed  HEAD:  Atraumatic, Normocephalic  EYES: EOMI, PERRLA, conjunctiva and sclera clear  NECK: Supple, No JVD  CHEST/LUNG: Clear to auscultation bilaterally; No wheeze  HEART: Regular rate and rhythm; No murmurs, rubs, or gallops  ABDOMEN: Soft, Nontender, Nondistended; Bowel sounds present  EXTREMITIES:  2+ Peripheral Pulses, No clubbing, cyanosis, or edema  PSYCH: confused  NEUROLOGY: non-focal  SKIN: No rashes or lesions    LINES:     HOSPITAL MEDICATIONS:  MEDICATIONS  (STANDING):  buMETAnide Injectable 2 milliGRAM(s) IV Push two times a day  chlorhexidine 2% Cloths 1 Application(s) Topical daily  heparin  Infusion.  Unit(s)/Hr (16 mL/Hr) IV Continuous <Continuous>  influenza  Vaccine (HIGH DOSE) 0.7 milliLiter(s) IntraMuscular once  midodrine 10 milliGRAM(s) Oral three times a day  ondansetron Injectable 4 milliGRAM(s) IV Push four times a day  pantoprazole  Injectable 40 milliGRAM(s) IV Push two times a day  piperacillin/tazobactam IVPB.. 3.375 Gram(s) IV Intermittent every 12 hours  predniSONE   Tablet 10 milliGRAM(s) Oral daily  simvastatin 10 milliGRAM(s) Oral at bedtime  sodium bicarbonate  Infusion 0.043 mEq/kG/Hr (50 mL/Hr) IV Continuous <Continuous>  sucralfate suspension 1 Gram(s) Oral two times a day    MEDICATIONS  (PRN):  acetaminophen     Tablet .. 650 milliGRAM(s) Oral every 6 hours PRN Temp greater or equal to 38.5C (101.3F), Mild Pain (1 - 3)  heparin   Injectable 7000 Unit(s) IV Push every 6 hours PRN For aPTT less than 40  heparin   Injectable 3500 Unit(s) IV Push every 6 hours PRN For aPTT between 40 - 57      LABS:                        14.4   27.57 )-----------( 119      ( 2023 04:19 )             44.7     Hgb Trend: 14.4<--, 13.5<--, 13.6<--, 13.3<--, 15.8<--  01-03    133<L>  |  90<L>  |  134<H>  ----------------------------<  80  4.8   |  20<L>  |  6.02<H>    Ca    6.9<L>      2023 15:19      Creatinine Trend: 6.02<--, 5.27<--, 4.17<--, 3.79<--  PTT - ( 2023 04:19 )  PTT:41.8 sec    Arterial Blood Gas:   @ 16:40  7.33/37/42/20/67.7/-5.8  ABG lactate: --        MICROBIOLOGY:     RADIOLOGY & ADDITIONAL TESTS:      ASSESSMENT AND PLAN:  77y male with a history of  HTN, HLD, psoriasis found to have esophageal mass, b/l pleural effusions, pulmonary embolism and DVT on heparin gtt, hepatic lesions, acute diastolic failure, chronic renal failure transferred to the MICU after a code blue was called for PEA arrest i/s/o PE now intubated and sedated on pressors.    #Neuro  AA&Ox3 baseline  Currently intubated and sedated    #Cardiovascular  PEA arrest  - i/s/o PE  - now intubated and sedated  - on epinephrine 0.1 and norepinephrine 0.1    Congestive Heart Failure  - telemetry  - cardiac enzymes  - echo with no strain   - cardiology evaluation     HTN  - hold BP meds for now  - currently on levo and epi  - hold diuretic    #Respiratory  Acute hypoxic respiratory failure  - Pt intubated  - AB.10|24|269|8    Pulmonary embolus and L DVT   - AC with Heparin  - Pulmonary follow  - Vascular cardiology follow     #GI/Nutrition  -pancreatobiliary adenocarcinoma  - NPO for now    #/Renal  -serrato in place  -    #Skin  Psoriasis  - stress dose steroid      Thrombocytopenia  - follow Platelets  - HIT  - if worsening thrombocytopenia may need IVC filter                Liver lesions  - s/p Bx at [facility] c/w adenocarcinoma  - Oncology follow    LIVIER  - follow lytes, Cr  - gentle IVF  - Nephrology evaluation Dr. Khan     CVA acute/ subacute with Confusion  - MRI brain noted  - Neurology evaluation noted     Esophageal mass  - Gastroenterology follow. await recommendations   - may need PEG    Sepsis/ Pneumonia  - Zosyn  - ID follow    DVT prophylaxis  - AC  \    #ID    #Endocrine    #Hematologic/DVT ppx    #Ethics MICU Accept Note    CHIEF COMPLAINT: PEA arrest    HPI / INTERVAL HISTORY:   78 y/o M PMH HTN, HLD, psoriasis on chronic prednisone presented with SOB. Recent admission for pneumonia and discharged approximately two weeks ago. Also found with an esophageal and liver mass s/p IR liver biopsy on 22 at [facility]. 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     Pt was admitted for acute right lower lobe PE with DVT of the LLE, pt currently on a heparin gtt.   Pt on zosyn for septic PNA, had an LIVIER w/anion gap acidosis s/p bicarb.  Pt had AMS s/p MR head showing an acute lacunar infarct. MRA head pending. Pt's FOBT was positive and was started on PPIs.    An RRT was called at 0400 today : Pt pulled out his IV access and began bleeding for approximately five minutes before it stopped. Pt currently on a heparin gtt for acute PE with LE DVT. BP obtained was 97/62, spO2 93% on RA. Stat CBC was stable with hgb 14.4 and hct 44.7. PTT was elevated at 41.8. Pt was able to answer questions and follow commands at the time. After shifting the patient to a sitting position, his SOB visibly improved. Pt was then scheduled for bumex as this seemed 2/2 overload.    A code was then called for PEA arrest i/s/o PE. 2 rounds of chest compressions were performed at bedside, ROSC was achieved. Anesthesia stat was called and the patient was intubated and then transferred to MICU now on pressors norepinephrine at 0.1 and epinephrine at 0.1.      PAST MEDICAL & SURGICAL HISTORY:  Psoriasis      Esophageal mass      Benign essential HTN          FAMILY HISTORY:      SOCIAL HISTORY:  Smoking:   Substance Use:   EtOH Use:   Marital Status:   Sexual History:   Occupation:  Recent Travel:  Country of Birth:   Advance Directives:     HOME MEDICATIONS:  * Outpatient Medication Status not yet specified    Allergies    No Known Allergies    Intolerances          REVIEW OF SYSTEMS:  [X] Unable to assess ROS because pt intubated and sedated    OBJECTIVE:  ICU Vital Signs Last 24 Hrs  T(C): 36.6 (2023 20:30), Max: 36.6 (2023 20:30)  T(F): 97.8 (2023 20:30), Max: 97.8 (2023 20:30)  HR: 91 (2023 04:40) (72 - 91)  BP: 115/74 (2023 04:40) (94/57 - 115/74)  BP(mean): --  ABP: --  ABP(mean): --  RR: 18 (2023 04:40) (18 - 19)  SpO2: 96% (2023 04:40) (90% - 96%)    O2 Parameters below as of 2023 04:40  Patient On (Oxygen Delivery Method): nasal cannula  O2 Flow (L/min): 5        PHYSICAL EXAM:  GENERAL: NAD, well-developed  HEAD:  Atraumatic, Normocephalic  EYES: EOMI, PERRLA, conjunctiva and sclera clear  NECK: Supple, No JVD  CHEST/LUNG: Clear to auscultation bilaterally; No wheeze  HEART: Regular rate and rhythm; No murmurs, rubs, or gallops  ABDOMEN: Soft, Nontender, Nondistended; Bowel sounds present  EXTREMITIES:  2+ Peripheral Pulses, No clubbing, cyanosis, or edema  PSYCH: confused  NEUROLOGY: non-focal  SKIN: No rashes or lesions    LINES:     HOSPITAL MEDICATIONS:  MEDICATIONS  (STANDING):  buMETAnide Injectable 2 milliGRAM(s) IV Push two times a day  chlorhexidine 2% Cloths 1 Application(s) Topical daily  heparin  Infusion.  Unit(s)/Hr (16 mL/Hr) IV Continuous <Continuous>  influenza  Vaccine (HIGH DOSE) 0.7 milliLiter(s) IntraMuscular once  midodrine 10 milliGRAM(s) Oral three times a day  ondansetron Injectable 4 milliGRAM(s) IV Push four times a day  pantoprazole  Injectable 40 milliGRAM(s) IV Push two times a day  piperacillin/tazobactam IVPB.. 3.375 Gram(s) IV Intermittent every 12 hours  predniSONE   Tablet 10 milliGRAM(s) Oral daily  simvastatin 10 milliGRAM(s) Oral at bedtime  sodium bicarbonate  Infusion 0.043 mEq/kG/Hr (50 mL/Hr) IV Continuous <Continuous>  sucralfate suspension 1 Gram(s) Oral two times a day    MEDICATIONS  (PRN):  acetaminophen     Tablet .. 650 milliGRAM(s) Oral every 6 hours PRN Temp greater or equal to 38.5C (101.3F), Mild Pain (1 - 3)  heparin   Injectable 7000 Unit(s) IV Push every 6 hours PRN For aPTT less than 40  heparin   Injectable 3500 Unit(s) IV Push every 6 hours PRN For aPTT between 40 - 57      LABS:                        14.4   27.57 )-----------( 119      ( 2023 04:19 )             44.7     Hgb Trend: 14.4<--, 13.5<--, 13.6<--, 13.3<--, 15.8<--  01-03    133<L>  |  90<L>  |  134<H>  ----------------------------<  80  4.8   |  20<L>  |  6.02<H>    Ca    6.9<L>      2023 15:19      Creatinine Trend: 6.02<--, 5.27<--, 4.17<--, 3.79<--  PTT - ( 2023 04:19 )  PTT:41.8 sec    Arterial Blood Gas:   @ 16:40  7.33/37/42/20/67.7/-5.8  ABG lactate: --        MICROBIOLOGY:     RADIOLOGY & ADDITIONAL TESTS:      ASSESSMENT AND PLAN:  77y male with a history of  HTN, HLD, psoriasis found to have esophageal mass, b/l pleural effusions, pulmonary embolism and DVT on heparin gtt, hepatic lesions, acute diastolic failure, chronic renal failure transferred to the MICU after a code blue was called for PEA arrest i/s/o PE now intubated and sedated on pressors.    #Neuro  AA&Ox3 baseline  Currently intubated and sedated    #Cardiovascular  PEA arrest  - i/s/o PE  - now intubated and sedated  - on epinephrine 0.1 and norepinephrine 0.1    Congestive Heart Failure  - telemetry  - cardiac enzymes  - echo with no strain   - cardiology evaluation     HTN  - hold BP meds for now  - currently on levo and epi  - hold diuretic    #Respiratory  Acute hypoxic respiratory failure  - Pt intubated  - AB.10|24|269|8    Pulmonary embolus and L DVT   - AC with Heparin  - Pulmonary follow  - Vascular cardiology follow     #GI/Nutrition  -pancreatobiliary adenocarcinoma  - NPO for now    #/Renal  -serrato in place  -    #Skin  Psoriasis  - stress dose steroid      Thrombocytopenia  - follow Platelets  - HIT  - if worsening thrombocytopenia may need IVC filter                Liver lesions  - s/p Bx at [facility] c/w adenocarcinoma  - Oncology follow    LIVIER  - follow lytes, Cr  - gentle IVF  - Nephrology evaluation Dr. Khan     CVA acute/ subacute with Confusion  - MRI brain noted  - Neurology evaluation noted     Esophageal mass  - Gastroenterology follow. await recommendations   - may need PEG    Sepsis/ Pneumonia  - Zosyn  - ID follow    DVT prophylaxis  - AC  \    #ID    #Endocrine    #Hematologic/DVT ppx    #Ethics MICU Accept Note    CHIEF COMPLAINT: PEA arrest    HPI / INTERVAL HISTORY:   76 y/o M PMH HTN, HLD, psoriasis on chronic prednisone presented with SOB. Recent admission for pneumonia and discharged approximately two weeks ago. Also found with an esophageal and liver mass s/p IR liver biopsy on 22 at [facility]. 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     Pt was admitted for acute right lower lobe PE with DVT of the LLE, pt currently on a heparin gtt.   Pt on zosyn for septic PNA, had an LIVIER w/anion gap acidosis s/p bicarb.  Pt had AMS s/p MR head showing an acute lacunar infarct. MRA head pending. Pt's FOBT was positive and was started on PPIs.    An RRT was called at 0400 today : Pt pulled out his IV access and began bleeding for approximately five minutes before it stopped. Pt currently on a heparin gtt for acute PE with LE DVT. BP obtained was 97/62, spO2 93% on RA. Stat CBC was stable with hgb 14.4 and hct 44.7. PTT was elevated at 41.8. Pt was able to answer questions and follow commands at the time. After shifting the patient to a sitting position, his SOB visibly improved. Pt was then scheduled for bumex as this seemed 2/2 overload.    A code was then called for PEA arrest i/s/o PE. 2 rounds of chest compressions were performed at bedside, ROSC was achieved. Anesthesia stat was called and the patient was intubated and then transferred to MICU now on pressors norepinephrine at 0.1 and epinephrine at 0.1.      PAST MEDICAL & SURGICAL HISTORY:  Psoriasis      Esophageal mass      Benign essential HTN          FAMILY HISTORY:      SOCIAL HISTORY:  Smoking:   Substance Use:   EtOH Use:   Marital Status:   Sexual History:   Occupation:  Recent Travel:  Country of Birth:   Advance Directives:     HOME MEDICATIONS:  * Outpatient Medication Status not yet specified    Allergies    No Known Allergies    Intolerances          REVIEW OF SYSTEMS:  [X] Unable to assess ROS because pt intubated and sedated    OBJECTIVE:  ICU Vital Signs Last 24 Hrs  T(C): 36.6 (2023 20:30), Max: 36.6 (2023 20:30)  T(F): 97.8 (2023 20:30), Max: 97.8 (2023 20:30)  HR: 91 (2023 04:40) (72 - 91)  BP: 115/74 (2023 04:40) (94/57 - 115/74)  BP(mean): --  ABP: --  ABP(mean): --  RR: 18 (2023 04:40) (18 - 19)  SpO2: 96% (2023 04:40) (90% - 96%)    O2 Parameters below as of 2023 04:40  Patient On (Oxygen Delivery Method): nasal cannula  O2 Flow (L/min): 5        PHYSICAL EXAM:  GENERAL: NAD, well-developed  HEAD:  Atraumatic, Normocephalic  EYES: EOMI, PERRLA, conjunctiva and sclera clear  NECK: Supple, No JVD  CHEST/LUNG: Clear to auscultation bilaterally; No wheeze  HEART: Regular rate and rhythm; No murmurs, rubs, or gallops  ABDOMEN: Soft, Nontender, Nondistended; Bowel sounds present  EXTREMITIES:  2+ Peripheral Pulses, No clubbing, cyanosis, or edema  PSYCH: confused  NEUROLOGY: non-focal  SKIN: No rashes or lesions    LINES:     HOSPITAL MEDICATIONS:  MEDICATIONS  (STANDING):  buMETAnide Injectable 2 milliGRAM(s) IV Push two times a day  chlorhexidine 2% Cloths 1 Application(s) Topical daily  heparin  Infusion.  Unit(s)/Hr (16 mL/Hr) IV Continuous <Continuous>  influenza  Vaccine (HIGH DOSE) 0.7 milliLiter(s) IntraMuscular once  midodrine 10 milliGRAM(s) Oral three times a day  ondansetron Injectable 4 milliGRAM(s) IV Push four times a day  pantoprazole  Injectable 40 milliGRAM(s) IV Push two times a day  piperacillin/tazobactam IVPB.. 3.375 Gram(s) IV Intermittent every 12 hours  predniSONE   Tablet 10 milliGRAM(s) Oral daily  simvastatin 10 milliGRAM(s) Oral at bedtime  sodium bicarbonate  Infusion 0.043 mEq/kG/Hr (50 mL/Hr) IV Continuous <Continuous>  sucralfate suspension 1 Gram(s) Oral two times a day    MEDICATIONS  (PRN):  acetaminophen     Tablet .. 650 milliGRAM(s) Oral every 6 hours PRN Temp greater or equal to 38.5C (101.3F), Mild Pain (1 - 3)  heparin   Injectable 7000 Unit(s) IV Push every 6 hours PRN For aPTT less than 40  heparin   Injectable 3500 Unit(s) IV Push every 6 hours PRN For aPTT between 40 - 57      LABS:                        14.4   27.57 )-----------( 119      ( 2023 04:19 )             44.7     Hgb Trend: 14.4<--, 13.5<--, 13.6<--, 13.3<--, 15.8<--  01-03    133<L>  |  90<L>  |  134<H>  ----------------------------<  80  4.8   |  20<L>  |  6.02<H>    Ca    6.9<L>      2023 15:19      Creatinine Trend: 6.02<--, 5.27<--, 4.17<--, 3.79<--  PTT - ( 2023 04:19 )  PTT:41.8 sec    Arterial Blood Gas:   @ 16:40  7.33/37/42/20/67.7/-5.8  ABG lactate: --        MICROBIOLOGY:     RADIOLOGY & ADDITIONAL TESTS:      ASSESSMENT AND PLAN:  76yo Male with PMHx of HTN, HLD, psoriasis on prednisone, and recent possible esophageal/hepatic malignancy/mass presented with acute onset SOB and found to have RLL segmental PE transferred to MICU after a code blue was called for PEA arrest i/s/o PE now intubated and sedated on pressors.    #Neuro  AA&Ox3 baseline  Currently intubated and sedated    #Cardiovascular  PEA arrest  - i/s/o PE  - now intubated and sedated  -  troponin 222  -  BNP pending  - on epinephrine 0.1 and norepinephrine 0.1  - Cardiology/PERT team consulted  - Vascular following    Congestive Heart Failure  - cardiac enzymes  - Trop at 165 and proBNP at 56235, TTE showed no sign of RV strain, no clot in transit, and LVEF of 70%.  - cardiology recs: c/w telemetry      HTN  - hold BP meds for now  - currently on levo and epi  - hold diuretic    #Respiratory  Acute hypoxic respiratory failure  - Pt intubated  - AB.10|24|269|8    Pulmonary embolus and L DVT   - AC with Heparin  - Pulmonary follow  - Cardiology/PERT team consulted i/s/o PEA arrest    #GI/Nutrition  -pancreatobiliary adenocarcinoma  - NPO for now    #/Renal  -serrato in place  -      #    #Skin  Psoriasis  - stress dose steroid      Thrombocytopenia  - follow Platelets  - HIT  - if worsening thrombocytopenia may need IVC filter        Liver lesions  - s/p Bx at [facility] c/w adenocarcinoma  - Oncology follow    LIVIER  - follow lytes, Cr  - gentle IVF  - Nephrology evaluation Dr. Khan     CVA acute/ subacute with Confusion  - MRI brain noted  - Neurology evaluation noted     Esophageal mass  - Gastroenterology follow. await recommendations   - may need PEG    Sepsis/ Pneumonia  - Zosyn  - ID follow    DVT prophylaxis  - AC  \    #ID    #Endocrine    #Hematologic/DVT ppx    #Ethics MICU Accept Note    CHIEF COMPLAINT: PEA arrest    HPI / INTERVAL HISTORY:   78 y/o M PMH HTN, HLD, psoriasis on chronic prednisone presented with SOB. Recent admission for pneumonia and discharged approximately two weeks ago. Also found with an esophageal and liver mass s/p IR liver biopsy on 22 at [facility]. 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     Pt was admitted for acute right lower lobe PE with DVT of the LLE, pt currently on a heparin gtt.   Pt on zosyn for septic PNA, had an LIVIER w/anion gap acidosis s/p bicarb.  Pt had AMS s/p MR head showing an acute lacunar infarct. MRA head pending. Pt's FOBT was positive and was started on PPIs.    An RRT was called at 0400 today : Pt pulled out his IV access and began bleeding for approximately five minutes before it stopped. Pt currently on a heparin gtt for acute PE with LE DVT. BP obtained was 97/62, spO2 93% on RA. Stat CBC was stable with hgb 14.4 and hct 44.7. PTT was elevated at 41.8. Pt was able to answer questions and follow commands at the time. After shifting the patient to a sitting position, his SOB visibly improved. Pt was then scheduled for bumex as this seemed 2/2 overload.    A code was then called for PEA arrest i/s/o PE. 2 rounds of chest compressions were performed at bedside, ROSC was achieved. Anesthesia stat was called and the patient was intubated and then transferred to MICU now on pressors norepinephrine at 0.1 and epinephrine at 0.1.      PAST MEDICAL & SURGICAL HISTORY:  Psoriasis      Esophageal mass      Benign essential HTN          FAMILY HISTORY:      SOCIAL HISTORY:  Smoking:   Substance Use:   EtOH Use:   Marital Status:   Sexual History:   Occupation:  Recent Travel:  Country of Birth:   Advance Directives:     HOME MEDICATIONS:  * Outpatient Medication Status not yet specified    Allergies    No Known Allergies    Intolerances          REVIEW OF SYSTEMS:  [X] Unable to assess ROS because pt intubated and sedated    OBJECTIVE:  ICU Vital Signs Last 24 Hrs  T(C): 36.6 (2023 20:30), Max: 36.6 (2023 20:30)  T(F): 97.8 (2023 20:30), Max: 97.8 (2023 20:30)  HR: 91 (2023 04:40) (72 - 91)  BP: 115/74 (2023 04:40) (94/57 - 115/74)  BP(mean): --  ABP: --  ABP(mean): --  RR: 18 (2023 04:40) (18 - 19)  SpO2: 96% (2023 04:40) (90% - 96%)    O2 Parameters below as of 2023 04:40  Patient On (Oxygen Delivery Method): nasal cannula  O2 Flow (L/min): 5        PHYSICAL EXAM:  GENERAL: NAD, well-developed  HEAD:  Atraumatic, Normocephalic  EYES: EOMI, PERRLA, conjunctiva and sclera clear  NECK: Supple, No JVD  CHEST/LUNG: Clear to auscultation bilaterally; No wheeze  HEART: Regular rate and rhythm; No murmurs, rubs, or gallops  ABDOMEN: Soft, Nontender, Nondistended; Bowel sounds present  EXTREMITIES:  2+ Peripheral Pulses, No clubbing, cyanosis, or edema  PSYCH: confused  NEUROLOGY: non-focal  SKIN: No rashes or lesions    LINES:     HOSPITAL MEDICATIONS:  MEDICATIONS  (STANDING):  buMETAnide Injectable 2 milliGRAM(s) IV Push two times a day  chlorhexidine 2% Cloths 1 Application(s) Topical daily  heparin  Infusion.  Unit(s)/Hr (16 mL/Hr) IV Continuous <Continuous>  influenza  Vaccine (HIGH DOSE) 0.7 milliLiter(s) IntraMuscular once  midodrine 10 milliGRAM(s) Oral three times a day  ondansetron Injectable 4 milliGRAM(s) IV Push four times a day  pantoprazole  Injectable 40 milliGRAM(s) IV Push two times a day  piperacillin/tazobactam IVPB.. 3.375 Gram(s) IV Intermittent every 12 hours  predniSONE   Tablet 10 milliGRAM(s) Oral daily  simvastatin 10 milliGRAM(s) Oral at bedtime  sodium bicarbonate  Infusion 0.043 mEq/kG/Hr (50 mL/Hr) IV Continuous <Continuous>  sucralfate suspension 1 Gram(s) Oral two times a day    MEDICATIONS  (PRN):  acetaminophen     Tablet .. 650 milliGRAM(s) Oral every 6 hours PRN Temp greater or equal to 38.5C (101.3F), Mild Pain (1 - 3)  heparin   Injectable 7000 Unit(s) IV Push every 6 hours PRN For aPTT less than 40  heparin   Injectable 3500 Unit(s) IV Push every 6 hours PRN For aPTT between 40 - 57      LABS:                        14.4   27.57 )-----------( 119      ( 2023 04:19 )             44.7     Hgb Trend: 14.4<--, 13.5<--, 13.6<--, 13.3<--, 15.8<--  01-03    133<L>  |  90<L>  |  134<H>  ----------------------------<  80  4.8   |  20<L>  |  6.02<H>    Ca    6.9<L>      2023 15:19      Creatinine Trend: 6.02<--, 5.27<--, 4.17<--, 3.79<--  PTT - ( 2023 04:19 )  PTT:41.8 sec    Arterial Blood Gas:   @ 16:40  7.33/37/42/20/67.7/-5.8  ABG lactate: --        MICROBIOLOGY:     RADIOLOGY & ADDITIONAL TESTS:      ASSESSMENT AND PLAN:  78yo Male with PMHx of HTN, HLD, psoriasis on prednisone, and recent possible esophageal/hepatic malignancy/mass presented with acute onset SOB and found to have RLL segmental PE transferred to MICU after a code blue was called for PEA arrest i/s/o PE now intubated and sedated on pressors.    #Neuro  AA&Ox3 baseline  Currently intubated and sedated    #Cardiovascular  PEA arrest  - i/s/o PE  - now intubated and sedated  -  troponin 222  -  BNP pending  - on epinephrine 0.1 and norepinephrine 0.1  - Cardiology/PERT team consulted  - Vascular following    Congestive Heart Failure  - cardiac enzymes  - Trop at 165 and proBNP at [address], TTE showed no sign of RV strain, no clot in transit, and LVEF of 70%.  - cardiology recs: c/w telemetry      HTN  - hold BP meds for now  - currently on levo and epi  - hold diuretic    #Respiratory  Acute hypoxic respiratory failure  - Pt intubated  - AB.10|24|269|8    Pulmonary embolus and L DVT   - AC with Heparin  - Pulmonary follow  - Cardiology/PERT team consulted i/s/o PEA arrest    #GI/Nutrition  -pancreatobiliary adenocarcinoma  - NPO for now    #/Renal  -serrato in place  -      #    #Skin  Psoriasis  - stress dose steroid      Thrombocytopenia  - follow Platelets  - HIT  - if worsening thrombocytopenia may need IVC filter        Liver lesions  - s/p Bx at [facility] c/w adenocarcinoma  - Oncology follow    LIVIER  - follow lytes, Cr  - gentle IVF  - Nephrology evaluation Dr. Khan     CVA acute/ subacute with Confusion  - MRI brain noted  - Neurology evaluation noted     Esophageal mass  - Gastroenterology follow. await recommendations   - may need PEG    Sepsis/ Pneumonia  - Zosyn  - ID follow    DVT prophylaxis  - AC  \    #ID    #Endocrine    #Hematologic/DVT ppx    #Ethics MICU Accept Note    CHIEF COMPLAINT: PEA arrest    HPI / INTERVAL HISTORY:   76 y/o M PMH HTN, HLD, psoriasis on chronic prednisone presented with SOB. Recent admission for pneumonia and discharged approximately two weeks ago. Also found with an esophageal and liver mass s/p IR liver biopsy on 22 at [facility]. 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     Pt was admitted for acute right lower lobe PE with DVT of the LLE, pt currently on a heparin gtt.   Pt on zosyn for septic PNA, had an LIVIER w/anion gap acidosis s/p bicarb.  Pt had AMS s/p MR head showing an acute lacunar infarct. MRA head pending. Pt's FOBT was positive and was started on PPIs.    An RRT was called at 0400 today : Pt pulled out his IV access and began bleeding for approximately five minutes before it stopped. Pt currently on a heparin gtt for acute PE with LE DVT. BP obtained was 97/62, spO2 93% on RA. Stat CBC was stable with hgb 14.4 and hct 44.7. PTT was elevated at 41.8. Pt was able to answer questions and follow commands at the time. After shifting the patient to a sitting position, his SOB visibly improved. Pt was then scheduled for bumex as this seemed 2/2 overload.    A code was then called for PEA arrest i/s/o PE. 2 rounds of chest compressions were performed at bedside, ROSC was achieved. Anesthesia stat was called and the patient was intubated and then transferred to MICU now on pressors norepinephrine at 0.1 and epinephrine at 0.1.      PAST MEDICAL & SURGICAL HISTORY:  Psoriasis      Esophageal mass      Benign essential HTN          FAMILY HISTORY:      SOCIAL HISTORY:  Smoking:   Substance Use:   EtOH Use:   Marital Status:   Sexual History:   Occupation:  Recent Travel:  Country of Birth:   Advance Directives:     HOME MEDICATIONS:  * Outpatient Medication Status not yet specified    Allergies    No Known Allergies    Intolerances          REVIEW OF SYSTEMS:  [X] Unable to assess ROS because pt intubated and sedated    OBJECTIVE:  ICU Vital Signs Last 24 Hrs  T(C): 36.6 (2023 20:30), Max: 36.6 (2023 20:30)  T(F): 97.8 (2023 20:30), Max: 97.8 (2023 20:30)  HR: 91 (2023 04:40) (72 - 91)  BP: 115/74 (2023 04:40) (94/57 - 115/74)  BP(mean): --  ABP: --  ABP(mean): --  RR: 18 (2023 04:40) (18 - 19)  SpO2: 96% (2023 04:40) (90% - 96%)    O2 Parameters below as of 2023 04:40  Patient On (Oxygen Delivery Method): nasal cannula  O2 Flow (L/min): 5        PHYSICAL EXAM:  GENERAL: NAD, well-developed  HEAD:  Atraumatic, Normocephalic  EYES: EOMI, PERRLA, conjunctiva and sclera clear  NECK: Supple, No JVD  CHEST/LUNG: Clear to auscultation bilaterally; No wheeze  HEART: Regular rate and rhythm; No murmurs, rubs, or gallops  ABDOMEN: Soft, Nontender, Nondistended; Bowel sounds present  EXTREMITIES:  2+ Peripheral Pulses, No clubbing, cyanosis, or edema  PSYCH: confused  NEUROLOGY: non-focal  SKIN: No rashes or lesions    LINES:     HOSPITAL MEDICATIONS:  MEDICATIONS  (STANDING):  buMETAnide Injectable 2 milliGRAM(s) IV Push two times a day  chlorhexidine 2% Cloths 1 Application(s) Topical daily  heparin  Infusion.  Unit(s)/Hr (16 mL/Hr) IV Continuous <Continuous>  influenza  Vaccine (HIGH DOSE) 0.7 milliLiter(s) IntraMuscular once  midodrine 10 milliGRAM(s) Oral three times a day  ondansetron Injectable 4 milliGRAM(s) IV Push four times a day  pantoprazole  Injectable 40 milliGRAM(s) IV Push two times a day  piperacillin/tazobactam IVPB.. 3.375 Gram(s) IV Intermittent every 12 hours  predniSONE   Tablet 10 milliGRAM(s) Oral daily  simvastatin 10 milliGRAM(s) Oral at bedtime  sodium bicarbonate  Infusion 0.043 mEq/kG/Hr (50 mL/Hr) IV Continuous <Continuous>  sucralfate suspension 1 Gram(s) Oral two times a day    MEDICATIONS  (PRN):  acetaminophen     Tablet .. 650 milliGRAM(s) Oral every 6 hours PRN Temp greater or equal to 38.5C (101.3F), Mild Pain (1 - 3)  heparin   Injectable 7000 Unit(s) IV Push every 6 hours PRN For aPTT less than 40  heparin   Injectable 3500 Unit(s) IV Push every 6 hours PRN For aPTT between 40 - 57      LABS:                        14.4   27.57 )-----------( 119      ( 2023 04:19 )             44.7     Hgb Trend: 14.4<--, 13.5<--, 13.6<--, 13.3<--, 15.8<--  01-03    133<L>  |  90<L>  |  134<H>  ----------------------------<  80  4.8   |  20<L>  |  6.02<H>    Ca    6.9<L>      2023 15:19      Creatinine Trend: 6.02<--, 5.27<--, 4.17<--, 3.79<--  PTT - ( 2023 04:19 )  PTT:41.8 sec    Arterial Blood Gas:   @ 16:40  7.33/37/42/20/67.7/-5.8  ABG lactate: --        MICROBIOLOGY:     RADIOLOGY & ADDITIONAL TESTS:      ASSESSMENT AND PLAN:  78yo Male with PMHx of HTN, HLD, psoriasis on prednisone, and recent possible esophageal/hepatic malignancy/mass presented with acute onset SOB and found to have RLL segmental PE transferred to MICU after a code blue was called for PEA arrest i/s/o PE now intubated and sedated on pressors.    #Neuro  AA&Ox3 baseline  Currently intubated and sedated    #Cardiovascular  PEA arrest  - i/s/o PE  - now intubated and sedated  -  troponin 222  -  BNP pending  - on epinephrine 0.1 and norepinephrine 0.1  - Cardiology/PERT team consulted  - Vascular following    Congestive Heart Failure  - cardiac enzymes  - Trop at 165 [place]BNP at 18[address], TTE showed no sign of RV strain, no clot in transit, and LVEF of 70%.  - cardiology recs: c/w telemetry      HTN  - hold BP meds for now  - currently on levo and epi  - hold diuretic    #Respiratory  Acute hypoxic respiratory failure  - Pt intubated  - AB.10|24|269|8    Pulmonary embolus and L DVT   - AC with Heparin  - Pulmonary follow  - Cardiology/PERT team consulted i/s/o PEA arrest    #GI/Nutrition  -pancreatobiliary adenocarcinoma  - NPO for now    #/Renal  -serrato in place  -      #    #Skin  Psoriasis  - stress dose steroid      Thrombocytopenia  - follow Platelets  - HIT  - if worsening thrombocytopenia may need IVC filter        Liver lesions  - s/p Bx at [facility] c/w adenocarcinoma  - Oncology follow    LIVIER  - follow lytes, Cr  - gentle IVF  - Nephrology evaluation Dr. Khan     CVA acute/ subacute with Confusion  - MRI brain noted  - Neurology evaluation noted     Esophageal mass  - Gastroenterology follow. await recommendations   - may need PEG    Sepsis/ Pneumonia  - Zosyn  - ID follow    DVT prophylaxis  - AC  \    #ID    #Endocrine    #Hematologic/DVT ppx    #Ethics MICU Accept Note    CHIEF COMPLAINT: PEA arrest    HPI / INTERVAL HISTORY:   78 y/o M PMH HTN, HLD, psoriasis on chronic prednisone presented with SOB. Recent admission for pneumonia and discharged approximately two weeks ago. Also found with an esophageal and liver mass s/p IR liver biopsy on 22 at [facility]. 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     Pt was admitted for acute right lower lobe PE with DVT of the LLE, pt currently on a heparin gtt.   Pt on zosyn for septic PNA, had an LIVIER w/anion gap acidosis s/p bicarb.  Pt had AMS s/p MR head showing an acute lacunar infarct. MRA head pending. Pt's FOBT was positive and was started on PPIs.    An RRT was called at 0400 today : Pt pulled out his IV access and began bleeding for approximately five minutes before it stopped. Pt currently on a heparin gtt for acute PE with LE DVT. BP obtained was 97/62, spO2 93% on RA. Stat CBC was stable with hgb 14.4 and hct 44.7. PTT was elevated at 41.8. Pt was able to answer questions and follow commands at the time. After shifting the patient to a sitting position, his SOB visibly improved. Pt was then scheduled for bumex as this seemed 2/2 overload.    A code was then called for PEA arrest i/s/o PE. 2 rounds of chest compressions were performed at bedside, ROSC was achieved. Anesthesia stat was called and the patient was intubated and then transferred to MICU now on pressors norepinephrine at 0.1 and epinephrine at 0.1.      PAST MEDICAL & SURGICAL HISTORY:  Psoriasis      Esophageal mass      Benign essential HTN          FAMILY HISTORY:      SOCIAL HISTORY:  Smoking:   Substance Use:   EtOH Use:   Marital Status:   Sexual History:   Occupation:  Recent Travel:  Country of Birth:   Advance Directives:     HOME MEDICATIONS:  * Outpatient Medication Status not yet specified    Allergies    No Known Allergies    Intolerances          REVIEW OF SYSTEMS:  [X] Unable to assess ROS because pt intubated and sedated    OBJECTIVE:  ICU Vital Signs Last 24 Hrs  T(C): 36.6 (2023 20:30), Max: 36.6 (2023 20:30)  T(F): 97.8 (2023 20:30), Max: 97.8 (2023 20:30)  HR: 91 (2023 04:40) (72 - 91)  BP: 115/74 (2023 04:40) (94/57 - 115/74)  BP(mean): --  ABP: --  ABP(mean): --  RR: 18 (2023 04:40) (18 - 19)  SpO2: 96% (2023 04:40) (90% - 96%)    O2 Parameters below as of 2023 04:40  Patient On (Oxygen Delivery Method): nasal cannula  O2 Flow (L/min): 5        PHYSICAL EXAM:  GENERAL: NAD, well-developed  HEAD:  Atraumatic, Normocephalic  EYES: EOMI, PERRLA, conjunctiva and sclera clear  NECK: Supple, No JVD  CHEST/LUNG: Clear to auscultation bilaterally; No wheeze  HEART: Regular rate and rhythm; No murmurs, rubs, or gallops  ABDOMEN: Soft, Nontender, Nondistended; Bowel sounds present  EXTREMITIES:  2+ Peripheral Pulses, No clubbing, cyanosis, or edema  PSYCH: confused  NEUROLOGY: non-focal  SKIN: No rashes or lesions    LINES:     HOSPITAL MEDICATIONS:  MEDICATIONS  (STANDING):  buMETAnide Injectable 2 milliGRAM(s) IV Push two times a day  chlorhexidine 2% Cloths 1 Application(s) Topical daily  heparin  Infusion.  Unit(s)/Hr (16 mL/Hr) IV Continuous <Continuous>  influenza  Vaccine (HIGH DOSE) 0.7 milliLiter(s) IntraMuscular once  midodrine 10 milliGRAM(s) Oral three times a day  ondansetron Injectable 4 milliGRAM(s) IV Push four times a day  pantoprazole  Injectable 40 milliGRAM(s) IV Push two times a day  piperacillin/tazobactam IVPB.. 3.375 Gram(s) IV Intermittent every 12 hours  predniSONE   Tablet 10 milliGRAM(s) Oral daily  simvastatin 10 milliGRAM(s) Oral at bedtime  sodium bicarbonate  Infusion 0.043 mEq/kG/Hr (50 mL/Hr) IV Continuous <Continuous>  sucralfate suspension 1 Gram(s) Oral two times a day    MEDICATIONS  (PRN):  acetaminophen     Tablet .. 650 milliGRAM(s) Oral every 6 hours PRN Temp greater or equal to 38.5C (101.3F), Mild Pain (1 - 3)  heparin   Injectable 7000 Unit(s) IV Push every 6 hours PRN For aPTT less than 40  heparin   Injectable 3500 Unit(s) IV Push every 6 hours PRN For aPTT between 40 - 57      LABS:                        14.4   27.57 )-----------( 119      ( 2023 04:19 )             44.7     Hgb Trend: 14.4<--, 13.5<--, 13.6<--, 13.3<--, 15.8<--  01-03    133<L>  |  90<L>  |  134<H>  ----------------------------<  80  4.8   |  20<L>  |  6.02<H>    Ca    6.9<L>      2023 15:19      Creatinine Trend: 6.02<--, 5.27<--, 4.17<--, 3.79<--  PTT - ( 2023 04:19 )  PTT:41.8 sec    Arterial Blood Gas:   @ 16:40  7.33/37/42/20/67.7/-5.8  ABG lactate: --        MICROBIOLOGY:     RADIOLOGY & ADDITIONAL TESTS:      ASSESSMENT AND PLAN:  78yo Male with PMHx of HTN, HLD, psoriasis on prednisone, and recent possible esophageal/hepatic malignancy/mass presented with acute onset SOB and found to have RLL segmental PE transferred to MICU after a code blue was called for PEA arrest i/s/o PE now intubated and sedated on pressors.    #Neuro  AA&Ox3 baseline  Currently intubated and sedated with propofol and fentanyl    CVA acute/ subacute with Confusion  - MRI brain noted  - Neurology evaluation noted     #Cardiovascular  PEA arrest  - i/s/o PE  - now intubated and sedated  - / troponin 222  - 1 BNP pending  - on epinephrine 0.1 and norepinephrine 0.1  - Cardiology/PERT team consulted  - Vascular following    Congestive Heart Failure  - cardiac enzymes  - Trop at 165 and proBNP at 11514, TTE showed no sign of RV strain, no clot in transit, and LVEF of 70%.  - cardiology recs: c/w telemetry      HTN  - hold BP meds for now  - currently on levo and epi  - hold diuretic    #Respiratory  Acute hypoxic respiratory failure  - Pt intubated  - AB.10|24|269|8    Pulmonary embolus and L DVT   - AC with Heparin  - Pulmonary follow  - Cardiology/PERT team consulted i/s/o PEA arrest    #GI/Nutrition  -pancreatobiliary adenocarcinoma  - NPO for now    #/Renal  LIVIER  -Creatinine 7.56 s/p arrest, came in with LIVIER with presenting Cr 3.79  -s/p bicarb drip  -serrato in place  - follow lytes, Cr  - gentle IVF  - Nephrology evaluation Dr. Khan     #ID  Sepsis/ Pneumonia  - Zosyn  - ID follow    #Hem/Onc  Esophageal mass  - Gastroenterology follow. await recommendations   - may need PEG    Liver lesions  - s/p Bx at [facility] c/w adenocarcinoma  - Oncology follow    Thrombocytopenia  - follow Platelets  - HIT  - if worsening thrombocytopenia may need IVC filter      #Skin  Psoriasis  - stress dose steroid        DVT prophylaxis  - AC  \    #ID    #Endocrine    #Hematologic/DVT ppx    #Ethics MICU Accept Note    CHIEF COMPLAINT: PEA arrest    HPI / INTERVAL HISTORY:   78 y/o M PMH HTN, HLD, psoriasis on chronic prednisone presented with SOB. Recent admission for pneumonia and discharged approximately two weeks ago. Also found with an esophageal and liver mass s/p IR liver biopsy on 22 at [facility]. 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     Pt was admitted for acute right lower lobe PE with DVT of the LLE, pt currently on a heparin gtt.   Pt on zosyn for septic PNA, had an LIVIER w/anion gap acidosis s/p bicarb.  Pt had AMS s/p MR head showing an acute lacunar infarct. MRA head pending. Pt's FOBT was positive and was started on PPIs.    An RRT was called at 0400 today : Pt pulled out his IV access and began bleeding for approximately five minutes before it stopped. Pt currently on a heparin gtt for acute PE with LE DVT. BP obtained was 97/62, spO2 93% on RA. Stat CBC was stable with hgb 14.4 and hct 44.7. PTT was elevated at 41.8. Pt was able to answer questions and follow commands at the time. After shifting the patient to a sitting position, his SOB visibly improved. Pt was then scheduled for bumex as this seemed 2/2 overload.    A code was then called for PEA arrest i/s/o PE. 2 rounds of chest compressions were performed at bedside, ROSC was achieved. Anesthesia stat was called and the patient was intubated and then transferred to MICU now on pressors norepinephrine at 0.1 and epinephrine at 0.1.      PAST MEDICAL & SURGICAL HISTORY:  Psoriasis      Esophageal mass      Benign essential HTN          FAMILY HISTORY:      SOCIAL HISTORY:  Smoking:   Substance Use:   EtOH Use:   Marital Status:   Sexual History:   Occupation:  Recent Travel:  Country of Birth:   Advance Directives:     HOME MEDICATIONS:  * Outpatient Medication Status not yet specified    Allergies    No Known Allergies    Intolerances          REVIEW OF SYSTEMS:  [X] Unable to assess ROS because pt intubated and sedated    OBJECTIVE:  ICU Vital Signs Last 24 Hrs  T(C): 36.6 (2023 20:30), Max: 36.6 (2023 20:30)  T(F): 97.8 (2023 20:30), Max: 97.8 (2023 20:30)  HR: 91 (2023 04:40) (72 - 91)  BP: 115/74 (2023 04:40) (94/57 - 115/74)  BP(mean): --  ABP: --  ABP(mean): --  RR: 18 (2023 04:40) (18 - 19)  SpO2: 96% (2023 04:40) (90% - 96%)    O2 Parameters below as of 2023 04:40  Patient On (Oxygen Delivery Method): nasal cannula  O2 Flow (L/min): 5        PHYSICAL EXAM:  GENERAL: NAD, well-developed  HEAD:  Atraumatic, Normocephalic  EYES: EOMI, PERRLA, conjunctiva and sclera clear  NECK: Supple, No JVD  CHEST/LUNG: Clear to auscultation bilaterally; No wheeze  HEART: Regular rate and rhythm; No murmurs, rubs, or gallops  ABDOMEN: Soft, Nontender, Nondistended; Bowel sounds present  EXTREMITIES:  2+ Peripheral Pulses, No clubbing, cyanosis, or edema  PSYCH: confused  NEUROLOGY: non-focal  SKIN: No rashes or lesions    LINES:     HOSPITAL MEDICATIONS:  MEDICATIONS  (STANDING):  buMETAnide Injectable 2 milliGRAM(s) IV Push two times a day  chlorhexidine 2% Cloths 1 Application(s) Topical daily  heparin  Infusion.  Unit(s)/Hr (16 mL/Hr) IV Continuous <Continuous>  influenza  Vaccine (HIGH DOSE) 0.7 milliLiter(s) IntraMuscular once  midodrine 10 milliGRAM(s) Oral three times a day  ondansetron Injectable 4 milliGRAM(s) IV Push four times a day  pantoprazole  Injectable 40 milliGRAM(s) IV Push two times a day  piperacillin/tazobactam IVPB.. 3.375 Gram(s) IV Intermittent every 12 hours  predniSONE   Tablet 10 milliGRAM(s) Oral daily  simvastatin 10 milliGRAM(s) Oral at bedtime  sodium bicarbonate  Infusion 0.043 mEq/kG/Hr (50 mL/Hr) IV Continuous <Continuous>  sucralfate suspension 1 Gram(s) Oral two times a day    MEDICATIONS  (PRN):  acetaminophen     Tablet .. 650 milliGRAM(s) Oral every 6 hours PRN Temp greater or equal to 38.5C (101.3F), Mild Pain (1 - 3)  heparin   Injectable 7000 Unit(s) IV Push every 6 hours PRN For aPTT less than 40  heparin   Injectable 3500 Unit(s) IV Push every 6 hours PRN For aPTT between 40 - 57      LABS:                        14.4   27.57 )-----------( 119      ( 2023 04:19 )             44.7     Hgb Trend: 14.4<--, 13.5<--, 13.6<--, 13.3<--, 15.8<--  01-03    133<L>  |  90<L>  |  134<H>  ----------------------------<  80  4.8   |  20<L>  |  6.02<H>    Ca    6.9<L>      2023 15:19      Creatinine Trend: 6.02<--, 5.27<--, 4.17<--, 3.79<--  PTT - ( 2023 04:19 )  PTT:41.8 sec    Arterial Blood Gas:   @ 16:40  7.33/37/42/20/67.7/-5.8  ABG lactate: --        MICROBIOLOGY:     RADIOLOGY & ADDITIONAL TESTS:      ASSESSMENT AND PLAN:  76yo Male with PMHx of HTN, HLD, psoriasis on prednisone, and recent possible esophageal/hepatic malignancy/mass presented with acute onset SOB and found to have RLL segmental PE transferred to MICU after a code blue was called for PEA arrest i/s/o PE now intubated and sedated on pressors.    #Neuro  AA&Ox3 baseline  Currently intubated and sedated with propofol and fentanyl    CVA acute/ subacute with Confusion  - MRI brain noted  - Neurology evaluation noted     #Cardiovascular  PEA arrest  - i/s/o PE  - now intubated and sedated  - / troponin 222  - 1 BNP pending  - on epinephrine 0.1 and norepinephrine 0.1  - Cardiology/PERT team consulted  - Vascular following    Congestive Heart Failure  - cardiac enzymes  - Trop at 165 [place]BNP at [address], TTE showed no sign of RV strain, no clot in transit, and LVEF of 70%.  - cardiology recs: c/w telemetry      HTN  - hold BP meds for now  - currently on levo and epi  - hold diuretic    #Respiratory  Acute hypoxic respiratory failure  - Pt intubated  - AB.10|24|269|8    Pulmonary embolus and L DVT   - AC with Heparin  - Pulmonary follow  - Cardiology/PERT team consulted i/s/o PEA arrest    #GI/Nutrition  -pancreatobiliary adenocarcinoma  - NPO for now    #/Renal  LIVIER  -Creatinine 7.56 s/p arrest, came in with LIVIER with presenting Cr 3.79  -s/p bicarb drip  -serrato in place  - follow lytes, Cr  - gentle IVF  - Nephrology evaluation Dr. Khan     #ID  Sepsis/ Pneumonia  - Zosyn  - ID follow    #Hem/Onc  Esophageal mass  - Gastroenterology follow. await recommendations   - may need PEG    Liver lesions  - s/p Bx at [facility] c/w adenocarcinoma  - Oncology follow    Thrombocytopenia  - follow Platelets  - HIT  - if worsening thrombocytopenia may need IVC filter      #Skin  Psoriasis  - stress dose steroid        DVT prophylaxis  - AC  \    #ID    #Endocrine    #Hematologic/DVT ppx    #Ethics MICU Accept Note    CHIEF COMPLAINT: PEA arrest    HPI / INTERVAL HISTORY:   78 y/o M PMH HTN, HLD, psoriasis on chronic prednisone presented with SOB. Recent admission for pneumonia and discharged approximately two weeks ago. Also found with an esophageal and liver mass s/p IR liver biopsy on 22 at [facility]. 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     Pt was admitted for acute right lower lobe PE with DVT of the LLE, pt currently on a heparin gtt.   Pt on zosyn for septic PNA, had an LIVIER w/anion gap acidosis s/p bicarb.  Pt had AMS s/p MR head showing an acute lacunar infarct. MRA head pending. Pt's FOBT was positive and was started on PPIs.    An RRT was called at 0400 today : Pt pulled out his IV access and began bleeding for approximately five minutes before it stopped. Pt currently on a heparin gtt for acute PE with LE DVT. BP obtained was 97/62, spO2 93% on RA. Stat CBC was stable with hgb 14.4 and hct 44.7. PTT was elevated at 41.8. Pt was able to answer questions and follow commands at the time. After shifting the patient to a sitting position, his SOB visibly improved. Pt was then scheduled for bumex as this seemed 2/2 overload.    A code was then called for PEA arrest i/s/o PE. 2 rounds of chest compressions were performed at bedside, ROSC was achieved. Anesthesia stat was called and the patient was intubated and then transferred to MICU now on pressors norepinephrine at 0.1 and epinephrine at 0.1.      PAST MEDICAL & SURGICAL HISTORY:  Psoriasis      Esophageal mass      Benign essential HTN          FAMILY HISTORY:      SOCIAL HISTORY:  Smoking:   Substance Use:   EtOH Use:   Marital Status:   Sexual History:   Occupation:  Recent Travel:  Country of Birth:   Advance Directives:     HOME MEDICATIONS:  * Outpatient Medication Status not yet specified    Allergies    No Known Allergies    Intolerances          REVIEW OF SYSTEMS:  [X] Unable to assess ROS because pt intubated and sedated    OBJECTIVE:  ICU Vital Signs Last 24 Hrs  T(C): 36.6 (2023 20:30), Max: 36.6 (2023 20:30)  T(F): 97.8 (2023 20:30), Max: 97.8 (2023 20:30)  HR: 91 (2023 04:40) (72 - 91)  BP: 115/74 (2023 04:40) (94/57 - 115/74)  BP(mean): --  ABP: --  ABP(mean): --  RR: 18 (2023 04:40) (18 - 19)  SpO2: 96% (2023 04:40) (90% - 96%)    O2 Parameters below as of 2023 04:40  Patient On (Oxygen Delivery Method): nasal cannula  O2 Flow (L/min): 5        PHYSICAL EXAM:  GENERAL: NAD, well-developed  HEAD:  Atraumatic, Normocephalic  EYES: EOMI, PERRLA, conjunctiva and sclera clear  NECK: Supple, No JVD  CHEST/LUNG: Clear to auscultation bilaterally; No wheeze  HEART: Regular rate and rhythm; No murmurs, rubs, or gallops  ABDOMEN: Soft, Nontender, Nondistended; Bowel sounds present  EXTREMITIES:  2+ Peripheral Pulses, No clubbing, cyanosis, or edema  PSYCH: confused  NEUROLOGY: non-focal  SKIN: No rashes or lesions    LINES:     HOSPITAL MEDICATIONS:  MEDICATIONS  (STANDING):  buMETAnide Injectable 2 milliGRAM(s) IV Push two times a day  chlorhexidine 2% Cloths 1 Application(s) Topical daily  heparin  Infusion.  Unit(s)/Hr (16 mL/Hr) IV Continuous <Continuous>  influenza  Vaccine (HIGH DOSE) 0.7 milliLiter(s) IntraMuscular once  midodrine 10 milliGRAM(s) Oral three times a day  ondansetron Injectable 4 milliGRAM(s) IV Push four times a day  pantoprazole  Injectable 40 milliGRAM(s) IV Push two times a day  piperacillin/tazobactam IVPB.. 3.375 Gram(s) IV Intermittent every 12 hours  predniSONE   Tablet 10 milliGRAM(s) Oral daily  simvastatin 10 milliGRAM(s) Oral at bedtime  sodium bicarbonate  Infusion 0.043 mEq/kG/Hr (50 mL/Hr) IV Continuous <Continuous>  sucralfate suspension 1 Gram(s) Oral two times a day    MEDICATIONS  (PRN):  acetaminophen     Tablet .. 650 milliGRAM(s) Oral every 6 hours PRN Temp greater or equal to 38.5C (101.3F), Mild Pain (1 - 3)  heparin   Injectable 7000 Unit(s) IV Push every 6 hours PRN For aPTT less than 40  heparin   Injectable 3500 Unit(s) IV Push every 6 hours PRN For aPTT between 40 - 57      LABS:                        14.4   27.57 )-----------( 119      ( 2023 04:19 )             44.7     Hgb Trend: 14.4<--, 13.5<--, 13.6<--, 13.3<--, 15.8<--  01-03    133<L>  |  90<L>  |  134<H>  ----------------------------<  80  4.8   |  20<L>  |  6.02<H>    Ca    6.9<L>      2023 15:19      Creatinine Trend: 6.02<--, 5.27<--, 4.17<--, 3.79<--  PTT - ( 2023 04:19 )  PTT:41.8 sec    Arterial Blood Gas:   @ 16:40  7.33/37/42/20/67.7/-5.8  ABG lactate: --        MICROBIOLOGY:     RADIOLOGY & ADDITIONAL TESTS:      ASSESSMENT AND PLAN:  78yo Male with PMHx of HTN, HLD, psoriasis on prednisone, and recent possible esophageal/hepatic malignancy/mass presented with acute onset SOB and found to have RLL segmental PE transferred to MICU after a code blue was called for PEA arrest i/s/o PE now intubated and sedated on pressors.    #Neuro  AA&Ox3 baseline  Currently intubated and sedated with propofol and fentanyl    CVA acute/ subacute with Confusion  - MRI brain noted  - Neurology evaluation noted     #Cardiovascular  PEA arrest  - i/s/o PE  - now intubated and sedated  - / troponin 222  - 1 BNP pending  - on epinephrine 0.1 and norepinephrine 0.1  - Cardiology/PERT team consulted  - Vascular following    Congestive Heart Failure  - cardiac enzymes  - Trop at 165 [place]BNP at [address], TTE showed no sign of RV strain, no clot in transit, and LVEF of 70%.  - cardiology recs: c/w telemetry      HTN  - hold BP meds for now  - currently on levo and epi  - hold diuretic    #Respiratory  Acute hypoxic respiratory failure  - Pt intubated  - AB.10|24|269|8    Pulmonary embolus and L DVT   - AC with Heparin  - Pulmonary follow  - Cardiology/PERT team consulted i/s/o PEA arrest    #GI/Nutrition  -pancreatobiliary adenocarcinoma  - NPO for now    #/Renal  LIVIER  -Creatinine 7.56 s/p arrest, came in with LIVIER with presenting Cr 3.79  -s/p bicarb drip  -serrato in place  - follow lytes, Cr  - gentle IVF  - Nephrology evaluation Dr. Khan     #ID  Sepsis/ Pneumonia  - Zosyn  - ID follow    #Hem/Onc  Esophageal mass  - Gastroenterology follow. await recommendations   - may need PEG    Liver lesions  - s/p Bx at [facility] c/w adenocarcinoma  - Oncology follow    Thrombocytopenia  - follow Platelets  - HIT  - if worsening thrombocytopenia may need IVC filter      #Skin  Psoriasis  - stress dose steroid        DVT prophylaxis  - AC  \    #ID    #Endocrine    #Hematologic/DVT ppx    #Ethics MICU Accept Note    CHIEF COMPLAINT: PEA arrest    HPI / INTERVAL HISTORY:   78 y/o M PMH HTN, HLD, psoriasis on chronic prednisone presented with SOB. Recent admission for pneumonia and discharged approximately two weeks ago. Also found with an esophageal and liver mass s/p IR liver biopsy on 22 at [facility]. 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     Pt was admitted for acute right lower lobe PE with DVT of the LLE, pt currently on a heparin gtt.   Pt on zosyn for septic PNA, had an LIVIER w/anion gap acidosis s/p bicarb.  Pt had AMS s/p MR head showing an acute lacunar infarct. MRA head pending. Pt's FOBT was positive and was started on PPIs.    An RRT was called at 0400 today : Pt pulled out his IV access and began bleeding for approximately five minutes before it stopped. Pt currently on a heparin gtt for acute PE with LE DVT. BP obtained was 97/62, spO2 93% on RA. Stat CBC was stable with hgb 14.4 and hct 44.7. PTT was elevated at 41.8. Pt was able to answer questions and follow commands at the time. After shifting the patient to a sitting position, his SOB visibly improved. Pt was then scheduled for bumex as this seemed 2/2 overload.    A code was then called for PEA arrest i/s/o PE. 2 rounds of chest compressions were performed at bedside, ROSC was achieved. Anesthesia stat was called and the patient was intubated and then transferred to MICU now on pressors norepinephrine at 0.1 and epinephrine at 0.1.      PAST MEDICAL & SURGICAL HISTORY:  Psoriasis      Esophageal mass      Benign essential HTN          FAMILY HISTORY:      SOCIAL HISTORY:  Smoking:   Substance Use:   EtOH Use:   Marital Status:   Sexual History:   Occupation:  Recent Travel:  Country of Birth:   Advance Directives:     HOME MEDICATIONS:  * Outpatient Medication Status not yet specified    Allergies    No Known Allergies    Intolerances          REVIEW OF SYSTEMS:  [X] Unable to assess ROS because pt intubated and sedated    OBJECTIVE:  ICU Vital Signs Last 24 Hrs  T(C): 36.6 (2023 20:30), Max: 36.6 (2023 20:30)  T(F): 97.8 (2023 20:30), Max: 97.8 (2023 20:30)  HR: 91 (2023 04:40) (72 - 91)  BP: 115/74 (2023 04:40) (94/57 - 115/74)  BP(mean): --  ABP: --  ABP(mean): --  RR: 18 (2023 04:40) (18 - 19)  SpO2: 96% (2023 04:40) (90% - 96%)    O2 Parameters below as of 2023 04:40  Patient On (Oxygen Delivery Method): nasal cannula  O2 Flow (L/min): 5        PHYSICAL EXAM:  GENERAL: intubated and sedated  HEAD:  Atraumatic, Normocephalic  EYES: EOMI, PERRLA, conjunctiva and sclera clear  NECK: Supple, No JVD  CHEST/LUNG: Clear to auscultation bilaterally; No wheeze  HEART: Regular rate and rhythm; No murmurs, rubs, or gallops  ABDOMEN: Soft, Nontender, Nondistended; Bowel sounds present  EXTREMITIES:  2+ Peripheral Pulses, No clubbing, cyanosis, or edema  NEUROLOGY: intubated and sedated, was SMALLS prior to sedation  SKIN: No rashes or lesions      HOSPITAL MEDICATIONS:  MEDICATIONS  (STANDING):  buMETAnide Injectable 2 milliGRAM(s) IV Push two times a day  chlorhexidine 2% Cloths 1 Application(s) Topical daily  heparin  Infusion.  Unit(s)/Hr (16 mL/Hr) IV Continuous <Continuous>  influenza  Vaccine (HIGH DOSE) 0.7 milliLiter(s) IntraMuscular once  midodrine 10 milliGRAM(s) Oral three times a day  ondansetron Injectable 4 milliGRAM(s) IV Push four times a day  pantoprazole  Injectable 40 milliGRAM(s) IV Push two times a day  piperacillin/tazobactam IVPB.. 3.375 Gram(s) IV Intermittent every 12 hours  predniSONE   Tablet 10 milliGRAM(s) Oral daily  simvastatin 10 milliGRAM(s) Oral at bedtime  sodium bicarbonate  Infusion 0.043 mEq/kG/Hr (50 mL/Hr) IV Continuous <Continuous>  sucralfate suspension 1 Gram(s) Oral two times a day    MEDICATIONS  (PRN):  acetaminophen     Tablet .. 650 milliGRAM(s) Oral every 6 hours PRN Temp greater or equal to 38.5C (101.3F), Mild Pain (1 - 3)  heparin   Injectable 7000 Unit(s) IV Push every 6 hours PRN For aPTT less than 40  heparin   Injectable 3500 Unit(s) IV Push every 6 hours PRN For aPTT between 40 - 57      LABS:                        14.4   27.57 )-----------( 119      ( 2023 04:19 )             44.7     Hgb Trend: 14.4<--, 13.5<--, 13.6<--, 13.3<--, 15.8<--  01-03    133<L>  |  90<L>  |  134<H>  ----------------------------<  80  4.8   |  20<L>  |  6.02<H>    Ca    6.9<L>      2023 15:19      Creatinine Trend: 6.02<--, 5.27<--, 4.17<--, 3.79<--  PTT - ( 2023 04:19 )  PTT:41.8 sec    Arterial Blood Gas:   @ 16:40  7.33/37/42/20/67.7/-5.8  ABG lactate: --        MICROBIOLOGY:     RADIOLOGY & ADDITIONAL TESTS:      ASSESSMENT AND PLAN:  78yo Male with PMHx of HTN, HLD, psoriasis on prednisone, and recent possible esophageal/hepatic malignancy/mass presented with acute onset SOB and found to have RLL segmental PE transferred to MICU after a code blue was called for PEA arrest i/s/o PE now intubated and sedated on pressors.    #Neuro  AA&Ox3 baseline  Currently intubated and sedated with propofol and fentanyl    CVA acute/ subacute with Confusion  - MRI brain noted  - Neurology evaluation noted     #Cardiovascular  PEA arrest  - i/s/o PE  - now intubated and sedated  -  troponin 222  -  BNP pending  - on epinephrine 0.1 and norepinephrine 0.1  - Cardiology/PERT team consulted  - Vascular following    Congestive Heart Failure  - cardiac enzymes  - Trop at 165 and proBNP at 44686, TTE showed no sign of RV strain, no clot in transit, and LVEF of 70%.  - cardiology recs: c/w telemetry      HTN  - hold BP meds for now  - currently on levo and epi  - hold diuretic    #Respiratory  Acute hypoxic respiratory failure  - Pt intubated  - AB.10|24|269|8    Pulmonary embolus and L DVT   - AC with Heparin  - Pulmonary follow  - Cardiology/PERT team consulted i/s/o PEA arrest    #GI/Nutrition  -pancreatobiliary adenocarcinoma  - NPO for now    #/Renal  LIVIER  -Creatinine 7.56 s/p arrest, came in with LIVIER with presenting Cr 3.79  -s/p bicarb drip  -serrato in place  - follow lyefrain Cr  - gentle IVF  - Nephrology evaluation Dr. Khan     #Skin  Psoriasis  - stress dose steroid    #ID  Sepsis/ Pneumonia  - Zosyn  - ID follow    #Endocrine  No acute issues    #Hematologic/DVT ppx  Esophageal mass  - Gastroenterology follow. await recommendations   - may need PEG    Liver lesions  - s/p Bx at [facility] c/w adenocarcinoma  - Oncology follow    Thrombocytopenia  - follow Platelets  - HIT  - if worsening thrombocytopenia may need IVC filter    DVT prophylaxis  - heparin gtt    #Ethics  full code MICU Accept Note    CHIEF COMPLAINT: PEA arrest    HPI / INTERVAL HISTORY:   78 y/o M PMH HTN, HLD, psoriasis on chronic prednisone presented with SOB. Recent admission for pneumonia and discharged approximately two weeks ago. Also found with an esophageal and liver mass s/p IR liver biopsy on 22 at [facility]. 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     Pt was admitted for acute right lower lobe PE with DVT of the LLE, pt currently on a heparin gtt.   Pt on zosyn for septic PNA, had an LIVIER w/anion gap acidosis s/p bicarb.  Pt had AMS s/p MR head showing an acute lacunar infarct. MRA head pending. Pt's FOBT was positive and was started on PPIs.    An RRT was called at 0400 today : Pt pulled out his IV access and began bleeding for approximately five minutes before it stopped. Pt currently on a heparin gtt for acute PE with LE DVT. BP obtained was 97/62, spO2 93% on RA. Stat CBC was stable with hgb 14.4 and hct 44.7. PTT was elevated at 41.8. Pt was able to answer questions and follow commands at the time. After shifting the patient to a sitting position, his SOB visibly improved. Pt was then scheduled for bumex as this seemed 2/2 overload.    A code was then called for PEA arrest i/s/o PE. 2 rounds of chest compressions were performed at bedside, ROSC was achieved. Anesthesia stat was called and the patient was intubated and then transferred to MICU now on pressors norepinephrine at 0.1 and epinephrine at 0.1.      PAST MEDICAL & SURGICAL HISTORY:  Psoriasis      Esophageal mass      Benign essential HTN          FAMILY HISTORY:      SOCIAL HISTORY:  Smoking:   Substance Use:   EtOH Use:   Marital Status:   Sexual History:   Occupation:  Recent Travel:  Country of Birth:   Advance Directives:     HOME MEDICATIONS:  * Outpatient Medication Status not yet specified    Allergies    No Known Allergies    Intolerances          REVIEW OF SYSTEMS:  [X] Unable to assess ROS because pt intubated and sedated    OBJECTIVE:  ICU Vital Signs Last 24 Hrs  T(C): 36.6 (2023 20:30), Max: 36.6 (2023 20:30)  T(F): 97.8 (2023 20:30), Max: 97.8 (2023 20:30)  HR: 91 (2023 04:40) (72 - 91)  BP: 115/74 (2023 04:40) (94/57 - 115/74)  BP(mean): --  ABP: --  ABP(mean): --  RR: 18 (2023 04:40) (18 - 19)  SpO2: 96% (2023 04:40) (90% - 96%)    O2 Parameters below as of 2023 04:40  Patient On (Oxygen Delivery Method): nasal cannula  O2 Flow (L/min): 5        PHYSICAL EXAM:  GENERAL: intubated and sedated  HEAD:  Atraumatic, Normocephalic  EYES: EOMI, PERRLA, conjunctiva and sclera clear  NECK: Supple, No JVD  CHEST/LUNG: Clear to auscultation bilaterally; No wheeze  HEART: Regular rate and rhythm; No murmurs, rubs, or gallops  ABDOMEN: Soft, Nontender, Nondistended; Bowel sounds present  EXTREMITIES:  2+ Peripheral Pulses, No clubbing, cyanosis, or edema  NEUROLOGY: intubated and sedated, was SMALLS prior to sedation  SKIN: No rashes or lesions      HOSPITAL MEDICATIONS:  MEDICATIONS  (STANDING):  buMETAnide Injectable 2 milliGRAM(s) IV Push two times a day  chlorhexidine 2% Cloths 1 Application(s) Topical daily  heparin  Infusion.  Unit(s)/Hr (16 mL/Hr) IV Continuous <Continuous>  influenza  Vaccine (HIGH DOSE) 0.7 milliLiter(s) IntraMuscular once  midodrine 10 milliGRAM(s) Oral three times a day  ondansetron Injectable 4 milliGRAM(s) IV Push four times a day  pantoprazole  Injectable 40 milliGRAM(s) IV Push two times a day  piperacillin/tazobactam IVPB.. 3.375 Gram(s) IV Intermittent every 12 hours  predniSONE   Tablet 10 milliGRAM(s) Oral daily  simvastatin 10 milliGRAM(s) Oral at bedtime  sodium bicarbonate  Infusion 0.043 mEq/kG/Hr (50 mL/Hr) IV Continuous <Continuous>  sucralfate suspension 1 Gram(s) Oral two times a day    MEDICATIONS  (PRN):  acetaminophen     Tablet .. 650 milliGRAM(s) Oral every 6 hours PRN Temp greater or equal to 38.5C (101.3F), Mild Pain (1 - 3)  heparin   Injectable 7000 Unit(s) IV Push every 6 hours PRN For aPTT less than 40  heparin   Injectable 3500 Unit(s) IV Push every 6 hours PRN For aPTT between 40 - 57      LABS:                        14.4   27.57 )-----------( 119      ( 2023 04:19 )             44.7     Hgb Trend: 14.4<--, 13.5<--, 13.6<--, 13.3<--, 15.8<--  01-03    133<L>  |  90<L>  |  134<H>  ----------------------------<  80  4.8   |  20<L>  |  6.02<H>    Ca    6.9<L>      2023 15:19      Creatinine Trend: 6.02<--, 5.27<--, 4.17<--, 3.79<--  PTT - ( 2023 04:19 )  PTT:41.8 sec    Arterial Blood Gas:   @ 16:40  7.33/37/42/20/67.7/-5.8  ABG lactate: --        MICROBIOLOGY:     RADIOLOGY & ADDITIONAL TESTS:      ASSESSMENT AND PLAN:  76yo Male with PMHx of HTN, HLD, psoriasis on prednisone, and recent possible esophageal/hepatic malignancy/mass presented with acute onset SOB and found to have RLL segmental PE transferred to MICU after a code blue was called for PEA arrest i/s/o PE now intubated and sedated on pressors.    #Neuro  AA&Ox3 baseline  Currently intubated and sedated with propofol and fentanyl    CVA acute/ subacute with Confusion  - MRI brain noted  - Neurology evaluation noted     #Cardiovascular  PEA arrest  - i/s/o PE  - now intubated and sedated  -  troponin 222  -  BNP pending  - on epinephrine 0.1 and norepinephrine 0.1  - Cardiology/PERT team consulted  - Vascular following    Congestive Heart Failure  - cardiac enzymes  - Trop at 165 and proBNP at 70498, TTE showed no sign of RV strain, no clot in transit, and LVEF of 70%.  - cardiology recs: c/w telemetry      HTN  - hold BP meds for now  - currently on levo and epi  - hold diuretic    #Respiratory  Acute hypoxic respiratory failure  - Pt intubated  - AB.10|24|269|8    Pulmonary embolus and L DVT   - AC with Heparin  - Pulmonary follow  - Cardiology/PERT team consulted i/s/o PEA arrest    #GI/Nutrition  -pancreatobiliary adenocarcinoma  - NPO for now    #/Renal  LIVIER  -Creatinine 7.56 s/p arrest, came in with LIVIER with presenting Cr 3.79  -s/p bicarb drip  -serrato in place  - follow lyefrain Cr  - gentle IVF  - Nephrology evaluation Dr. Khan     #Skin  Psoriasis  - stress dose steroid    #ID  Sepsis/ Pneumonia  - Zosyn  - ID follow    #Endocrine  No acute issues    #Hematologic/DVT ppx  Esophageal mass  - Gastroenterology follow. await recommendations   - may need PEG    Liver lesions  - s/p Bx at [facility] c/w adenocarcinoma  - Oncology follow    Thrombocytopenia  - follow Platelets  - HIT  - if worsening thrombocytopenia may need IVC filter    DVT prophylaxis  - heparin gtt    #Ethics  full code MICU Accept Note    CHIEF COMPLAINT: PEA arrest    HPI / INTERVAL HISTORY:   76 y/o M PMH HTN, HLD, psoriasis on chronic prednisone presented with SOB. Recent admission for pneumonia and discharged approximately two weeks ago. Also found with an esophageal and liver mass s/p IR liver biopsy on 22 at [facility]. 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     Pt was admitted for acute right lower lobe PE with DVT of the LLE, pt currently on a heparin gtt.   Pt on zosyn for septic PNA, had an LIVIER w/anion gap acidosis s/p bicarb.  Pt had AMS s/p MR head showing an acute lacunar infarct. MRA head pending. Pt's FOBT was positive and was started on PPIs.    An RRT was called at 0400 today : Pt pulled out his IV access and began bleeding for approximately five minutes before it stopped. Pt currently on a heparin gtt for acute PE with LE DVT. BP obtained was 97/62, spO2 93% on RA. Stat CBC was stable with hgb 14.4 and hct 44.7. PTT was elevated at 41.8. Pt was able to answer questions and follow commands at the time. After shifting the patient to a sitting position, his SOB visibly improved. Pt was then scheduled for bumex as this seemed 2/2 overload.    A code was then called for PEA arrest i/s/o PE. 2 rounds of chest compressions were performed at bedside, ROSC was achieved. Anesthesia stat was called and the patient was intubated and then transferred to MICU now on pressors norepinephrine at 0.1 and epinephrine at 0.1.      PAST MEDICAL & SURGICAL HISTORY:  Psoriasis      Esophageal mass      Benign essential HTN          FAMILY HISTORY:      SOCIAL HISTORY:  Smoking:   Substance Use:   EtOH Use:   Marital Status:   Sexual History:   Occupation:  Recent Travel:  Country of Birth:   Advance Directives:     HOME MEDICATIONS:  * Outpatient Medication Status not yet specified    Allergies    No Known Allergies    Intolerances          REVIEW OF SYSTEMS:  [X] Unable to assess ROS because pt intubated and sedated    OBJECTIVE:  ICU Vital Signs Last 24 Hrs  T(C): 36.6 (2023 20:30), Max: 36.6 (2023 20:30)  T(F): 97.8 (2023 20:30), Max: 97.8 (2023 20:30)  HR: 91 (2023 04:40) (72 - 91)  BP: 115/74 (2023 04:40) (94/57 - 115/74)  BP(mean): --  ABP: --  ABP(mean): --  RR: 18 (2023 04:40) (18 - 19)  SpO2: 96% (2023 04:40) (90% - 96%)    O2 Parameters below as of 2023 04:40  Patient On (Oxygen Delivery Method): nasal cannula  O2 Flow (L/min): 5        PHYSICAL EXAM:  GENERAL: intubated and sedated  HEAD:  Atraumatic, Normocephalic  EYES: EOMI, PERRLA, conjunctiva and sclera clear  NECK: Supple, No JVD  CHEST/LUNG: Clear to auscultation bilaterally; No wheeze  HEART: Regular rate and rhythm; No murmurs, rubs, or gallops  ABDOMEN: Soft, Nontender, Nondistended; Bowel sounds present  EXTREMITIES:  2+ Peripheral Pulses, No clubbing, cyanosis, or edema  NEUROLOGY: intubated and sedated, was SMALLS prior to sedation  SKIN: No rashes or lesions      HOSPITAL MEDICATIONS:  MEDICATIONS  (STANDING):  buMETAnide Injectable 2 milliGRAM(s) IV Push two times a day  chlorhexidine 2% Cloths 1 Application(s) Topical daily  heparin  Infusion.  Unit(s)/Hr (16 mL/Hr) IV Continuous <Continuous>  influenza  Vaccine (HIGH DOSE) 0.7 milliLiter(s) IntraMuscular once  midodrine 10 milliGRAM(s) Oral three times a day  ondansetron Injectable 4 milliGRAM(s) IV Push four times a day  pantoprazole  Injectable 40 milliGRAM(s) IV Push two times a day  piperacillin/tazobactam IVPB.. 3.375 Gram(s) IV Intermittent every 12 hours  predniSONE   Tablet 10 milliGRAM(s) Oral daily  simvastatin 10 milliGRAM(s) Oral at bedtime  sodium bicarbonate  Infusion 0.043 mEq/kG/Hr (50 mL/Hr) IV Continuous <Continuous>  sucralfate suspension 1 Gram(s) Oral two times a day    MEDICATIONS  (PRN):  acetaminophen     Tablet .. 650 milliGRAM(s) Oral every 6 hours PRN Temp greater or equal to 38.5C (101.3F), Mild Pain (1 - 3)  heparin   Injectable 7000 Unit(s) IV Push every 6 hours PRN For aPTT less than 40  heparin   Injectable 3500 Unit(s) IV Push every 6 hours PRN For aPTT between 40 - 57      LABS:                        14.4   27.57 )-----------( 119      ( 2023 04:19 )             44.7     Hgb Trend: 14.4<--, 13.5<--, 13.6<--, 13.3<--, 15.8<--  01-03    133<L>  |  90<L>  |  134<H>  ----------------------------<  80  4.8   |  20<L>  |  6.02<H>    Ca    6.9<L>      2023 15:19      Creatinine Trend: 6.02<--, 5.27<--, 4.17<--, 3.79<--  PTT - ( 2023 04:19 )  PTT:41.8 sec    Arterial Blood Gas:   @ 16:40  7.33/37/42/20/67.7/-5.8  ABG lactate: --        MICROBIOLOGY:     RADIOLOGY & ADDITIONAL TESTS:      ASSESSMENT AND PLAN:  78yo Male with PMHx of HTN, HLD, psoriasis on prednisone, and recent possible esophageal/hepatic malignancy/mass presented with acute onset SOB and found to have RLL segmental PE transferred to MICU after a code blue was called for PEA arrest i/s/o PE now intubated and sedated on pressors.    #Neuro  AA&Ox3 baseline  Currently intubated and sedated with propofol and fentanyl    CVA acute/ subacute with Confusion  - MRI brain noted  - Neurology evaluation noted     #Cardiovascular  PEA arrest  - i/s/o PE  - now intubated and sedated  -  troponin 222  -  BNP pending  - on epinephrine 0.1 and norepinephrine 0.1  - Cardiology/PERT team consulted  - Vascular following    Congestive Heart Failure  - cardiac enzymes  - Trop at 165 and proBNP at 13961, TTE showed no sign of RV strain, no clot in transit, and LVEF of 70%.  - cardiology recs: c/w telemetry      HTN  - hold BP meds for now  - currently on levo and epi  - hold diuretic    #Respiratory  Acute hypoxic respiratory failure  - Pt intubated  - AB.10|24|269|8    Pulmonary embolus and L DVT   - AC with Heparin  - Pulmonary follow  - Cardiology/PERT team consulted i/s/o PEA arrest    #GI/Nutrition  -pancreatobiliary adenocarcinoma  - NPO for now    #/Renal  LIVIER  -Creatinine 7.56 s/p arrest, came in with LIVIER with presenting Cr 3.79  -s/p bicarb drip  -serrato in place  - follow lyefrain Cr  - gentle IVF  - Nephrology evaluation Dr. Khan     #Skin  Psoriasis  - stress dose steroid    #ID  Sepsis/ Pneumonia  - Zosyn  - ID follow    #Endocrine  No acute issues    #Hematologic/DVT ppx  Esophageal mass  - Gastroenterology follow. await recommendations   - may need PEG    Liver lesions  - s/p Bx at [facility] c/w adenocarcinoma  - Oncology follow    Thrombocytopenia  - follow Platelets  - HIT  - if worsening thrombocytopenia may need IVC filter    DVT prophylaxis  - heparin gtt    #Ethics  full code MICU Accept Note    CHIEF COMPLAINT: PEA arrest    HPI / INTERVAL HISTORY:   76 y/o M PMH HTN, HLD, psoriasis on chronic prednisone presented with SOB. Recent admission for pneumonia and discharged approximately two weeks ago. Also found with an esophageal and liver mass s/p IR liver biopsy on 22 at [facility]. 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     Pt was admitted for acute right lower lobe PE with DVT of the LLE, pt currently on a heparin gtt.   Pt on zosyn for septic PNA, had an LIVIER w/anion gap acidosis s/p bicarb.  Pt had AMS s/p MR head showing an acute lacunar infarct. MRA head pending. Pt's FOBT was positive and was started on PPIs.    An RRT was called at 0400 today : Pt pulled out his IV access and began bleeding for approximately five minutes before it stopped. Pt currently on a heparin gtt for acute PE with LE DVT. BP obtained was 97/62, spO2 93% on RA. Stat CBC was stable with hgb 14.4 and hct 44.7. PTT was elevated at 41.8. Pt was able to answer questions and follow commands at the time. After shifting the patient to a sitting position, his SOB visibly improved. Pt was then scheduled for bumex as this seemed 2/2 overload.    A code was then called for PEA arrest i/s/o PE. 2 rounds of chest compressions were performed at bedside, ROSC was achieved. Anesthesia stat was called and the patient was intubated and then transferred to MICU now on pressors norepinephrine at 0.1 and epinephrine at 0.1.      PAST MEDICAL & SURGICAL HISTORY:  Psoriasis      Esophageal mass      Benign essential HTN        HOME MEDICATIONS:  * Outpatient Medication Status not yet specified    Allergies    No Known Allergies    Intolerances          REVIEW OF SYSTEMS:  [X] Unable to assess ROS because pt intubated and sedated    OBJECTIVE:  ICU Vital Signs Last 24 Hrs  T(C): 36.6 (2023 20:30), Max: 36.6 (2023 20:30)  T(F): 97.8 (2023 20:30), Max: 97.8 (2023 20:30)  HR: 91 (2023 04:40) (72 - 91)  BP: 115/74 (2023 04:40) (94/57 - 115/74)  BP(mean): --  ABP: --  ABP(mean): --  RR: 18 (2023 04:40) (18 - 19)  SpO2: 96% (2023 04:40) (90% - 96%)    O2 Parameters below as of 2023 04:40  Patient On (Oxygen Delivery Method): nasal cannula  O2 Flow (L/min): 5        PHYSICAL EXAM:  GENERAL: intubated and sedated  HEAD:  Atraumatic, Normocephalic  EYES: Pupils round and sluggish b/l, conjunctiva and sclera clear  NECK: Supple, No JVD  CHEST/LUNG: Clear to auscultation bilaterally; No wheeze  HEART: Regular rate and rhythm; No murmurs, rubs, or gallops  ABDOMEN: Soft, Nontender, Nondistended; Bowel sounds present  EXTREMITIES:  2+ pitting edema in LE b/l  NEUROLOGY: intubated and sedated, was SMALLS prior to sedation  SKIN: Diffuse purpura in b/l       HOSPITAL MEDICATIONS:  MEDICATIONS  (STANDING):  buMETAnide Injectable 2 milliGRAM(s) IV Push two times a day  chlorhexidine 2% Cloths 1 Application(s) Topical daily  heparin  Infusion.  Unit(s)/Hr (16 mL/Hr) IV Continuous <Continuous>  influenza  Vaccine (HIGH DOSE) 0.7 milliLiter(s) IntraMuscular once  midodrine 10 milliGRAM(s) Oral three times a day  ondansetron Injectable 4 milliGRAM(s) IV Push four times a day  pantoprazole  Injectable 40 milliGRAM(s) IV Push two times a day  piperacillin/tazobactam IVPB.. 3.375 Gram(s) IV Intermittent every 12 hours  predniSONE   Tablet 10 milliGRAM(s) Oral daily  simvastatin 10 milliGRAM(s) Oral at bedtime  sodium bicarbonate  Infusion 0.043 mEq/kG/Hr (50 mL/Hr) IV Continuous <Continuous>  sucralfate suspension 1 Gram(s) Oral two times a day    MEDICATIONS  (PRN):  acetaminophen     Tablet .. 650 milliGRAM(s) Oral every 6 hours PRN Temp greater or equal to 38.5C (101.3F), Mild Pain (1 - 3)  heparin   Injectable 7000 Unit(s) IV Push every 6 hours PRN For aPTT less than 40  heparin   Injectable 3500 Unit(s) IV Push every 6 hours PRN For aPTT between 40 - 57      LABS:                        14.4   27.57 )-----------( 119      ( 2023 04:19 )             44.7     Hgb Trend: 14.4<--, 13.5<--, 13.6<--, 13.3<--, 15.8<--  01-03    133<L>  |  90<L>  |  134<H>  ----------------------------<  80  4.8   |  20<L>  |  6.02<H>    Ca    6.9<L>      2023 15:19      Creatinine Trend: 6.02<--, 5.27<--, 4.17<--, 3.79<--  PTT - ( 2023 04:19 )  PTT:41.8 sec    Arterial Blood Gas:   @ 16:40  7.33/37/42/20/67.7/-5.8  ABG lactate: --        MICROBIOLOGY:     RADIOLOGY & ADDITIONAL TESTS:      ASSESSMENT AND PLAN:  76yo Male with PMHx of HTN, HLD, psoriasis on prednisone, and recent possible esophageal/hepatic malignancy/mass presented with acute onset SOB and found to have RLL segmental PE transferred to MICU after a code blue was called for PEA arrest i/s/o PE now intubated and sedated on pressors.    #Neuro  AA&Ox2 at baseline  Currently intubated and sedated with propofol and fentanyl    AMS  -MRI brain w/wo contrast 1/3/23 showing acute/subacute lacunar infarcts at R cerebellum and R occipital  -Neuro following    #Cardiovascular  PEA arrest  - i/s/o PE  - s/p 2 rounds of epinephrine  - now intubated and sedated  -  troponin 222 from 144 on   -  BNP pending  - on epinephrine 0.1 and norepinephrine 0.1  - Cardiology/PERT team consulted  - Vascular following  - POCUS significant for normal RV function with LV systolic dysfunction suspicious for LV infarct vs less likely acute massive PE    Congestive Heart Failure  - cardiac enzymes  - Trop at 165 on admission and proBNP at 97699 on admission, TTE showed no sign of RV strain, no clot in transit, and LVEF of 70%.  - cardiology recs appreciated      HTN  - hold BP meds for now  - currently on levo and epi  - hold diuretic    #Respiratory  Acute hypoxic respiratory failure  - Pt intubated  - AB.10|24|269|8    Pulmonary embolus and L DVT   - AC with Heparin  - Pulmonary following  - Cardiology/PERT team consulted i/s/o PEA arrest  -VA Duplex BLE: acute above & below knee LLE DVT extending into L ext iliac vein; no acute DVT RLE.  -DVT in Right femoral vein (unclear acuity) seen on POCUS     #GI/Nutrition  #metastatic pancreatobiliary adenocarcinoma  -c/b PE/DVT on heparin gtt  -hem/onc following as below    - NPO for now    #/Renal  LIVIER  -Creatinine 7.56 s/p arrest, came in with LIVIER with presenting Cr 3.79  -s/p bicarb drip  -serrato in place    #?adrenal insufficiency  - Nephro following  -Started on hydrocortisone 100q8h ivp  -Continue to bladder scan   -Dose meds for  Cr CL 15-20 ml/min     #Skin  Psoriasis  - stress dose steroid    #ID  Sepsis/ Pneumonia  - s/p 5 day course of Zosyn completed on   - Blood cultures NGTD  - MRSA not detected  -Urine legionella antigen not detected  -RVP negative  - ID following      #Endocrine  #?adrenal insufficiency  -see above    #Hematologic/Onc/DVT ppx  Metastatic Poorly Differentiated Adenocarcinoma  -Hem/Onc consulted and following:  -Patient recently admitted to [facility] and was found to have an esophageal mass and liver mass.   -IR biopsied the liver mass 22 and now pathology shows poorly differentiated adenocarcinoma favoring upper GI or pancreaticobiliary origin.  - CT chest non-con and MRI abdomen w/wo IV contrast were performed at [facility].   - planned to get a PET/CT soon but unable to get one due to renal function.  - CTA chest 22 showing pulmonary embolus (started on heparin gtt for this new finding already), indeterminate lung nodules which will require follow up CT chest w/ IV contrast in 3 months  - At this time, does not require any additional imaging   - Still awaiting results of NGS testing (HER2, PD-L1), which will determine if he is a candidate for immunotherapy  - Recommend following up with GI for PEG or PEJ tube as patient has not been able to tolerate PO for weeks now.      Thrombocytopenia  - follow Platelets  - HIT  - if worsening thrombocytopenia may need IVC filter    DVT prophylaxis  - heparin gtt    #Ethics  full code MICU Accept Note    CHIEF COMPLAINT: PEA arrest    HPI / INTERVAL HISTORY:   76 y/o M PMH HTN, HLD, psoriasis on chronic prednisone presented with SOB. Recent admission for pneumonia and discharged approximately two weeks ago. Also found with an esophageal and liver mass s/p IR liver biopsy on 22 at [facility]. 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     Pt was admitted for acute right lower lobe PE with DVT of the LLE, pt currently on a heparin gtt.   Pt on zosyn for septic PNA, had an LIVIER w/anion gap acidosis s/p bicarb.  Pt had AMS s/p MR head showing an acute lacunar infarct. MRA head pending. Pt's FOBT was positive and was started on PPIs.    An RRT was called at 0400 today : Pt pulled out his IV access and began bleeding for approximately five minutes before it stopped. Pt currently on a heparin gtt for acute PE with LE DVT. BP obtained was 97/62, spO2 93% on RA. Stat CBC was stable with hgb 14.4 and hct 44.7. PTT was elevated at 41.8. Pt was able to answer questions and follow commands at the time. After shifting the patient to a sitting position, his SOB visibly improved. Pt was then scheduled for bumex as this seemed 2/2 overload.    A code was then called for PEA arrest i/s/o PE. 2 rounds of chest compressions were performed at bedside, ROSC was achieved. Anesthesia stat was called and the patient was intubated and then transferred to MICU now on pressors norepinephrine at 0.1 and epinephrine at 0.1.      PAST MEDICAL & SURGICAL HISTORY:  Psoriasis      Esophageal mass      Benign essential HTN        HOME MEDICATIONS:  * Outpatient Medication Status not yet specified    Allergies    No Known Allergies    Intolerances          REVIEW OF SYSTEMS:  [X] Unable to assess ROS because pt intubated and sedated    OBJECTIVE:  ICU Vital Signs Last 24 Hrs  T(C): 36.6 (2023 20:30), Max: 36.6 (2023 20:30)  T(F): 97.8 (2023 20:30), Max: 97.8 (2023 20:30)  HR: 91 (2023 04:40) (72 - 91)  BP: 115/74 (2023 04:40) (94/57 - 115/74)  BP(mean): --  ABP: --  ABP(mean): --  RR: 18 (2023 04:40) (18 - 19)  SpO2: 96% (2023 04:40) (90% - 96%)    O2 Parameters below as of 2023 04:40  Patient On (Oxygen Delivery Method): nasal cannula  O2 Flow (L/min): 5        PHYSICAL EXAM:  GENERAL: intubated and sedated  HEAD:  Atraumatic, Normocephalic  EYES: Pupils round and sluggish b/l, conjunctiva and sclera clear  NECK: Supple, No JVD  CHEST/LUNG: Clear to auscultation bilaterally; No wheeze  HEART: Regular rate and rhythm; No murmurs, rubs, or gallops  ABDOMEN: Soft, Nontender, Nondistended; Bowel sounds present  EXTREMITIES:  2+ pitting edema in LE b/l  NEUROLOGY: intubated and sedated, was SMALLS prior to sedation  SKIN: Diffuse purpura in b/l       HOSPITAL MEDICATIONS:  MEDICATIONS  (STANDING):  buMETAnide Injectable 2 milliGRAM(s) IV Push two times a day  chlorhexidine 2% Cloths 1 Application(s) Topical daily  heparin  Infusion.  Unit(s)/Hr (16 mL/Hr) IV Continuous <Continuous>  influenza  Vaccine (HIGH DOSE) 0.7 milliLiter(s) IntraMuscular once  midodrine 10 milliGRAM(s) Oral three times a day  ondansetron Injectable 4 milliGRAM(s) IV Push four times a day  pantoprazole  Injectable 40 milliGRAM(s) IV Push two times a day  piperacillin/tazobactam IVPB.. 3.375 Gram(s) IV Intermittent every 12 hours  predniSONE   Tablet 10 milliGRAM(s) Oral daily  simvastatin 10 milliGRAM(s) Oral at bedtime  sodium bicarbonate  Infusion 0.043 mEq/kG/Hr (50 mL/Hr) IV Continuous <Continuous>  sucralfate suspension 1 Gram(s) Oral two times a day    MEDICATIONS  (PRN):  acetaminophen     Tablet .. 650 milliGRAM(s) Oral every 6 hours PRN Temp greater or equal to 38.5C (101.3F), Mild Pain (1 - 3)  heparin   Injectable 7000 Unit(s) IV Push every 6 hours PRN For aPTT less than 40  heparin   Injectable 3500 Unit(s) IV Push every 6 hours PRN For aPTT between 40 - 57      LABS:                        14.4   27.57 )-----------( 119      ( 2023 04:19 )             44.7     Hgb Trend: 14.4<--, 13.5<--, 13.6<--, 13.3<--, 15.8<--  01-03    133<L>  |  90<L>  |  134<H>  ----------------------------<  80  4.8   |  20<L>  |  6.02<H>    Ca    6.9<L>      2023 15:19      Creatinine Trend: 6.02<--, 5.27<--, 4.17<--, 3.79<--  PTT - ( 2023 04:19 )  PTT:41.8 sec    Arterial Blood Gas:   @ 16:40  7.33/37/42/20/67.7/-5.8  ABG lactate: --        MICROBIOLOGY:     RADIOLOGY & ADDITIONAL TESTS:      ASSESSMENT AND PLAN:  76yo Male with PMHx of HTN, HLD, psoriasis on prednisone, and recent possible esophageal/hepatic malignancy/mass presented with acute onset SOB and found to have RLL segmental PE transferred to MICU after a code blue was called for PEA arrest i/s/o PE now intubated and sedated on pressors.    #Neuro  AA&Ox2 at baseline  Currently intubated and sedated with propofol and fentanyl    AMS  -MRI brain w/wo contrast 1/3/23 showing acute/subacute lacunar infarcts at R cerebellum and R occipital  -Neuro following    #Cardiovascular  PEA arrest  - i/s/o PE  - s/p 2 rounds of epinephrine  - now intubated and sedated  -  troponin 222 from 144 on   -  BNP pending  - on epinephrine 0.1 and norepinephrine 0.1  - Cardiology/PERT team consulted  - Vascular following  - POCUS significant for normal RV function with LV systolic dysfunction suspicious for LV infarct vs less likely acute massive PE    Congestive Heart Failure  - cardiac enzymes  - Trop at 165 on admission and proBNP [place] 51096 on admission, TTE showed no sign of RV strain, no clot in transit, and LVEF of 70%.  - cardiology recs appreciated      HTN  - hold BP meds for now  - currently on levo and epi  - hold diuretic    #Respiratory  Acute hypoxic respiratory failure  - Pt intubated  - AB.10|24|269|8    Pulmonary embolus and L DVT   - AC with Heparin  - Pulmonary following  - Cardiology/PERT team consulted i/s/o PEA arrest  -VA Duplex BLE: acute above & below knee LLE DVT extending into L ext iliac vein; no acute DVT RLE.  -DVT in Right femoral vein (unclear acuity) seen on POCUS     #GI/Nutrition  #metastatic pancreatobiliary adenocarcinoma  -c/b PE/DVT on heparin gtt  -hem/onc following as below    - NPO for now    #/Renal  LIVIER  -Creatinine 7.56 s/p arrest, came in with LIVIER with presenting Cr 3.79  -s/p bicarb drip  -serrato in place    #?adrenal insufficiency  - Nephro following  -Started on hydrocortisone 100q8h ivp  -Continue to bladder scan   -Dose meds for  Cr CL 15-20 ml/min     #Skin  Psoriasis  - stress dose steroid    #ID  Sepsis/ Pneumonia  - s/p 5 day course of Zosyn completed on   - Blood cultures NGTD  - MRSA not detected  -Urine legionella antigen not detected  -RVP negative  - ID following      #Endocrine  #?adrenal insufficiency  -see above    #Hematologic/Onc/DVT ppx  Metastatic Poorly Differentiated Adenocarcinoma  -Hem/Onc consulted and following:  -Patient recently admitted to [facility] and was found to have an esophageal mass and liver mass.   -IR biopsied the liver mass 22 and now pathology shows poorly differentiated adenocarcinoma favoring upper GI or pancreaticobiliary origin.  - CT chest non-con and MRI abdomen w/wo IV contrast were performed at [facility].   - planned to get a PET/CT soon but unable to get one due to renal function.  - CTA chest 22 showing pulmonary embolus (started on heparin gtt for this new finding already), indeterminate lung nodules which will require follow up CT chest w/ IV contrast in 3 months  - At this time, does not require any additional imaging   - Still awaiting results of NGS testing (HER2, PD-L1), which will determine if he is a candidate for immunotherapy  - Recommend following up with GI for PEG or PEJ tube as patient has not been able to tolerate PO for weeks now.      Thrombocytopenia  - follow Platelets  - HIT  - if worsening thrombocytopenia may need IVC filter    DVT prophylaxis  - heparin gtt    #Ethics  full code MICU Accept Note    CHIEF COMPLAINT: PEA arrest    HPI / INTERVAL HISTORY:   76 y/o M PMH HTN, HLD, psoriasis on chronic prednisone presented with SOB. Recent admission for pneumonia and discharged approximately two weeks ago. Also found with an esophageal and liver mass s/p IR liver biopsy on 22 at [facility]. 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     Pt was admitted for acute right lower lobe PE with DVT of the LLE, pt currently on a heparin gtt.   Pt on zosyn for septic PNA, had an LIVIER w/anion gap acidosis s/p bicarb.  Pt had AMS s/p MR head showing an acute lacunar infarct. MRA head pending. Pt's FOBT was positive and was started on PPIs.    An RRT was called at 0400 today : Pt pulled out his IV access and began bleeding for approximately five minutes before it stopped. Pt currently on a heparin gtt for acute PE with LE DVT. BP obtained was 97/62, spO2 93% on RA. Stat CBC was stable with hgb 14.4 and hct 44.7. PTT was elevated at 41.8. Pt was able to answer questions and follow commands at the time. After shifting the patient to a sitting position, his SOB visibly improved. Pt was then scheduled for bumex as this seemed 2/2 overload.    A code was then called for PEA arrest i/s/o PE. 2 rounds of chest compressions were performed at bedside, ROSC was achieved. Anesthesia stat was called and the patient was intubated and then transferred to MICU now on pressors norepinephrine at 0.1 and epinephrine at 0.1.      PAST MEDICAL & SURGICAL HISTORY:  Psoriasis      Esophageal mass      Benign essential HTN        HOME MEDICATIONS:  * Outpatient Medication Status not yet specified    Allergies    No Known Allergies    Intolerances          REVIEW OF SYSTEMS:  [X] Unable to assess ROS because pt intubated and sedated    OBJECTIVE:  ICU Vital Signs Last 24 Hrs  T(C): 36.6 (2023 20:30), Max: 36.6 (2023 20:30)  T(F): 97.8 (2023 20:30), Max: 97.8 (2023 20:30)  HR: 91 (2023 04:40) (72 - 91)  BP: 115/74 (2023 04:40) (94/57 - 115/74)  BP(mean): --  ABP: --  ABP(mean): --  RR: 18 (2023 04:40) (18 - 19)  SpO2: 96% (2023 04:40) (90% - 96%)    O2 Parameters below as of 2023 04:40  Patient On (Oxygen Delivery Method): nasal cannula  O2 Flow (L/min): 5        PHYSICAL EXAM:  GENERAL: intubated and sedated  HEAD:  Atraumatic, Normocephalic  EYES: Pupils round and sluggish b/l, conjunctiva and sclera clear  NECK: Supple, No JVD  CHEST/LUNG: Clear to auscultation bilaterally; No wheeze  HEART: Regular rate and rhythm; No murmurs, rubs, or gallops  ABDOMEN: Soft, Nontender, Nondistended; Bowel sounds present  EXTREMITIES:  2+ pitting edema in LE b/l  NEUROLOGY: intubated and sedated, was SMALLS prior to sedation  SKIN: Diffuse purpura in b/l       HOSPITAL MEDICATIONS:  MEDICATIONS  (STANDING):  buMETAnide Injectable 2 milliGRAM(s) IV Push two times a day  chlorhexidine 2% Cloths 1 Application(s) Topical daily  heparin  Infusion.  Unit(s)/Hr (16 mL/Hr) IV Continuous <Continuous>  influenza  Vaccine (HIGH DOSE) 0.7 milliLiter(s) IntraMuscular once  midodrine 10 milliGRAM(s) Oral three times a day  ondansetron Injectable 4 milliGRAM(s) IV Push four times a day  pantoprazole  Injectable 40 milliGRAM(s) IV Push two times a day  piperacillin/tazobactam IVPB.. 3.375 Gram(s) IV Intermittent every 12 hours  predniSONE   Tablet 10 milliGRAM(s) Oral daily  simvastatin 10 milliGRAM(s) Oral at bedtime  sodium bicarbonate  Infusion 0.043 mEq/kG/Hr (50 mL/Hr) IV Continuous <Continuous>  sucralfate suspension 1 Gram(s) Oral two times a day    MEDICATIONS  (PRN):  acetaminophen     Tablet .. 650 milliGRAM(s) Oral every 6 hours PRN Temp greater or equal to 38.5C (101.3F), Mild Pain (1 - 3)  heparin   Injectable 7000 Unit(s) IV Push every 6 hours PRN For aPTT less than 40  heparin   Injectable 3500 Unit(s) IV Push every 6 hours PRN For aPTT between 40 - 57      LABS:                        14.4   27.57 )-----------( 119      ( 2023 04:19 )             44.7     Hgb Trend: 14.4<--, 13.5<--, 13.6<--, 13.3<--, 15.8<--  01-03    133<L>  |  90<L>  |  134<H>  ----------------------------<  80  4.8   |  20<L>  |  6.02<H>    Ca    6.9<L>      2023 15:19      Creatinine Trend: 6.02<--, 5.27<--, 4.17<--, 3.79<--  PTT - ( 2023 04:19 )  PTT:41.8 sec    Arterial Blood Gas:   @ 16:40  7.33/37/42/20/67.7/-5.8  ABG lactate: --        MICROBIOLOGY:     RADIOLOGY & ADDITIONAL TESTS:      ASSESSMENT AND PLAN:  76yo Male with PMHx of HTN, HLD, psoriasis on prednisone, and recent possible esophageal/hepatic malignancy/mass presented with acute onset SOB and found to have RLL segmental PE transferred to MICU after a code blue was called for PEA arrest i/s/o PE now intubated and sedated on pressors.    #Neuro  AA&Ox2 at baseline  Currently intubated and sedated with propofol and fentanyl    AMS  -MRI brain w/wo contrast 1/3/23 showing acute/subacute lacunar infarcts at R cerebellum and R occipital  -Neuro following    #Cardiovascular  PEA arrest  - i/s/o PE  - s/p 2 rounds of epinephrine  - now intubated and sedated  -  troponin 222 from 144 on   -  BNP pending  - on epinephrine 0.1 and norepinephrine 0.1  - Cardiology/PERT team consulted  - Vascular following  - POCUS significant for normal RV function with LV systolic dysfunction suspicious for LV infarct vs less likely acute massive PE    Congestive Heart Failure  - cardiac enzymes  - Trop at 165 on admission and proBNP at 92675 on admission, TTE showed no sign of RV strain, no clot in transit, and LVEF of 70%.  - cardiology recs appreciated      HTN  - hold BP meds for now  - currently on levo and epi  - hold diuretic    #Respiratory  Acute hypoxic respiratory failure  - Pt intubated  - AB.10|24|269|8    Pulmonary embolus and L DVT   - AC with Heparin  - Pulmonary following  - Cardiology/PERT team consulted i/s/o PEA arrest  -VA Duplex BLE: acute above & below knee LLE DVT extending into L ext iliac vein; no acute DVT RLE.  -DVT in Right femoral vein (unclear acuity) seen on POCUS     #GI/Nutrition  #metastatic pancreatobiliary adenocarcinoma  -c/b PE/DVT on heparin gtt  -hem/onc following as below    - NPO for now    #/Renal  LIVIER  -Creatinine 7.56 s/p arrest, came in with LIVIER with presenting Cr 3.79  -s/p bicarb drip  -serrato in place    #?adrenal insufficiency  - Nephro following  -Started on hydrocortisone 100q8h ivp  -Continue to bladder scan   -Dose meds for  Cr CL 15-20 ml/min     #Skin  Psoriasis  - stress dose steroid    #ID  Sepsis/ Pneumonia  - s/p 5 day course of Zosyn completed on   - Blood cultures NGTD  - MRSA not detected  -Urine legionella antigen not detected  -RVP negative  - ID following      #Endocrine  #?adrenal insufficiency  -see above    #Hematologic/Onc/DVT ppx  Metastatic Poorly Differentiated Adenocarcinoma  -Hem/Onc consulted and following:  -Patient recently admitted to [facility] and was found to have an esophageal mass and liver mass.   -IR biopsied the liver mass 22 and now pathology shows poorly differentiated adenocarcinoma favoring upper GI or pancreaticobiliary origin.  - CT chest non-con and MRI abdomen w/wo IV contrast were performed at [facility].   - planned to get a PET/CT soon but unable to get one due to renal function.  - CTA chest 22 showing pulmonary embolus (started on heparin gtt for this new finding already), indeterminate lung nodules which will require follow up CT chest w/ IV contrast in 3 months  - At this time, does not require any additional imaging   - Still awaiting results of NGS testing (HER2, PD-L1), which will determine if he is a candidate for immunotherapy  - Recommend following up with GI for PEG or PEJ tube as patient has not been able to tolerate PO for weeks now.      Thrombocytopenia  - follow Platelets  - HIT  - if worsening thrombocytopenia may need IVC filter    DVT prophylaxis  - heparin gtt    #Ethics  full code MICU Accept Note    CHIEF COMPLAINT: PEA arrest    HPI / INTERVAL HISTORY:   76 y/o M PMH HTN, HLD, psoriasis on chronic prednisone presented with SOB. Recent admission for pneumonia and discharged approximately two weeks ago. Also found with an esophageal and liver mass s/p IR liver biopsy on 22 at [facility]. 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     Pt was admitted for acute right lower lobe PE with DVT of the LLE, pt currently on a heparin gtt.   Pt on zosyn for septic PNA, had an LIVIER w/anion gap acidosis s/p bicarb.  Pt had AMS s/p MR head showing an acute lacunar infarct. MRA head pending. Pt's FOBT was positive and was started on PPIs.    An RRT was called at 0400 today : Pt pulled out his IV access and began bleeding for approximately five minutes before it stopped. Pt currently on a heparin gtt for acute PE with LE DVT. BP obtained was 97/62, spO2 93% on RA. Stat CBC was stable with hgb 14.4 and hct 44.7. PTT was elevated at 41.8. Pt was able to answer questions and follow commands at the time. After shifting the patient to a sitting position, his SOB visibly improved. Pt was then scheduled for bumex as this seemed 2/2 overload.    A code was then called for PEA arrest i/s/o PE. 2 rounds of chest compressions were performed at bedside, ROSC was achieved. Anesthesia stat was called and the patient was intubated and then transferred to MICU now on pressors norepinephrine at 0.1 and epinephrine at 0.1.      PAST MEDICAL & SURGICAL HISTORY:  Psoriasis      Esophageal mass      Benign essential HTN        HOME MEDICATIONS:  * Outpatient Medication Status not yet specified    Allergies    No Known Allergies    Intolerances          REVIEW OF SYSTEMS:  [X] Unable to assess ROS because pt intubated and sedated    OBJECTIVE:  ICU Vital Signs Last 24 Hrs  T(C): 36.6 (2023 20:30), Max: 36.6 (2023 20:30)  T(F): 97.8 (2023 20:30), Max: 97.8 (2023 20:30)  HR: 91 (2023 04:40) (72 - 91)  BP: 115/74 (2023 04:40) (94/57 - 115/74)  BP(mean): --  ABP: --  ABP(mean): --  RR: 18 (2023 04:40) (18 - 19)  SpO2: 96% (2023 04:40) (90% - 96%)    O2 Parameters below as of 2023 04:40  Patient On (Oxygen Delivery Method): nasal cannula  O2 Flow (L/min): 5        PHYSICAL EXAM:  GENERAL: intubated and sedated  HEAD:  Atraumatic, Normocephalic  EYES: Pupils round and sluggish b/l, conjunctiva and sclera clear  NECK: Supple, No JVD  CHEST/LUNG: Clear to auscultation bilaterally; No wheeze  HEART: Regular rate and rhythm; No murmurs, rubs, or gallops  ABDOMEN: Soft, Nontender, Nondistended; Bowel sounds present  EXTREMITIES:  2+ pitting edema in LE b/l  NEUROLOGY: intubated and sedated, was SMALLS prior to sedation  SKIN: Diffuse purpura in b/l       HOSPITAL MEDICATIONS:  MEDICATIONS  (STANDING):  buMETAnide Injectable 2 milliGRAM(s) IV Push two times a day  chlorhexidine 2% Cloths 1 Application(s) Topical daily  heparin  Infusion.  Unit(s)/Hr (16 mL/Hr) IV Continuous <Continuous>  influenza  Vaccine (HIGH DOSE) 0.7 milliLiter(s) IntraMuscular once  midodrine 10 milliGRAM(s) Oral three times a day  ondansetron Injectable 4 milliGRAM(s) IV Push four times a day  pantoprazole  Injectable 40 milliGRAM(s) IV Push two times a day  piperacillin/tazobactam IVPB.. 3.375 Gram(s) IV Intermittent every 12 hours  predniSONE   Tablet 10 milliGRAM(s) Oral daily  simvastatin 10 milliGRAM(s) Oral at bedtime  sodium bicarbonate  Infusion 0.043 mEq/kG/Hr (50 mL/Hr) IV Continuous <Continuous>  sucralfate suspension 1 Gram(s) Oral two times a day    MEDICATIONS  (PRN):  acetaminophen     Tablet .. 650 milliGRAM(s) Oral every 6 hours PRN Temp greater or equal to 38.5C (101.3F), Mild Pain (1 - 3)  heparin   Injectable 7000 Unit(s) IV Push every 6 hours PRN For aPTT less than 40  heparin   Injectable 3500 Unit(s) IV Push every 6 hours PRN For aPTT between 40 - 57      LABS:                        14.4   27.57 )-----------( 119      ( 2023 04:19 )             44.7     Hgb Trend: 14.4<--, 13.5<--, 13.6<--, 13.3<--, 15.8<--  01-03    133<L>  |  90<L>  |  134<H>  ----------------------------<  80  4.8   |  20<L>  |  6.02<H>    Ca    6.9<L>      2023 15:19      Creatinine Trend: 6.02<--, 5.27<--, 4.17<--, 3.79<--  PTT - ( 2023 04:19 )  PTT:41.8 sec    Arterial Blood Gas:   @ 16:40  7.33/37/42/20/67.7/-5.8  ABG lactate: --        MICROBIOLOGY:     RADIOLOGY & ADDITIONAL TESTS:      ASSESSMENT AND PLAN:  78yo Male with PMHx of HTN, HLD, psoriasis on prednisone, and recent possible esophageal/hepatic malignancy/mass presented with acute onset SOB and found to have RLL segmental PE transferred to MICU after a code blue was called for PEA arrest i/s/o PE now intubated and sedated on pressors.    #Neuro  AA&Ox2 at baseline  Currently intubated and sedated with propofol and fentanyl    AMS  -MRI brain w/wo contrast 1/3/23 showing acute/subacute lacunar infarcts at R cerebellum and R occipital  -Neuro following      #Cardiovascular  PEA arrest  - i/s/o PE  - s/p 2 rounds of epinephrine  - now intubated and sedated  -  troponin 222 from 144 on   -  BNP pending  - on epinephrine 0.1 and norepinephrine 0.1 --> discontinue epinephrine gtt and titrate norepinephrine gtt to maintain MAP>65  - Cardiology/PERT team consulted  - Vascular following  - POCUS significant for normal RV function with LV systolic dysfunction suspicious for LV infarct vs less likely acute massive PE    Congestive Heart Failure  - cardiac enzymes  - Trop at 165 on admission and proBNP at 12481 on admission, TTE showed no sign of RV strain, no clot in transit, and LVEF of 70%.  - cardiology recs appreciated      HTN  - hold BP meds for now  - currently on levo and epi  - hold diuretic    #Respiratory  Acute hypoxic respiratory failure  - Pt intubated  - AB.10|24|269|8    Pulmonary embolus and L DVT   - AC with Heparin; level is supratherapeutic; PTT >200 --> will hold for now, restart as appropriate  - Pulmonary following  - Cardiology/PERT team consulted i/s/o PEA arrest  -VA Duplex BLE: acute above & below knee LLE DVT extending into L ext iliac vein; no acute DVT RLE.  -DVT in Right femoral vein (unclear acuity) seen on POCUS     #GI/Nutrition  #metastatic pancreatobiliary adenocarcinoma  -c/b PE/DVT on heparin gtt, holding for supratherapeutic PTT  -hem/onc following as below    -Monitor for signs of GI bleeding  -Transfuse to maintain Hgb >7  - NPO for now, plan to place OGT when possible    #/Renal  Anion Gap Metabolic acidosis  - Likely 2/2 LIVIER and lactic acidosis: Lactate 14  - AG 33 on   - AB.10|24|269|8  - Switching bicarb pushes to bicarb drip 75mEq at 75ml/hr  - Holding off on dialysis per Dr. Khan as pt is not a candidate at this time with multiple medical comorbidities and tenuous clinical course. Medical management for now and will reassess for dialysis benefit pending clinical course    LIVIER  -Creatinine 7.56 s/p arrest, came in with LIVIER with presenting Cr 3.79  -s/p bicarb drip  -serrato in place    #?adrenal insufficiency  - Nephro following  -Started on hydrocortisone 100q8h ivp  -Continue to bladder scan   -Dose meds for  Cr CL 15-20 ml/min     #Skin  Psoriasis  - stress dose steroid    #ID  Sepsis/ Pneumonia  - s/p 5 day course of Zosyn completed on   - Blood cultures NGTD  - MRSA not detected  -Urine legionella antigen not detected  - Repeat blood cultures, send sputum culture, UA+UC today   -RVP negative  - ID following      #Endocrine  #?adrenal insufficiency  -see above    #Hematologic/Onc/DVT ppx  Metastatic Poorly Differentiated Adenocarcinoma  -Hem/Onc consulted and following:  -Patient recently admitted to [facility] and was found to have an esophageal mass and liver mass.   -IR biopsied the liver mass 22 and now pathology shows poorly differentiated adenocarcinoma favoring upper GI or pancreaticobiliary origin.  - CT chest non-con and MRI abdomen w/wo IV contrast were performed at [facility].   - planned to get a PET/CT soon but unable to get one due to renal function.  - CTA chest 22 showing pulmonary embolus (started on heparin gtt for this new finding already), indeterminate lung nodules which will require follow up CT chest w/ IV contrast in 3 months  - At this time, does not require any additional imaging   - Still awaiting results of NGS testing (HER2, PD-L1), which will determine if he is a candidate for immunotherapy  - Recommend following up with GI for PEG or PEJ tube as patient has not been able to tolerate PO for weeks now.      Thrombocytopenia  - follow Platelets  - HIT  - if worsening thrombocytopenia may need IVC filter    DVT prophylaxis  - heparin gtt, holding for supratherapeutic PTT    #Ethics  full code MICU Accept Note    CHIEF COMPLAINT: PEA arrest    HPI / INTERVAL HISTORY:   78 y/o M PMH HTN, HLD, psoriasis on chronic prednisone presented with SOB. Recent admission for pneumonia and discharged approximately two weeks ago. Also found with an esophageal and liver mass s/p IR liver biopsy on 22 at [facility]. 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     Pt was admitted for acute right lower lobe PE with DVT of the LLE, pt currently on a heparin gtt.   Pt on zosyn for septic PNA, had an LIVIER w/anion gap acidosis s/p bicarb.  Pt had AMS s/p MR head showing an acute lacunar infarct. MRA head pending. Pt's FOBT was positive and was started on PPIs.    An RRT was called at 0400 today : Pt pulled out his IV access and began bleeding for approximately five minutes before it stopped. Pt currently on a heparin gtt for acute PE with LE DVT. BP obtained was 97/62, spO2 93% on RA. Stat CBC was stable with hgb 14.4 and hct 44.7. PTT was elevated at 41.8. Pt was able to answer questions and follow commands at the time. After shifting the patient to a sitting position, his SOB visibly improved. Pt was then scheduled for bumex as this seemed 2/2 overload.    A code was then called for PEA arrest i/s/o PE. 2 rounds of chest compressions were performed at bedside, ROSC was achieved. Anesthesia stat was called and the patient was intubated and then transferred to MICU now on pressors norepinephrine at 0.1 and epinephrine at 0.1.      PAST MEDICAL & SURGICAL HISTORY:  Psoriasis      Esophageal mass      Benign essential HTN        HOME MEDICATIONS:  * Outpatient Medication Status not yet specified    Allergies    No Known Allergies    Intolerances          REVIEW OF SYSTEMS:  [X] Unable to assess ROS because pt intubated and sedated    OBJECTIVE:  ICU Vital Signs Last 24 Hrs  T(C): 36.6 (2023 20:30), Max: 36.6 (2023 20:30)  T(F): 97.8 (2023 20:30), Max: 97.8 (2023 20:30)  HR: 91 (2023 04:40) (72 - 91)  BP: 115/74 (2023 04:40) (94/57 - 115/74)  BP(mean): --  ABP: --  ABP(mean): --  RR: 18 (2023 04:40) (18 - 19)  SpO2: 96% (2023 04:40) (90% - 96%)    O2 Parameters below as of 2023 04:40  Patient On (Oxygen Delivery Method): nasal cannula  O2 Flow (L/min): 5        PHYSICAL EXAM:  GENERAL: intubated and sedated  HEAD:  Atraumatic, Normocephalic  EYES: Pupils round and sluggish b/l, conjunctiva and sclera clear  NECK: Supple, No JVD  CHEST/LUNG: Clear to auscultation bilaterally; No wheeze  HEART: Regular rate and rhythm; No murmurs, rubs, or gallops  ABDOMEN: Soft, Nontender, Nondistended; Bowel sounds present  EXTREMITIES:  2+ pitting edema in LE b/l  NEUROLOGY: intubated and sedated, was SMALLS prior to sedation  SKIN: Diffuse purpura in b/l       HOSPITAL MEDICATIONS:  MEDICATIONS  (STANDING):  buMETAnide Injectable 2 milliGRAM(s) IV Push two times a day  chlorhexidine 2% Cloths 1 Application(s) Topical daily  heparin  Infusion.  Unit(s)/Hr (16 mL/Hr) IV Continuous <Continuous>  influenza  Vaccine (HIGH DOSE) 0.7 milliLiter(s) IntraMuscular once  midodrine 10 milliGRAM(s) Oral three times a day  ondansetron Injectable 4 milliGRAM(s) IV Push four times a day  pantoprazole  Injectable 40 milliGRAM(s) IV Push two times a day  piperacillin/tazobactam IVPB.. 3.375 Gram(s) IV Intermittent every 12 hours  predniSONE   Tablet 10 milliGRAM(s) Oral daily  simvastatin 10 milliGRAM(s) Oral at bedtime  sodium bicarbonate  Infusion 0.043 mEq/kG/Hr (50 mL/Hr) IV Continuous <Continuous>  sucralfate suspension 1 Gram(s) Oral two times a day    MEDICATIONS  (PRN):  acetaminophen     Tablet .. 650 milliGRAM(s) Oral every 6 hours PRN Temp greater or equal to 38.5C (101.3F), Mild Pain (1 - 3)  heparin   Injectable 7000 Unit(s) IV Push every 6 hours PRN For aPTT less than 40  heparin   Injectable 3500 Unit(s) IV Push every 6 hours PRN For aPTT between 40 - 57      LABS:                        14.4   27.57 )-----------( 119      ( 2023 04:19 )             44.7     Hgb Trend: 14.4<--, 13.5<--, 13.6<--, 13.3<--, 15.8<--  01-03    133<L>  |  90<L>  |  134<H>  ----------------------------<  80  4.8   |  20<L>  |  6.02<H>    Ca    6.9<L>      2023 15:19      Creatinine Trend: 6.02<--, 5.27<--, 4.17<--, 3.79<--  PTT - ( 2023 04:19 )  PTT:41.8 sec    Arterial Blood Gas:   @ 16:40  7.33/37/42/20/67.7/-5.8  ABG lactate: --        MICROBIOLOGY:     RADIOLOGY & ADDITIONAL TESTS:      ASSESSMENT AND PLAN:  78yo Male with PMHx of HTN, HLD, psoriasis on prednisone, and recent possible esophageal/hepatic malignancy/mass presented with acute onset SOB and found to have RLL segmental PE transferred to MICU after a code blue was called for PEA arrest i/s/o PE now intubated and sedated on pressors.    #Neuro  AA&Ox2 at baseline  Currently intubated and sedated with propofol and fentanyl    AMS  -MRI brain w/wo contrast 1/3/23 showing acute/subacute lacunar infarcts at R cerebellum and R occipital  -Neuro following      #Cardiovascular  PEA arrest  - i/s/o PE  - s/p 2 rounds of epinephrine  - now intubated and sedated  -  troponin 222 from 144 on   -  BNP pending  - on epinephrine 0.1 and norepinephrine 0.1 --> discontinue epinephrine gtt and titrate norepinephrine gtt to maintain MAP>65  - Cardiology/PERT team consulted  - Vascular following  - POCUS significant for normal RV function with LV systolic dysfunction suspicious for LV infarct vs less likely acute massive PE    Congestive Heart Failure  - cardiac enzymes  - Trop at 165 on admission [place]NP [place] [address] on admission, TTE showed no sign of RV strain, no clot in transit, and LVEF of 70%.  - cardiology recs appreciated      HTN  - hold BP meds for now  - currently on levo and epi  - hold diuretic    #Respiratory  Acute hypoxic respiratory failure  - Pt intubated  - AB.10|24|269|8    Pulmonary embolus and L DVT   - AC with Heparin; level is supratherapeutic; PTT >200 --> will hold for now, restart as appropriate  - Pulmonary following  - Cardiology/PERT team consulted i/s/o PEA arrest  -VA Duplex BLE: acute above & below knee LLE DVT extending into L ext iliac vein; no acute DVT RLE.  -DVT in Right femoral vein (unclear acuity) seen on POCUS     #GI/Nutrition  #metastatic pancreatobiliary adenocarcinoma  -c/b PE/DVT on heparin gtt, holding for supratherapeutic PTT  -hem/onc following as below    -Monitor for signs of GI bleeding  -Transfuse to maintain Hgb >7  - NPO for now, plan to place OGT when possible    #/Renal  Anion Gap Metabolic acidosis  - Likely 2/2 LIVIER and lactic acidosis: Lactate 14  - AG 33 on   - AB.10|24|269|8  - Switching bicarb pushes to bicarb drip 75mEq at 75ml/hr  - Holding off on dialysis per Dr. Khan as pt is not a candidate at this time with multiple medical comorbidities and tenuous clinical course. Medical management for now and will reassess for dialysis benefit pending clinical course    LIVIER  -Creatinine 7.56 s/p arrest, came in with LIVIER with presenting Cr 3.79  -s/p bicarb drip  -serrato in place    #?adrenal insufficiency  - Nephro following  -Started on hydrocortisone 100q8h ivp  -Continue to bladder scan   -Dose meds for  Cr CL 15-20 ml/min     #Skin  Psoriasis  - stress dose steroid    #ID  Sepsis/ Pneumonia  - s/p 5 day course of Zosyn completed on   - Blood cultures NGTD  - MRSA not detected  -Urine legionella antigen not detected  - Repeat blood cultures, send sputum culture, UA+UC today   -RVP negative  - ID following      #Endocrine  #?adrenal insufficiency  -see above    #Hematologic/Onc/DVT ppx  Metastatic Poorly Differentiated Adenocarcinoma  -Hem/Onc consulted and following:  -Patient recently admitted to [facility] and was found to have an esophageal mass and liver mass.   -IR biopsied the liver mass 22 and now pathology shows poorly differentiated adenocarcinoma favoring upper GI or pancreaticobiliary origin.  - CT chest non-con and MRI abdomen w/wo IV contrast were performed at [facility].   - planned to get a PET/CT soon but unable to get one due to renal function.  - CTA chest 22 showing pulmonary embolus (started on heparin gtt for this new finding already), indeterminate lung nodules which will require follow up CT chest w/ IV contrast in 3 months  - At this time, does not require any additional imaging   - Still awaiting results of NGS testing (HER2, PD-L1), which will determine if he is a candidate for immunotherapy  - Recommend following up with GI for PEG or PEJ tube as patient has not been able to tolerate PO for weeks now.      Thrombocytopenia  - follow Platelets  - HIT  - if worsening thrombocytopenia may need IVC filter    DVT prophylaxis  - heparin gtt, holding for supratherapeutic PTT    #Ethics  full code MICU Accept Note    CHIEF COMPLAINT: PEA arrest    HPI / INTERVAL HISTORY:   76 y/o M PMH HTN, HLD, psoriasis on chronic prednisone presented with SOB. Recent admission for pneumonia and discharged approximately two weeks ago. Also found with an esophageal and liver mass s/p IR liver biopsy on 22 at [facility]. 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     Pt was admitted for acute right lower lobe PE with DVT of the LLE, pt currently on a heparin gtt.   Pt on zosyn for septic PNA, had an LIVIER w/anion gap acidosis s/p bicarb.  Pt had AMS s/p MR head showing an acute lacunar infarct. MRA head pending. Pt's FOBT was positive and was started on PPIs.    An RRT was called at 0400 today : Pt pulled out his IV access and began bleeding for approximately five minutes before it stopped. Pt currently on a heparin gtt for acute PE with LE DVT. BP obtained was 97/62, spO2 93% on RA. Stat CBC was stable with hgb 14.4 and hct 44.7. PTT was elevated at 41.8. Pt was able to answer questions and follow commands at the time. After shifting the patient to a sitting position, his SOB visibly improved. Pt was then scheduled for bumex as this seemed 2/2 overload.    A code was then called for PEA arrest i/s/o PE. 2 rounds of chest compressions were performed at bedside, ROSC was achieved. Anesthesia stat was called and the patient was intubated and then transferred to MICU now on pressors norepinephrine at 0.1 and epinephrine at 0.1.      PAST MEDICAL & SURGICAL HISTORY:  Psoriasis      Esophageal mass      Benign essential HTN        HOME MEDICATIONS:  * Outpatient Medication Status not yet specified    Allergies    No Known Allergies    Intolerances          REVIEW OF SYSTEMS:  [X] Unable to assess ROS because pt intubated and sedated    OBJECTIVE:  ICU Vital Signs Last 24 Hrs  T(C): 36.6 (2023 20:30), Max: 36.6 (2023 20:30)  T(F): 97.8 (2023 20:30), Max: 97.8 (2023 20:30)  HR: 91 (2023 04:40) (72 - 91)  BP: 115/74 (2023 04:40) (94/57 - 115/74)  BP(mean): --  ABP: --  ABP(mean): --  RR: 18 (2023 04:40) (18 - 19)  SpO2: 96% (2023 04:40) (90% - 96%)    O2 Parameters below as of 2023 04:40  Patient On (Oxygen Delivery Method): nasal cannula  O2 Flow (L/min): 5        PHYSICAL EXAM:  GENERAL: intubated and sedated  HEAD:  Atraumatic, Normocephalic  EYES: Pupils round and sluggish b/l, conjunctiva and sclera clear  NECK: Supple, No JVD  CHEST/LUNG: Clear to auscultation bilaterally; No wheeze  HEART: Regular rate and rhythm; No murmurs, rubs, or gallops  ABDOMEN: Soft, Nontender, Nondistended; Bowel sounds present  EXTREMITIES:  2+ pitting edema in LE b/l  NEUROLOGY: intubated and sedated, was SMALLS prior to sedation  SKIN: Diffuse purpura in b/l       HOSPITAL MEDICATIONS:  MEDICATIONS  (STANDING):  buMETAnide Injectable 2 milliGRAM(s) IV Push two times a day  chlorhexidine 2% Cloths 1 Application(s) Topical daily  heparin  Infusion.  Unit(s)/Hr (16 mL/Hr) IV Continuous <Continuous>  influenza  Vaccine (HIGH DOSE) 0.7 milliLiter(s) IntraMuscular once  midodrine 10 milliGRAM(s) Oral three times a day  ondansetron Injectable 4 milliGRAM(s) IV Push four times a day  pantoprazole  Injectable 40 milliGRAM(s) IV Push two times a day  piperacillin/tazobactam IVPB.. 3.375 Gram(s) IV Intermittent every 12 hours  predniSONE   Tablet 10 milliGRAM(s) Oral daily  simvastatin 10 milliGRAM(s) Oral at bedtime  sodium bicarbonate  Infusion 0.043 mEq/kG/Hr (50 mL/Hr) IV Continuous <Continuous>  sucralfate suspension 1 Gram(s) Oral two times a day    MEDICATIONS  (PRN):  acetaminophen     Tablet .. 650 milliGRAM(s) Oral every 6 hours PRN Temp greater or equal to 38.5C (101.3F), Mild Pain (1 - 3)  heparin   Injectable 7000 Unit(s) IV Push every 6 hours PRN For aPTT less than 40  heparin   Injectable 3500 Unit(s) IV Push every 6 hours PRN For aPTT between 40 - 57      LABS:                        14.4   27.57 )-----------( 119      ( 2023 04:19 )             44.7     Hgb Trend: 14.4<--, 13.5<--, 13.6<--, 13.3<--, 15.8<--  01-03    133<L>  |  90<L>  |  134<H>  ----------------------------<  80  4.8   |  20<L>  |  6.02<H>    Ca    6.9<L>      2023 15:19      Creatinine Trend: 6.02<--, 5.27<--, 4.17<--, 3.79<--  PTT - ( 2023 04:19 )  PTT:41.8 sec    Arterial Blood Gas:   @ 16:40  7.33/37/42/20/67.7/-5.8  ABG lactate: --        MICROBIOLOGY:     RADIOLOGY & ADDITIONAL TESTS:      ASSESSMENT AND PLAN:  78yo Male with PMHx of HTN, HLD, psoriasis on prednisone, and recent possible esophageal/hepatic malignancy/mass presented with acute onset SOB and found to have RLL segmental PE transferred to MICU after a code blue was called for PEA arrest i/s/o PE now intubated and sedated on pressors.    #Neuro  AA&Ox2 at baseline  Currently intubated and sedated with propofol and fentanyl    AMS  -MRI brain w/wo contrast 1/3/23 showing acute/subacute lacunar infarcts at R cerebellum and R occipital  -Neuro following      #Cardiovascular  PEA arrest  - i/s/o PE  - s/p 2 rounds of epinephrine  - now intubated and sedated  -  troponin 222 from 144 on   -  BNP pending  - on epinephrine 0.1 and norepinephrine 0.1 --> discontinue epinephrine gtt and titrate norepinephrine gtt to maintain MAP>65  - Cardiology/PERT team consulted  - Vascular following  - POCUS significant for normal RV function with LV systolic dysfunction suspicious for LV infarct vs less likely acute massive PE    Congestive Heart Failure  - cardiac enzymes  - Trop at 165 on admission [place]BNP [place] 93806 on admission, TTE showed no sign of RV strain, no clot in transit, and LVEF of 70%.  - cardiology recs appreciated      HTN  - hold BP meds for now  - currently on levo and epi  - hold diuretic    #Respiratory  Acute hypoxic respiratory failure  - Pt intubated  - AB.10|24|269|8    Pulmonary embolus and L DVT   - AC with Heparin; level is supratherapeutic; PTT >200 --> will hold for now, restart as appropriate  - Pulmonary following  - Cardiology/PERT team consulted i/s/o PEA arrest  -VA Duplex BLE: acute above & below knee LLE DVT extending into L ext iliac vein; no acute DVT RLE.  -DVT in Right femoral vein (unclear acuity) seen on POCUS     #GI/Nutrition  #metastatic pancreatobiliary adenocarcinoma  -c/b PE/DVT on heparin gtt, holding for supratherapeutic PTT  -hem/onc following as below    -Monitor for signs of GI bleeding  -Transfuse to maintain Hgb >7  - NPO for now, plan to place OGT when possible    #/Renal  Anion Gap Metabolic acidosis  - Likely 2/2 LIVIER and lactic acidosis: Lactate 14  - AG 33 on   - AB.10|24|269|8  - Switching bicarb pushes to bicarb drip 75mEq at 75ml/hr  - Holding off on dialysis per Dr. Khan as pt is not a candidate at this time with multiple medical comorbidities and tenuous clinical course. Medical management for now and will reassess for dialysis benefit pending clinical course    LIVIER  -Creatinine 7.56 s/p arrest, came in with LIVIER with presenting Cr 3.79  -s/p bicarb drip  -serrato in place    #?adrenal insufficiency  - Nephro following  -Started on hydrocortisone 100q8h ivp  -Continue to bladder scan   -Dose meds for  Cr CL 15-20 ml/min     #Skin  Psoriasis  - stress dose steroid    #ID  Sepsis/ Pneumonia  - s/p 5 day course of Zosyn completed on   - Blood cultures NGTD  - MRSA not detected  -Urine legionella antigen not detected  - Repeat blood cultures, send sputum culture, UA+UC today   -RVP negative  - ID following      #Endocrine  #?adrenal insufficiency  -see above    #Hematologic/Onc/DVT ppx  Metastatic Poorly Differentiated Adenocarcinoma  -Hem/Onc consulted and following:  -Patient recently admitted to [facility] and was found to have an esophageal mass and liver mass.   -IR biopsied the liver mass 22 and now pathology shows poorly differentiated adenocarcinoma favoring upper GI or pancreaticobiliary origin.  - CT chest non-con and MRI abdomen w/wo IV contrast were performed at [facility].   - planned to get a PET/CT soon but unable to get one due to renal function.  - CTA chest 22 showing pulmonary embolus (started on heparin gtt for this new finding already), indeterminate lung nodules which will require follow up CT chest w/ IV contrast in 3 months  - At this time, does not require any additional imaging   - Still awaiting results of NGS testing (HER2, PD-L1), which will determine if he is a candidate for immunotherapy  - Recommend following up with GI for PEG or PEJ tube as patient has not been able to tolerate PO for weeks now.      Thrombocytopenia  - follow Platelets  - HIT  - if worsening thrombocytopenia may need IVC filter    DVT prophylaxis  - heparin gtt, holding for supratherapeutic PTT    #Ethics  full code MICU Accept Note    CHIEF COMPLAINT: PEA arrest    HPI / INTERVAL HISTORY:   76 y/o M PMH HTN, HLD, psoriasis on chronic prednisone presented with SOB. Recent admission for pneumonia and discharged approximately two weeks ago. Also found with an esophageal and liver mass s/p IR liver biopsy on 22 at [facility]. 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     Pt was admitted for acute right lower lobe PE with DVT of the LLE, pt currently on a heparin gtt.   Pt on zosyn for septic PNA, had an LIVIER w/anion gap acidosis s/p bicarb.  Pt had AMS s/p MR head showing an acute lacunar infarct. MRA head pending. Pt's FOBT was positive and was started on PPIs.    An RRT was called at 0400 today : Pt pulled out his IV access and began bleeding for approximately five minutes before it stopped. Pt currently on a heparin gtt for acute PE with LE DVT. BP obtained was 97/62, spO2 93% on RA. Stat CBC was stable with hgb 14.4 and hct 44.7. PTT was elevated at 41.8. Pt was able to answer questions and follow commands at the time. After shifting the patient to a sitting position, his SOB visibly improved. Pt was then scheduled for bumex as this seemed 2/2 overload.    A code was then called for PEA arrest i/s/o PE. 2 rounds of chest compressions were performed at bedside, ROSC was achieved. Anesthesia stat was called and the patient was intubated and then transferred to MICU now on pressors norepinephrine at 0.1 and epinephrine at 0.1.      PAST MEDICAL & SURGICAL HISTORY:  Psoriasis      Esophageal mass      Benign essential HTN        HOME MEDICATIONS:  * Outpatient Medication Status not yet specified    Allergies    No Known Allergies    Intolerances          REVIEW OF SYSTEMS:  [X] Unable to assess ROS because pt intubated and sedated    OBJECTIVE:  ICU Vital Signs Last 24 Hrs  T(C): 36.6 (2023 20:30), Max: 36.6 (2023 20:30)  T(F): 97.8 (2023 20:30), Max: 97.8 (2023 20:30)  HR: 91 (2023 04:40) (72 - 91)  BP: 115/74 (2023 04:40) (94/57 - 115/74)  BP(mean): --  ABP: --  ABP(mean): --  RR: 18 (2023 04:40) (18 - 19)  SpO2: 96% (2023 04:40) (90% - 96%)    O2 Parameters below as of 2023 04:40  Patient On (Oxygen Delivery Method): nasal cannula  O2 Flow (L/min): 5        PHYSICAL EXAM:  GENERAL: intubated and sedated  HEAD:  Atraumatic, Normocephalic  EYES: Pupils round and sluggish b/l, conjunctiva and sclera clear  NECK: Supple, No JVD  CHEST/LUNG: Clear to auscultation bilaterally; No wheeze  HEART: Regular rate and rhythm; No murmurs, rubs, or gallops  ABDOMEN: Soft, Nontender, Nondistended; Bowel sounds present  EXTREMITIES:  2+ pitting edema in LE b/l  NEUROLOGY: intubated and sedated, was SMALLS prior to sedation  SKIN: Diffuse purpura in b/l       HOSPITAL MEDICATIONS:  MEDICATIONS  (STANDING):  buMETAnide Injectable 2 milliGRAM(s) IV Push two times a day  chlorhexidine 2% Cloths 1 Application(s) Topical daily  heparin  Infusion.  Unit(s)/Hr (16 mL/Hr) IV Continuous <Continuous>  influenza  Vaccine (HIGH DOSE) 0.7 milliLiter(s) IntraMuscular once  midodrine 10 milliGRAM(s) Oral three times a day  ondansetron Injectable 4 milliGRAM(s) IV Push four times a day  pantoprazole  Injectable 40 milliGRAM(s) IV Push two times a day  piperacillin/tazobactam IVPB.. 3.375 Gram(s) IV Intermittent every 12 hours  predniSONE   Tablet 10 milliGRAM(s) Oral daily  simvastatin 10 milliGRAM(s) Oral at bedtime  sodium bicarbonate  Infusion 0.043 mEq/kG/Hr (50 mL/Hr) IV Continuous <Continuous>  sucralfate suspension 1 Gram(s) Oral two times a day    MEDICATIONS  (PRN):  acetaminophen     Tablet .. 650 milliGRAM(s) Oral every 6 hours PRN Temp greater or equal to 38.5C (101.3F), Mild Pain (1 - 3)  heparin   Injectable 7000 Unit(s) IV Push every 6 hours PRN For aPTT less than 40  heparin   Injectable 3500 Unit(s) IV Push every 6 hours PRN For aPTT between 40 - 57      LABS:                        14.4   27.57 )-----------( 119      ( 2023 04:19 )             44.7     Hgb Trend: 14.4<--, 13.5<--, 13.6<--, 13.3<--, 15.8<--  01-03    133<L>  |  90<L>  |  134<H>  ----------------------------<  80  4.8   |  20<L>  |  6.02<H>    Ca    6.9<L>      2023 15:19      Creatinine Trend: 6.02<--, 5.27<--, 4.17<--, 3.79<--  PTT - ( 2023 04:19 )  PTT:41.8 sec    Arterial Blood Gas:   @ 16:40  7.33/37/42/20/67.7/-5.8  ABG lactate: --        MICROBIOLOGY:     RADIOLOGY & ADDITIONAL TESTS:      ASSESSMENT AND PLAN:  78yo Male with PMHx of HTN, HLD, psoriasis on prednisone, and recent possible esophageal/hepatic malignancy/mass presented with acute onset SOB and found to have RLL segmental PE transferred to MICU after a code blue was called for PEA arrest i/s/o PE now intubated and sedated on pressors.    ==========NEURO==========  #AA&Ox2 at baseline  -Currently intubated and sedated with propofol and fentanyl    #AMS  -MRI brain w/wo contrast 1/3/23 showing acute/subacute lacunar infarcts at R cerebellum and R occipital  -Neuro following      ==========CARDIOVASCULAR==========  #PEA arrest  - i/s/o PE  - s/p 2 rounds of epinephrine  - now intubated and sedated  -  troponin 222 from 144 on   -  BNP pending  - on epinephrine 0.1 and norepinephrine 0.1 --> discontinue epinephrine gtt and titrate norepinephrine gtt to maintain MAP>65  - Cardiology/PERT team consulted  - Vascular following  - POCUS significant for normal RV function with LV systolic dysfunction suspicious for LV infarct vs less likely acute massive PE    #Congestive Heart Failure  - cardiac enzymes  - Trop at 165 on admission and proBNP at 15256 on admission, TTE showed no sign of RV strain, no clot in transit, and LVEF of 70%.  - cardiology recs appreciated      #HTN  - hold BP meds for now  - currently on levo and epi  - hold diuretic    ==========RESPIRATORY==========  #AHRF  - Pt intubated, titrate vent settings to ABGs  - Vent settings: RR|TV|FiO2|PEEP --> 20|400|40|5  - AB.10|24|269|8    #Pulmonary embolus and L DVT   - AC with Heparin; level is supratherapeutic; PTT >200 --> will hold for now, restart as appropriate  - Pulmonary following  - Cardiology/PERT team consulted i/s/o PEA arrest  -VA Duplex BLE: acute above & below knee LLE DVT extending into L ext iliac vein; no acute DVT RLE.  -DVT in Right femoral vein (unclear acuity) seen on POCUS     =========GI/NUTRITION==========  #metastatic pancreatobiliary adenocarcinoma  -c/b PE/DVT on heparin gtt, holding for supratherapeutic PTT  -hem/onc following as below    -Monitor for signs of GI bleeding  -Transfuse to maintain Hgb >7  - NPO for now, plan to place OGT when possible    ==========/RENAL==========  #Anion Gap Metabolic acidosis  - Likely 2/2 LIVIER and lactic acidosis: Lactate 14  - AG 33 on   - AB.10|24|269|8  - Switching bicarb pushes to bicarb drip 75mEq at 75ml/hr  - Holding off on dialysis per Dr. Khan as pt is not a candidate at this time with multiple medical comorbidities and tenuous clinical course. Medical management for now and will reassess for dialysis benefit pending clinical course    #LIVIER  -Creatinine 7.56 s/p arrest, came in with LIVIER with presenting Cr 3.79 that has been uptrending  -s/p bicarb drip on floors, restarting bicarb gtt  -serrato in place  -strict monitoring of Is and Os    #?adrenal insufficiency  - Nephro following  -Started on hydrocortisone 100q8h ivp  -Continue to bladder scan   -Dose meds for  Cr CL 15-20 ml/min     =========SKIN==========  #Psoriasis  - stress dose steroid    ==========ID==========  #Sepsis/ Pneumonia  - s/p 5 day course of Zosyn completed on , will continue empirically in MICU  - Blood cultures NGTD  - MRSA not detected  -Urine legionella antigen not detected  - Repeat blood cultures, send sputum culture, UA+UC today   -RVP negative  - ID following      ==========ENDOCRINE==========  #?adrenal insufficiency  -see above    ==========HEM/ONC===========  Metastatic Poorly Differentiated Adenocarcinoma  -Hem/Onc consulted and following:  -Patient recently admitted to [facility] and was found to have an esophageal mass and liver mass.   -IR biopsied the liver mass 22 and now pathology shows poorly differentiated adenocarcinoma favoring upper GI or pancreaticobiliary origin.  - CT chest non-con and MRI abdomen w/wo IV contrast were performed at [facility].   - planned to get a PET/CT soon but unable to get one due to renal function.  - CTA chest 22 showing pulmonary embolus (started on heparin gtt for this new finding already), indeterminate lung nodules which will require follow up CT chest w/ IV contrast in 3 months  - At this time, does not require any additional imaging   - Still awaiting results of NGS testing (HER2, PD-L1), which will determine if he is a candidate for immunotherapy  - Recommend following up with GI for PEG or PEJ tube as patient has not been able to tolerate PO for weeks now.      Thrombocytopenia  - follow Platelets  - HIT  - if worsening thrombocytopenia may need IVC filter    ===========DVT prophylaxis===========  - heparin gtt, holding for supratherapeutic PTT    ===========Ethics==========  -Full code MICU Accept Note    CHIEF COMPLAINT: PEA arrest    HPI / INTERVAL HISTORY:   76 y/o M PMH HTN, HLD, psoriasis on chronic prednisone presented with SOB. Recent admission for pneumonia and discharged approximately two weeks ago. Also found with an esophageal and liver mass s/p IR liver biopsy on 22 at [facility]. 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     Pt was admitted for acute right lower lobe PE with DVT of the LLE, pt currently on a heparin gtt.   Pt on zosyn for septic PNA, had an LIVIER w/anion gap acidosis s/p bicarb.  Pt had AMS s/p MR head showing an acute lacunar infarct. MRA head pending. Pt's FOBT was positive and was started on PPIs.    An RRT was called at 0400 today : Pt pulled out his IV access and began bleeding for approximately five minutes before it stopped. Pt currently on a heparin gtt for acute PE with LE DVT. BP obtained was 97/62, spO2 93% on RA. Stat CBC was stable with hgb 14.4 and hct 44.7. PTT was elevated at 41.8. Pt was able to answer questions and follow commands at the time. After shifting the patient to a sitting position, his SOB visibly improved. Pt was then scheduled for bumex as this seemed 2/2 overload.    A code was then called for PEA arrest i/s/o PE. 2 rounds of chest compressions were performed at bedside, ROSC was achieved. Anesthesia stat was called and the patient was intubated and then transferred to MICU now on pressors norepinephrine at 0.1 and epinephrine at 0.1.      PAST MEDICAL & SURGICAL HISTORY:  Psoriasis      Esophageal mass      Benign essential HTN        HOME MEDICATIONS:  * Outpatient Medication Status not yet specified    Allergies    No Known Allergies    Intolerances          REVIEW OF SYSTEMS:  [X] Unable to assess ROS because pt intubated and sedated    OBJECTIVE:  ICU Vital Signs Last 24 Hrs  T(C): 36.6 (2023 20:30), Max: 36.6 (2023 20:30)  T(F): 97.8 (2023 20:30), Max: 97.8 (2023 20:30)  HR: 91 (2023 04:40) (72 - 91)  BP: 115/74 (2023 04:40) (94/57 - 115/74)  BP(mean): --  ABP: --  ABP(mean): --  RR: 18 (2023 04:40) (18 - 19)  SpO2: 96% (2023 04:40) (90% - 96%)    O2 Parameters below as of 2023 04:40  Patient On (Oxygen Delivery Method): nasal cannula  O2 Flow (L/min): 5        PHYSICAL EXAM:  GENERAL: intubated and sedated  HEAD:  Atraumatic, Normocephalic  EYES: Pupils round and sluggish b/l, conjunctiva and sclera clear  NECK: Supple, No JVD  CHEST/LUNG: Clear to auscultation bilaterally; No wheeze  HEART: Regular rate and rhythm; No murmurs, rubs, or gallops  ABDOMEN: Soft, Nontender, Nondistended; Bowel sounds present  EXTREMITIES:  2+ pitting edema in LE b/l  NEUROLOGY: intubated and sedated, was SMALLS prior to sedation  SKIN: Diffuse purpura in b/l       HOSPITAL MEDICATIONS:  MEDICATIONS  (STANDING):  buMETAnide Injectable 2 milliGRAM(s) IV Push two times a day  chlorhexidine 2% Cloths 1 Application(s) Topical daily  heparin  Infusion.  Unit(s)/Hr (16 mL/Hr) IV Continuous <Continuous>  influenza  Vaccine (HIGH DOSE) 0.7 milliLiter(s) IntraMuscular once  midodrine 10 milliGRAM(s) Oral three times a day  ondansetron Injectable 4 milliGRAM(s) IV Push four times a day  pantoprazole  Injectable 40 milliGRAM(s) IV Push two times a day  piperacillin/tazobactam IVPB.. 3.375 Gram(s) IV Intermittent every 12 hours  predniSONE   Tablet 10 milliGRAM(s) Oral daily  simvastatin 10 milliGRAM(s) Oral at bedtime  sodium bicarbonate  Infusion 0.043 mEq/kG/Hr (50 mL/Hr) IV Continuous <Continuous>  sucralfate suspension 1 Gram(s) Oral two times a day    MEDICATIONS  (PRN):  acetaminophen     Tablet .. 650 milliGRAM(s) Oral every 6 hours PRN Temp greater or equal to 38.5C (101.3F), Mild Pain (1 - 3)  heparin   Injectable 7000 Unit(s) IV Push every 6 hours PRN For aPTT less than 40  heparin   Injectable 3500 Unit(s) IV Push every 6 hours PRN For aPTT between 40 - 57      LABS:                        14.4   27.57 )-----------( 119      ( 2023 04:19 )             44.7     Hgb Trend: 14.4<--, 13.5<--, 13.6<--, 13.3<--, 15.8<--  01-03    133<L>  |  90<L>  |  134<H>  ----------------------------<  80  4.8   |  20<L>  |  6.02<H>    Ca    6.9<L>      2023 15:19      Creatinine Trend: 6.02<--, 5.27<--, 4.17<--, 3.79<--  PTT - ( 2023 04:19 )  PTT:41.8 sec    Arterial Blood Gas:   @ 16:40  7.33/37/42/20/67.7/-5.8  ABG lactate: --        MICROBIOLOGY:     RADIOLOGY & ADDITIONAL TESTS:      ASSESSMENT AND PLAN:  78yo Male with PMHx of HTN, HLD, psoriasis on prednisone, and recent possible esophageal/hepatic malignancy/mass presented with acute onset SOB and found to have RLL segmental PE transferred to MICU after a code blue was called for PEA arrest i/s/o PE now intubated and sedated on pressors.    ==========NEURO==========  #AA&Ox2 at baseline  -Currently intubated and sedated with propofol and fentanyl    #AMS  -MRI brain w/wo contrast 1/3/23 showing acute/subacute lacunar infarcts at R cerebellum and R occipital  -Neuro following      ==========CARDIOVASCULAR==========  #PEA arrest  - i/s/o PE  - s/p 2 rounds of epinephrine  - now intubated and sedated  -  troponin 222 from 144 on   -  BNP pending  - on epinephrine 0.1 and norepinephrine 0.1 --> discontinue epinephrine gtt and titrate norepinephrine gtt to maintain MAP>65  - Cardiology/PERT team consulted  - Vascular following  - POCUS significant for normal RV function with LV systolic dysfunction suspicious for LV infarct vs less likely acute massive PE    #Congestive Heart Failure  - cardiac enzymes  - Trop at 165 on admission [place]BNP at [address]58 on admission, TTE showed no sign of RV strain, no clot in transit, and LVEF of 70%.  - cardiology recs appreciated      #HTN  - hold BP meds for now  - currently on levo and epi  - hold diuretic    ==========RESPIRATORY==========  #AHRF  - Pt intubated, titrate vent settings to ABGs  - Vent settings: RR|TV|FiO2|PEEP --> 20|400|40|5  - AB.10|24|269|8    #Pulmonary embolus and L DVT   - AC with Heparin; level is supratherapeutic; PTT >200 --> will hold for now, restart as appropriate  - Pulmonary following  - Cardiology/PERT team consulted i/s/o PEA arrest  -VA Duplex BLE: acute above & below knee LLE DVT extending into L ext iliac vein; no acute DVT RLE.  -DVT in Right femoral vein (unclear acuity) seen on POCUS     =========GI/NUTRITION==========  #metastatic pancreatobiliary adenocarcinoma  -c/b PE/DVT on heparin gtt, holding for supratherapeutic PTT  -hem/onc following as below    -Monitor for signs of GI bleeding  -Transfuse to maintain Hgb >7  - NPO for now, plan to place OGT when possible    ==========/RENAL==========  #Anion Gap Metabolic acidosis  - Likely 2/2 LIVIER and lactic acidosis: Lactate 14  - AG 33 on   - AB.10|24|269|8  - Switching bicarb pushes to bicarb drip 75mEq at 75ml/hr  - Holding off on dialysis per Dr. Khan as pt is not a candidate at this time with multiple medical comorbidities and tenuous clinical course. Medical management for now and will reassess for dialysis benefit pending clinical course    #LIVIER  -Creatinine 7.56 s/p arrest, came in with LIVIER with presenting Cr 3.79 that has been uptrending  -s/p bicarb drip on floors, restarting bicarb gtt  -serrato in place  -strict monitoring of Is and Os    #?adrenal insufficiency  - Nephro following  -Started on hydrocortisone 100q8h ivp  -Continue to bladder scan   -Dose meds for  Cr CL 15-20 ml/min     =========SKIN==========  #Psoriasis  - stress dose steroid    ==========ID==========  #Sepsis/ Pneumonia  - s/p 5 day course of Zosyn completed on , will continue empirically in MICU  - Blood cultures NGTD  - MRSA not detected  -Urine legionella antigen not detected  - Repeat blood cultures, send sputum culture, UA+UC today   -RVP negative  - ID following      ==========ENDOCRINE==========  #?adrenal insufficiency  -see above    ==========HEM/ONC===========  Metastatic Poorly Differentiated Adenocarcinoma  -Hem/Onc consulted and following:  -Patient recently admitted to [facility] and was found to have an esophageal mass and liver mass.   -IR biopsied the liver mass 22 and now pathology shows poorly differentiated adenocarcinoma favoring upper GI or pancreaticobiliary origin.  - CT chest non-con and MRI abdomen w/wo IV contrast were performed at [facility].   - planned to get a PET/CT soon but unable to get one due to renal function.  - CTA chest 22 showing pulmonary embolus (started on heparin gtt for this new finding already), indeterminate lung nodules which will require follow up CT chest w/ IV contrast in 3 months  - At this time, does not require any additional imaging   - Still awaiting results of NGS testing (HER2, PD-L1), which will determine if he is a candidate for immunotherapy  - Recommend following up with GI for PEG or PEJ tube as patient has not been able to tolerate PO for weeks now.      Thrombocytopenia  - follow Platelets  - HIT  - if worsening thrombocytopenia may need IVC filter    ===========DVT prophylaxis===========  - heparin gtt, holding for supratherapeutic PTT    ===========Ethics==========  -Full code MICU Accept Note    CHIEF COMPLAINT: PEA arrest    HPI / INTERVAL HISTORY:   76 y/o M PMH HTN, HLD, psoriasis on chronic prednisone presented with SOB. Recent admission for pneumonia and discharged approximately two weeks ago. Also found with an esophageal and liver mass s/p IR liver biopsy on 22 at [facility]. 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     Pt was admitted for acute right lower lobe PE with DVT of the LLE, pt currently on a heparin gtt.   Pt on zosyn for septic PNA, had an LIVIER w/anion gap acidosis s/p bicarb.  Pt had AMS s/p MR head showing an acute lacunar infarct. MRA head pending. Pt's FOBT was positive and was started on PPIs.    An RRT was called at 0400 today : Pt pulled out his IV access and began bleeding for approximately five minutes before it stopped. Pt currently on a heparin gtt for acute PE with LE DVT. BP obtained was 97/62, spO2 93% on RA. Stat CBC was stable with hgb 14.4 and hct 44.7. PTT was elevated at 41.8. Pt was able to answer questions and follow commands at the time. After shifting the patient to a sitting position, his SOB visibly improved. Pt was then scheduled for bumex as this seemed 2/2 overload.    A code was then called for PEA arrest i/s/o PE. 2 rounds of chest compressions were performed at bedside, ROSC was achieved. Anesthesia stat was called and the patient was intubated and then transferred to MICU now on pressors norepinephrine at 0.1 and epinephrine at 0.1.      PAST MEDICAL & SURGICAL HISTORY:  Psoriasis      Esophageal mass      Benign essential HTN        HOME MEDICATIONS:  * Outpatient Medication Status not yet specified    Allergies    No Known Allergies    Intolerances          REVIEW OF SYSTEMS:  [X] Unable to assess ROS because pt intubated and sedated    OBJECTIVE:  ICU Vital Signs Last 24 Hrs  T(C): 36.6 (2023 20:30), Max: 36.6 (2023 20:30)  T(F): 97.8 (2023 20:30), Max: 97.8 (2023 20:30)  HR: 91 (2023 04:40) (72 - 91)  BP: 115/74 (2023 04:40) (94/57 - 115/74)  BP(mean): --  ABP: --  ABP(mean): --  RR: 18 (2023 04:40) (18 - 19)  SpO2: 96% (2023 04:40) (90% - 96%)    O2 Parameters below as of 2023 04:40  Patient On (Oxygen Delivery Method): nasal cannula  O2 Flow (L/min): 5        PHYSICAL EXAM:  GENERAL: intubated and sedated  HEAD:  Atraumatic, Normocephalic  EYES: Pupils round and sluggish b/l, conjunctiva and sclera clear  NECK: Supple, No JVD  CHEST/LUNG: Clear to auscultation bilaterally; No wheeze  HEART: Regular rate and rhythm; No murmurs, rubs, or gallops  ABDOMEN: Soft, Nontender, Nondistended; Bowel sounds present  EXTREMITIES:  2+ pitting edema in LE b/l  NEUROLOGY: intubated and sedated, was SMALLS prior to sedation  SKIN: Diffuse purpura in b/l       HOSPITAL MEDICATIONS:  MEDICATIONS  (STANDING):  buMETAnide Injectable 2 milliGRAM(s) IV Push two times a day  chlorhexidine 2% Cloths 1 Application(s) Topical daily  heparin  Infusion.  Unit(s)/Hr (16 mL/Hr) IV Continuous <Continuous>  influenza  Vaccine (HIGH DOSE) 0.7 milliLiter(s) IntraMuscular once  midodrine 10 milliGRAM(s) Oral three times a day  ondansetron Injectable 4 milliGRAM(s) IV Push four times a day  pantoprazole  Injectable 40 milliGRAM(s) IV Push two times a day  piperacillin/tazobactam IVPB.. 3.375 Gram(s) IV Intermittent every 12 hours  predniSONE   Tablet 10 milliGRAM(s) Oral daily  simvastatin 10 milliGRAM(s) Oral at bedtime  sodium bicarbonate  Infusion 0.043 mEq/kG/Hr (50 mL/Hr) IV Continuous <Continuous>  sucralfate suspension 1 Gram(s) Oral two times a day    MEDICATIONS  (PRN):  acetaminophen     Tablet .. 650 milliGRAM(s) Oral every 6 hours PRN Temp greater or equal to 38.5C (101.3F), Mild Pain (1 - 3)  heparin   Injectable 7000 Unit(s) IV Push every 6 hours PRN For aPTT less than 40  heparin   Injectable 3500 Unit(s) IV Push every 6 hours PRN For aPTT between 40 - 57      LABS:                        14.4   27.57 )-----------( 119      ( 2023 04:19 )             44.7     Hgb Trend: 14.4<--, 13.5<--, 13.6<--, 13.3<--, 15.8<--  01-03    133<L>  |  90<L>  |  134<H>  ----------------------------<  80  4.8   |  20<L>  |  6.02<H>    Ca    6.9<L>      2023 15:19      Creatinine Trend: 6.02<--, 5.27<--, 4.17<--, 3.79<--  PTT - ( 2023 04:19 )  PTT:41.8 sec    Arterial Blood Gas:   @ 16:40  7.33/37/42/20/67.7/-5.8  ABG lactate: --        MICROBIOLOGY:     RADIOLOGY & ADDITIONAL TESTS:      ASSESSMENT AND PLAN:  76yo Male with PMHx of HTN, HLD, psoriasis on prednisone, and recent possible esophageal/hepatic malignancy/mass presented with acute onset SOB and found to have RLL segmental PE transferred to MICU after a code blue was called for PEA arrest i/s/o PE now intubated and sedated on pressors.    ==========NEURO==========  #AA&Ox2 at baseline  -Currently intubated and sedated with propofol and fentanyl    #AMS  -MRI brain w/wo contrast 1/3/23 showing acute/subacute lacunar infarcts at R cerebellum and R occipital  -Neuro following      ==========CARDIOVASCULAR==========  #PEA arrest  - i/s/o PE  - s/p 2 rounds of epinephrine  - now intubated and sedated  -  troponin 222 from 144 on   -  BNP pending  - on epinephrine 0.1 and norepinephrine 0.1 --> discontinue epinephrine gtt and titrate norepinephrine gtt to maintain MAP>65  - Cardiology/PERT team consulted  - Vascular following  - POCUS significant for normal RV function with LV systolic dysfunction suspicious for LV infarct vs less likely acute massive PE    #Congestive Heart Failure  - cardiac enzymes  - Trop at 165 on admission and proBNP at 92349 on admission, TTE showed no sign of RV strain, no clot in transit, and LVEF of 70%.  - cardiology recs appreciated      #HTN  - hold BP meds for now  - currently on levo and epi  - hold diuretic    ==========RESPIRATORY==========  #AHRF  - Pt intubated, titrate vent settings to ABGs  - Vent settings: RR|TV|FiO2|PEEP --> 20|400|40|5  - AB.10|24|269|8    #Pulmonary embolus and L DVT   - AC with Heparin; level is supratherapeutic; PTT >200 --> will hold for now, restart as appropriate  - Pulmonary following  - Cardiology/PERT team consulted i/s/o PEA arrest  -VA Duplex BLE: acute above & below knee LLE DVT extending into L ext iliac vein; no acute DVT RLE.  -DVT in Right femoral vein (unclear acuity) seen on POCUS     =========GI/NUTRITION==========  #metastatic pancreatobiliary adenocarcinoma  -c/b PE/DVT on heparin gtt, holding for supratherapeutic PTT  -hem/onc following as below    -Monitor for signs of GI bleeding  -Transfuse to maintain Hgb >7  - NPO for now, plan to place OGT when possible    ==========/RENAL==========  #Anion Gap Metabolic acidosis  - Likely 2/2 LIVIER and lactic acidosis: Lactate 14  - AG 33 on   - AB.10|24|269|8  - Switching bicarb pushes to bicarb drip 75mEq at 75ml/hr  - Holding off on dialysis per Dr. Khan as pt is not a candidate at this time with multiple medical comorbidities and tenuous clinical course. Medical management for now and will reassess for dialysis benefit pending clinical course    #LIVIER  -Creatinine 7.56 s/p arrest, came in with LIVEIR with presenting Cr 3.79 that has been uptrending  -s/p bicarb drip on floors, restarting bicarb gtt  -serrato in place  -strict monitoring of Is and Os    #?adrenal insufficiency  - Nephro following  -Started on hydrocortisone 100q8h ivp  -Continue to bladder scan   -Dose meds for  Cr CL 15-20 ml/min     =========SKIN==========  #Psoriasis  - stress dose steroid    ==========ID==========  #Sepsis/ Pneumonia  - s/p 5 day course of Zosyn completed on , will continue empirically in MICU  - Blood cultures NGTD  - MRSA not detected  -Urine legionella antigen not detected  - Repeat blood cultures, send sputum culture, UA+UC today   -RVP negative  - ID following      ==========ENDOCRINE==========  #?adrenal insufficiency  -see above    ==========HEM/ONC===========  Metastatic Poorly Differentiated Adenocarcinoma  -Hem/Onc consulted and following:  -Patient recently admitted to [facility] and was found to have an esophageal mass and liver mass.   -IR biopsied the liver mass 22 and now pathology shows poorly differentiated adenocarcinoma favoring upper GI or pancreaticobiliary origin.  - CT chest non-con and MRI abdomen w/wo IV contrast were performed at [facility].   - planned to get a PET/CT soon but unable to get one due to renal function.  - CTA chest 22 showing pulmonary embolus (started on heparin gtt for this new finding already), indeterminate lung nodules which will require follow up CT chest w/ IV contrast in 3 months  - At this time, does not require any additional imaging   - Still awaiting results of NGS testing (HER2, PD-L1), which will determine if he is a candidate for immunotherapy  - Recommend following up with GI for PEG or PEJ tube as patient has not been able to tolerate PO for weeks now.      Thrombocytopenia  - follow Platelets  - HIT  - if worsening thrombocytopenia may need IVC filter    ===========DVT prophylaxis===========  - heparin gtt, holding for supratherapeutic PTT    ===========Ethics==========  -Full code MICU Accept Note    CHIEF COMPLAINT: PEA arrest    HPI / INTERVAL HISTORY:   78 y/o M PMH HTN, HLD, psoriasis on chronic prednisone presented with SOB. Recent admission for pneumonia and discharged approximately two weeks ago. Also found with an esophageal and liver mass s/p IR liver biopsy on 22 at [facility]. 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     Pt was admitted for acute right lower lobe PE with DVT of the LLE, pt currently on a heparin gtt.   Pt on zosyn for septic PNA, had an LIVIER w/anion gap acidosis s/p bicarb.  Pt had AMS s/p MR head showing an acute lacunar infarct. MRA head pending. Pt's FOBT was positive and was started on PPIs.    An RRT was called at 0400 today : Pt pulled out his IV access and began bleeding for approximately five minutes before it stopped. Pt currently on a heparin gtt for acute PE with LE DVT. BP obtained was 97/62, spO2 93% on RA. Stat CBC was stable with hgb 14.4 and hct 44.7. PTT was elevated at 41.8. Pt was able to answer questions and follow commands at the time. After shifting the patient to a sitting position, his SOB visibly improved. Pt was then scheduled for bumex as this seemed 2/2 overload.    A code was then called for PEA arrest i/s/o PE. 2 rounds of chest compressions were performed at bedside, ROSC was achieved. Anesthesia stat was called and the patient was intubated and then transferred to MICU now on pressors norepinephrine at 0.1 and epinephrine at 0.1.      PAST MEDICAL & SURGICAL HISTORY:  Psoriasis      Esophageal mass      Benign essential HTN        HOME MEDICATIONS:  * Outpatient Medication Status not yet specified    Allergies    No Known Allergies    Intolerances          REVIEW OF SYSTEMS:  [X] Unable to assess ROS because pt intubated and sedated    OBJECTIVE:  ICU Vital Signs Last 24 Hrs  T(C): 36.6 (2023 20:30), Max: 36.6 (2023 20:30)  T(F): 97.8 (2023 20:30), Max: 97.8 (2023 20:30)  HR: 91 (2023 04:40) (72 - 91)  BP: 115/74 (2023 04:40) (94/57 - 115/74)  BP(mean): --  ABP: --  ABP(mean): --  RR: 18 (2023 04:40) (18 - 19)  SpO2: 96% (2023 04:40) (90% - 96%)    O2 Parameters below as of 2023 04:40  Patient On (Oxygen Delivery Method): nasal cannula  O2 Flow (L/min): 5        PHYSICAL EXAM:  GENERAL: intubated and sedated  HEAD:  Atraumatic, Normocephalic  EYES: Pupils round and sluggish b/l, conjunctiva and sclera clear  NECK: Supple, No JVD  CHEST/LUNG: Clear to auscultation bilaterally; No wheeze  HEART: Regular rate and rhythm; No murmurs, rubs, or gallops  ABDOMEN: Soft, Nontender, Nondistended; Bowel sounds present  EXTREMITIES:  2+ pitting edema in LE b/l  NEUROLOGY: intubated and sedated, was SMALLS prior to sedation  SKIN: Diffuse purpura in b/l       HOSPITAL MEDICATIONS:  MEDICATIONS  (STANDING):  buMETAnide Injectable 2 milliGRAM(s) IV Push two times a day  chlorhexidine 2% Cloths 1 Application(s) Topical daily  heparin  Infusion.  Unit(s)/Hr (16 mL/Hr) IV Continuous <Continuous>  influenza  Vaccine (HIGH DOSE) 0.7 milliLiter(s) IntraMuscular once  midodrine 10 milliGRAM(s) Oral three times a day  ondansetron Injectable 4 milliGRAM(s) IV Push four times a day  pantoprazole  Injectable 40 milliGRAM(s) IV Push two times a day  piperacillin/tazobactam IVPB.. 3.375 Gram(s) IV Intermittent every 12 hours  predniSONE   Tablet 10 milliGRAM(s) Oral daily  simvastatin 10 milliGRAM(s) Oral at bedtime  sodium bicarbonate  Infusion 0.043 mEq/kG/Hr (50 mL/Hr) IV Continuous <Continuous>  sucralfate suspension 1 Gram(s) Oral two times a day    MEDICATIONS  (PRN):  acetaminophen     Tablet .. 650 milliGRAM(s) Oral every 6 hours PRN Temp greater or equal to 38.5C (101.3F), Mild Pain (1 - 3)  heparin   Injectable 7000 Unit(s) IV Push every 6 hours PRN For aPTT less than 40  heparin   Injectable 3500 Unit(s) IV Push every 6 hours PRN For aPTT between 40 - 57      LABS:                        14.4   27.57 )-----------( 119      ( 2023 04:19 )             44.7     Hgb Trend: 14.4<--, 13.5<--, 13.6<--, 13.3<--, 15.8<--  01-03    133<L>  |  90<L>  |  134<H>  ----------------------------<  80  4.8   |  20<L>  |  6.02<H>    Ca    6.9<L>      2023 15:19      Creatinine Trend: 6.02<--, 5.27<--, 4.17<--, 3.79<--  PTT - ( 2023 04:19 )  PTT:41.8 sec    Arterial Blood Gas:   @ 16:40  7.33/37/42/20/67.7/-5.8  ABG lactate: --        MICROBIOLOGY:     RADIOLOGY & ADDITIONAL TESTS:      ASSESSMENT AND PLAN:  76yo Male with PMHx of HTN, HLD, psoriasis on prednisone, and recent possible esophageal/hepatic malignancy/mass presented with acute onset SOB and found to have RLL segmental PE transferred to MICU after a code blue was called for PEA arrest i/s/o PE now intubated and sedated on pressors.    ==========NEURO==========  #AA&Ox2 at baseline  -Currently intubated and sedated with propofol and fentanyl    #AMS  -MRI brain w/wo contrast 1/3/23 showing acute/subacute lacunar infarcts at R cerebellum and R occipital  -Neuro following      ==========CARDIOVASCULAR==========  #PEA arrest  - i/s/o PE  - s/p 2 rounds of epinephrine  - now intubated and sedated  -  troponin 222 from 144 on   -  BNP pending  - on epinephrine 0.1 and norepinephrine 0.1 --> discontinue epinephrine gtt and titrate norepinephrine gtt to maintain MAP>65  - Cardiology/PERT team consulted  - Vascular following  - POCUS significant for normal RV function with LV systolic dysfunction suspicious for LV infarct vs less likely acute massive PE    #Congestive Heart Failure  - cardiac enzymes  - Trop at 165 on admission and proBNP at 86414 on admission, TTE showed no sign of RV strain, no clot in transit, and LVEF of 70%.  - cardiology recs appreciated      #HTN  - hold BP meds for now  - currently on levo and epi  - hold diuretic    ==========RESPIRATORY==========  #AHRF  - Pt intubated and mechanically ventilated  - Vent settings on AC/CMV mode  RR|TV|FiO2|PEEP --> 20|400|40|5  - Daily ABG's: 7.10|24|269|8    #Pulmonary embolus and L DVT   - AC with Heparin; level is supratherapeutic; PTT >200 --> will hold for now, restart as appropriate  - Pulmonary following  - Cardiology/PERT team consulted i/s/o PEA arrest  -VA Duplex BLE: acute above & below knee LLE DVT extending into L ext iliac vein; no acute DVT RLE.  -DVT in Right femoral vein (unclear acuity) seen on POCUS     =========GI/NUTRITION==========  #metastatic pancreatobiliary adenocarcinoma  -c/b PE/DVT on heparin gtt, holding for supratherapeutic PTT  -hem/onc following as below    -Monitor for signs of GI bleeding  -Transfuse to maintain Hgb >7  - NPO for now, plan to place OGT when possible    ==========/RENAL==========  #Anion Gap Metabolic acidosis  - Likely 2/2 LIVIER and lactic acidosis: Lactate 14  - AG 33 on   - AB.10|24|269|8  - Switching bicarb pushes to bicarb drip 75mEq at 75ml/hr  - Holding off on dialysis per Dr. Khan as pt is not a candidate at this time with multiple medical comorbidities and tenuous clinical course. Medical management for now and will reassess for dialysis benefit pending clinical course    #LIVIER  -Creatinine 7.56 s/p arrest, came in with LIVIER with presenting Cr 3.79 that has been uptrending  -s/p bicarb drip on floors, restarting bicarb gtt  -serrato in place  -strict monitoring of Is and Os    #?adrenal insufficiency  - Nephro following  -Started on hydrocortisone 100q8h ivp  -Continue to bladder scan   -Dose meds for  Cr CL 15-20 ml/min     =========SKIN==========  #Psoriasis  - stress dose steroid    ==========ID==========  #Sepsis/ Pneumonia  - s/p 5 day course of Zosyn completed on , will continue empirically in MICU  - Blood cultures NGTD  - MRSA not detected  -Urine legionella antigen not detected  - Repeat blood cultures, send sputum culture, UA+UC today   -RVP negative  - ID following      ==========ENDOCRINE==========  #?adrenal insufficiency  -see above    ==========HEM/ONC===========  Metastatic Poorly Differentiated Adenocarcinoma  -Hem/Onc consulted and following:  -Patient recently admitted to [facility] and was found to have an esophageal mass and liver mass.   -IR biopsied the liver mass 22 and now pathology shows poorly differentiated adenocarcinoma favoring upper GI or pancreaticobiliary origin.  - CT chest non-con and MRI abdomen w/wo IV contrast were performed at [facility].   - planned to get a PET/CT soon but unable to get one due to renal function.  - CTA chest 22 showing pulmonary embolus (started on heparin gtt for this new finding already), indeterminate lung nodules which will require follow up CT chest w/ IV contrast in 3 months  - At this time, does not require any additional imaging   - Still awaiting results of NGS testing (HER2, PD-L1), which will determine if he is a candidate for immunotherapy  - Recommend following up with GI for PEG or PEJ tube as patient has not been able to tolerate PO for weeks now.      Thrombocytopenia  - follow Platelets  - HIT  - if worsening thrombocytopenia may need IVC filter    ===========DVT prophylaxis===========  - heparin gtt, holding for supratherapeutic PTT    ===========Ethics==========  -Full code MICU Accept Note    CHIEF COMPLAINT: PEA arrest    HPI / INTERVAL HISTORY:   76 y/o M PMH HTN, HLD, psoriasis on chronic prednisone presented with SOB. Recent admission for pneumonia and discharged approximately two weeks ago. Also found with an esophageal and liver mass s/p IR liver biopsy on 22 at [facility]. 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     Pt was admitted for acute right lower lobe PE with DVT of the LLE, pt currently on a heparin gtt.   Pt on zosyn for septic PNA, had an LIVIER w/anion gap acidosis s/p bicarb.  Pt had AMS s/p MR head showing an acute lacunar infarct. MRA head pending. Pt's FOBT was positive and was started on PPIs.    An RRT was called at 0400 today : Pt pulled out his IV access and began bleeding for approximately five minutes before it stopped. Pt currently on a heparin gtt for acute PE with LE DVT. BP obtained was 97/62, spO2 93% on RA. Stat CBC was stable with hgb 14.4 and hct 44.7. PTT was elevated at 41.8. Pt was able to answer questions and follow commands at the time. After shifting the patient to a sitting position, his SOB visibly improved. Pt was then scheduled for bumex as this seemed 2/2 overload.    A code was then called for PEA arrest i/s/o PE. 2 rounds of chest compressions were performed at bedside, ROSC was achieved. Anesthesia stat was called and the patient was intubated and then transferred to MICU now on pressors norepinephrine at 0.1 and epinephrine at 0.1.      PAST MEDICAL & SURGICAL HISTORY:  Psoriasis      Esophageal mass      Benign essential HTN        HOME MEDICATIONS:  * Outpatient Medication Status not yet specified    Allergies    No Known Allergies    Intolerances          REVIEW OF SYSTEMS:  [X] Unable to assess ROS because pt intubated and sedated    OBJECTIVE:  ICU Vital Signs Last 24 Hrs  T(C): 36.6 (2023 20:30), Max: 36.6 (2023 20:30)  T(F): 97.8 (2023 20:30), Max: 97.8 (2023 20:30)  HR: 91 (2023 04:40) (72 - 91)  BP: 115/74 (2023 04:40) (94/57 - 115/74)  BP(mean): --  ABP: --  ABP(mean): --  RR: 18 (2023 04:40) (18 - 19)  SpO2: 96% (2023 04:40) (90% - 96%)    O2 Parameters below as of 2023 04:40  Patient On (Oxygen Delivery Method): nasal cannula  O2 Flow (L/min): 5        PHYSICAL EXAM:  GENERAL: intubated and sedated  HEAD:  Atraumatic, Normocephalic  EYES: Pupils round and sluggish b/l, conjunctiva and sclera clear  NECK: Supple, No JVD  CHEST/LUNG: Clear to auscultation bilaterally; No wheeze  HEART: Regular rate and rhythm; No murmurs, rubs, or gallops  ABDOMEN: Soft, Nontender, Nondistended; Bowel sounds present  EXTREMITIES:  2+ pitting edema in LE b/l  NEUROLOGY: intubated and sedated, was SMALLS prior to sedation  SKIN: Diffuse purpura in b/l       HOSPITAL MEDICATIONS:  MEDICATIONS  (STANDING):  buMETAnide Injectable 2 milliGRAM(s) IV Push two times a day  chlorhexidine 2% Cloths 1 Application(s) Topical daily  heparin  Infusion.  Unit(s)/Hr (16 mL/Hr) IV Continuous <Continuous>  influenza  Vaccine (HIGH DOSE) 0.7 milliLiter(s) IntraMuscular once  midodrine 10 milliGRAM(s) Oral three times a day  ondansetron Injectable 4 milliGRAM(s) IV Push four times a day  pantoprazole  Injectable 40 milliGRAM(s) IV Push two times a day  piperacillin/tazobactam IVPB.. 3.375 Gram(s) IV Intermittent every 12 hours  predniSONE   Tablet 10 milliGRAM(s) Oral daily  simvastatin 10 milliGRAM(s) Oral at bedtime  sodium bicarbonate  Infusion 0.043 mEq/kG/Hr (50 mL/Hr) IV Continuous <Continuous>  sucralfate suspension 1 Gram(s) Oral two times a day    MEDICATIONS  (PRN):  acetaminophen     Tablet .. 650 milliGRAM(s) Oral every 6 hours PRN Temp greater or equal to 38.5C (101.3F), Mild Pain (1 - 3)  heparin   Injectable 7000 Unit(s) IV Push every 6 hours PRN For aPTT less than 40  heparin   Injectable 3500 Unit(s) IV Push every 6 hours PRN For aPTT between 40 - 57      LABS:                        14.4   27.57 )-----------( 119      ( 2023 04:19 )             44.7     Hgb Trend: 14.4<--, 13.5<--, 13.6<--, 13.3<--, 15.8<--  01-03    133<L>  |  90<L>  |  134<H>  ----------------------------<  80  4.8   |  20<L>  |  6.02<H>    Ca    6.9<L>      2023 15:19      Creatinine Trend: 6.02<--, 5.27<--, 4.17<--, 3.79<--  PTT - ( 2023 04:19 )  PTT:41.8 sec    Arterial Blood Gas:   @ 16:40  7.33/37/42/20/67.7/-5.8  ABG lactate: --        MICROBIOLOGY:     RADIOLOGY & ADDITIONAL TESTS:      ASSESSMENT AND PLAN:  78yo Male with PMHx of HTN, HLD, psoriasis on prednisone, and recent possible esophageal/hepatic malignancy/mass presented with acute onset SOB and found to have RLL segmental PE transferred to MICU after a code blue was called for PEA arrest i/s/o PE now intubated and sedated on pressors.    ==========NEURO==========  #AA&Ox2 at baseline  -Currently intubated and sedated with propofol and fentanyl    #AMS  -MRI brain w/wo contrast 1/3/23 showing acute/subacute lacunar infarcts at R cerebellum and R occipital  -Neuro following      ==========CARDIOVASCULAR==========  #PEA arrest  - i/s/o PE  - s/p 2 rounds of epinephrine  - now intubated and sedated  -  troponin 222 from 144 on   -  BNP pending  - on epinephrine 0.1 and norepinephrine 0.1 --> discontinue epinephrine gtt and titrate norepinephrine gtt to maintain MAP>65  - Cardiology/PERT team consulted  - Vascular following  - POCUS significant for normal RV function with LV systolic dysfunction suspicious for LV infarct vs less likely acute massive PE    #Congestive Heart Failure  - cardiac enzymes  - Trop at 165 on admission [place]BNP [place] [address] on admission, TTE showed no sign of RV strain, no clot in transit, and LVEF of 70%.  - cardiology recs appreciated      #HTN  - hold BP meds for now  - currently on levo and epi  - hold diuretic    ==========RESPIRATORY==========  #AHRF  - Pt intubated and mechanically ventilated  - Vent settings on AC/CMV mode  RR|TV|FiO2|PEEP --> 20|400|40|5  - Daily ABG's: 7.10|24|269|8    #Pulmonary embolus and L DVT   - AC with Heparin; level is supratherapeutic; PTT >200 --> will hold for now, restart as appropriate  - Pulmonary following  - Cardiology/PERT team consulted i/s/o PEA arrest  -VA Duplex BLE: acute above & below knee LLE DVT extending into L ext iliac vein; no acute DVT RLE.  -DVT in Right femoral vein (unclear acuity) seen on POCUS     =========GI/NUTRITION==========  #metastatic pancreatobiliary adenocarcinoma  -c/b PE/DVT on heparin gtt, holding for supratherapeutic PTT  -hem/onc following as below    -Monitor for signs of GI bleeding  -Transfuse to maintain Hgb >7  - NPO for now, plan to place OGT when possible    ==========/RENAL==========  #Anion Gap Metabolic acidosis  - Likely 2/2 LIVIER and lactic acidosis: Lactate 14  - AG 33 on   - AB.10|24|269|8  - Switching bicarb pushes to bicarb drip 75mEq at 75ml/hr  - Holding off on dialysis per Dr. Khan as pt is not a candidate at this time with multiple medical comorbidities and tenuous clinical course. Medical management for now and will reassess for dialysis benefit pending clinical course    #LIVIER  -Creatinine 7.56 s/p arrest, came in with LIVIER with presenting Cr 3.79 that has been uptrending  -s/p bicarb drip on floors, restarting bicarb gtt  -serrato in place  -strict monitoring of Is and Os    #?adrenal insufficiency  - Nephro following  -Started on hydrocortisone 100q8h ivp  -Continue to bladder scan   -Dose meds for  Cr CL 15-20 ml/min     =========SKIN==========  #Psoriasis  - stress dose steroid    ==========ID==========  #Sepsis/ Pneumonia  - s/p 5 day course of Zosyn completed on , will continue empirically in MICU  - Blood cultures NGTD  - MRSA not detected  -Urine legionella antigen not detected  - Repeat blood cultures, send sputum culture, UA+UC today   -RVP negative  - ID following      ==========ENDOCRINE==========  #?adrenal insufficiency  -see above    ==========HEM/ONC===========  Metastatic Poorly Differentiated Adenocarcinoma  -Hem/Onc consulted and following:  -Patient recently admitted to [facility] and was found to have an esophageal mass and liver mass.   -IR biopsied the liver mass 22 and now pathology shows poorly differentiated adenocarcinoma favoring upper GI or pancreaticobiliary origin.  - CT chest non-con and MRI abdomen w/wo IV contrast were performed at [facility].   - planned to get a PET/CT soon but unable to get one due to renal function.  - CTA chest 22 showing pulmonary embolus (started on heparin gtt for this new finding already), indeterminate lung nodules which will require follow up CT chest w/ IV contrast in 3 months  - At this time, does not require any additional imaging   - Still awaiting results of NGS testing (HER2, PD-L1), which will determine if he is a candidate for immunotherapy  - Recommend following up with GI for PEG or PEJ tube as patient has not been able to tolerate PO for weeks now.      Thrombocytopenia  - follow Platelets  - HIT  - if worsening thrombocytopenia may need IVC filter    ===========DVT prophylaxis===========  - heparin gtt, holding for supratherapeutic PTT    ===========Ethics==========  -Full code MICU Accept Note    CHIEF COMPLAINT: PEA arrest    HPI / INTERVAL HISTORY:   76 y/o M PMH HTN, HLD, psoriasis on chronic prednisone presented with SOB. Recent admission for pneumonia and discharged approximately two weeks ago. Also found with an esophageal and liver mass s/p IR liver biopsy on 22 at [facility]. 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     Pt was admitted for acute right lower lobe PE with DVT of the LLE, pt currently on a heparin gtt.   Pt on zosyn for septic PNA, had an LIVIER w/anion gap acidosis s/p bicarb.  Pt had AMS s/p MR head showing an acute lacunar infarct. MRA head pending. Pt's FOBT was positive and was started on PPIs.    An RRT was called at 0400 today : Pt pulled out his IV access and began bleeding for approximately five minutes before it stopped. Pt currently on a heparin gtt for acute PE with LE DVT. BP obtained was 97/62, spO2 93% on RA. Stat CBC was stable with hgb 14.4 and hct 44.7. PTT was elevated at 41.8. Pt was able to answer questions and follow commands at the time. After shifting the patient to a sitting position, his SOB visibly improved. Pt was then scheduled for bumex as this seemed 2/2 overload.    A code was then called for PEA arrest i/s/o PE. 2 rounds of chest compressions were performed at bedside, ROSC was achieved. Anesthesia stat was called and the patient was intubated and then transferred to MICU now on pressors norepinephrine at 0.1 and epinephrine at 0.1.      PAST MEDICAL & SURGICAL HISTORY:  Psoriasis      Esophageal mass      Benign essential HTN        HOME MEDICATIONS:  * Outpatient Medication Status not yet specified    Allergies    No Known Allergies    Intolerances          REVIEW OF SYSTEMS:  [X] Unable to assess ROS because pt intubated and sedated    OBJECTIVE:  ICU Vital Signs Last 24 Hrs  T(C): 36.6 (2023 20:30), Max: 36.6 (2023 20:30)  T(F): 97.8 (2023 20:30), Max: 97.8 (2023 20:30)  HR: 91 (2023 04:40) (72 - 91)  BP: 115/74 (2023 04:40) (94/57 - 115/74)  BP(mean): --  ABP: --  ABP(mean): --  RR: 18 (2023 04:40) (18 - 19)  SpO2: 96% (2023 04:40) (90% - 96%)    O2 Parameters below as of 2023 04:40  Patient On (Oxygen Delivery Method): nasal cannula  O2 Flow (L/min): 5        PHYSICAL EXAM:  GENERAL: intubated and sedated  HEAD:  Atraumatic, Normocephalic  EYES: Pupils round and sluggish b/l, conjunctiva and sclera clear  NECK: Supple, No JVD  CHEST/LUNG: Clear to auscultation bilaterally; No wheeze  HEART: Regular rate and rhythm; No murmurs, rubs, or gallops  ABDOMEN: Soft, Nontender, Nondistended; Bowel sounds present  EXTREMITIES:  2+ pitting edema in LE b/l  NEUROLOGY: intubated and sedated, was SMALLS prior to sedation  SKIN: Diffuse purpura in b/l       HOSPITAL MEDICATIONS:  MEDICATIONS  (STANDING):  buMETAnide Injectable 2 milliGRAM(s) IV Push two times a day  chlorhexidine 2% Cloths 1 Application(s) Topical daily  heparin  Infusion.  Unit(s)/Hr (16 mL/Hr) IV Continuous <Continuous>  influenza  Vaccine (HIGH DOSE) 0.7 milliLiter(s) IntraMuscular once  midodrine 10 milliGRAM(s) Oral three times a day  ondansetron Injectable 4 milliGRAM(s) IV Push four times a day  pantoprazole  Injectable 40 milliGRAM(s) IV Push two times a day  piperacillin/tazobactam IVPB.. 3.375 Gram(s) IV Intermittent every 12 hours  predniSONE   Tablet 10 milliGRAM(s) Oral daily  simvastatin 10 milliGRAM(s) Oral at bedtime  sodium bicarbonate  Infusion 0.043 mEq/kG/Hr (50 mL/Hr) IV Continuous <Continuous>  sucralfate suspension 1 Gram(s) Oral two times a day    MEDICATIONS  (PRN):  acetaminophen     Tablet .. 650 milliGRAM(s) Oral every 6 hours PRN Temp greater or equal to 38.5C (101.3F), Mild Pain (1 - 3)  heparin   Injectable 7000 Unit(s) IV Push every 6 hours PRN For aPTT less than 40  heparin   Injectable 3500 Unit(s) IV Push every 6 hours PRN For aPTT between 40 - 57      LABS:                        14.4   27.57 )-----------( 119      ( 2023 04:19 )             44.7     Hgb Trend: 14.4<--, 13.5<--, 13.6<--, 13.3<--, 15.8<--  01-03    133<L>  |  90<L>  |  134<H>  ----------------------------<  80  4.8   |  20<L>  |  6.02<H>    Ca    6.9<L>      2023 15:19      Creatinine Trend: 6.02<--, 5.27<--, 4.17<--, 3.79<--  PTT - ( 2023 04:19 )  PTT:41.8 sec    Arterial Blood Gas:   @ 16:40  7.33/37/42/20/67.7/-5.8  ABG lactate: --        MICROBIOLOGY:     RADIOLOGY & ADDITIONAL TESTS:      ASSESSMENT AND PLAN:  78yo Male with PMHx of HTN, HLD, psoriasis on prednisone, and recent possible esophageal/hepatic malignancy/mass presented with acute onset SOB and found to have RLL segmental PE transferred to MICU after a code blue was called for PEA arrest i/s/o PE now intubated and sedated on pressors.    ==========NEURO==========  #AA&Ox2 at baseline  -Currently intubated and sedated with propofol and fentanyl    #AMS  -MRI brain w/wo contrast 1/3/23 showing acute/subacute lacunar infarcts at R cerebellum and R occipital  -Neuro following      ==========CARDIOVASCULAR==========  #PEA arrest  - i/s/o PE  - s/p 2 rounds of epinephrine  - now intubated and sedated  -  troponin 222 from 144 on   -  BNP pending  - on epinephrine 0.1 and norepinephrine 0.1 --> discontinue epinephrine gtt and titrate norepinephrine gtt to maintain MAP>65  - Cardiology/PERT team consulted  - Vascular following  - POCUS significant for normal RV function with LV systolic dysfunction suspicious for LV infarct vs less likely acute massive PE    #Congestive Heart Failure  - cardiac enzymes  - Trop at 165 on admission [place] [address] on admission, TTE showed no sign of RV strain, no clot in transit, and LVEF of 70%.  - cardiology recs appreciated      #HTN  - hold BP meds for now  - currently on levo and epi  - hold diuretic    ==========RESPIRATORY==========  #AHRF  - Pt intubated and mechanically ventilated  - Vent settings on AC/CMV mode  RR|TV|FiO2|PEEP --> 20|400|40|5  - Daily ABG's: 7.10|24|269|8    #Pulmonary embolus and L DVT   - AC with Heparin; level is supratherapeutic; PTT >200 --> will hold for now, restart as appropriate  - Pulmonary following  - Cardiology/PERT team consulted i/s/o PEA arrest  -VA Duplex BLE: acute above & below knee LLE DVT extending into L ext iliac vein; no acute DVT RLE.  -DVT in Right femoral vein (unclear acuity) seen on POCUS     =========GI/NUTRITION==========  #metastatic pancreatobiliary adenocarcinoma  -c/b PE/DVT on heparin gtt, holding for supratherapeutic PTT  -hem/onc following as below    -Monitor for signs of GI bleeding  -Transfuse to maintain Hgb >7  - NPO for now, plan to place OGT when possible    ==========/RENAL==========  #Anion Gap Metabolic acidosis  - Likely 2/2 LIVIER and lactic acidosis: Lactate 14  - AG 33 on   - AB.10|24|269|8  - Switching bicarb pushes to bicarb drip 75mEq at 75ml/hr  - Holding off on dialysis per Dr. Khan as pt is not a candidate at this time with multiple medical comorbidities and tenuous clinical course. Medical management for now and will reassess for dialysis benefit pending clinical course    #LIVIER  -Creatinine 7.56 s/p arrest, came in with LIVIER with presenting Cr 3.79 that has been uptrending  -s/p bicarb drip on floors, restarting bicarb gtt  -serrato in place  -strict monitoring of Is and Os    #?adrenal insufficiency  - Nephro following  -Started on hydrocortisone 100q8h ivp  -Continue to bladder scan   -Dose meds for  Cr CL 15-20 ml/min     =========SKIN==========  #Psoriasis  - stress dose steroid    ==========ID==========  #Sepsis/ Pneumonia  - s/p 5 day course of Zosyn completed on , will continue empirically in MICU  - Blood cultures NGTD  - MRSA not detected  -Urine legionella antigen not detected  - Repeat blood cultures, send sputum culture, UA+UC today   -RVP negative  - ID following      ==========ENDOCRINE==========  #?adrenal insufficiency  -see above    ==========HEM/ONC===========  Metastatic Poorly Differentiated Adenocarcinoma  -Hem/Onc consulted and following:  -Patient recently admitted to [facility] and was found to have an esophageal mass and liver mass.   -IR biopsied the liver mass 22 and now pathology shows poorly differentiated adenocarcinoma favoring upper GI or pancreaticobiliary origin.  - CT chest non-con and MRI abdomen w/wo IV contrast were performed at [facility].   - planned to get a PET/CT soon but unable to get one due to renal function.  - CTA chest 22 showing pulmonary embolus (started on heparin gtt for this new finding already), indeterminate lung nodules which will require follow up CT chest w/ IV contrast in 3 months  - At this time, does not require any additional imaging   - Still awaiting results of NGS testing (HER2, PD-L1), which will determine if he is a candidate for immunotherapy  - Recommend following up with GI for PEG or PEJ tube as patient has not been able to tolerate PO for weeks now.      Thrombocytopenia  - follow Platelets  - HIT  - if worsening thrombocytopenia may need IVC filter    ===========DVT prophylaxis===========  - heparin gtt, holding for supratherapeutic PTT    ===========Ethics==========  -Full code

## 2025-05-19 NOTE — ED ADULT TRIAGE NOTE - NS ED TRIAGE AVPU SCALE
Refill Request    LOV  4/16/2024  Next  5/29/2025   Alert-The patient is alert, awake and responds to voice. The patient is oriented to time, place, and person. The triage nurse is able to obtain subjective information.